# Patient Record
Sex: FEMALE | Race: WHITE | Employment: OTHER | URBAN - METROPOLITAN AREA
[De-identification: names, ages, dates, MRNs, and addresses within clinical notes are randomized per-mention and may not be internally consistent; named-entity substitution may affect disease eponyms.]

---

## 2017-01-17 ENCOUNTER — MED REC SCAN ONLY (OUTPATIENT)
Dept: FAMILY MEDICINE CLINIC | Facility: CLINIC | Age: 82
End: 2017-01-17

## 2017-02-02 RX ORDER — BETAMETHASONE DIPROPIONATE 0.05 %
OINTMENT (GRAM) TOPICAL
Qty: 60 G | Refills: 3 | Status: SHIPPED | OUTPATIENT
Start: 2017-02-02 | End: 2019-06-17

## 2017-02-02 NOTE — TELEPHONE ENCOUNTER
From: Kyle Hernandez  To:  Juaquin Berger MD  Sent: 1/31/2017 10:32 PM CST  Subject: Medication Renewal Request    Original authorizing provider: MD Kyle Palencia would like a refill of the following medications:  Betamethasone Dipro

## 2017-02-02 NOTE — TELEPHONE ENCOUNTER
Refill requested for Betamethasone cream via [facility]. LOV 08/23/16. Will you approve refill ? Routed to Dr Kenji Jones.

## 2017-03-21 ENCOUNTER — MED REC SCAN ONLY (OUTPATIENT)
Dept: FAMILY MEDICINE CLINIC | Facility: CLINIC | Age: 82
End: 2017-03-21

## 2017-03-30 ENCOUNTER — LAB ENCOUNTER (OUTPATIENT)
Dept: LAB | Age: 82
End: 2017-03-30
Attending: INTERNAL MEDICINE
Payer: MEDICARE

## 2017-03-30 ENCOUNTER — PRIOR ORIGINAL RECORDS (OUTPATIENT)
Dept: OTHER | Age: 82
End: 2017-03-30

## 2017-03-30 DIAGNOSIS — I25.10 CORONARY ATHEROSCLEROSIS OF NATIVE CORONARY ARTERY: ICD-10-CM

## 2017-03-30 DIAGNOSIS — E78.00 PURE HYPERCHOLESTEROLEMIA: ICD-10-CM

## 2017-03-30 DIAGNOSIS — I65.23 BILATERAL CAROTID ARTERY STENOSIS: Primary | ICD-10-CM

## 2017-03-30 PROCEDURE — 36415 COLL VENOUS BLD VENIPUNCTURE: CPT

## 2017-03-30 PROCEDURE — 84450 TRANSFERASE (AST) (SGOT): CPT

## 2017-03-30 PROCEDURE — 80061 LIPID PANEL: CPT

## 2017-03-30 PROCEDURE — 80048 BASIC METABOLIC PNL TOTAL CA: CPT

## 2017-03-30 PROCEDURE — 84460 ALANINE AMINO (ALT) (SGPT): CPT

## 2017-04-03 LAB
BUN: 18 MG/DL
CALCIUM: 8.8 MG/DL
CHLORIDE: 109 MEQ/L
CHOLESTEROL, TOTAL: 114 MG/DL
CREATININE, SERUM: 0.81 MG/DL
GLUCOSE: 108 MG/DL
HDL CHOLESTEROL: 49 MG/DL
LDL CHOLESTEROL: 49 MG/DL
POTASSIUM, SERUM: 3.7 MEQ/L
SGOT (AST): 15 IU/L
SGPT (ALT): 18 IU/L
SODIUM: 144 MEQ/L
TRIGLYCERIDES: 81 MG/DL

## 2017-04-10 ENCOUNTER — PRIOR ORIGINAL RECORDS (OUTPATIENT)
Dept: OTHER | Age: 82
End: 2017-04-10

## 2017-07-17 ENCOUNTER — APPOINTMENT (OUTPATIENT)
Dept: LAB | Age: 82
End: 2017-07-17
Attending: FAMILY MEDICINE
Payer: MEDICARE

## 2017-07-17 ENCOUNTER — OFFICE VISIT (OUTPATIENT)
Dept: FAMILY MEDICINE CLINIC | Facility: CLINIC | Age: 82
End: 2017-07-17

## 2017-07-17 VITALS
DIASTOLIC BLOOD PRESSURE: 60 MMHG | BODY MASS INDEX: 25 KG/M2 | SYSTOLIC BLOOD PRESSURE: 116 MMHG | RESPIRATION RATE: 15 BRPM | WEIGHT: 157 LBS | HEART RATE: 100 BPM

## 2017-07-17 DIAGNOSIS — M79.10 MYALGIA: Primary | ICD-10-CM

## 2017-07-17 DIAGNOSIS — E78.00 PURE HYPERCHOLESTEROLEMIA: ICD-10-CM

## 2017-07-17 DIAGNOSIS — H35.30 MACULAR DEGENERATION: ICD-10-CM

## 2017-07-17 DIAGNOSIS — M35.3 PMR (POLYMYALGIA RHEUMATICA) (HCC): ICD-10-CM

## 2017-07-17 DIAGNOSIS — M79.10 MYALGIA: ICD-10-CM

## 2017-07-17 DIAGNOSIS — D75.89 MACROCYTOSIS: ICD-10-CM

## 2017-07-17 DIAGNOSIS — I10 ESSENTIAL HYPERTENSION: ICD-10-CM

## 2017-07-17 DIAGNOSIS — E03.9 HYPOTHYROIDISM, UNSPECIFIED TYPE: ICD-10-CM

## 2017-07-17 LAB
CK: 49 IU/L (ref 26–192)
TSI SER-ACNC: 0.35 MIU/ML (ref 0.35–5.5)

## 2017-07-17 PROCEDURE — 99214 OFFICE O/P EST MOD 30 MIN: CPT | Performed by: FAMILY MEDICINE

## 2017-07-17 PROCEDURE — 82607 VITAMIN B-12: CPT

## 2017-07-17 PROCEDURE — 84443 ASSAY THYROID STIM HORMONE: CPT

## 2017-07-17 PROCEDURE — 85027 COMPLETE CBC AUTOMATED: CPT

## 2017-07-17 PROCEDURE — 80053 COMPREHEN METABOLIC PANEL: CPT

## 2017-07-17 PROCEDURE — 86140 C-REACTIVE PROTEIN: CPT

## 2017-07-17 PROCEDURE — 36415 COLL VENOUS BLD VENIPUNCTURE: CPT

## 2017-07-17 PROCEDURE — 85652 RBC SED RATE AUTOMATED: CPT

## 2017-07-17 PROCEDURE — 82550 ASSAY OF CK (CPK): CPT

## 2017-07-17 NOTE — PROGRESS NOTES
Chief Complaint:  Patient presents with:  Pain: c/o shoulder, arm, leg, and groin pain since May. Taking APAP for pain - takes the edge off.    Thyroid Problem: due for thyroid labs  Other: pt does not wish to have screening exams done (colonoscopy, mammogr History   Problem Relation Age of Onset   • Breast Cancer Sister    • Diabetes Sister    • Heart Attack Father      MI   • Hypertension Mother      Social history:  Smoking status: Former Smoker no obvious depression or anxiety    ASSESSMENT AND PLAN:  Discussed the following assessment   Problem List Items Addressed This Visit        Cardiovascular    Essential hypertension    Pure hypercholesterolemia       Endocrine    Hypothyroidism       Othe

## 2017-07-18 DIAGNOSIS — M35.3 PMR (POLYMYALGIA RHEUMATICA) (HCC): Primary | ICD-10-CM

## 2017-07-18 DIAGNOSIS — E83.51 HYPOCALCEMIA: ICD-10-CM

## 2017-07-18 DIAGNOSIS — M79.10 MYALGIA: ICD-10-CM

## 2017-07-18 DIAGNOSIS — D75.89 MACROCYTOSIS: ICD-10-CM

## 2017-07-18 LAB
ALBUMIN SERPL-MCNC: 3.4 G/DL (ref 3.5–4.8)
ALP LIVER SERPL-CCNC: 106 U/L (ref 55–142)
ALT SERPL-CCNC: 17 U/L (ref 14–54)
AST SERPL-CCNC: 13 U/L (ref 15–41)
BILIRUB SERPL-MCNC: 0.4 MG/DL (ref 0.1–2)
BUN BLD-MCNC: 13 MG/DL (ref 8–20)
C-REACTIVE PROTEIN: 3.78 MG/DL (ref ?–1)
CALCIUM BLD-MCNC: 9.6 MG/DL (ref 8.3–10.3)
CHLORIDE: 107 MMOL/L (ref 101–111)
CO2: 23 MMOL/L (ref 22–32)
CREAT BLD-MCNC: 0.68 MG/DL (ref 0.55–1.02)
ERYTHROCYTE [DISTWIDTH] IN BLOOD BY AUTOMATED COUNT: 13.5 % (ref 11.5–16)
GLUCOSE BLD-MCNC: 74 MG/DL (ref 70–99)
HAV AB SERPL IA-ACNC: 349 PG/ML (ref 193–986)
HCT VFR BLD AUTO: 40.9 % (ref 34–50)
HGB BLD-MCNC: 12.9 G/DL (ref 12–16)
M PROTEIN MFR SERPL ELPH: 8 G/DL (ref 6.1–8.3)
MCH RBC QN AUTO: 32.1 PG (ref 27–33.2)
MCHC RBC AUTO-ENTMCNC: 31.5 G/DL (ref 31–37)
MCV RBC AUTO: 101.7 FL (ref 81–100)
PLATELET # BLD AUTO: 265 10(3)UL (ref 150–450)
POTASSIUM SERPL-SCNC: 4.3 MMOL/L (ref 3.6–5.1)
RBC # BLD AUTO: 4.02 X10(6)UL (ref 3.8–5.1)
RED CELL DISTRIBUTION WIDTH-SD: 51 FL (ref 35.1–46.3)
SED RATE-ML: 53 MM/HR (ref 0–25)
SODIUM SERPL-SCNC: 140 MMOL/L (ref 136–144)
WBC # BLD AUTO: 10.6 X10(3) UL (ref 4–13)

## 2017-07-18 RX ORDER — PREDNISONE 20 MG/1
20 TABLET ORAL DAILY
Qty: 30 TABLET | Refills: 0 | Status: SHIPPED | OUTPATIENT
Start: 2017-07-18 | End: 2017-08-04

## 2017-07-20 PROBLEM — H35.3221 EXUDATIVE AGE-RELATED MACULAR DEGENERATION OF LEFT EYE WITH ACTIVE CHOROIDAL NEOVASCULARIZATION (HCC): Status: ACTIVE | Noted: 2017-07-20

## 2017-07-20 PROBLEM — H35.3112 NONEXUDATIVE AGE-RELATED MACULAR DEGENERATION, RIGHT EYE, INTERMEDIATE DRY STAGE: Status: ACTIVE | Noted: 2017-07-20

## 2017-07-20 PROBLEM — H35.341 MACULAR CYST, HOLE, OR PSEUDOHOLE, RIGHT EYE: Status: ACTIVE | Noted: 2017-07-20

## 2017-08-04 ENCOUNTER — LAB ENCOUNTER (OUTPATIENT)
Dept: LAB | Age: 82
End: 2017-08-04
Attending: FAMILY MEDICINE
Payer: MEDICARE

## 2017-08-04 ENCOUNTER — OFFICE VISIT (OUTPATIENT)
Dept: FAMILY MEDICINE CLINIC | Facility: CLINIC | Age: 82
End: 2017-08-04

## 2017-08-04 VITALS
BODY MASS INDEX: 25 KG/M2 | WEIGHT: 157 LBS | SYSTOLIC BLOOD PRESSURE: 118 MMHG | HEART RATE: 88 BPM | RESPIRATION RATE: 14 BRPM | DIASTOLIC BLOOD PRESSURE: 58 MMHG

## 2017-08-04 DIAGNOSIS — D75.89 MACROCYTOSIS: ICD-10-CM

## 2017-08-04 DIAGNOSIS — M35.3 PMR (POLYMYALGIA RHEUMATICA) (HCC): ICD-10-CM

## 2017-08-04 DIAGNOSIS — E03.9 HYPOTHYROIDISM, UNSPECIFIED TYPE: ICD-10-CM

## 2017-08-04 DIAGNOSIS — E83.51 HYPOCALCEMIA: ICD-10-CM

## 2017-08-04 DIAGNOSIS — M79.10 MYALGIA: ICD-10-CM

## 2017-08-04 DIAGNOSIS — M35.3 PMR (POLYMYALGIA RHEUMATICA) (HCC): Primary | ICD-10-CM

## 2017-08-04 LAB
25-HYDROXYVITAMIN D (TOTAL): 11.7 NG/ML (ref 30–100)
C-REACTIVE PROTEIN: 0.64 MG/DL (ref ?–1)
FOLATE (FOLIC ACID), SERUM: 23.5 NG/ML (ref 8.7–24)
RHEUMATOID FACT SERPL-ACNC: <10 IU/ML (ref ?–15)
SED RATE-ML: 30 MM/HR (ref 0–25)

## 2017-08-04 PROCEDURE — 84165 PROTEIN E-PHORESIS SERUM: CPT

## 2017-08-04 PROCEDURE — 86334 IMMUNOFIX E-PHORESIS SERUM: CPT

## 2017-08-04 PROCEDURE — 86431 RHEUMATOID FACTOR QUANT: CPT

## 2017-08-04 PROCEDURE — 99214 OFFICE O/P EST MOD 30 MIN: CPT | Performed by: FAMILY MEDICINE

## 2017-08-04 PROCEDURE — 36415 COLL VENOUS BLD VENIPUNCTURE: CPT

## 2017-08-04 PROCEDURE — 83883 ASSAY NEPHELOMETRY NOT SPEC: CPT

## 2017-08-04 PROCEDURE — 82306 VITAMIN D 25 HYDROXY: CPT

## 2017-08-04 PROCEDURE — 82746 ASSAY OF FOLIC ACID SERUM: CPT

## 2017-08-04 PROCEDURE — 86140 C-REACTIVE PROTEIN: CPT

## 2017-08-04 PROCEDURE — 85652 RBC SED RATE AUTOMATED: CPT

## 2017-08-04 RX ORDER — LEVOTHYROXINE SODIUM 112 UG/1
112 TABLET ORAL
Qty: 90 TABLET | Refills: 3 | Status: SHIPPED | OUTPATIENT
Start: 2017-08-04 | End: 2018-07-13

## 2017-08-04 RX ORDER — PREDNISONE 20 MG/1
20 TABLET ORAL DAILY
Qty: 30 TABLET | Refills: 0 | Status: SHIPPED | OUTPATIENT
Start: 2017-08-04 | End: 2017-10-26

## 2017-08-04 NOTE — PROGRESS NOTES
Chief Complaint:  Patient presents with:  Medication Follow-Up: dx with polymyalgia rheumatica at [address] [place] [address], put on Prednisone. Here for f/u. Pt reports she is feeling much better.      HPI:  This is a 80year old female patient presenting for Medication Follow-U Alcohol use: Yes           4.2 - 8.4 oz/week     Standard drinks or equivalent: 7 - 14 per week     Comment: 1-2 glass of wine daily        Current Outpatient Prescriptions:  Levothyroxine Sodium 112 MCG Oral Tab Take 1 tablet (112 mcg total) predniSONE 20 MG Oral Tab    Other Relevant Orders    SED RATE, WESTERGREN (AUTOMATED) (Completed)    C-REACTIVE PROTEIN (Completed)      Other Visit Diagnoses    None. Advised the following:  Doreen Sena was seen today for medication follow-up.     Radha

## 2017-08-07 DIAGNOSIS — E55.9 VITAMIN D DEFICIENCY: Primary | ICD-10-CM

## 2017-08-07 PROBLEM — M35.3 PMR (POLYMYALGIA RHEUMATICA) (HCC): Status: ACTIVE | Noted: 2017-08-07

## 2017-08-07 RX ORDER — ERGOCALCIFEROL 1.25 MG/1
50000 CAPSULE ORAL WEEKLY
Qty: 12 CAPSULE | Refills: 0 | Status: SHIPPED | OUTPATIENT
Start: 2017-08-07 | End: 2017-09-01

## 2017-08-07 RX ORDER — ERGOCALCIFEROL 1.25 MG/1
50000 CAPSULE ORAL WEEKLY
Qty: 12 CAPSULE | Refills: 0 | Status: SHIPPED | OUTPATIENT
Start: 2017-08-07 | End: 2017-10-26 | Stop reason: ALTCHOICE

## 2017-08-10 LAB
A/G RATIO: 1.52
ALBUMIN, SERUM: 4.22 G/DL (ref 3.5–4.8)
ALPHA-1 GLOBULIN: 0.22 G/DL (ref 0.1–0.3)
ALPHA-2 GLOBULIN: 1.02 G/DL (ref 0.6–1)
BETA GLOBULIN: 0.86 G/DL (ref 0.7–1.2)
GAMMA GLOBULIN: 0.69 G/DL (ref 0.6–1.6)
KAPPA FREE LIGHT CHAIN: 1.75 MG/DL (ref 0.33–1.94)
KAPPA/LAMBDA FLC RATIO: 0.94 (ref 0.26–1.65)
LAMBDA FREE LIGHT CHAIN: 1.87 MG/DL (ref 0.57–2.63)
TOTAL PROTEIN,SERUM: 7 G/DL (ref 6.1–8.3)

## 2017-08-30 ENCOUNTER — TELEPHONE (OUTPATIENT)
Dept: FAMILY MEDICINE CLINIC | Facility: CLINIC | Age: 82
End: 2017-08-30

## 2017-08-30 NOTE — TELEPHONE ENCOUNTER
LMOM for pt to call back and answer her Annual Assessment Form for appt w/Dr Hall on 9/1/17 (form by daily checklist)

## 2017-09-01 ENCOUNTER — OFFICE VISIT (OUTPATIENT)
Dept: FAMILY MEDICINE CLINIC | Facility: CLINIC | Age: 82
End: 2017-09-01

## 2017-09-01 ENCOUNTER — LAB ENCOUNTER (OUTPATIENT)
Dept: LAB | Age: 82
End: 2017-09-01
Attending: FAMILY MEDICINE
Payer: MEDICARE

## 2017-09-01 VITALS
BODY MASS INDEX: 25.3 KG/M2 | SYSTOLIC BLOOD PRESSURE: 140 MMHG | DIASTOLIC BLOOD PRESSURE: 62 MMHG | WEIGHT: 161.19 LBS | HEART RATE: 100 BPM | HEIGHT: 67 IN

## 2017-09-01 DIAGNOSIS — Z00.00 ENCOUNTER FOR ANNUAL HEALTH EXAMINATION: ICD-10-CM

## 2017-09-01 DIAGNOSIS — Z28.21 IMMUNIZATION NOT CARRIED OUT BECAUSE OF PATIENT REFUSAL: ICD-10-CM

## 2017-09-01 DIAGNOSIS — M35.3 PMR (POLYMYALGIA RHEUMATICA) (HCC): ICD-10-CM

## 2017-09-01 DIAGNOSIS — Z13.31 DEPRESSION SCREENING: ICD-10-CM

## 2017-09-01 DIAGNOSIS — D75.89 MACROCYTOSIS: ICD-10-CM

## 2017-09-01 DIAGNOSIS — M79.10 MYALGIA: ICD-10-CM

## 2017-09-01 DIAGNOSIS — E83.51 HYPOCALCEMIA: ICD-10-CM

## 2017-09-01 LAB
BASOPHILS # BLD AUTO: 0.05 X10(3) UL (ref 0–0.1)
BASOPHILS NFR BLD AUTO: 0.4 %
BILIRUB UR QL STRIP.AUTO: NEGATIVE
C-REACTIVE PROTEIN: 1.38 MG/DL (ref ?–1)
COLOR UR AUTO: YELLOW
EOSINOPHIL # BLD AUTO: 0 X10(3) UL (ref 0–0.3)
EOSINOPHIL NFR BLD AUTO: 0 %
ERYTHROCYTE [DISTWIDTH] IN BLOOD BY AUTOMATED COUNT: 13.8 % (ref 11.5–16)
GLUCOSE UR STRIP.AUTO-MCNC: NEGATIVE MG/DL
HCT VFR BLD AUTO: 38.9 % (ref 34–50)
HGB BLD-MCNC: 12.5 G/DL (ref 12–16)
IMMATURE GRANULOCYTE COUNT: 0.16 X10(3) UL (ref 0–1)
IMMATURE GRANULOCYTE RATIO %: 1.1 %
KETONES UR STRIP.AUTO-MCNC: NEGATIVE MG/DL
LYMPHOCYTES # BLD AUTO: 0.58 X10(3) UL (ref 0.9–4)
LYMPHOCYTES NFR BLD AUTO: 4.1 %
MCH RBC QN AUTO: 32.4 PG (ref 27–33.2)
MCHC RBC AUTO-ENTMCNC: 32.1 G/DL (ref 31–37)
MCV RBC AUTO: 100.8 FL (ref 81–100)
MONOCYTES # BLD AUTO: 0.41 X10(3) UL (ref 0.1–0.6)
MONOCYTES NFR BLD AUTO: 2.9 %
NEUTROPHIL ABS PRELIM: 13.07 X10 (3) UL (ref 1.3–6.7)
NEUTROPHILS # BLD AUTO: 13.07 X10(3) UL (ref 1.3–6.7)
NEUTROPHILS NFR BLD AUTO: 91.5 %
NITRITE UR QL STRIP.AUTO: NEGATIVE
PH UR STRIP.AUTO: 5 [PH] (ref 4.5–8)
PLATELET # BLD AUTO: 210 10(3)UL (ref 150–450)
PROT UR STRIP.AUTO-MCNC: NEGATIVE MG/DL
RBC # BLD AUTO: 3.86 X10(6)UL (ref 3.8–5.1)
RBC UR QL AUTO: NEGATIVE
RED CELL DISTRIBUTION WIDTH-SD: 51.8 FL (ref 35.1–46.3)
SED RATE-ML: 27 MM/HR (ref 0–25)
SP GR UR STRIP.AUTO: 1.01 (ref 1–1.03)
UROBILINOGEN UR STRIP.AUTO-MCNC: <2 MG/DL
WBC # BLD AUTO: 14.3 X10(3) UL (ref 4–13)

## 2017-09-01 PROCEDURE — 36415 COLL VENOUS BLD VENIPUNCTURE: CPT

## 2017-09-01 PROCEDURE — 85652 RBC SED RATE AUTOMATED: CPT

## 2017-09-01 PROCEDURE — 86140 C-REACTIVE PROTEIN: CPT

## 2017-09-01 PROCEDURE — G0439 PPPS, SUBSEQ VISIT: HCPCS | Performed by: FAMILY MEDICINE

## 2017-09-01 PROCEDURE — 81001 URINALYSIS AUTO W/SCOPE: CPT | Performed by: FAMILY MEDICINE

## 2017-09-01 PROCEDURE — G0444 DEPRESSION SCREEN ANNUAL: HCPCS | Performed by: FAMILY MEDICINE

## 2017-09-01 PROCEDURE — 85025 COMPLETE CBC W/AUTO DIFF WBC: CPT

## 2017-09-01 NOTE — PROGRESS NOTES
HPI:   Pastora Carroll is a 80year old female who presents for a Medicare Subsequent Annual Wellness visit (Pt already had Initial Annual Wellness). Patient following up on PMR. At this time, notes that she is feeling well.  Does have some AM stiffnes (50,000 Units total) by mouth once a week. Levothyroxine Sodium 112 MCG Oral Tab Take 1 tablet (112 mcg total) by mouth once daily. predniSONE 20 MG Oral Tab Take 1 tablet (20 mg total) by mouth daily.    Betamethasone Dipropionate 0.05 % External Ointm asthma    EXAM:   /62   Pulse 100   Ht 67\"   Wt 161 lb 3.2 oz   BMI 25.25 kg/m²  Estimated body mass index is 25.25 kg/m² as calculated from the following:    Height as of this encounter: 67\". Weight as of this encounter: 161 lb 3.2 oz.     Medic Braxton Moe is a 80year old female who presents for a Medicare Assessment. PLAN SUMMARY:   Diagnoses and all orders for this visit:    PMR (polymyalgia rheumatica) ([facility] [address]  Now on prednisone for 1 month. Notes improvement in symptoms.  Will repeat describe your current health state?: Fair    How do you maintain positive mental well-being?: Social Interaction; Visiting Friends; Visiting Family    If you are a male age 38-65 or a female age 47-67, do you take aspirin?: Yes    Have you had any immunizati input here.   Cognitive Assessment     What day of the week is this?: Correct    What month is it?: Correct    What year is it?: Correct    Recall \"Ball\": Correct    Recall \"Flag\": Correct    Recall \"Tree\": Correct       This section provided for quic Screening Mammogram      Mammogram Annually to 76, then as discussed Mammogram,1 Yr due on 10/26/2013 Update Health Maintenance if applicable     Immunizations (Update Immunization Activity if applicable)     Influenza  Covered Annually  Please get ever 05/03/2013 5.9 (H)    No flowsheet data found. Creat/alb ratio  Annually      LDL  Annually LDL CHOLESTROL (mg/dL)   Date Value   10/07/2013 72     LDL Cholesterol (mg/dL)   Date Value   03/30/2017 49    No flowsheet data found.      Dilated Eye exam

## 2017-09-01 NOTE — PATIENT INSTRUCTIONS
Courtney Montgomery's SCREENING SCHEDULE   Tests on this list are recommended by your physician but may not be covered, or covered at this frequency, by your insurer. Please check with your insurance carrier before scheduling to verify coverage.    PREVENTATI criteria:   • Men who are 73-68 years old and have smoked more than 100 cigarettes in their lifetime   • Anyone with a family history    Colorectal Cancer Screening  Covered up to Age 76     Colonoscopy Screen   Covered every 10 years- more often if abnorm Immunizations      Influenza  Covered Annually No orders found for this or any previous visit. Please get every year    Pneumococcal 13 (Prevnar)  Covered Once after 65 No orders found for this or any previous visit.  Please get once after your 65th birth

## 2017-09-06 DIAGNOSIS — M35.3 PMR (POLYMYALGIA RHEUMATICA) (HCC): Primary | ICD-10-CM

## 2017-09-06 RX ORDER — PREDNISONE 1 MG/1
TABLET ORAL
Qty: 150 TABLET | Refills: 0 | Status: SHIPPED | OUTPATIENT
Start: 2017-09-06 | End: 2019-06-17 | Stop reason: ALTCHOICE

## 2017-09-06 RX ORDER — PREDNISONE 10 MG/1
10 TABLET ORAL DAILY
Qty: 90 TABLET | Refills: 0 | Status: SHIPPED | OUTPATIENT
Start: 2017-09-06 | End: 2017-10-26

## 2017-09-29 ENCOUNTER — LAB ENCOUNTER (OUTPATIENT)
Dept: LAB | Age: 82
End: 2017-09-29
Attending: FAMILY MEDICINE
Payer: MEDICARE

## 2017-09-29 ENCOUNTER — OFFICE VISIT (OUTPATIENT)
Dept: FAMILY MEDICINE CLINIC | Facility: CLINIC | Age: 82
End: 2017-09-29

## 2017-09-29 VITALS
TEMPERATURE: 99 F | RESPIRATION RATE: 12 BRPM | SYSTOLIC BLOOD PRESSURE: 122 MMHG | WEIGHT: 159.63 LBS | DIASTOLIC BLOOD PRESSURE: 74 MMHG | HEART RATE: 72 BPM | HEIGHT: 67 IN | BODY MASS INDEX: 25.06 KG/M2

## 2017-09-29 DIAGNOSIS — M35.3 PMR (POLYMYALGIA RHEUMATICA) (HCC): Primary | ICD-10-CM

## 2017-09-29 DIAGNOSIS — M35.3 PMR (POLYMYALGIA RHEUMATICA) (HCC): ICD-10-CM

## 2017-09-29 PROCEDURE — 36415 COLL VENOUS BLD VENIPUNCTURE: CPT

## 2017-09-29 PROCEDURE — 85652 RBC SED RATE AUTOMATED: CPT

## 2017-09-29 PROCEDURE — 90686 IIV4 VACC NO PRSV 0.5 ML IM: CPT | Performed by: FAMILY MEDICINE

## 2017-09-29 PROCEDURE — 85025 COMPLETE CBC W/AUTO DIFF WBC: CPT

## 2017-09-29 PROCEDURE — 99214 OFFICE O/P EST MOD 30 MIN: CPT | Performed by: FAMILY MEDICINE

## 2017-09-29 PROCEDURE — 80053 COMPREHEN METABOLIC PANEL: CPT

## 2017-09-29 PROCEDURE — G0008 ADMIN INFLUENZA VIRUS VAC: HCPCS | Performed by: FAMILY MEDICINE

## 2017-09-29 PROCEDURE — 86140 C-REACTIVE PROTEIN: CPT

## 2017-09-29 NOTE — PROGRESS NOTES
Chief Complaint:  Patient presents with:  Medication Follow-Up    HPI:  This is a 80year old female patient presenting for Medication Follow-Up    Patient notes that she is feeling better.    Notes that in the morning she has a little stiffness but by the tablet daily. Disp: 150 tablet Rfl: 0   ergocalciferol 21885 units Oral Cap Take 1 capsule (50,000 Units total) by mouth once a week. Disp: 12 capsule Rfl: 0   Levothyroxine Sodium 112 MCG Oral Tab Take 1 tablet (112 mcg total) by mouth once daily.  Disp: 9 medication follow-up. Diagnoses and all orders for this visit:    PMR (polymyalgia rheumatica) ([facility]  Symptoms continue to improve without relapse. Will recheck labs today. Plan to wean to 10mg and maintain on this for 4 weeks.  IF tolerated, will plan t

## 2017-10-09 ENCOUNTER — PRIOR ORIGINAL RECORDS (OUTPATIENT)
Dept: OTHER | Age: 82
End: 2017-10-09

## 2017-10-16 ENCOUNTER — TELEPHONE (OUTPATIENT)
Dept: FAMILY MEDICINE CLINIC | Facility: CLINIC | Age: 82
End: 2017-10-16

## 2017-10-16 DIAGNOSIS — M35.3 PMR (POLYMYALGIA RHEUMATICA) (HCC): Primary | ICD-10-CM

## 2017-10-16 DIAGNOSIS — M35.3 PMR (POLYMYALGIA RHEUMATICA) (HCC): ICD-10-CM

## 2017-10-16 RX ORDER — PREDNISONE 1 MG/1
2 TABLET ORAL
Qty: 60 TABLET | Refills: 0 | Status: SHIPPED | OUTPATIENT
Start: 2017-10-16 | End: 2017-11-21

## 2017-10-16 RX ORDER — PREDNISONE 1 MG/1
TABLET ORAL
Qty: 150 TABLET | Refills: 0 | Status: CANCELLED
Start: 2017-10-16

## 2017-10-16 NOTE — TELEPHONE ENCOUNTER
I recommend patient go back to 12 mg prednisone daily. I will send in 1mg tablets. We may need to have her see rheumatology if no improvement. Let's get her in to see me this week if possible. Please let me know if you have any questions.   Mony Yanes

## 2017-10-16 NOTE — TELEPHONE ENCOUNTER
From: Lilian Hearing  Sent: 10/16/2017 1:58 PM CDT  Subject: Medication Renewal Request    Jeffery Montgomery would like a refill of the following medications:     predniSONE 1 MG Oral Tab [id number], DO]    Preferred pharmacy: [address] [place] [address],[address]

## 2017-10-16 NOTE — TELEPHONE ENCOUNTER
LOV 9/29/17. Future Appointments  Date Time Provider Jae Mcbride   10/26/2017 1:30 PM Mony Hall,  [facility] [place]     Pt was advised to decrease Prednisone to 10 mg daily at [address] [place] [address]. Pt notes that she is achy all over, has no appetite, and is jessica

## 2017-10-16 NOTE — TELEPHONE ENCOUNTER
sharon called back gave her instructions from Dr Monika Chauhan she will discuss it further at up coming appointment

## 2017-10-16 NOTE — TELEPHONE ENCOUNTER
Patient was being weened off of Prednisone and was put on 10mg and states that she is having a lot of pain and believes that she needs to be put back up on her dosage, patient states very painful

## 2017-10-26 ENCOUNTER — OFFICE VISIT (OUTPATIENT)
Dept: FAMILY MEDICINE CLINIC | Facility: CLINIC | Age: 82
End: 2017-10-26

## 2017-10-26 ENCOUNTER — LAB ENCOUNTER (OUTPATIENT)
Dept: LAB | Age: 82
End: 2017-10-26
Attending: FAMILY MEDICINE
Payer: MEDICARE

## 2017-10-26 VITALS
SYSTOLIC BLOOD PRESSURE: 130 MMHG | WEIGHT: 160.38 LBS | TEMPERATURE: 99 F | RESPIRATION RATE: 16 BRPM | BODY MASS INDEX: 25 KG/M2 | DIASTOLIC BLOOD PRESSURE: 60 MMHG | HEART RATE: 80 BPM

## 2017-10-26 DIAGNOSIS — E55.9 VITAMIN D DEFICIENCY: ICD-10-CM

## 2017-10-26 DIAGNOSIS — M35.3 PMR (POLYMYALGIA RHEUMATICA) (HCC): Primary | ICD-10-CM

## 2017-10-26 DIAGNOSIS — M35.3 PMR (POLYMYALGIA RHEUMATICA) (HCC): ICD-10-CM

## 2017-10-26 DIAGNOSIS — R73.01 ELEVATED FASTING GLUCOSE: ICD-10-CM

## 2017-10-26 PROCEDURE — 36415 COLL VENOUS BLD VENIPUNCTURE: CPT

## 2017-10-26 PROCEDURE — 82306 VITAMIN D 25 HYDROXY: CPT

## 2017-10-26 PROCEDURE — 83036 HEMOGLOBIN GLYCOSYLATED A1C: CPT

## 2017-10-26 PROCEDURE — 99213 OFFICE O/P EST LOW 20 MIN: CPT | Performed by: FAMILY MEDICINE

## 2017-10-26 PROCEDURE — 80053 COMPREHEN METABOLIC PANEL: CPT

## 2017-10-26 PROCEDURE — 86140 C-REACTIVE PROTEIN: CPT

## 2017-10-26 PROCEDURE — 85652 RBC SED RATE AUTOMATED: CPT

## 2017-10-26 PROCEDURE — 85025 COMPLETE CBC W/AUTO DIFF WBC: CPT

## 2017-10-26 RX ORDER — PREDNISONE 10 MG/1
10 TABLET ORAL DAILY
Qty: 90 TABLET | Refills: 0 | Status: SHIPPED | OUTPATIENT
Start: 2017-10-26 | End: 2017-11-21

## 2017-10-26 RX ORDER — PREDNISONE 1 MG/1
5 TABLET ORAL DAILY
Qty: 30 TABLET | Refills: 0 | Status: SHIPPED | OUTPATIENT
Start: 2017-10-26 | End: 2017-11-21

## 2017-10-26 NOTE — PATIENT INSTRUCTIONS
WE are increasing your prednisone to 15mg daily (one 10mg tablet and one 5 mg tablet daily). We will continue this until we see each other back.     We may change a few things based on labs today:    If vitamin D is normal, I would recommend you start 2000

## 2017-10-26 NOTE — PROGRESS NOTES
Chief Complaint:  Patient presents with:  PMR: taper down on Prednisone did not go well- currently on 13 mg/////// now has 40 of the 10mgs and 41 of the 1 mgs    HPI:  This is a 80year old female patient presenting for PMR (taper down on Prednisone did no Diabetes Sister    • Heart Attack Father      MI   • Hypertension Mother      Social history:  Smoking status: Former Smoker                                                              Packs/day: 0.00      Years: 0.00         Quit date: 1/1/1972  [place] and listed above, alert, oriented, appears well hydrated and in no acute distress  HEENT: atraumatic, conjunctiva clear, no obvious abnormalities on inspection   LUNGS: clear to auscultation bilaterally, no wheezes, rales or rhonchi, good air movement  CV: DO Rafael  10/26/2017 1:30 PM  Family Medicine

## 2017-11-07 ENCOUNTER — PRIOR ORIGINAL RECORDS (OUTPATIENT)
Dept: OTHER | Age: 82
End: 2017-11-07

## 2017-11-20 ENCOUNTER — APPOINTMENT (OUTPATIENT)
Dept: LAB | Age: 82
End: 2017-11-20
Attending: FAMILY MEDICINE
Payer: MEDICARE

## 2017-11-20 ENCOUNTER — OFFICE VISIT (OUTPATIENT)
Dept: FAMILY MEDICINE CLINIC | Facility: CLINIC | Age: 82
End: 2017-11-20

## 2017-11-20 VITALS
BODY MASS INDEX: 25.06 KG/M2 | SYSTOLIC BLOOD PRESSURE: 132 MMHG | RESPIRATION RATE: 16 BRPM | HEART RATE: 88 BPM | WEIGHT: 159.63 LBS | HEIGHT: 67 IN | DIASTOLIC BLOOD PRESSURE: 62 MMHG

## 2017-11-20 DIAGNOSIS — Z79.899 HIGH RISK MEDICATIONS (NOT ANTICOAGULANTS) LONG-TERM USE: ICD-10-CM

## 2017-11-20 DIAGNOSIS — M35.3 PMR (POLYMYALGIA RHEUMATICA) (HCC): Primary | ICD-10-CM

## 2017-11-20 DIAGNOSIS — R73.9 ELEVATED BLOOD SUGAR: ICD-10-CM

## 2017-11-20 DIAGNOSIS — M35.3 PMR (POLYMYALGIA RHEUMATICA) (HCC): ICD-10-CM

## 2017-11-20 PROCEDURE — 36415 COLL VENOUS BLD VENIPUNCTURE: CPT

## 2017-11-20 PROCEDURE — 99214 OFFICE O/P EST MOD 30 MIN: CPT | Performed by: FAMILY MEDICINE

## 2017-11-20 PROCEDURE — 86140 C-REACTIVE PROTEIN: CPT

## 2017-11-20 PROCEDURE — 85652 RBC SED RATE AUTOMATED: CPT

## 2017-11-20 NOTE — PROGRESS NOTES
Chief Complaint:  Patient presents with:  PMR: follow up. HPI:  This is a 80year old female patient presenting for PMR (follow up.)    Last visit noted to be in a relapse. Increased to 15mg prednisone daily. Today, patient notes she is feeling better. Disp: 30 tablet Rfl: 0   predniSONE 10 MG Oral Tab Take 1 tablet (10 mg total) by mouth daily. In addition to 5mg tablets. Disp: 90 tablet Rfl: 0   Levothyroxine Sodium 112 MCG Oral Tab Take 1 tablet (112 mcg total) by mouth once daily.  Disp: 90 tablet Rfl RATE, WESTDAGOREN (AUTOMATED)    C-REACTIVE PROTEIN      Other Visit Diagnoses     Elevated blood sugar        High risk medications (not anticoagulants) long-term use              Advised the following:  Summer Rand was seen today for pmr.     Diagnoses and all

## 2017-11-21 DIAGNOSIS — M35.3 PMR (POLYMYALGIA RHEUMATICA) (HCC): ICD-10-CM

## 2017-11-21 RX ORDER — PREDNISONE 1 MG/1
5 TABLET ORAL DAILY
Qty: 60 TABLET | Refills: 1 | Status: SHIPPED | OUTPATIENT
Start: 2017-11-21 | End: 2018-03-12

## 2017-11-21 RX ORDER — PREDNISONE 10 MG/1
10 TABLET ORAL DAILY
Qty: 90 TABLET | Refills: 0 | Status: SHIPPED | OUTPATIENT
Start: 2017-11-21 | End: 2018-01-15

## 2017-11-21 RX ORDER — PREDNISONE 1 MG/1
2 TABLET ORAL
Qty: 120 TABLET | Refills: 1 | Status: SHIPPED | OUTPATIENT
Start: 2017-11-21 | End: 2018-03-12

## 2017-12-18 ENCOUNTER — OFFICE VISIT (OUTPATIENT)
Dept: FAMILY MEDICINE CLINIC | Facility: CLINIC | Age: 82
End: 2017-12-18

## 2017-12-18 ENCOUNTER — APPOINTMENT (OUTPATIENT)
Dept: LAB | Age: 82
End: 2017-12-18
Attending: FAMILY MEDICINE
Payer: MEDICARE

## 2017-12-18 VITALS
SYSTOLIC BLOOD PRESSURE: 138 MMHG | HEIGHT: 67 IN | DIASTOLIC BLOOD PRESSURE: 68 MMHG | BODY MASS INDEX: 25.14 KG/M2 | TEMPERATURE: 98 F | WEIGHT: 160.19 LBS | RESPIRATION RATE: 16 BRPM | HEART RATE: 84 BPM

## 2017-12-18 DIAGNOSIS — M35.3 PMR (POLYMYALGIA RHEUMATICA) (HCC): Primary | ICD-10-CM

## 2017-12-18 DIAGNOSIS — M35.3 PMR (POLYMYALGIA RHEUMATICA) (HCC): ICD-10-CM

## 2017-12-18 DIAGNOSIS — R22.2 SUPRACLAVICULAR MASS: ICD-10-CM

## 2017-12-18 PROCEDURE — 86140 C-REACTIVE PROTEIN: CPT

## 2017-12-18 PROCEDURE — 99213 OFFICE O/P EST LOW 20 MIN: CPT | Performed by: FAMILY MEDICINE

## 2017-12-18 PROCEDURE — 85652 RBC SED RATE AUTOMATED: CPT

## 2017-12-18 PROCEDURE — 36415 COLL VENOUS BLD VENIPUNCTURE: CPT

## 2017-12-18 NOTE — PROGRESS NOTES
Chief Complaint:  Patient presents with:  PMR: 1 month f/u. Here to possibly adjust the prednisone. Currently taking prednisone 17mg  Blood Pressure: 148/70 and 138/68     HPI:  This is a 80year old female patient presenting for PMR (1 month f/u. Here to po and 1mg tablets. Disp: 90 tablet Rfl: 0   predniSONE 5 MG Oral Tab Take 1 tablet (5 mg total) by mouth daily. Add to 10 mg tablets and 1 mg tablets daily for total of 17 mg daily.  Disp: 60 tablet Rfl: 1   predniSONE 1 MG Oral Tab Take 2 tablets (2 mg total EXTREMITIES: warm and well perfused  PSYCH: pleasant and cooperative, no obvious depression or anxiety    ASSESSMENT AND PLAN:  Discussed the following assessment   Problem List Items Addressed This Visit        Immune    PMR (polymyalgia rheumatica) ([facility]

## 2017-12-26 ENCOUNTER — HOSPITAL ENCOUNTER (OUTPATIENT)
Dept: ULTRASOUND IMAGING | Age: 82
Discharge: HOME | End: 2017-12-26
Attending: FAMILY MEDICINE
Payer: MEDICARE

## 2017-12-26 DIAGNOSIS — R22.2 SUPRACLAVICULAR MASS: ICD-10-CM

## 2017-12-26 PROCEDURE — 76536 US EXAM OF HEAD AND NECK: CPT | Performed by: FAMILY MEDICINE

## 2018-01-05 ENCOUNTER — TELEPHONE (OUTPATIENT)
Dept: FAMILY MEDICINE CLINIC | Facility: CLINIC | Age: 83
End: 2018-01-05

## 2018-01-05 NOTE — TELEPHONE ENCOUNTER
Patient notified of below directives. Patient verbalized understanding and agrees with plan.       Future Appointments  Date Time Provider Jae Mcbride   1/15/2018 1:30 PM Mony Hall,  [facility] [place]

## 2018-01-05 NOTE — TELEPHONE ENCOUNTER
Pt was put on 16mg of prednisone x 2 wks. Pt reports she is feeling fine - everything went well on this dose. Pt is wondering if she should go down to 15mg now?     Routed to Dr. Lesly iDallo

## 2018-01-05 NOTE — TELEPHONE ENCOUNTER
She can decrease to 15mg daily until we follow up. Please let me know if you have any questions.   [id number], DO 1/5/2018 2:58 PM

## 2018-01-05 NOTE — TELEPHONE ENCOUNTER
Patient is calling with an update for Dr. Shraddha Santana stating her script for   PredniSONE (Tab) DELTASONE 10 MG Oral Take 1 tablet (10 mg total) by mouth daily. In addition to 5mg and 1mg tablets.       PredniSONE (Tab) DELTASONE 5 MG       PredniSONE (Tab) DELT

## 2018-01-15 ENCOUNTER — OFFICE VISIT (OUTPATIENT)
Dept: FAMILY MEDICINE CLINIC | Facility: CLINIC | Age: 83
End: 2018-01-15

## 2018-01-15 VITALS
HEIGHT: 67 IN | SYSTOLIC BLOOD PRESSURE: 126 MMHG | TEMPERATURE: 98 F | RESPIRATION RATE: 14 BRPM | BODY MASS INDEX: 24.96 KG/M2 | DIASTOLIC BLOOD PRESSURE: 64 MMHG | HEART RATE: 88 BPM | WEIGHT: 159 LBS

## 2018-01-15 DIAGNOSIS — R73.03 PREDIABETES: ICD-10-CM

## 2018-01-15 DIAGNOSIS — M35.3 PMR (POLYMYALGIA RHEUMATICA) (HCC): Primary | ICD-10-CM

## 2018-01-15 DIAGNOSIS — E78.00 PURE HYPERCHOLESTEROLEMIA: ICD-10-CM

## 2018-01-15 DIAGNOSIS — Z79.52 LONG TERM (CURRENT) USE OF SYSTEMIC STEROIDS: ICD-10-CM

## 2018-01-15 DIAGNOSIS — I10 ESSENTIAL HYPERTENSION: ICD-10-CM

## 2018-01-15 LAB
CARTRIDGE LOT#: 780 NUMERIC
HEMOGLOBIN A1C: 6 % (ref 4.3–5.6)

## 2018-01-15 PROCEDURE — 83036 HEMOGLOBIN GLYCOSYLATED A1C: CPT | Performed by: FAMILY MEDICINE

## 2018-01-15 PROCEDURE — 99214 OFFICE O/P EST MOD 30 MIN: CPT | Performed by: FAMILY MEDICINE

## 2018-01-15 RX ORDER — PREDNISONE 10 MG/1
10 TABLET ORAL DAILY
Qty: 90 TABLET | Refills: 0 | Status: SHIPPED | OUTPATIENT
Start: 2018-01-15 | End: 2018-05-23

## 2018-01-15 NOTE — PROGRESS NOTES
Chief Complaint:  Patient presents with:  PMR: 4 week f/u . Pt has been taking 15 mg of Prednisone. HPI:  This is a 80year old female patient presenting for PMR (4 week f/u .  Pt has been taking 15 mg of Prednisone. )    PMR:  Decreased from 16 to 15mg Comment: 1/2 to 1 glass of wine daily        Current Outpatient Prescriptions:  predniSONE 10 MG Oral Tab Take 1 tablet (10 mg total) by mouth daily. In addition to 1mg tablets.  Disp: 90 tablet Rfl: 0   predniSONE 5 MG Oral Tab Take 1 tablet (5 mg total) peripheral edema   EXTREMITIES: warm and well perfused  SKIN: No active bleeding or bruising  PSYCH: pleasant and cooperative, no obvious depression or anxiety    ASSESSMENT AND PLAN:  Discussed the following assessment   Problem List Items Addressed This use  -     HEMOGLOBIN A1C: 6.0%, improved from 6.1%. To cont diet changes. RTC 1 month.   Jennifer Click, DO  1/15/2018 1:30 PM  Family Medicine

## 2018-01-25 ENCOUNTER — PRIOR ORIGINAL RECORDS (OUTPATIENT)
Dept: OTHER | Age: 83
End: 2018-01-25

## 2018-01-25 ENCOUNTER — LAB ENCOUNTER (OUTPATIENT)
Dept: LAB | Age: 83
End: 2018-01-25
Attending: FAMILY MEDICINE
Payer: MEDICARE

## 2018-01-25 DIAGNOSIS — I10 ESSENTIAL HYPERTENSION: ICD-10-CM

## 2018-01-25 DIAGNOSIS — Z79.52 LONG TERM (CURRENT) USE OF SYSTEMIC STEROIDS: ICD-10-CM

## 2018-01-25 DIAGNOSIS — M35.3 PMR (POLYMYALGIA RHEUMATICA) (HCC): ICD-10-CM

## 2018-01-25 DIAGNOSIS — E78.00 PURE HYPERCHOLESTEROLEMIA: ICD-10-CM

## 2018-01-25 LAB
ALBUMIN SERPL-MCNC: 3.4 G/DL (ref 3.5–4.8)
ALP LIVER SERPL-CCNC: 61 U/L (ref 55–142)
ALT SERPL-CCNC: 24 U/L (ref 14–54)
AST SERPL-CCNC: 13 U/L (ref 15–41)
BASOPHILS # BLD AUTO: 0.05 X10(3) UL (ref 0–0.1)
BASOPHILS NFR BLD AUTO: 0.4 %
BILIRUB SERPL-MCNC: 0.6 MG/DL (ref 0.1–2)
BUN BLD-MCNC: 18 MG/DL (ref 8–20)
C-REACTIVE PROTEIN: 1.58 MG/DL (ref ?–1)
CALCIUM BLD-MCNC: 8.8 MG/DL (ref 8.3–10.3)
CHLORIDE: 107 MMOL/L (ref 101–111)
CHOLEST SMN-MCNC: 135 MG/DL (ref ?–200)
CO2: 28 MMOL/L (ref 22–32)
CREAT BLD-MCNC: 0.77 MG/DL (ref 0.55–1.02)
EOSINOPHIL # BLD AUTO: 0.17 X10(3) UL (ref 0–0.3)
EOSINOPHIL NFR BLD AUTO: 1.4 %
ERYTHROCYTE [DISTWIDTH] IN BLOOD BY AUTOMATED COUNT: 14.4 % (ref 11.5–16)
GLUCOSE BLD-MCNC: 96 MG/DL (ref 70–99)
HCT VFR BLD AUTO: 40.3 % (ref 34–50)
HDLC SERPL-MCNC: 56 MG/DL (ref 45–?)
HDLC SERPL: 2.41 {RATIO} (ref ?–4.44)
HGB BLD-MCNC: 12.8 G/DL (ref 12–16)
IMMATURE GRANULOCYTE COUNT: 0.07 X10(3) UL (ref 0–1)
IMMATURE GRANULOCYTE RATIO %: 0.6 %
LDLC SERPL CALC-MCNC: 56 MG/DL (ref ?–130)
LYMPHOCYTES # BLD AUTO: 2.36 X10(3) UL (ref 0.9–4)
LYMPHOCYTES NFR BLD AUTO: 19.7 %
M PROTEIN MFR SERPL ELPH: 7.3 G/DL (ref 6.1–8.3)
MCH RBC QN AUTO: 31.4 PG (ref 27–33.2)
MCHC RBC AUTO-ENTMCNC: 31.8 G/DL (ref 31–37)
MCV RBC AUTO: 99 FL (ref 81–100)
MONOCYTES # BLD AUTO: 1.28 X10(3) UL (ref 0.1–0.6)
MONOCYTES NFR BLD AUTO: 10.7 %
NEUTROPHIL ABS PRELIM: 8.05 X10 (3) UL (ref 1.3–6.7)
NEUTROPHILS # BLD AUTO: 8.05 X10(3) UL (ref 1.3–6.7)
NEUTROPHILS NFR BLD AUTO: 67.2 %
NONHDLC SERPL-MCNC: 79 MG/DL (ref ?–130)
PLATELET # BLD AUTO: 219 10(3)UL (ref 150–450)
POTASSIUM SERPL-SCNC: 3.7 MMOL/L (ref 3.6–5.1)
RBC # BLD AUTO: 4.07 X10(6)UL (ref 3.8–5.1)
RED CELL DISTRIBUTION WIDTH-SD: 53.4 FL (ref 35.1–46.3)
SED RATE-ML: 22 MM/HR (ref 0–25)
SODIUM SERPL-SCNC: 142 MMOL/L (ref 136–144)
TRIGL SERPL-MCNC: 115 MG/DL (ref ?–150)
VLDLC SERPL CALC-MCNC: 23 MG/DL (ref 5–40)
WBC # BLD AUTO: 12 X10(3) UL (ref 4–13)

## 2018-01-25 PROCEDURE — 36415 COLL VENOUS BLD VENIPUNCTURE: CPT

## 2018-01-25 PROCEDURE — 86140 C-REACTIVE PROTEIN: CPT

## 2018-01-25 PROCEDURE — 85025 COMPLETE CBC W/AUTO DIFF WBC: CPT

## 2018-01-25 PROCEDURE — 80061 LIPID PANEL: CPT

## 2018-01-25 PROCEDURE — 80053 COMPREHEN METABOLIC PANEL: CPT

## 2018-01-25 PROCEDURE — 85652 RBC SED RATE AUTOMATED: CPT

## 2018-02-12 ENCOUNTER — OFFICE VISIT (OUTPATIENT)
Dept: FAMILY MEDICINE CLINIC | Facility: CLINIC | Age: 83
End: 2018-02-12

## 2018-02-12 VITALS
DIASTOLIC BLOOD PRESSURE: 60 MMHG | HEIGHT: 67 IN | TEMPERATURE: 99 F | SYSTOLIC BLOOD PRESSURE: 130 MMHG | RESPIRATION RATE: 12 BRPM | WEIGHT: 158.38 LBS | HEART RATE: 72 BPM | BODY MASS INDEX: 24.86 KG/M2

## 2018-02-12 DIAGNOSIS — M35.3 PMR (POLYMYALGIA RHEUMATICA) (HCC): Primary | ICD-10-CM

## 2018-02-12 PROCEDURE — 99213 OFFICE O/P EST LOW 20 MIN: CPT | Performed by: FAMILY MEDICINE

## 2018-02-12 NOTE — PROGRESS NOTES
Chief Complaint:  Patient presents with:  Medication Follow-Up    HPI:  This is a 80year old female patient presenting for Medication Follow-Up    PMR:  Last visit, patient was doing well with transition back to 15mg prednisone.    Since this visit, catrina daily. In addition to 1mg tablets. Disp: 90 tablet Rfl: 0   predniSONE 5 MG Oral Tab Take 1 tablet (5 mg total) by mouth daily. Add to 10 mg tablets and 1 mg tablets daily for total of 17 mg daily.  Disp: 60 tablet Rfl: 1   Levothyroxine Sodium 112 MCG Oral Problem List Items Addressed This Visit        Immune    PMR (polymyalgia rheumatica) (HCC) - Primary    Relevant Orders    C-REACTIVE PROTEIN    SED RATE, MAVERICK (AUTOMATED)          Advised the following:  Stephenie Metcalf was seen today for medication foll

## 2018-02-20 ENCOUNTER — APPOINTMENT (OUTPATIENT)
Dept: LAB | Age: 83
End: 2018-02-20
Attending: FAMILY MEDICINE
Payer: MEDICARE

## 2018-02-20 DIAGNOSIS — M35.3 PMR (POLYMYALGIA RHEUMATICA) (HCC): ICD-10-CM

## 2018-02-20 LAB
C-REACTIVE PROTEIN: <0.29 MG/DL (ref ?–1)
SED RATE-ML: 26 MM/HR (ref 0–25)

## 2018-02-20 PROCEDURE — 85652 RBC SED RATE AUTOMATED: CPT

## 2018-02-20 PROCEDURE — 36415 COLL VENOUS BLD VENIPUNCTURE: CPT

## 2018-02-20 PROCEDURE — 86140 C-REACTIVE PROTEIN: CPT

## 2018-03-12 ENCOUNTER — OFFICE VISIT (OUTPATIENT)
Dept: FAMILY MEDICINE CLINIC | Facility: CLINIC | Age: 83
End: 2018-03-12

## 2018-03-12 VITALS
SYSTOLIC BLOOD PRESSURE: 134 MMHG | BODY MASS INDEX: 25.4 KG/M2 | DIASTOLIC BLOOD PRESSURE: 60 MMHG | WEIGHT: 161.81 LBS | TEMPERATURE: 98 F | HEART RATE: 84 BPM | RESPIRATION RATE: 16 BRPM | HEIGHT: 67 IN

## 2018-03-12 DIAGNOSIS — M35.3 PMR (POLYMYALGIA RHEUMATICA) (HCC): ICD-10-CM

## 2018-03-12 PROCEDURE — 99213 OFFICE O/P EST LOW 20 MIN: CPT | Performed by: FAMILY MEDICINE

## 2018-03-12 RX ORDER — PREDNISONE 1 MG/1
3 TABLET ORAL
Qty: 90 TABLET | Refills: 1 | Status: SHIPPED | OUTPATIENT
Start: 2018-03-12 | End: 2018-09-20

## 2018-03-22 ENCOUNTER — APPOINTMENT (OUTPATIENT)
Dept: LAB | Age: 83
End: 2018-03-22
Attending: FAMILY MEDICINE
Payer: MEDICARE

## 2018-03-22 DIAGNOSIS — M35.3 PMR (POLYMYALGIA RHEUMATICA) (HCC): ICD-10-CM

## 2018-03-22 LAB
C-REACTIVE PROTEIN: 0.39 MG/DL (ref ?–1)
SED RATE-ML: 20 MM/HR (ref 0–25)

## 2018-03-22 PROCEDURE — 85652 RBC SED RATE AUTOMATED: CPT

## 2018-03-22 PROCEDURE — 36415 COLL VENOUS BLD VENIPUNCTURE: CPT

## 2018-03-22 PROCEDURE — 86140 C-REACTIVE PROTEIN: CPT

## 2018-04-05 ENCOUNTER — PRIOR ORIGINAL RECORDS (OUTPATIENT)
Dept: OTHER | Age: 83
End: 2018-04-05

## 2018-04-05 ENCOUNTER — HOSPITAL ENCOUNTER (OUTPATIENT)
Dept: CARDIOLOGY CLINIC | Facility: HOSPITAL | Age: 83
Discharge: HOME | End: 2018-04-05
Attending: INTERNAL MEDICINE

## 2018-04-05 DIAGNOSIS — I10 BENIGN HYPERTENSION: ICD-10-CM

## 2018-04-05 DIAGNOSIS — I65.23 BILATERAL CAROTID ARTERY STENOSIS: ICD-10-CM

## 2018-04-10 ENCOUNTER — OFFICE VISIT (OUTPATIENT)
Dept: FAMILY MEDICINE CLINIC | Facility: CLINIC | Age: 83
End: 2018-04-10

## 2018-04-10 VITALS
RESPIRATION RATE: 12 BRPM | SYSTOLIC BLOOD PRESSURE: 134 MMHG | WEIGHT: 160 LBS | TEMPERATURE: 99 F | HEART RATE: 72 BPM | HEIGHT: 67.25 IN | BODY MASS INDEX: 24.82 KG/M2 | DIASTOLIC BLOOD PRESSURE: 62 MMHG

## 2018-04-10 DIAGNOSIS — M35.3 PMR (POLYMYALGIA RHEUMATICA) (HCC): Primary | ICD-10-CM

## 2018-04-10 PROCEDURE — 99213 OFFICE O/P EST LOW 20 MIN: CPT | Performed by: FAMILY MEDICINE

## 2018-04-10 NOTE — PROGRESS NOTES
Chief Complaint:  Patient presents with:  PMR: Pt is on 13 mg and ready to drop down 12mg     HPI:  This is a 80year old female patient presenting for PMR (Pt is on 13 mg and ready to drop down 12mg )    PMR:  Decreased from 14 to 13 mg.  Denies hip or arm predniSONE 10 MG Oral Tab Take 1 tablet (10 mg total) by mouth daily. In addition to 1mg tablets.  Disp: 90 tablet Rfl: 0   predniSONE 1 MG Oral Tab Take 8 tablets PO QD x 1 week, then 6 tablets PO QD x 1 week, then 4 tablets PO QD x 1 week, then 2 tablet and all orders for this visit:    PMR (polymyalgia rheumatica) (HCC)  Decrease from 13mg to 12mg. Recheck ESR and CRP. Goal to wean by 1 mg every 4 weeks until 10mg. -     SED RATE, WESTERGREN (AUTOMATED);  Future  -     C-REACTIVE PROTEIN; Future    Conc

## 2018-04-12 ENCOUNTER — PRIOR ORIGINAL RECORDS (OUTPATIENT)
Dept: OTHER | Age: 83
End: 2018-04-12

## 2018-04-12 LAB
ALBUMIN: 3.4 G/DL
ALKALINE PHOSPHATATE(ALK PHOS): 61 IU/L
BILIRUBIN TOTAL: 0.6 MG/DL
BUN: 18 MG/DL
CALCIUM: 8.8 MG/DL
CHLORIDE: 107 MEQ/L
CHOLESTEROL, TOTAL: 135 MG/DL
CREATININE, SERUM: 0.77 MG/DL
GLUCOSE: 96 MG/DL
HDL CHOLESTEROL: 56 MG/DL
HEMATOCRIT: 40.3 %
HEMOGLOBIN A1C: 6 %
HEMOGLOBIN: 12.8 G/DL
LDL CHOLESTEROL: 56 MG/DL
PLATELETS: 219 K/UL
POTASSIUM, SERUM: 3.7 MEQ/L
PROTEIN, TOTAL: 7.3 G/DL
RED BLOOD COUNT: 4.07 X 10-6/U
SGOT (AST): 13 IU/L
SGPT (ALT): 24 IU/L
SODIUM: 142 MEQ/L
TRIGLYCERIDES: 115 MG/DL
WHITE BLOOD COUNT: 12 X 10-3/U

## 2018-04-16 ENCOUNTER — APPOINTMENT (OUTPATIENT)
Dept: LAB | Age: 83
End: 2018-04-16
Attending: FAMILY MEDICINE
Payer: MEDICARE

## 2018-04-16 DIAGNOSIS — M35.3 PMR (POLYMYALGIA RHEUMATICA) (HCC): ICD-10-CM

## 2018-04-16 PROCEDURE — 85652 RBC SED RATE AUTOMATED: CPT

## 2018-04-16 PROCEDURE — 86140 C-REACTIVE PROTEIN: CPT

## 2018-04-16 PROCEDURE — 36415 COLL VENOUS BLD VENIPUNCTURE: CPT

## 2018-04-17 ENCOUNTER — PRIOR ORIGINAL RECORDS (OUTPATIENT)
Dept: OTHER | Age: 83
End: 2018-04-17

## 2018-04-23 ENCOUNTER — PRIOR ORIGINAL RECORDS (OUTPATIENT)
Dept: OTHER | Age: 83
End: 2018-04-23

## 2018-05-08 ENCOUNTER — OFFICE VISIT (OUTPATIENT)
Dept: FAMILY MEDICINE CLINIC | Facility: CLINIC | Age: 83
End: 2018-05-08

## 2018-05-08 VITALS
TEMPERATURE: 98 F | BODY MASS INDEX: 25.11 KG/M2 | RESPIRATION RATE: 12 BRPM | HEART RATE: 80 BPM | SYSTOLIC BLOOD PRESSURE: 120 MMHG | DIASTOLIC BLOOD PRESSURE: 60 MMHG | HEIGHT: 67 IN | WEIGHT: 160 LBS

## 2018-05-08 DIAGNOSIS — M35.3 PMR (POLYMYALGIA RHEUMATICA) (HCC): Primary | ICD-10-CM

## 2018-05-08 DIAGNOSIS — F43.9 STRESS AT HOME: ICD-10-CM

## 2018-05-08 PROCEDURE — 99213 OFFICE O/P EST LOW 20 MIN: CPT | Performed by: FAMILY MEDICINE

## 2018-05-08 NOTE — PROGRESS NOTES
Chief Complaint:  Patient presents with: Follow - Up: PMR    HPI:  This is a 80year old female patient presenting for Follow - Up (PMR)    PMR:  Notes she decreased to 11mg about 4 days ago and symptoms have been well controlled.  Denies muscle aches or j Alcohol use: Yes           4.2 oz/week     Glasses of wine: 7 per week     Comment: 1 glass         Current Outpatient Prescriptions:  predniSONE 1 MG Oral Tab Take 3 tablets (3 mg total) by mouth daily with breakfast. With the 10mg tab PLAN:  Discussed the following assessment   Problem List Items Addressed This Visit        Immune    PMR (polymyalgia rheumatica) (HCC) - Primary      Other Visit Diagnoses     Stress at home              Advised the following:  Nani Beasley was seen today for

## 2018-05-23 DIAGNOSIS — M35.3 PMR (POLYMYALGIA RHEUMATICA) (HCC): ICD-10-CM

## 2018-05-24 RX ORDER — PREDNISONE 10 MG/1
TABLET ORAL
Qty: 60 TABLET | Refills: 1 | Status: SHIPPED | OUTPATIENT
Start: 2018-05-24 | End: 2018-10-29

## 2018-05-25 PROBLEM — H35.433 PAVING STONE DEGENERATION OF RETINA, BILATERAL: Status: ACTIVE | Noted: 2018-05-25

## 2018-06-04 ENCOUNTER — TELEPHONE (OUTPATIENT)
Dept: FAMILY MEDICINE CLINIC | Facility: CLINIC | Age: 83
End: 2018-06-04

## 2018-06-04 ENCOUNTER — OFFICE VISIT (OUTPATIENT)
Dept: FAMILY MEDICINE CLINIC | Facility: CLINIC | Age: 83
End: 2018-06-04

## 2018-06-04 VITALS
SYSTOLIC BLOOD PRESSURE: 118 MMHG | DIASTOLIC BLOOD PRESSURE: 64 MMHG | HEIGHT: 67 IN | RESPIRATION RATE: 14 BRPM | HEART RATE: 76 BPM | BODY MASS INDEX: 25.11 KG/M2 | WEIGHT: 160 LBS | TEMPERATURE: 98 F

## 2018-06-04 DIAGNOSIS — M35.3 PMR (POLYMYALGIA RHEUMATICA) (HCC): Primary | ICD-10-CM

## 2018-06-04 PROCEDURE — 99214 OFFICE O/P EST MOD 30 MIN: CPT | Performed by: FAMILY MEDICINE

## 2018-06-04 NOTE — PATIENT INSTRUCTIONS
Finish out the 10mg for 4 weeks. Then decrease to 8mg for 3 weeks. We will try to see each other on July 9th and will do labs at that time.

## 2018-06-04 NOTE — PROGRESS NOTES
Chief Complaint:  Patient presents with: Follow - Up: PMR f/u 1 month Pt started 10 mg on Friday. Pt states no problems. HPI:  This is a 80year old female patient presenting for Follow - Up (PMR f/u 1 month Pt started 10 mg on Friday.  Pt states no pr Quit date: 1/1/1972  Smokeless tobacco: Never Used                      Alcohol use: Yes           4.2 oz/week     Glasses of wine: 7 per week     Comment: 1 glass         Current Outpatient Prescriptions:  PREDNISONE 10 MG Oral Tab TAKE ONE TABLET BY MOUT anxiety    ASSESSMENT AND PLAN:  Discussed the following assessment   Problem List Items Addressed This Visit        Immune    PMR (polymyalgia rheumatica) (HCC) - Primary          Advised the following:  Wilberto Quiñones was seen today for follow - up.     Diagnose

## 2018-07-09 ENCOUNTER — OFFICE VISIT (OUTPATIENT)
Dept: FAMILY MEDICINE CLINIC | Facility: CLINIC | Age: 83
End: 2018-07-09

## 2018-07-09 VITALS
SYSTOLIC BLOOD PRESSURE: 118 MMHG | RESPIRATION RATE: 16 BRPM | DIASTOLIC BLOOD PRESSURE: 58 MMHG | WEIGHT: 161 LBS | TEMPERATURE: 100 F | HEIGHT: 67 IN | BODY MASS INDEX: 25.27 KG/M2 | HEART RATE: 70 BPM

## 2018-07-09 DIAGNOSIS — M35.3 PMR (POLYMYALGIA RHEUMATICA) (HCC): Primary | ICD-10-CM

## 2018-07-09 DIAGNOSIS — E03.9 HYPOTHYROIDISM, UNSPECIFIED TYPE: ICD-10-CM

## 2018-07-09 DIAGNOSIS — Z79.899 HIGH RISK MEDICATION USE: ICD-10-CM

## 2018-07-09 DIAGNOSIS — I10 ESSENTIAL HYPERTENSION: ICD-10-CM

## 2018-07-09 DIAGNOSIS — R73.03 PREDIABETES: ICD-10-CM

## 2018-07-09 PROCEDURE — 80053 COMPREHEN METABOLIC PANEL: CPT | Performed by: FAMILY MEDICINE

## 2018-07-09 PROCEDURE — 84443 ASSAY THYROID STIM HORMONE: CPT | Performed by: FAMILY MEDICINE

## 2018-07-09 PROCEDURE — 99213 OFFICE O/P EST LOW 20 MIN: CPT | Performed by: FAMILY MEDICINE

## 2018-07-09 PROCEDURE — 85027 COMPLETE CBC AUTOMATED: CPT | Performed by: FAMILY MEDICINE

## 2018-07-09 PROCEDURE — 86140 C-REACTIVE PROTEIN: CPT | Performed by: FAMILY MEDICINE

## 2018-07-09 PROCEDURE — 83036 HEMOGLOBIN GLYCOSYLATED A1C: CPT | Performed by: FAMILY MEDICINE

## 2018-07-09 PROCEDURE — 85652 RBC SED RATE AUTOMATED: CPT | Performed by: FAMILY MEDICINE

## 2018-07-09 NOTE — PROGRESS NOTES
Chief Complaint:  Patient presents with:  PMR: Would like to be off of prednisone, pt is having no problems    HPI:  This is a 80year old female patient presenting for PMR (Would like to be off of prednisone, pt is having no problems)    HAs been on the 8 Disp: 60 tablet Rfl: 1   predniSONE 1 MG Oral Tab Take 3 tablets (3 mg total) by mouth daily with breakfast. With the 10mg tablet.  Disp: 90 tablet Rfl: 1   predniSONE 1 MG Oral Tab Take 8 tablets PO QD x 1 week, then 6 tablets PO QD x 1 week, then 4 tablet (polymyalgia rheumatica) (HCC) - Primary    Relevant Orders    CBC, PLATELET; NO DIFFERENTIAL (Completed)    COMP METABOLIC PANEL (14) (Completed)    SED RATE, WESTERGREN (AUTOMATED) (Completed)    C-REACTIVE PROTEIN (Completed)      Other Visit Diagnoses

## 2018-07-10 LAB
ALBUMIN SERPL-MCNC: 3.5 G/DL (ref 3.5–4.8)
ALP LIVER SERPL-CCNC: 76 U/L (ref 55–142)
ALT SERPL-CCNC: 23 U/L (ref 14–54)
AST SERPL-CCNC: 15 U/L (ref 15–41)
BILIRUB SERPL-MCNC: 0.5 MG/DL (ref 0.1–2)
BUN BLD-MCNC: 22 MG/DL (ref 8–20)
C-REACTIVE PROTEIN: <0.29 MG/DL (ref ?–1)
CALCIUM BLD-MCNC: 9 MG/DL (ref 8.3–10.3)
CHLORIDE: 109 MMOL/L (ref 101–111)
CO2: 26 MMOL/L (ref 22–32)
CREAT BLD-MCNC: 0.84 MG/DL (ref 0.55–1.02)
ERYTHROCYTE [DISTWIDTH] IN BLOOD BY AUTOMATED COUNT: 13.5 % (ref 11.5–16)
EST. AVERAGE GLUCOSE BLD GHB EST-MCNC: 126 MG/DL (ref 68–126)
GLUCOSE BLD-MCNC: 96 MG/DL (ref 70–99)
HBA1C MFR BLD HPLC: 6 % (ref ?–5.7)
HCT VFR BLD AUTO: 40.8 % (ref 34–50)
HGB BLD-MCNC: 13.3 G/DL (ref 12–16)
M PROTEIN MFR SERPL ELPH: 7.2 G/DL (ref 6.1–8.3)
MCH RBC QN AUTO: 32.8 PG (ref 27–33.2)
MCHC RBC AUTO-ENTMCNC: 32.6 G/DL (ref 31–37)
MCV RBC AUTO: 100.5 FL (ref 81–100)
PLATELET # BLD AUTO: 186 10(3)UL (ref 150–450)
POTASSIUM SERPL-SCNC: 4.4 MMOL/L (ref 3.6–5.1)
RBC # BLD AUTO: 4.06 X10(6)UL (ref 3.8–5.1)
RED CELL DISTRIBUTION WIDTH-SD: 50.2 FL (ref 35.1–46.3)
SED RATE-ML: 26 MM/HR (ref 0–25)
SODIUM SERPL-SCNC: 143 MMOL/L (ref 136–144)
TSI SER-ACNC: 0.18 MIU/ML (ref 0.35–5.5)
WBC # BLD AUTO: 13.2 X10(3) UL (ref 4–13)

## 2018-07-13 DIAGNOSIS — E03.9 HYPOTHYROIDISM, UNSPECIFIED TYPE: ICD-10-CM

## 2018-07-13 RX ORDER — LEVOTHYROXINE SODIUM 0.1 MG/1
100 TABLET ORAL
Qty: 90 TABLET | Refills: 0 | Status: SHIPPED | OUTPATIENT
Start: 2018-07-13 | End: 2018-09-20

## 2018-07-16 DIAGNOSIS — E03.9 HYPOTHYROIDISM, UNSPECIFIED TYPE: Primary | ICD-10-CM

## 2018-08-06 ENCOUNTER — OFFICE VISIT (OUTPATIENT)
Dept: FAMILY MEDICINE CLINIC | Facility: CLINIC | Age: 83
End: 2018-08-06
Payer: MEDICARE

## 2018-08-06 VITALS
BODY MASS INDEX: 25.27 KG/M2 | DIASTOLIC BLOOD PRESSURE: 56 MMHG | WEIGHT: 161 LBS | HEART RATE: 80 BPM | SYSTOLIC BLOOD PRESSURE: 110 MMHG | HEIGHT: 67 IN | TEMPERATURE: 99 F | RESPIRATION RATE: 16 BRPM

## 2018-08-06 DIAGNOSIS — M35.3 PMR (POLYMYALGIA RHEUMATICA) (HCC): Primary | ICD-10-CM

## 2018-08-06 DIAGNOSIS — M75.22 BICEPS TENDINITIS OF LEFT UPPER EXTREMITY: ICD-10-CM

## 2018-08-06 PROCEDURE — 99213 OFFICE O/P EST LOW 20 MIN: CPT | Performed by: FAMILY MEDICINE

## 2018-08-06 RX ORDER — PREDNISONE 2.5 MG
2.5 TABLET ORAL DAILY
Qty: 30 TABLET | Refills: 1 | Status: SHIPPED | OUTPATIENT
Start: 2018-08-06 | End: 2019-06-17 | Stop reason: ALTCHOICE

## 2018-08-06 RX ORDER — IRBESARTAN AND HYDROCHLOROTHIAZIDE 150; 12.5 MG/1; MG/1
TABLET, FILM COATED ORAL
Refills: 0 | COMMUNITY
Start: 2018-07-23 | End: 2019-02-04 | Stop reason: ALTCHOICE

## 2018-08-06 NOTE — PROGRESS NOTES
Chief Complaint:  Patient presents with:  PMR: Pain on left arm, painful when lifting it, radiates to shoulder  Blood Pressure: f/u    HPI:  This is a 80year old female patient presenting for PMR (Pain on left arm, painful when lifting it, radiates to wyatt glass         Current Outpatient Prescriptions:  Irbesartan-Hydrochlorothiazide 150-12.5 MG Oral Tab  Disp:  Rfl: 0   predniSONE 2.5 MG Oral Tab Take 1 tablet (2.5 mg total) by mouth daily.  Disp: 30 tablet Rfl: 1   Levothyroxine Sodium 100 MCG Oral Tab [facility] anxiety    ASSESSMENT AND PLAN:  Discussed the following assessment   Problem List Items Addressed This Visit        Immune    PMR (polymyalgia rheumatica) (HCC) - Primary    Relevant Medications    predniSONE 2.5 MG Oral Tab    Other Relevant Orders    C-

## 2018-08-06 NOTE — PATIENT INSTRUCTIONS
Finish out the course of 5mg (4 weeks) and then decrease to 2.5mg prednisone. After a week of starting 2.5mg, stop at the lab and get your blood work. We will see each other in 4 weeks. Also make sure that you are icing that shoulder.  If not getting be

## 2018-08-22 ENCOUNTER — APPOINTMENT (OUTPATIENT)
Dept: LAB | Age: 83
End: 2018-08-22
Attending: FAMILY MEDICINE
Payer: MEDICARE

## 2018-08-22 DIAGNOSIS — M35.3 PMR (POLYMYALGIA RHEUMATICA) (HCC): ICD-10-CM

## 2018-08-22 LAB
CRP SERPL-MCNC: 1.86 MG/DL (ref ?–1)
SED RATE-ML: 32 MM/HR (ref 0–25)

## 2018-08-22 PROCEDURE — 85652 RBC SED RATE AUTOMATED: CPT

## 2018-08-22 PROCEDURE — 36415 COLL VENOUS BLD VENIPUNCTURE: CPT

## 2018-08-22 PROCEDURE — 86140 C-REACTIVE PROTEIN: CPT

## 2018-08-23 ENCOUNTER — TELEPHONE (OUTPATIENT)
Dept: FAMILY MEDICINE CLINIC | Facility: CLINIC | Age: 83
End: 2018-08-23

## 2018-08-23 NOTE — TELEPHONE ENCOUNTER
If she dropped from 5mg to 2.5mg, I would go back to 5mg. I would have her continue this for 4 weeks before we again decrease dosing.   Thanks,  José Luis Yanes, DO

## 2018-08-23 NOTE — TELEPHONE ENCOUNTER
Called and talked to pateint informed her Dr Debby Hernandez wants her to go back to 5 mg daily she is OK with this

## 2018-08-23 NOTE — TELEPHONE ENCOUNTER
Spoke to pt who indicated she saw her recent labs via [address] Pt is not feeling well and would to speak to a nurse.

## 2018-08-23 NOTE — TELEPHONE ENCOUNTER
Patient reports she is not feeling well on new Prednisone dose. She is wondering if she should increase her Prednisone to 3.5mg to 2.5mg (was previously on 5mg).     Routed to Dr. Junaid Bains

## 2018-09-04 ENCOUNTER — OFFICE VISIT (OUTPATIENT)
Dept: FAMILY MEDICINE CLINIC | Facility: CLINIC | Age: 83
End: 2018-09-04
Payer: MEDICARE

## 2018-09-04 VITALS
RESPIRATION RATE: 16 BRPM | HEIGHT: 67 IN | HEART RATE: 80 BPM | TEMPERATURE: 99 F | DIASTOLIC BLOOD PRESSURE: 56 MMHG | BODY MASS INDEX: 25.11 KG/M2 | SYSTOLIC BLOOD PRESSURE: 120 MMHG | WEIGHT: 160 LBS

## 2018-09-04 DIAGNOSIS — E03.9 HYPOTHYROIDISM, UNSPECIFIED TYPE: ICD-10-CM

## 2018-09-04 DIAGNOSIS — L30.4 INTERTRIGO: ICD-10-CM

## 2018-09-04 DIAGNOSIS — M35.3 PMR (POLYMYALGIA RHEUMATICA) (HCC): Primary | ICD-10-CM

## 2018-09-04 PROCEDURE — 99214 OFFICE O/P EST MOD 30 MIN: CPT | Performed by: FAMILY MEDICINE

## 2018-09-04 RX ORDER — NYSTATIN 100000 U/G
CREAM TOPICAL
Qty: 15 G | Refills: 1 | Status: SHIPPED | OUTPATIENT
Start: 2018-09-04 | End: 2019-06-17 | Stop reason: ALTCHOICE

## 2018-09-04 NOTE — PROGRESS NOTES
Chief Complaint:  Patient presents with:  PMR: Is back on 5 mg on prednisone  Depression: Pt states she has been very stress  Rash: Painful rash on left side neck     HPI:  This is a 80year old female patient presenting for PMR (Is back on 5 mg on prednis status: Former Smoker                                                              Packs/day: 0.00      Years: 0.00         Quit date: 1/1/1972  Smokeless tobacco: Never Used                      Alcohol use: Yes           4.2 oz/week     Glasses of wine: oriented, appears well hydrated and in no acute distress  HEENT: atraumatic, conjunctiva clear, no obvious abnormalities on inspection   LUNGS: clear to auscultation bilaterally, no wheezes, rales or rhonchi, good air movement  CV: HRRR, no murmurs, no per

## 2018-09-17 ENCOUNTER — APPOINTMENT (OUTPATIENT)
Dept: LAB | Age: 83
End: 2018-09-17
Attending: FAMILY MEDICINE
Payer: MEDICARE

## 2018-09-17 DIAGNOSIS — M35.3 PMR (POLYMYALGIA RHEUMATICA) (HCC): ICD-10-CM

## 2018-09-17 DIAGNOSIS — E03.9 HYPOTHYROIDISM, UNSPECIFIED TYPE: ICD-10-CM

## 2018-09-17 LAB
CRP SERPL-MCNC: 0.92 MG/DL (ref ?–1)
SED RATE-ML: 27 MM/HR (ref 0–25)
TSI SER-ACNC: 0.59 MIU/ML (ref 0.35–5.5)

## 2018-09-17 PROCEDURE — 36415 COLL VENOUS BLD VENIPUNCTURE: CPT

## 2018-09-17 PROCEDURE — 86140 C-REACTIVE PROTEIN: CPT

## 2018-09-17 PROCEDURE — 85652 RBC SED RATE AUTOMATED: CPT

## 2018-09-17 PROCEDURE — 84443 ASSAY THYROID STIM HORMONE: CPT

## 2018-09-20 ENCOUNTER — OFFICE VISIT (OUTPATIENT)
Dept: FAMILY MEDICINE CLINIC | Facility: CLINIC | Age: 83
End: 2018-09-20
Payer: MEDICARE

## 2018-09-20 VITALS
TEMPERATURE: 99 F | BODY MASS INDEX: 25 KG/M2 | SYSTOLIC BLOOD PRESSURE: 114 MMHG | DIASTOLIC BLOOD PRESSURE: 58 MMHG | RESPIRATION RATE: 16 BRPM | HEART RATE: 80 BPM | WEIGHT: 162 LBS

## 2018-09-20 DIAGNOSIS — M35.3 PMR (POLYMYALGIA RHEUMATICA) (HCC): Primary | ICD-10-CM

## 2018-09-20 DIAGNOSIS — Z23 NEED FOR INFLUENZA VACCINATION: ICD-10-CM

## 2018-09-20 DIAGNOSIS — E03.9 HYPOTHYROIDISM, UNSPECIFIED TYPE: ICD-10-CM

## 2018-09-20 PROCEDURE — 90686 IIV4 VACC NO PRSV 0.5 ML IM: CPT | Performed by: FAMILY MEDICINE

## 2018-09-20 PROCEDURE — G0008 ADMIN INFLUENZA VIRUS VAC: HCPCS | Performed by: FAMILY MEDICINE

## 2018-09-20 PROCEDURE — 99213 OFFICE O/P EST LOW 20 MIN: CPT | Performed by: FAMILY MEDICINE

## 2018-09-20 RX ORDER — LEVOTHYROXINE SODIUM 0.1 MG/1
100 TABLET ORAL
Qty: 90 TABLET | Refills: 1 | Status: SHIPPED | OUTPATIENT
Start: 2018-09-20 | End: 2019-03-25

## 2018-09-20 RX ORDER — PREDNISONE 1 MG/1
3 TABLET ORAL
Qty: 90 TABLET | Refills: 1 | Status: SHIPPED | OUTPATIENT
Start: 2018-09-20 | End: 2019-01-21

## 2018-09-20 NOTE — PROGRESS NOTES
Chief Complaint:  Patient presents with:  Test Results  PMR: Has been on 5 MG on Prednione    HPI:  This is a 80year old female patient presenting for Test Results and PMR (Has been on 5 MG on Prednione)    PMR: Last visit, increased back to 5mg prednison Disp: 90 tablet Rfl: 1   Levothyroxine Sodium 100 MCG Oral Tab Take 1 tablet (100 mcg total) by mouth once daily. Disp: 90 tablet Rfl: 1   nystatin [id number] UNIT/GM External Cream Apply thin layer twice daily for rash.  Disp: 15 g Rfl: 1   Irbesartan-Hydrochl Addressed This Visit        Endocrine    Hypothyroidism    Relevant Medications    Levothyroxine Sodium 100 MCG Oral Tab       Immune    PMR (polymyalgia rheumatica) (HCC) - Primary    Relevant Medications    predniSONE 1 MG Oral Tab    Other Relevant Orde

## 2018-10-15 ENCOUNTER — APPOINTMENT (OUTPATIENT)
Dept: LAB | Age: 83
End: 2018-10-15
Attending: FAMILY MEDICINE
Payer: MEDICARE

## 2018-10-15 DIAGNOSIS — M35.3 PMR (POLYMYALGIA RHEUMATICA) (HCC): ICD-10-CM

## 2018-10-15 PROCEDURE — 85652 RBC SED RATE AUTOMATED: CPT

## 2018-10-15 PROCEDURE — 86140 C-REACTIVE PROTEIN: CPT

## 2018-10-15 PROCEDURE — 36415 COLL VENOUS BLD VENIPUNCTURE: CPT

## 2018-10-18 ENCOUNTER — TELEPHONE (OUTPATIENT)
Dept: FAMILY MEDICINE CLINIC | Facility: CLINIC | Age: 83
End: 2018-10-18

## 2018-10-18 NOTE — TELEPHONE ENCOUNTER
Called patient and informed her Dr Carrol Vidales wants her to increase to 4 mg latha so she will and then keep up coming appointment

## 2018-10-18 NOTE — TELEPHONE ENCOUNTER
Called patient and went over labs and recommendations She is taking 3.5 mg daily at this point, and has some ache in the shoulder but not as bad as before, she was asking to have Dr Ebenezer Callejas review this and recommend if she should wait until the appointment

## 2018-10-29 ENCOUNTER — OFFICE VISIT (OUTPATIENT)
Dept: FAMILY MEDICINE CLINIC | Facility: CLINIC | Age: 83
End: 2018-10-29
Payer: MEDICARE

## 2018-10-29 VITALS
HEART RATE: 84 BPM | DIASTOLIC BLOOD PRESSURE: 60 MMHG | RESPIRATION RATE: 16 BRPM | HEIGHT: 67 IN | TEMPERATURE: 98 F | WEIGHT: 159 LBS | BODY MASS INDEX: 24.96 KG/M2 | SYSTOLIC BLOOD PRESSURE: 126 MMHG

## 2018-10-29 DIAGNOSIS — M35.3 PMR (POLYMYALGIA RHEUMATICA) (HCC): Primary | ICD-10-CM

## 2018-10-29 DIAGNOSIS — B35.4 TINEA CORPORIS: ICD-10-CM

## 2018-10-29 PROCEDURE — 99214 OFFICE O/P EST MOD 30 MIN: CPT | Performed by: FAMILY MEDICINE

## 2018-10-29 RX ORDER — PREDNISONE 1 MG/1
5 TABLET ORAL DAILY
Qty: 90 TABLET | Refills: 0 | Status: SHIPPED | OUTPATIENT
Start: 2018-10-29 | End: 2018-12-07

## 2018-10-29 RX ORDER — KETOCONAZOLE 20 MG/G
CREAM TOPICAL
Qty: 30 G | Refills: 0 | Status: SHIPPED | OUTPATIENT
Start: 2018-10-29 | End: 2019-02-04

## 2018-10-29 NOTE — PROGRESS NOTES
Ophelia Calhoun is a 80year old female. HPI:   Patient presents for a medication follow up. Currently on prednisone for PMR. ESR was elevated, having more pain so went up from 3mg to 4mg.   She didin't notice much improvement so she increase last w murmur  EXT:  No pedal edema    ASSESSMENT AND PLAN:   1. PMR (polymyalgia rheumatica) (HCC)  Return to 5mg daily. Labs in 2 weeks. Recommend slower taper if improved in 2 weeks down to 4.5mg daily.   - SED RATE, WESTERGREN (AUTOMATED);  Future  - C-REACT

## 2018-11-05 ENCOUNTER — PRIOR ORIGINAL RECORDS (OUTPATIENT)
Dept: OTHER | Age: 83
End: 2018-11-05

## 2018-11-05 ENCOUNTER — MYAURORA ACCOUNT LINK (OUTPATIENT)
Dept: OTHER | Age: 83
End: 2018-11-05

## 2018-11-12 ENCOUNTER — APPOINTMENT (OUTPATIENT)
Dept: LAB | Age: 83
End: 2018-11-12
Attending: FAMILY MEDICINE
Payer: MEDICARE

## 2018-11-12 DIAGNOSIS — M35.3 PMR (POLYMYALGIA RHEUMATICA) (HCC): ICD-10-CM

## 2018-11-12 PROCEDURE — 36415 COLL VENOUS BLD VENIPUNCTURE: CPT

## 2018-11-12 PROCEDURE — 85652 RBC SED RATE AUTOMATED: CPT

## 2018-11-12 PROCEDURE — 86140 C-REACTIVE PROTEIN: CPT

## 2018-12-07 ENCOUNTER — OFFICE VISIT (OUTPATIENT)
Dept: FAMILY MEDICINE CLINIC | Facility: CLINIC | Age: 83
End: 2018-12-07
Payer: MEDICARE

## 2018-12-07 VITALS
HEIGHT: 67 IN | HEART RATE: 80 BPM | BODY MASS INDEX: 24.64 KG/M2 | RESPIRATION RATE: 14 BRPM | WEIGHT: 157 LBS | SYSTOLIC BLOOD PRESSURE: 132 MMHG | DIASTOLIC BLOOD PRESSURE: 52 MMHG

## 2018-12-07 DIAGNOSIS — M35.3 PMR (POLYMYALGIA RHEUMATICA) (HCC): Primary | ICD-10-CM

## 2018-12-07 PROCEDURE — 99213 OFFICE O/P EST LOW 20 MIN: CPT | Performed by: FAMILY MEDICINE

## 2018-12-07 RX ORDER — PREDNISONE 1 MG/1
5 TABLET ORAL DAILY
Qty: 90 TABLET | Refills: 0 | Status: SHIPPED | OUTPATIENT
Start: 2018-12-07 | End: 2019-06-17 | Stop reason: ALTCHOICE

## 2018-12-07 NOTE — PROGRESS NOTES
Kerri Oliver is a 80year old female. HPI:   Patient presents for a medication follow up. Currently on prednisone for PMR. Taking 4.5mg daily. ESR improved after last visit, so decreased from 5mg to 4.5mg.   Has not felt she could decrease anymo Visit:  Requested Prescriptions     Signed Prescriptions Disp Refills   • predniSONE 5 MG Oral Tab 90 tablet 0     Sig: Take 1 tablet (5 mg total) by mouth daily.      Imaging & Consults:  None

## 2018-12-14 ENCOUNTER — TELEPHONE (OUTPATIENT)
Dept: FAMILY MEDICINE CLINIC | Facility: CLINIC | Age: 83
End: 2018-12-14

## 2018-12-14 NOTE — TELEPHONE ENCOUNTER
Pt states she was advised to keep Dr. Jai Marte informed on Prednisone. Pt states the 8mg was not helping so she increased dose to 10mg. Pt states has been on Prednisone 10mg x 2 days and it seems to be helpful.   Pt wants to know how long she should stay on

## 2019-01-07 ENCOUNTER — OFFICE VISIT (OUTPATIENT)
Dept: FAMILY MEDICINE CLINIC | Facility: CLINIC | Age: 84
End: 2019-01-07
Payer: MEDICARE

## 2019-01-07 VITALS
TEMPERATURE: 98 F | SYSTOLIC BLOOD PRESSURE: 118 MMHG | DIASTOLIC BLOOD PRESSURE: 64 MMHG | BODY MASS INDEX: 24.64 KG/M2 | RESPIRATION RATE: 16 BRPM | HEART RATE: 80 BPM | HEIGHT: 67 IN | WEIGHT: 157 LBS

## 2019-01-07 DIAGNOSIS — M35.3 PMR (POLYMYALGIA RHEUMATICA) (HCC): Primary | ICD-10-CM

## 2019-01-07 PROCEDURE — 99213 OFFICE O/P EST LOW 20 MIN: CPT | Performed by: FAMILY MEDICINE

## 2019-01-07 NOTE — PROGRESS NOTES
Chief Complaint:  Patient presents with:  PMR: Pt is currently taking 9 MG of Prednisone   Derm Problem: Red Sore F/u, pt states it is getting smaller, pain is getting better    HPI:  This is a 80year old female patient presenting for PMR (Pt is currently Alcohol/week: 4.2 oz      Types: 7 Glasses of wine per week      Comment: 1/2 glass    Drug use: No         Current Outpatient Medications:  predniSONE 5 MG Oral Tab Take 1 tablet (5 mg total) by mouth daily.  Disp: 90 tablet Rfl: 0   ketoconazole 2 % Exter inspection   LUNGS: clear to auscultation bilaterally, no wheezes, rales or rhonchi, good air movement  CV: HRRR, no murmurs, no peripheral edema   EXTREMITIES: warm and well perfused  PSYCH: pleasant and cooperative, no obvious depression or anxiety    AS

## 2019-01-16 DIAGNOSIS — M35.3 PMR (POLYMYALGIA RHEUMATICA) (HCC): ICD-10-CM

## 2019-01-16 RX ORDER — PREDNISONE 1 MG/1
TABLET ORAL
Qty: 90 TABLET | Refills: 0 | Status: CANCELLED | OUTPATIENT
Start: 2019-01-16

## 2019-01-17 ENCOUNTER — PRIOR ORIGINAL RECORDS (OUTPATIENT)
Dept: OTHER | Age: 84
End: 2019-01-17

## 2019-01-21 DIAGNOSIS — M35.3 PMR (POLYMYALGIA RHEUMATICA) (HCC): ICD-10-CM

## 2019-01-21 NOTE — TELEPHONE ENCOUNTER
Requesting refill of prednisone 1 mg she is currently taking 9 mg total prednisone daily then is to taper to 8 mg daily for a month then 7 mg daily for a month.  So she will need one mg tablets to last for this time frame pended RX for Dr Tk Arnett to approve

## 2019-01-22 RX ORDER — PREDNISONE 1 MG/1
4 TABLET ORAL
Qty: 120 TABLET | Refills: 2 | Status: SHIPPED | OUTPATIENT
Start: 2019-01-22 | End: 2019-07-01

## 2019-02-04 ENCOUNTER — OFFICE VISIT (OUTPATIENT)
Dept: FAMILY MEDICINE CLINIC | Facility: CLINIC | Age: 84
End: 2019-02-04
Payer: MEDICARE

## 2019-02-04 ENCOUNTER — APPOINTMENT (OUTPATIENT)
Dept: LAB | Age: 84
End: 2019-02-04
Attending: FAMILY MEDICINE
Payer: MEDICARE

## 2019-02-04 VITALS
BODY MASS INDEX: 24.64 KG/M2 | HEART RATE: 80 BPM | HEIGHT: 67 IN | SYSTOLIC BLOOD PRESSURE: 118 MMHG | TEMPERATURE: 98 F | DIASTOLIC BLOOD PRESSURE: 64 MMHG | WEIGHT: 157 LBS | RESPIRATION RATE: 16 BRPM

## 2019-02-04 DIAGNOSIS — E78.00 PURE HYPERCHOLESTEROLEMIA: ICD-10-CM

## 2019-02-04 DIAGNOSIS — I10 ESSENTIAL HYPERTENSION: ICD-10-CM

## 2019-02-04 DIAGNOSIS — R21 RASH OF NECK: ICD-10-CM

## 2019-02-04 DIAGNOSIS — E03.9 HYPOTHYROIDISM, UNSPECIFIED TYPE: ICD-10-CM

## 2019-02-04 DIAGNOSIS — R73.03 PREDIABETES: ICD-10-CM

## 2019-02-04 DIAGNOSIS — M35.3 PMR (POLYMYALGIA RHEUMATICA) (HCC): ICD-10-CM

## 2019-02-04 DIAGNOSIS — I10 ESSENTIAL HYPERTENSION: Primary | ICD-10-CM

## 2019-02-04 LAB
ALBUMIN SERPL-MCNC: 3.6 G/DL (ref 3.1–4.5)
ALBUMIN/GLOB SERPL: 1 {RATIO} (ref 1–2)
ALP LIVER SERPL-CCNC: 70 U/L (ref 55–142)
ALT SERPL-CCNC: 24 U/L (ref 14–54)
ANION GAP SERPL CALC-SCNC: 9 MMOL/L (ref 0–18)
AST SERPL-CCNC: 19 U/L (ref 15–41)
BILIRUB SERPL-MCNC: 0.4 MG/DL (ref 0.1–2)
BUN BLD-MCNC: 19 MG/DL (ref 8–20)
BUN/CREAT SERPL: 25.3 (ref 10–20)
CALCIUM BLD-MCNC: 9 MG/DL (ref 8.3–10.3)
CHLORIDE SERPL-SCNC: 108 MMOL/L (ref 101–111)
CHOLEST SMN-MCNC: 141 MG/DL (ref ?–200)
CO2 SERPL-SCNC: 27 MMOL/L (ref 22–32)
CREAT BLD-MCNC: 0.75 MG/DL (ref 0.55–1.02)
DEPRECATED RDW RBC AUTO: 53.1 FL (ref 35.1–46.3)
ERYTHROCYTE [DISTWIDTH] IN BLOOD BY AUTOMATED COUNT: 13.7 % (ref 11–15)
EST. AVERAGE GLUCOSE BLD GHB EST-MCNC: 126 MG/DL (ref 68–126)
GLOBULIN PLAS-MCNC: 3.7 G/DL (ref 2.8–4.4)
GLUCOSE BLD-MCNC: 137 MG/DL (ref 70–99)
HBA1C MFR BLD HPLC: 6 % (ref ?–5.7)
HCT VFR BLD AUTO: 41.5 % (ref 35–48)
HDLC SERPL-MCNC: 56 MG/DL (ref 40–59)
HGB BLD-MCNC: 13.1 G/DL (ref 12–16)
HSCRP: 2.2 MG/L (ref ?–3)
LDLC SERPL CALC-MCNC: 57 MG/DL (ref ?–100)
M PROTEIN MFR SERPL ELPH: 7.3 G/DL (ref 6.4–8.2)
MCH RBC QN AUTO: 32.6 PG (ref 26–34)
MCHC RBC AUTO-ENTMCNC: 31.6 G/DL (ref 31–37)
MCV RBC AUTO: 103.2 FL (ref 80–100)
NONHDLC SERPL-MCNC: 85 MG/DL (ref ?–130)
OSMOLALITY SERPL CALC.SUM OF ELEC: 302 MOSM/KG (ref 275–295)
PLATELET # BLD AUTO: 187 10(3)UL (ref 150–450)
POTASSIUM SERPL-SCNC: 4.2 MMOL/L (ref 3.6–5.1)
RBC # BLD AUTO: 4.02 X10(6)UL (ref 3.8–5.3)
SED RATE-ML: 18 MM/HR (ref 0–25)
SODIUM SERPL-SCNC: 144 MMOL/L (ref 136–144)
TRIGL SERPL-MCNC: 140 MG/DL (ref 30–149)
TSI SER-ACNC: 0.46 MIU/ML (ref 0.35–5.5)
VLDLC SERPL CALC-MCNC: 28 MG/DL (ref 0–30)
WBC # BLD AUTO: 12.8 X10(3) UL (ref 4–11)

## 2019-02-04 PROCEDURE — 99214 OFFICE O/P EST MOD 30 MIN: CPT | Performed by: FAMILY MEDICINE

## 2019-02-04 PROCEDURE — 85652 RBC SED RATE AUTOMATED: CPT

## 2019-02-04 PROCEDURE — 84443 ASSAY THYROID STIM HORMONE: CPT

## 2019-02-04 PROCEDURE — 86141 C-REACTIVE PROTEIN HS: CPT

## 2019-02-04 PROCEDURE — 80053 COMPREHEN METABOLIC PANEL: CPT

## 2019-02-04 PROCEDURE — 80061 LIPID PANEL: CPT

## 2019-02-04 PROCEDURE — 86140 C-REACTIVE PROTEIN: CPT

## 2019-02-04 PROCEDURE — 85027 COMPLETE CBC AUTOMATED: CPT

## 2019-02-04 PROCEDURE — 83036 HEMOGLOBIN GLYCOSYLATED A1C: CPT

## 2019-02-04 RX ORDER — LISINOPRIL AND HYDROCHLOROTHIAZIDE 20; 12.5 MG/1; MG/1
TABLET ORAL
Refills: 0 | COMMUNITY
Start: 2019-01-17 | End: 2019-11-21 | Stop reason: ALTCHOICE

## 2019-02-04 RX ORDER — KETOCONAZOLE 20 MG/G
CREAM TOPICAL
Qty: 30 G | Refills: 0 | Status: SHIPPED | OUTPATIENT
Start: 2019-02-04 | End: 2019-06-17

## 2019-02-04 NOTE — PROGRESS NOTES
Chief Complaint:  Patient presents with:  Blood Pressure: F/u, started on Lisnopril/Hctz 20-12.5 MG    PMR: Taking 8 Mg of Prednisone    HPI:  This is a 80year old female patient presenting for Blood Pressure (F/u, started on Lisnopril/Hctz 20-12.5 MG  ) tobacco: Never Used    Alcohol use:  Yes      Alcohol/week: 4.2 oz      Types: 7 Glasses of wine per week      Comment: 1/2 glass    Drug use: No         Current Outpatient Medications:  Lisinopril-Hydrochlorothiazide 20-12.5 MG Oral Tab  Disp:  Rfl: 0   ke conjunctiva clear, no obvious abnormalities on inspection   LUNGS: clear to auscultation bilaterally, no wheezes, rales or rhonchi, good air movement  CV: HRRR, no murmurs, no peripheral edema   EXTREMITIES: warm and well perfused  SKIN: erythematous patch Future    Prediabetes  -    Diet controlled. Recheck HEMOGLOBIN A1C; Future    Rash of neck  -    Will cont ketoconazole 2 % External Cream; Use 1-2 x daily to affected area. Consider referral to dermatology given persistence. RTC 2 months.   Mony Coleman

## 2019-02-05 DIAGNOSIS — M35.3 PMR (POLYMYALGIA RHEUMATICA) (HCC): Primary | ICD-10-CM

## 2019-02-05 LAB — CRP SERPL-MCNC: <0.29 MG/DL (ref ?–1)

## 2019-02-28 VITALS
DIASTOLIC BLOOD PRESSURE: 68 MMHG | SYSTOLIC BLOOD PRESSURE: 120 MMHG | HEIGHT: 67 IN | BODY MASS INDEX: 24.96 KG/M2 | WEIGHT: 159 LBS | HEART RATE: 102 BPM

## 2019-02-28 VITALS
BODY MASS INDEX: 24.8 KG/M2 | HEART RATE: 80 BPM | HEIGHT: 67 IN | DIASTOLIC BLOOD PRESSURE: 62 MMHG | WEIGHT: 158 LBS | SYSTOLIC BLOOD PRESSURE: 120 MMHG

## 2019-02-28 VITALS
BODY MASS INDEX: 24.64 KG/M2 | HEART RATE: 78 BPM | WEIGHT: 157 LBS | DIASTOLIC BLOOD PRESSURE: 58 MMHG | SYSTOLIC BLOOD PRESSURE: 130 MMHG | HEIGHT: 67 IN

## 2019-03-01 VITALS
DIASTOLIC BLOOD PRESSURE: 68 MMHG | SYSTOLIC BLOOD PRESSURE: 112 MMHG | HEIGHT: 67 IN | HEART RATE: 72 BPM | WEIGHT: 161 LBS | BODY MASS INDEX: 25.27 KG/M2

## 2019-03-05 ENCOUNTER — PRIOR ORIGINAL RECORDS (OUTPATIENT)
Dept: HEALTH INFORMATION MANAGEMENT | Facility: OTHER | Age: 84
End: 2019-03-05

## 2019-03-13 ENCOUNTER — PATIENT OUTREACH (OUTPATIENT)
Dept: CASE MANAGEMENT | Age: 84
End: 2019-03-13

## 2019-03-13 NOTE — PROGRESS NOTES
Called pt regarding CCM program.   Pt would like more information mailed to her home    information mailed

## 2019-03-19 ENCOUNTER — TELEPHONE (OUTPATIENT)
Dept: CARDIOLOGY | Age: 84
End: 2019-03-19

## 2019-03-19 ENCOUNTER — TELEPHONE (OUTPATIENT)
Dept: FAMILY MEDICINE CLINIC | Facility: CLINIC | Age: 84
End: 2019-03-19

## 2019-03-19 RX ORDER — LISINOPRIL AND HYDROCHLOROTHIAZIDE 20; 12.5 MG/1; MG/1
1 TABLET ORAL DAILY
Qty: 90 TABLET | Refills: 1 | Status: SHIPPED | OUTPATIENT
Start: 2019-03-19 | End: 2019-09-11 | Stop reason: SDUPTHER

## 2019-03-25 ENCOUNTER — OFFICE VISIT (OUTPATIENT)
Dept: FAMILY MEDICINE CLINIC | Facility: CLINIC | Age: 84
End: 2019-03-25
Payer: MEDICARE

## 2019-03-25 VITALS
TEMPERATURE: 99 F | DIASTOLIC BLOOD PRESSURE: 64 MMHG | RESPIRATION RATE: 16 BRPM | BODY MASS INDEX: 24 KG/M2 | SYSTOLIC BLOOD PRESSURE: 124 MMHG | WEIGHT: 155 LBS | HEART RATE: 84 BPM

## 2019-03-25 DIAGNOSIS — E03.9 HYPOTHYROIDISM, UNSPECIFIED TYPE: ICD-10-CM

## 2019-03-25 DIAGNOSIS — Z23 NEED FOR VACCINATION: ICD-10-CM

## 2019-03-25 DIAGNOSIS — Z00.00 ENCOUNTER FOR ANNUAL HEALTH EXAMINATION: Primary | ICD-10-CM

## 2019-03-25 DIAGNOSIS — M35.3 PMR (POLYMYALGIA RHEUMATICA) (HCC): ICD-10-CM

## 2019-03-25 PROCEDURE — 90670 PCV13 VACCINE IM: CPT | Performed by: FAMILY MEDICINE

## 2019-03-25 PROCEDURE — G0009 ADMIN PNEUMOCOCCAL VACCINE: HCPCS | Performed by: FAMILY MEDICINE

## 2019-03-25 PROCEDURE — G0439 PPPS, SUBSEQ VISIT: HCPCS | Performed by: FAMILY MEDICINE

## 2019-03-25 RX ORDER — LEVOTHYROXINE SODIUM 0.1 MG/1
100 TABLET ORAL
Qty: 90 TABLET | Refills: 1 | Status: SHIPPED | OUTPATIENT
Start: 2019-03-25 | End: 2019-09-16

## 2019-03-25 NOTE — PATIENT INSTRUCTIONS
Stephenie Montgomery's SCREENING SCHEDULE   Tests on this list are recommended by your physician but may not be covered, or covered at this frequency, by your insurer. Please check with your insurance carrier before scheduling to verify coverage.    PREVENTATI to patients who meet one of the following criteria:   • Men who are 73-68 years old and have smoked more than 100 cigarettes in their lifetime   • Anyone with a family history    Colorectal Cancer Screening  Covered up to Age 76     Colonoscopy Screen   Co reminders to display for this patient. Please get this Mammogram regularly   Immunizations      Influenza  Covered Annually No orders found for this or any previous visit.  Please get every year    Pneumococcal 13 (Prevnar)  Covered Once after 65 Orders [facility] regarding Advance Directives.

## 2019-03-25 NOTE — PROGRESS NOTES
HPI:   Maricel Genao is a 80year old female who presents for a Medicare Subsequent Annual Wellness visit (Pt already had Initial Annual Wellness). PMR: Now on 6mg prednisone for past 3 days. Notes she is not having any symptoms of PMR.      CAD, HTN low risk: Fall/Risk Scorin    Cognitive Assessment   She had a completely normal cognitive assessment- see flowsheet entries    Functional Ability/Status   Maricel Genao has some abnormal functions as listed below:  She has no issues with dressing an (OPHTHALMOLOGY)    Patient Active Problem List:     Hypothyroidism     Unspecified cataract     Coronary atherosclerosis of unspecified type of vessel, native or graft     Herpes zoster without mention of complication     Retinopathy of both eyes     Macul 2.5 MG Oral Tab Take 1 tablet (2.5 mg total) by mouth daily.  (Patient taking differently: Take 5 mg by mouth daily.  )   predniSONE 1 MG Oral Tab Take 8 tablets PO QD x 1 week, then 6 tablets PO QD x 1 week, then 4 tablets PO QD x 1 week, then 2 tablets PO asthma    EXAM:   /64   Pulse 84   Temp 98.6 °F (37 °C) (Oral)   Resp 16   Wt 155 lb   BMI 24.28 kg/m²  Estimated body mass index is 24.28 kg/m² as calculated from the following:    Height as of 2/4/19: 67\". Weight as of this encounter: 155 lb. FLULAVAL 6 months & older 0.5 ml Prefilled syringe (65711) 09/29/2017, 09/20/2018   • Pneumococcal (Prevnar 13) 03/25/2019   Deferred Date(s) Deferred   • >=3 YRS TRI  MULTIDOSE VIAL (16738) FLU CLINIC 09/01/2017   • Pneumococcal (Prevnar 13) 09/01/2017 Sugar (FSB)Annually Glucose (mg/dL)   Date Value   02/04/2019 137 (H)     GLUCOSE (mg/dL)   Date Value   10/07/2013 89          Cardiovascular Disease Screening     LDL Annually LDL Cholesterol (mg/dL)   Date Value   02/04/2019 57     LDL CHOLESTROL (mg/dL for Moderate/High Risk No vaccine history found Medium/high risk factors:   End-stage renal disease   Hemophiliacs who received Factor VIII or IX concentrates   Clients of institutions for the mentally retarded   Persons who live in the same house as a Hep

## 2019-04-10 RX ORDER — PREDNISONE 10 MG/1
TABLET ORAL
COMMUNITY

## 2019-04-10 RX ORDER — METOPROLOL TARTRATE 50 MG/1
1 TABLET, FILM COATED ORAL 2 TIMES DAILY
COMMUNITY
Start: 2018-04-23 | End: 2019-07-12 | Stop reason: SDUPTHER

## 2019-04-10 RX ORDER — ATORVASTATIN CALCIUM 40 MG/1
1 TABLET, FILM COATED ORAL AT BEDTIME
COMMUNITY
Start: 2018-04-23 | End: 2019-06-20 | Stop reason: SDUPTHER

## 2019-04-22 ENCOUNTER — TELEPHONE (OUTPATIENT)
Dept: FAMILY MEDICINE CLINIC | Facility: CLINIC | Age: 84
End: 2019-04-22

## 2019-04-22 DIAGNOSIS — L98.9 SKIN LESION OF NECK: ICD-10-CM

## 2019-04-22 DIAGNOSIS — L98.9 FACIAL SKIN LESION: Primary | ICD-10-CM

## 2019-04-22 RX ORDER — MUPIROCIN CALCIUM 20 MG/G
CREAM TOPICAL
Qty: 15 G | Refills: 0 | Status: SHIPPED | OUTPATIENT
Start: 2019-04-22 | End: 2019-09-16

## 2019-04-22 NOTE — TELEPHONE ENCOUNTER
Patient is calling to find out if antibiotic should be the next step to treat a yeast infection. Patient also wants to know the name of dermatologist she should see. Patient was recommended to see derm but was not given a name.  Please contact patient

## 2019-04-22 NOTE — TELEPHONE ENCOUNTER
Will write for bactroban. Referral to derm placed. Please provide her with the information. Please let me know if you have any questions.   [address], DO 4/22/2019 4:58 PM

## 2019-04-22 NOTE — TELEPHONE ENCOUNTER
LOV 3/25/19. Future Appointments   Date Time Provider Jae Nietoi   6/17/2019  1:00 PM Mony Hall,  [facility] [place]     Pt states that she has an ongoing fungal infection on neck. It has been getting worse.  It is spreading, red, possible b

## 2019-06-17 ENCOUNTER — OFFICE VISIT (OUTPATIENT)
Dept: FAMILY MEDICINE CLINIC | Facility: CLINIC | Age: 84
End: 2019-06-17
Payer: MEDICARE

## 2019-06-17 VITALS
RESPIRATION RATE: 16 BRPM | DIASTOLIC BLOOD PRESSURE: 64 MMHG | HEART RATE: 80 BPM | BODY MASS INDEX: 24 KG/M2 | SYSTOLIC BLOOD PRESSURE: 120 MMHG | WEIGHT: 155 LBS | TEMPERATURE: 98 F

## 2019-06-17 DIAGNOSIS — I10 ESSENTIAL HYPERTENSION: ICD-10-CM

## 2019-06-17 DIAGNOSIS — E78.00 PURE HYPERCHOLESTEROLEMIA: ICD-10-CM

## 2019-06-17 DIAGNOSIS — M35.3 PMR (POLYMYALGIA RHEUMATICA) (HCC): Primary | ICD-10-CM

## 2019-06-17 DIAGNOSIS — H35.3221 EXUDATIVE AGE-RELATED MACULAR DEGENERATION OF LEFT EYE WITH ACTIVE CHOROIDAL NEOVASCULARIZATION (HCC): ICD-10-CM

## 2019-06-17 DIAGNOSIS — L98.9 SKIN LESION OF NECK: ICD-10-CM

## 2019-06-17 DIAGNOSIS — E03.9 HYPOTHYROIDISM, UNSPECIFIED TYPE: ICD-10-CM

## 2019-06-17 DIAGNOSIS — C44.320 SQUAMOUS CELL CARCINOMA, FACE: ICD-10-CM

## 2019-06-17 DIAGNOSIS — I25.10 ATHEROSCLEROSIS OF NATIVE CORONARY ARTERY OF NATIVE HEART WITHOUT ANGINA PECTORIS: ICD-10-CM

## 2019-06-17 PROCEDURE — 99214 OFFICE O/P EST MOD 30 MIN: CPT | Performed by: FAMILY MEDICINE

## 2019-06-17 RX ORDER — BETAMETHASONE DIPROPIONATE 0.05 %
OINTMENT (GRAM) TOPICAL
Qty: 45 G | Refills: 3 | Status: SHIPPED | OUTPATIENT
Start: 2019-06-17

## 2019-06-17 NOTE — PROGRESS NOTES
Chief Complaint:  Patient presents with:  Medication Follow-Up    HPI:  This is a 80year old female patient presenting for Medication Follow-Up    Squamous cell CA- diagnosed under the R eye. Recently removed. Having sutures removed next week.      PMR: No per week      Comment: 1/2 glass    Drug use: No         Current Outpatient Medications:  Betamethasone Dipropionate 0.05 % External Ointment Apply BID as needed Disp: 45 g Rfl: 3   Mupirocin Calcium 2 % External Cream Apply thin layer topically 1-2 times Other    Exudative age-related macular degeneration of left eye with active choroidal neovascularization ([address]      Other Visit Diagnoses     Skin lesion of neck        Relevant Medications    Betamethasone Dipropionate 0.05 % External Ointment    Sq

## 2019-06-20 RX ORDER — ATORVASTATIN CALCIUM 40 MG/1
40 TABLET, FILM COATED ORAL AT BEDTIME
Qty: 90 TABLET | Refills: 0 | Status: SHIPPED | OUTPATIENT
Start: 2019-06-20 | End: 2019-09-20 | Stop reason: SDUPTHER

## 2019-07-01 DIAGNOSIS — M35.3 PMR (POLYMYALGIA RHEUMATICA) (HCC): ICD-10-CM

## 2019-07-02 RX ORDER — PREDNISONE 1 MG/1
TABLET ORAL
Qty: 120 TABLET | Refills: 1 | Status: SHIPPED | OUTPATIENT
Start: 2019-07-02 | End: 2019-11-21 | Stop reason: ALTCHOICE

## 2019-07-12 RX ORDER — METOPROLOL TARTRATE 50 MG/1
TABLET, FILM COATED ORAL
Qty: 180 TABLET | Refills: 0 | Status: SHIPPED | OUTPATIENT
Start: 2019-07-12 | End: 2019-10-11 | Stop reason: SDUPTHER

## 2019-07-29 ENCOUNTER — TELEPHONE (OUTPATIENT)
Dept: CARDIOLOGY | Age: 84
End: 2019-07-29

## 2019-08-12 ENCOUNTER — OFFICE VISIT (OUTPATIENT)
Dept: CARDIOLOGY | Age: 84
End: 2019-08-12

## 2019-08-12 VITALS
BODY MASS INDEX: 24.33 KG/M2 | WEIGHT: 155 LBS | HEIGHT: 67 IN | HEART RATE: 64 BPM | DIASTOLIC BLOOD PRESSURE: 54 MMHG | SYSTOLIC BLOOD PRESSURE: 122 MMHG

## 2019-08-12 DIAGNOSIS — I10 HYPERTENSION, BENIGN: ICD-10-CM

## 2019-08-12 DIAGNOSIS — I25.10 CORONARY ARTERY DISEASE INVOLVING NATIVE CORONARY ARTERY OF NATIVE HEART WITHOUT ANGINA PECTORIS: Primary | ICD-10-CM

## 2019-08-12 DIAGNOSIS — E78.00 HYPERCHOLESTEREMIA: ICD-10-CM

## 2019-08-12 DIAGNOSIS — I65.23 ASYMPTOMATIC CAROTID ARTERY STENOSIS, BILATERAL: ICD-10-CM

## 2019-08-12 PROBLEM — M35.3 PMR (POLYMYALGIA RHEUMATICA) (CMD): Status: ACTIVE | Noted: 2017-08-07

## 2019-08-12 PROCEDURE — 99214 OFFICE O/P EST MOD 30 MIN: CPT | Performed by: INTERNAL MEDICINE

## 2019-09-11 RX ORDER — LISINOPRIL AND HYDROCHLOROTHIAZIDE 20; 12.5 MG/1; MG/1
TABLET ORAL
Qty: 90 TABLET | Refills: 2 | Status: SHIPPED | OUTPATIENT
Start: 2019-09-11 | End: 2020-05-18 | Stop reason: ALTCHOICE

## 2019-09-16 ENCOUNTER — OFFICE VISIT (OUTPATIENT)
Dept: FAMILY MEDICINE CLINIC | Facility: CLINIC | Age: 84
End: 2019-09-16
Payer: MEDICARE

## 2019-09-16 VITALS
WEIGHT: 154 LBS | HEIGHT: 67 IN | SYSTOLIC BLOOD PRESSURE: 118 MMHG | HEART RATE: 70 BPM | BODY MASS INDEX: 24.17 KG/M2 | RESPIRATION RATE: 16 BRPM | DIASTOLIC BLOOD PRESSURE: 58 MMHG | TEMPERATURE: 98 F

## 2019-09-16 DIAGNOSIS — E03.9 HYPOTHYROIDISM, UNSPECIFIED TYPE: ICD-10-CM

## 2019-09-16 DIAGNOSIS — M35.3 PMR (POLYMYALGIA RHEUMATICA) (HCC): Primary | ICD-10-CM

## 2019-09-16 PROCEDURE — G0008 ADMIN INFLUENZA VIRUS VAC: HCPCS | Performed by: FAMILY MEDICINE

## 2019-09-16 PROCEDURE — 90662 IIV NO PRSV INCREASED AG IM: CPT | Performed by: FAMILY MEDICINE

## 2019-09-16 PROCEDURE — 99214 OFFICE O/P EST MOD 30 MIN: CPT | Performed by: FAMILY MEDICINE

## 2019-09-16 RX ORDER — LEVOTHYROXINE SODIUM 0.1 MG/1
100 TABLET ORAL
Qty: 90 TABLET | Refills: 1 | Status: SHIPPED | OUTPATIENT
Start: 2019-09-16 | End: 2020-03-16

## 2019-09-16 NOTE — PROGRESS NOTES
Chief Complaint:  Patient presents with:  Blood Pressure: 3 month Follow Up  Medication Follow-Up: Stopped Taking Prednisone this morning   Imm/Inj: Flu Shot    HPI:  This is a 80year old female patient presenting for Blood Pressure (3 month Follow Up); M HYSTERECTOMY  1/1/1972   • OOPHORECTOMY  1/1/1965   • SKIN SURGERY  06/11/2019    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       Family History   Problem Relation Age of Onset   • Hypertension Mother    • Heart Attack Father         MI   • Breast Cancer Sis pleasant and cooperative, no obvious depression or anxiety    ASSESSMENT AND PLAN:  Discussed the following assessment   Problem List Items Addressed This Visit        Endocrine    Hypothyroidism    Relevant Medications    Levothyroxine Sodium 100 MCG Oral

## 2019-09-20 RX ORDER — ATORVASTATIN CALCIUM 40 MG/1
40 TABLET, FILM COATED ORAL AT BEDTIME
Qty: 90 TABLET | Refills: 1 | Status: SHIPPED | OUTPATIENT
Start: 2019-09-20 | End: 2019-12-19

## 2019-10-13 ENCOUNTER — HOSPITAL (OUTPATIENT)
Dept: OTHER | Age: 84
End: 2019-10-13

## 2019-10-13 ENCOUNTER — DIAGNOSTIC TRANS (OUTPATIENT)
Dept: OTHER | Age: 84
End: 2019-10-13

## 2019-10-13 PROCEDURE — 99222 1ST HOSP IP/OBS MODERATE 55: CPT | Performed by: INTERNAL MEDICINE

## 2019-10-14 ENCOUNTER — DIAGNOSTIC TRANS (OUTPATIENT)
Dept: OTHER | Age: 84
End: 2019-10-14

## 2019-10-14 PROCEDURE — 93306 TTE W/DOPPLER COMPLETE: CPT | Performed by: INTERNAL MEDICINE

## 2019-10-14 PROCEDURE — 99232 SBSQ HOSP IP/OBS MODERATE 35: CPT | Performed by: INTERNAL MEDICINE

## 2019-10-14 RX ORDER — METOPROLOL TARTRATE 50 MG/1
TABLET, FILM COATED ORAL
Qty: 180 TABLET | Refills: 2 | Status: SHIPPED | OUTPATIENT
Start: 2019-10-14 | End: 2020-05-18 | Stop reason: SDUPTHER

## 2019-10-15 PROCEDURE — 99232 SBSQ HOSP IP/OBS MODERATE 35: CPT | Performed by: INTERNAL MEDICINE

## 2019-10-17 ENCOUNTER — LAB ENCOUNTER (OUTPATIENT)
Dept: LAB | Age: 84
End: 2019-10-17
Attending: INTERNAL MEDICINE
Payer: MEDICARE

## 2019-10-17 DIAGNOSIS — I25.10 CAD (CORONARY ARTERY DISEASE): ICD-10-CM

## 2019-10-17 DIAGNOSIS — I10 HTN (HYPERTENSION): Primary | ICD-10-CM

## 2019-10-17 PROCEDURE — 85025 COMPLETE CBC W/AUTO DIFF WBC: CPT

## 2019-10-17 PROCEDURE — 80053 COMPREHEN METABOLIC PANEL: CPT

## 2019-10-17 PROCEDURE — 36415 COLL VENOUS BLD VENIPUNCTURE: CPT

## 2019-10-23 ENCOUNTER — NURSE ONLY (OUTPATIENT)
Dept: LAB | Age: 84
End: 2019-10-23
Attending: INTERNAL MEDICINE
Payer: MEDICARE

## 2019-10-23 DIAGNOSIS — I10 HTN (HYPERTENSION): ICD-10-CM

## 2019-10-23 DIAGNOSIS — I10 HTN (HYPERTENSION): Primary | ICD-10-CM

## 2019-10-23 PROCEDURE — 85025 COMPLETE CBC W/AUTO DIFF WBC: CPT

## 2019-10-23 PROCEDURE — 80053 COMPREHEN METABOLIC PANEL: CPT

## 2019-10-23 PROCEDURE — 36415 COLL VENOUS BLD VENIPUNCTURE: CPT

## 2019-10-30 ENCOUNTER — NURSE ONLY (OUTPATIENT)
Dept: LAB | Age: 84
End: 2019-10-30
Attending: INTERNAL MEDICINE
Payer: MEDICARE

## 2019-10-30 DIAGNOSIS — I10 HTN (HYPERTENSION): Primary | ICD-10-CM

## 2019-10-30 PROCEDURE — 85025 COMPLETE CBC W/AUTO DIFF WBC: CPT

## 2019-10-30 PROCEDURE — 80053 COMPREHEN METABOLIC PANEL: CPT

## 2019-10-30 PROCEDURE — 36415 COLL VENOUS BLD VENIPUNCTURE: CPT

## 2019-11-06 ENCOUNTER — NURSE ONLY (OUTPATIENT)
Dept: LAB | Age: 84
End: 2019-11-06
Attending: INTERNAL MEDICINE
Payer: MEDICARE

## 2019-11-06 DIAGNOSIS — I10 HTN (HYPERTENSION): Primary | ICD-10-CM

## 2019-11-06 PROCEDURE — 80053 COMPREHEN METABOLIC PANEL: CPT

## 2019-11-06 PROCEDURE — 36415 COLL VENOUS BLD VENIPUNCTURE: CPT

## 2019-11-06 PROCEDURE — 85025 COMPLETE CBC W/AUTO DIFF WBC: CPT

## 2019-11-21 ENCOUNTER — OFFICE VISIT (OUTPATIENT)
Dept: FAMILY MEDICINE CLINIC | Facility: CLINIC | Age: 84
End: 2019-11-21
Payer: MEDICARE

## 2019-11-21 VITALS
RESPIRATION RATE: 14 BRPM | DIASTOLIC BLOOD PRESSURE: 64 MMHG | HEART RATE: 80 BPM | WEIGHT: 152 LBS | SYSTOLIC BLOOD PRESSURE: 122 MMHG | OXYGEN SATURATION: 98 % | TEMPERATURE: 98 F | BODY MASS INDEX: 24 KG/M2

## 2019-11-21 DIAGNOSIS — E03.9 HYPOTHYROIDISM, UNSPECIFIED TYPE: ICD-10-CM

## 2019-11-21 DIAGNOSIS — T79.6XXD TRAUMATIC RHABDOMYOLYSIS, SUBSEQUENT ENCOUNTER: ICD-10-CM

## 2019-11-21 DIAGNOSIS — M35.3 PMR (POLYMYALGIA RHEUMATICA) (HCC): ICD-10-CM

## 2019-11-21 DIAGNOSIS — M79.89 LEG SWELLING: Primary | ICD-10-CM

## 2019-11-21 DIAGNOSIS — I10 HYPERTENSION, BENIGN: ICD-10-CM

## 2019-11-21 PROCEDURE — 99214 OFFICE O/P EST MOD 30 MIN: CPT | Performed by: FAMILY MEDICINE

## 2019-11-21 NOTE — PROGRESS NOTES
Chief Complaint:  Patient presents with:  Leg Pain: Pain on Right Leg due to Fall, Pt went to [facility] on 10-- currently living in a Assistant Living home     HPI:  This is a 80year old female patient presenting for Leg Pain (Pain on Right CATARACT SURGERY, COMPLEX     • D & C  1/1/1968   • HYSTERECTOMY  1/1/1972   • OOPHORECTOMY  1/1/1965   • SKIN SURGERY  06/11/2019    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       Family History   Problem Relation Age of Onset   • Hypertension Mother    • depression or anxiety    ASSESSMENT AND PLAN:  Discussed the following assessment   Problem List Items Addressed This Visit        Cardiovascular    Hypertension, benign       Endocrine    Hypothyroidism       Immune    PMR (polymyalgia rheumatica) ([facility])

## 2020-01-26 ENCOUNTER — HOSPITAL ENCOUNTER (OUTPATIENT)
Facility: HOSPITAL | Age: 85
Setting detail: OBSERVATION
Discharge: HOME HEALTH | End: 2020-01-28
Attending: EMERGENCY MEDICINE | Admitting: HOSPITALIST
Payer: MEDICARE

## 2020-01-26 DIAGNOSIS — M35.3 PMR (POLYMYALGIA RHEUMATICA) (HCC): Primary | ICD-10-CM

## 2020-01-26 DIAGNOSIS — R53.1 WEAKNESS: ICD-10-CM

## 2020-01-26 LAB
ALBUMIN SERPL-MCNC: 2.7 G/DL (ref 3.4–5)
ALBUMIN/GLOB SERPL: 0.7 {RATIO} (ref 1–2)
ALP LIVER SERPL-CCNC: 88 U/L (ref 55–142)
ALT SERPL-CCNC: 14 U/L (ref 13–56)
ANION GAP SERPL CALC-SCNC: 4 MMOL/L (ref 0–18)
AST SERPL-CCNC: 10 U/L (ref 15–37)
BASOPHILS # BLD AUTO: 0.03 X10(3) UL (ref 0–0.2)
BASOPHILS NFR BLD AUTO: 0.3 %
BILIRUB SERPL-MCNC: 0.4 MG/DL (ref 0.1–2)
BILIRUB UR QL STRIP.AUTO: NEGATIVE
BUN BLD-MCNC: 14 MG/DL (ref 7–18)
BUN/CREAT SERPL: 26.9 (ref 10–20)
CALCIUM BLD-MCNC: 8.8 MG/DL (ref 8.5–10.1)
CHLORIDE SERPL-SCNC: 111 MMOL/L (ref 98–112)
CK SERPL-CCNC: 29 U/L (ref 26–192)
CLARITY UR REFRACT.AUTO: CLEAR
CO2 SERPL-SCNC: 28 MMOL/L (ref 21–32)
COLOR UR AUTO: YELLOW
CREAT BLD-MCNC: 0.52 MG/DL (ref 0.55–1.02)
DEPRECATED RDW RBC AUTO: 51.8 FL (ref 35.1–46.3)
EOSINOPHIL # BLD AUTO: 0.05 X10(3) UL (ref 0–0.7)
EOSINOPHIL NFR BLD AUTO: 0.5 %
ERYTHROCYTE [DISTWIDTH] IN BLOOD BY AUTOMATED COUNT: 14.4 % (ref 11–15)
GLOBULIN PLAS-MCNC: 4.1 G/DL (ref 2.8–4.4)
GLUCOSE BLD-MCNC: 114 MG/DL (ref 70–99)
GLUCOSE UR STRIP.AUTO-MCNC: NEGATIVE MG/DL
HCT VFR BLD AUTO: 34.5 % (ref 35–48)
HGB BLD-MCNC: 11 G/DL (ref 12–16)
IMM GRANULOCYTES # BLD AUTO: 0.06 X10(3) UL (ref 0–1)
IMM GRANULOCYTES NFR BLD: 0.6 %
KETONES UR STRIP.AUTO-MCNC: NEGATIVE MG/DL
LEUKOCYTE ESTERASE UR QL STRIP.AUTO: NEGATIVE
LYMPHOCYTES # BLD AUTO: 1.29 X10(3) UL (ref 1–4)
LYMPHOCYTES NFR BLD AUTO: 14 %
M PROTEIN MFR SERPL ELPH: 6.8 G/DL (ref 6.4–8.2)
MCH RBC QN AUTO: 31.3 PG (ref 26–34)
MCHC RBC AUTO-ENTMCNC: 31.9 G/DL (ref 31–37)
MCV RBC AUTO: 98.3 FL (ref 80–100)
MONOCYTES # BLD AUTO: 1.03 X10(3) UL (ref 0.1–1)
MONOCYTES NFR BLD AUTO: 11.1 %
NEUTROPHILS # BLD AUTO: 6.78 X10 (3) UL (ref 1.5–7.7)
NEUTROPHILS # BLD AUTO: 6.78 X10(3) UL (ref 1.5–7.7)
NEUTROPHILS NFR BLD AUTO: 73.5 %
NITRITE UR QL STRIP.AUTO: NEGATIVE
OSMOLALITY SERPL CALC.SUM OF ELEC: 297 MOSM/KG (ref 275–295)
PH UR STRIP.AUTO: 7 [PH] (ref 4.5–8)
PLATELET # BLD AUTO: 167 10(3)UL (ref 150–450)
POTASSIUM SERPL-SCNC: 3.7 MMOL/L (ref 3.5–5.1)
PROT UR STRIP.AUTO-MCNC: NEGATIVE MG/DL
RBC # BLD AUTO: 3.51 X10(6)UL (ref 3.8–5.3)
RBC UR QL AUTO: NEGATIVE
SED RATE-ML: 67 MM/HR (ref 0–25)
SODIUM SERPL-SCNC: 143 MMOL/L (ref 136–145)
SP GR UR STRIP.AUTO: 1.01 (ref 1–1.03)
UROBILINOGEN UR STRIP.AUTO-MCNC: 2 MG/DL
WBC # BLD AUTO: 9.2 X10(3) UL (ref 4–11)

## 2020-01-26 PROCEDURE — 99220 INITIAL OBSERVATION CARE,LEVL III: CPT | Performed by: HOSPITALIST

## 2020-01-26 RX ORDER — ASPIRIN 81 MG/1
81 TABLET ORAL DAILY
Status: DISCONTINUED | OUTPATIENT
Start: 2020-01-27 | End: 2020-01-28

## 2020-01-26 RX ORDER — OFLOXACIN 3 MG/ML
SOLUTION/ DROPS OPHTHALMIC AS NEEDED
COMMUNITY

## 2020-01-26 RX ORDER — ATORVASTATIN CALCIUM 40 MG/1
40 TABLET, FILM COATED ORAL NIGHTLY
Status: DISCONTINUED | OUTPATIENT
Start: 2020-01-26 | End: 2020-01-28

## 2020-01-26 RX ORDER — ENOXAPARIN SODIUM 100 MG/ML
40 INJECTION SUBCUTANEOUS EVERY 24 HOURS
Status: DISCONTINUED | OUTPATIENT
Start: 2020-01-26 | End: 2020-01-28

## 2020-01-26 RX ORDER — ACETAMINOPHEN 500 MG
1000 TABLET ORAL EVERY 12 HOURS PRN
COMMUNITY

## 2020-01-26 RX ORDER — ONDANSETRON 2 MG/ML
4 INJECTION INTRAMUSCULAR; INTRAVENOUS EVERY 6 HOURS PRN
Status: DISCONTINUED | OUTPATIENT
Start: 2020-01-26 | End: 2020-01-28

## 2020-01-26 RX ORDER — HYDROMORPHONE HYDROCHLORIDE 1 MG/ML
INJECTION, SOLUTION INTRAMUSCULAR; INTRAVENOUS; SUBCUTANEOUS
Status: COMPLETED
Start: 2020-01-26 | End: 2020-01-26

## 2020-01-26 RX ORDER — LEVOTHYROXINE SODIUM 0.1 MG/1
100 TABLET ORAL
Status: DISCONTINUED | OUTPATIENT
Start: 2020-01-27 | End: 2020-01-28

## 2020-01-26 RX ORDER — METOPROLOL TARTRATE 50 MG/1
50 TABLET, FILM COATED ORAL
Status: DISCONTINUED | OUTPATIENT
Start: 2020-01-26 | End: 2020-01-28

## 2020-01-26 RX ORDER — TRAZODONE HYDROCHLORIDE 50 MG/1
50 TABLET ORAL NIGHTLY PRN
Status: DISCONTINUED | OUTPATIENT
Start: 2020-01-26 | End: 2020-01-28

## 2020-01-26 RX ORDER — PREDNISONE 20 MG/1
40 TABLET ORAL
Status: DISCONTINUED | OUTPATIENT
Start: 2020-01-27 | End: 2020-01-28

## 2020-01-26 RX ORDER — HYDROMORPHONE HYDROCHLORIDE 1 MG/ML
0.5 INJECTION, SOLUTION INTRAMUSCULAR; INTRAVENOUS; SUBCUTANEOUS EVERY 30 MIN PRN
Status: DISCONTINUED | OUTPATIENT
Start: 2020-01-26 | End: 2020-01-26

## 2020-01-26 RX ORDER — METHYLPREDNISOLONE SODIUM SUCCINATE 125 MG/2ML
125 INJECTION, POWDER, LYOPHILIZED, FOR SOLUTION INTRAMUSCULAR; INTRAVENOUS ONCE
Status: COMPLETED | OUTPATIENT
Start: 2020-01-26 | End: 2020-01-26

## 2020-01-26 RX ORDER — ACETAMINOPHEN 325 MG/1
650 TABLET ORAL EVERY 6 HOURS PRN
Status: DISCONTINUED | OUTPATIENT
Start: 2020-01-26 | End: 2020-01-28

## 2020-01-26 RX ORDER — METOCLOPRAMIDE HYDROCHLORIDE 5 MG/ML
10 INJECTION INTRAMUSCULAR; INTRAVENOUS EVERY 8 HOURS PRN
Status: DISCONTINUED | OUTPATIENT
Start: 2020-01-26 | End: 2020-01-28

## 2020-01-26 NOTE — ED INITIAL ASSESSMENT (HPI)
Pt fell in October and has had severe pain since. Pt takes tylenol. Pt seen at multiple places and prescribed tylenol.

## 2020-01-26 NOTE — PLAN OF CARE
ED admit for PMR flare up. Worse pain in shoulders and hips. Able to bend knees, move legs and plantar/dorsiflex. States she has numbness to feet per baseline. Left arm ROM worse than right. Skin intact, 2 person skin check done. RA, VSS.  Buck placed per

## 2020-01-26 NOTE — H&P
[facility]  History and Physical     Breanna Binning Patient Status:  Emergency    1932 MRN [id number]   Location [facility] Attending Lee Paulino MD   Hosp Day # 0 PCP Ashley Gutierrez DO     Chief Complaint: 0.05 % External Ointment, Apply BID as needed, Disp: 45 g, Rfl: 3  ofloxacin 0.3 % Ophthalmic Solution,  , Disp: , Rfl: 1  Multiple Vitamins-Minerals (ICAPS AREDS 2 OR), Take by mouth daily.   , Disp: , Rfl:   aspirin (ASPIRIN LOW DOSE) 81 MG Oral Tab, Take last 168 hours. Recent Labs   Lab 01/26/20  1331   CK 29       Imaging: Imaging data reviewed in Epic. ASSESSMENT / PLAN:     1. Acute flare of PMR: iv steroids ordered per Dr. Flako Jarrett in ER.   Will redose tomorrow; rheum and PT consults  2. hypothyr

## 2020-01-26 NOTE — ED PROVIDER NOTES
Patient Seen in: [facility] Emergency Department      History   Patient presents with:  Pain    Stated Complaint: Pain - fall in October, admitted at that time.  Only taking tylenol for pain and*    HPI    80-year-old female comes in the hospital com Review of Systems    Positive for stated complaint: Pain - fall in October, admitted at that time. Only taking tylenol for pain and*  Other systems are as noted in HPI. Constitutional and vital signs reviewed.       All other systems reviewed and neg Narrative: The following orders were created for panel order CBC WITH DIFFERENTIAL WITH PLATELET.   Procedure                               Abnormality         Status                     ---------                               -----------         ------

## 2020-01-26 NOTE — ED NOTES
Patient states she has been having chronic pain all over her body since she fell back in October. She was seen for all this pain several times and told to take tylenol. She states the tylenol just isn't working.

## 2020-01-27 LAB
ANION GAP SERPL CALC-SCNC: 6 MMOL/L (ref 0–18)
BASOPHILS # BLD AUTO: 0.01 X10(3) UL (ref 0–0.2)
BASOPHILS NFR BLD AUTO: 0.1 %
BUN BLD-MCNC: 14 MG/DL (ref 7–18)
BUN/CREAT SERPL: 26.4 (ref 10–20)
CALCIUM BLD-MCNC: 8.8 MG/DL (ref 8.5–10.1)
CHLORIDE SERPL-SCNC: 111 MMOL/L (ref 98–112)
CK SERPL-CCNC: 29 U/L (ref 26–192)
CO2 SERPL-SCNC: 26 MMOL/L (ref 21–32)
CREAT BLD-MCNC: 0.53 MG/DL (ref 0.55–1.02)
DEPRECATED RDW RBC AUTO: 51.5 FL (ref 35.1–46.3)
EOSINOPHIL # BLD AUTO: 0 X10(3) UL (ref 0–0.7)
EOSINOPHIL NFR BLD AUTO: 0 %
ERYTHROCYTE [DISTWIDTH] IN BLOOD BY AUTOMATED COUNT: 14.2 % (ref 11–15)
GLUCOSE BLD-MCNC: 161 MG/DL (ref 70–99)
HCT VFR BLD AUTO: 33.3 % (ref 35–48)
HGB BLD-MCNC: 10.4 G/DL (ref 12–16)
IMM GRANULOCYTES # BLD AUTO: 0.05 X10(3) UL (ref 0–1)
IMM GRANULOCYTES NFR BLD: 0.6 %
LYMPHOCYTES # BLD AUTO: 0.66 X10(3) UL (ref 1–4)
LYMPHOCYTES NFR BLD AUTO: 8.5 %
MCH RBC QN AUTO: 30.7 PG (ref 26–34)
MCHC RBC AUTO-ENTMCNC: 31.2 G/DL (ref 31–37)
MCV RBC AUTO: 98.2 FL (ref 80–100)
MONOCYTES # BLD AUTO: 0.2 X10(3) UL (ref 0.1–1)
MONOCYTES NFR BLD AUTO: 2.6 %
NEUTROPHILS # BLD AUTO: 6.81 X10 (3) UL (ref 1.5–7.7)
NEUTROPHILS # BLD AUTO: 6.81 X10(3) UL (ref 1.5–7.7)
NEUTROPHILS NFR BLD AUTO: 88.2 %
OSMOLALITY SERPL CALC.SUM OF ELEC: 300 MOSM/KG (ref 275–295)
PLATELET # BLD AUTO: 183 10(3)UL (ref 150–450)
POTASSIUM SERPL-SCNC: 4 MMOL/L (ref 3.5–5.1)
RBC # BLD AUTO: 3.39 X10(6)UL (ref 3.8–5.3)
RHEUMATOID FACT SERPL-ACNC: <10 IU/ML (ref ?–15)
SODIUM SERPL-SCNC: 143 MMOL/L (ref 136–145)
URATE SERPL-MCNC: 4 MG/DL (ref 2.6–6)
VIT D+METAB SERPL-MCNC: 47.5 NG/ML (ref 30–100)
WBC # BLD AUTO: 7.7 X10(3) UL (ref 4–11)

## 2020-01-27 PROCEDURE — 99225 SUBSEQUENT OBSERVATION CARE: CPT | Performed by: HOSPITALIST

## 2020-01-27 RX ORDER — POLYETHYLENE GLYCOL 3350 17 G/17G
17 POWDER, FOR SOLUTION ORAL DAILY
Status: DISCONTINUED | OUTPATIENT
Start: 2020-01-27 | End: 2020-01-28

## 2020-01-27 RX ORDER — SENNOSIDES 8.6 MG
8.6 TABLET ORAL 2 TIMES DAILY
Status: DISCONTINUED | OUTPATIENT
Start: 2020-01-27 | End: 2020-01-28

## 2020-01-27 NOTE — DIETARY NOTE
[facility]     Admitting diagnosis:  PMR (polymyalgia rheumatica) ([facility]) [M35.3]  Weakness [R53.1]    Ht: 170.2 cm (5' 7\")  Wt: 54.4 kg (119 lb 14.9 oz). This is 88% of IBW  Body mass index is 18.78 kg/m².   IBW:

## 2020-01-27 NOTE — PROGRESS NOTES
Went to remove meyer, pt very anxious and is refusing to have it removed. States she wants to keep it in until discharge. Informed pt that she is at risk for UTI with meyer in. Pt states, \"please don't take it out and cause me more stress. \"  Paged Dr. Torey Corral

## 2020-01-27 NOTE — PROGRESS NOTES
[facility]  Progress note     [facility] ER Patient Status:  Emergency    1932 MRN [id number]   Location [facility] Attending Jaguar Riojas MD   Hosp Day # 0 PCP DO Heather     Chief Complaint: joint p steroids; rheum to evaluate  2. hypothyroidsm and htn; resume home meds  D/c meyer; start bowel regimen; hopefully home tomorrow  Quality:  · DVT Prophylaxis: lovenox  · CODE status: full  · Meyer: no    Plan of care discussed with patient and rn  Patrice Liang

## 2020-01-27 NOTE — PHYSICAL THERAPY NOTE
PHYSICAL THERAPY EVALUATION - INPATIENT     Room Number: 799/071-N  Evaluation Date: 1/27/2020  Type of Evaluation: Initial  Physician Order: PT Eval and Treat    Presenting Problem: Joint pain - PMR  Reason for Therapy: Mobility Dysfunction and Disc living. OBJECTIVE  Precautions: Bed/chair alarm; Low vision  Fall Risk: High fall risk    WEIGHT BEARING RESTRICTION  Weight Bearing Restriction: None                PAIN ASSESSMENT  Rating: 3  Location: Bilateral shoulders, left more than right  Managem STATUS  Gait Assessment   Gait Assistance: Contact guard assist  Distance (ft): 150 ft  Assistive Device: Rolling walker  Pattern: (Decreased pace, decreased step length)  Stoop/Curb Assistance: Not tested  Comment : Above score is based on FIM definations returning to prior to level of function. DISCHARGE RECOMMENDATIONS  PT Discharge Recommendations: Home with home health PT; Intermittent Supervision(initial supervision for safety )The AM-PAC '6-Clicks' Inpatient Basic Mobility Short Form was completed and

## 2020-01-27 NOTE — OCCUPATIONAL THERAPY NOTE
OCCUPATIONAL THERAPY EVALUATION - INPATIENT     Room Number: 258/109-Q  Evaluation Date: 1/27/2020  Type of Evaluation: Initial  Presenting Problem: joint pain, PMR    Physician Order: IP Consult to Occupational Therapy  Reason for Therapy: ADL/IADL Dysfun supervision for shower transfers and showering twice a week. She walks to dining zarate for all her meals via RW. SUBJECTIVE   \"I think it's my PMR.  My pain was a 15 yesterday, everywhere, this is so much better\"    Patient self-stated goal is to feel Provided: Patient received in supine; sitting EOB to L side via CGA with encouragement and increased time; standing via RW and CGA; functional mobility to zarate and back into bathroom via RW and CGA; toilet transfer via CGA and safety cues; functional mobil Recommendations: Home with home health PT/OT(initial family assist)  OT Device Recommendations: TBD    PLAN  OT Treatment Plan: Balance activities; ADL training;Functional transfer training; Endurance training;Patient/Family education;Patient/Family training

## 2020-01-27 NOTE — PLAN OF CARE
Pt A & O x4, forgetful at times, often gets distracted off topic when asking questions. /IS, on RA. SCDs. Regular diet. Meyer placed yesterday per pt request as she was not getting up. PT saw pt this AM, will remove meyer.  Last BM about 4-7 days ago p

## 2020-01-27 NOTE — CM/SW NOTE
Emerita Paulino, 01/27/20, 3:32 PM  Patient failed inpatient criteria. Second level of review completed and supports observation. UR committee in agreement. Discussed with Dr. Gonzalo Vital who approves observation status.  Observation letter given to the patient

## 2020-01-27 NOTE — PROGRESS NOTES
01/27/20 0740   Clinical Encounter Type   Referral To Nurse   Spiritism Encounters   Spiritual Requests During Visit / Hospitalization [facility]  (Marissa Fabian made referral to [address] [place] [address] as requested by patient.)

## 2020-01-27 NOTE — PLAN OF CARE
Pt A&O. On 2L via nasal cannula while asleep. Pt seems anxious and forgetful. Reorientation effective. SCDs to BLE. Pt tolerating general diet, but is unable to remember when her last BM was. She states it is normal for her to only go once a week.  Cielo in

## 2020-01-27 NOTE — CM/SW NOTE
01/27/20 1200   CM/SW Referral Data   Referral Source Social Work (self-referral)   Reason for Referral Discharge planning   Informant Patient; Children   Patient Info   Patient's Mental Status Alert;Oriented   Discharge Needs   Anticipated D/C needs Yazmin

## 2020-01-28 ENCOUNTER — TELEPHONE (OUTPATIENT)
Dept: FAMILY MEDICINE CLINIC | Facility: CLINIC | Age: 85
End: 2020-01-28

## 2020-01-28 VITALS
SYSTOLIC BLOOD PRESSURE: 117 MMHG | OXYGEN SATURATION: 98 % | HEIGHT: 67 IN | DIASTOLIC BLOOD PRESSURE: 54 MMHG | BODY MASS INDEX: 18.82 KG/M2 | TEMPERATURE: 98 F | WEIGHT: 119.94 LBS | HEART RATE: 83 BPM | RESPIRATION RATE: 18 BRPM

## 2020-01-28 LAB
CRP SERPL-MCNC: 0.83 MG/DL (ref ?–0.3)
SED RATE-ML: 52 MM/HR (ref 0–25)

## 2020-01-28 PROCEDURE — 99217 OBSERVATION CARE DISCHARGE: CPT | Performed by: HOSPITALIST

## 2020-01-28 RX ORDER — PREDNISONE 20 MG/1
20 TABLET ORAL
Qty: 60 TABLET | Refills: 0 | Status: SHIPPED | OUTPATIENT
Start: 2020-01-28 | End: 2020-03-23 | Stop reason: DRUGHIGH

## 2020-01-28 RX ORDER — PREDNISONE 20 MG/1
20 TABLET ORAL
Qty: 60 TABLET | Refills: 0 | Status: SHIPPED | OUTPATIENT
Start: 2020-01-28 | End: 2020-01-28

## 2020-01-28 RX ORDER — PREDNISONE 20 MG/1
20 TABLET ORAL
Status: DISCONTINUED | OUTPATIENT
Start: 2020-01-28 | End: 2020-01-28

## 2020-01-28 NOTE — CONSULTS
[facility] Rheumatology  [address] [place] [address] [address] [facility],[address]  [place], [address]    Referring Physician:   Dr. Ashley Lentz ref. provider found       . As you know Ms. Raymond Pryor is a 80year old female with complaints of possible flare of her past diagnosis of PMR. around her hip girdle she again has morning stiffness which is her worst time of the day. Pain improves slightly throughout the day.   There is no swelling of her fingers or toes there is been some short-lived knee effusion she has no history of gout or CP HISTORY:     Social History    Tobacco Use      Smoking status: Former Smoker        Quit date: 1972        Years since quittin.1      Smokeless tobacco: Never Used    Alcohol use:  Yes      Alcohol/week: 7.0 standard drinks      Types: 7 Glasses o Hemoglobin      12.0 - 16.0 g/dL 10.4 (L) 11.0 (L)   Hematocrit      35.0 - 48.0 % 33.3 (L) 34.5 (L)   Platelet Count      424.3 - 450.0 10(3)uL 183.0 167.0   MCV      80.0 - 100.0 fL 98.2 98.3   MCH      26.0 - 34.0 pg 30.7 31.3   MCHC      31.0 - 37.0 Spec Gravity      1.001 - 1.030  1.015   Glucose Urine      Negative mg/dl  Negative   Bilirubin      Negative  Negative   Ketones, UA      Negative mg/dL  Negative   Blood Urine      Negative  Negative   PH Urine      4.5 - 8.0  7.0   Protein Urine Aller.  There is been no signs of GCA in past or now. No jaw claudication, no scalp tenderness, no headaches  Add CRP and few other serologies.   She agrees pending her labs to drop her prednisone down to 20 mg a day in am; and she will likely need this dos

## 2020-01-28 NOTE — PLAN OF CARE
Pt. Admitted for PMR flare up on 01-26-20. Pain level is well controlled on oral daily Prednisone and prn Tylenol. Ambulates with walker and min assist. VS remain stable.  Voiding via meyer catheter, per pt's request. Last BM on 01-22-20, refusing bowel m

## 2020-01-28 NOTE — CM/SW NOTE
Contacted [facility] ([phone]) and spoke with [place] who confirmed they have previously provided [facility] services to patient. Discussed new referral and anticipated discharge today. Referral sent via [address]. QD done.   / to rem

## 2020-01-28 NOTE — PHYSICAL THERAPY NOTE
PHYSICAL THERAPY TREATMENT NOTE - INPATIENT    Room Number: 201/010-J     Session: 1   Number of Visits to Meet Established Goals: 3    Presenting Problem: Joint pain - PMR    Problem List  Principal Problem:    PMR (polymyalgia rheumatica) (HCC)  Active None   How much help from another person does the patient currently need. ..   -   Moving to and from a bed to a chair (including a wheelchair)?: None   -   Need to walk in hospital room?: None   -   Climbing 3-5 steps with a railing?: A Lot(does no have st rehab aide will perform treatment activities prescribed by this physical therapist. The rehab aide will communicate with overseeing PT regarding any change in functional mobility. RN aware.          DISCHARGE RECOMMENDATIONS  PT Discharge Recommendations: H

## 2020-01-28 NOTE — TELEPHONE ENCOUNTER
Ana from [facility] called to find out if Dr. Dorian Jimenez will be signing orders for home health and physical therapy.  Patient will be discharged from [facility] today

## 2020-01-28 NOTE — OCCUPATIONAL THERAPY NOTE
OCCUPATIONAL THERAPY TREATMENT NOTE - INPATIENT     Room Number: 201/827-A  Session: 1   Number of Visits to Meet Established Goals: 3    Presenting Problem: joint pain, PMR    History related to current admission: Patient is 80year old female admitted on regular lower body clothing?: A Little(supervision)  -   Bathing (including washing, rinsing, drying)?: A Little(supervision)  -   Toileting, which includes using toilet, bedpan or urinal? : A Little(supervision)  -   Putting on and taking off regular uppe no further questions or concerns about safe return home to I/ADL tasks. No further OT services needed at this time.     OT Discharge Recommendations: Home with home health PT/OT(initial family assist)  OT Device Recommendations: TBD    PLAN  OT Treatment Pl

## 2020-01-28 NOTE — PROGRESS NOTES
Patient seen and examined. Medically cleared for discharge, if all involved specialists' consent for discharge.     Edelmira Graves MD  [facility]  Internal Medicine Hospitalist  Cell [phone]

## 2020-01-29 ENCOUNTER — PATIENT OUTREACH (OUTPATIENT)
Dept: CASE MANAGEMENT | Age: 85
End: 2020-01-29

## 2020-01-29 DIAGNOSIS — Z02.9 ENCOUNTERS FOR ADMINISTRATIVE PURPOSE: ICD-10-CM

## 2020-01-29 DIAGNOSIS — M35.3 PMR (POLYMYALGIA RHEUMATICA) (HCC): ICD-10-CM

## 2020-01-29 LAB
ANTI-NUCLEAR ANTIBODY (ANA), HEP-2 IGG: DETECTED
CCP IGG SERPL-ACNC: 0.5 U/ML (ref 0–6.9)
KAPPA FREE LIGHT CHAIN: 1.47 MG/DL (ref 0.33–1.94)
KAPPA/LAMBDA FLC RATIO: 0.88 (ref 0.26–1.65)
LAMBDA FREE LIGHT CHAIN: 1.66 MG/DL (ref 0.57–2.63)
MYELOPEROX ANTIBODIES, IGG: 0 AU/ML
SERINE PROTEASE3, IGG: 0 AU/ML

## 2020-01-29 PROCEDURE — 1111F DSCHRG MED/CURRENT MED MERGE: CPT

## 2020-01-29 NOTE — CM/SW NOTE
01/29/20 0800   Discharge disposition   Expected discharge disposition Home-Health   Name of Facillity/Home Care/Hospice   ([address])   Home services after discharge Skilled home care   Discharge transportation Private car   AVS sent to amanda

## 2020-01-29 NOTE — PLAN OF CARE
Pt cleared for discharge home today with home health care. Buck catheter was removed at noon and pt voided. Pt had bm today. Pain well controlled with tylenol and prednisone.  Written and verbal discharge instructions provided for pt and daughter, all ques

## 2020-01-29 NOTE — DISCHARGE SUMMARY
[facility] [place] [facility]PITAL[facility]  DISCHARGE SUMMARY     Sally Liu Patient Status:  Observation    1932 MRN [id number]   [facility] 3SW-A Attending No att. providers found   Hosp Day # 0 PCP José Luis Yanes DO     Date of Admission: 2020  D STRENGTH      Take 1,000 mg by mouth every 12 (twelve) hours as needed for Pain. Refills:  0     Aspirin Low Dose 81 MG Tabs  Generic drug:  aspirin      Take 81 mg by mouth daily.    Refills:  0     atorvastatin 40 MG Tabs  Commonly known as:  LIPITOR Clear to auscultation bilaterally. No wheezes. No rhonchi. Cardiovascular: S1, S2. Regular rate and rhythm. No murmurs, rubs or gallops. Abdomen: Soft, nontender, nondistended. No rebound, guarding or organomegaly.   Neurologic: No focal neurological d

## 2020-01-29 NOTE — PROGRESS NOTES
Initial Post Discharge Follow Up   Discharge Date: 1/28/20  Contact Date: 1/29/2020    Consent Verification:  Assessment Completed With: Patient  HIPAA Verified?   Yes    Discharge Dx:   PMR (polymyalgia rheumatica)    Was TCC ordered: no    General:   • • Levothyroxine Sodium 100 MCG Oral Tab Take 1 tablet (100 mcg total) by mouth once daily.  90 tablet 1   • Betamethasone Dipropionate 0.05 % External Ointment Apply BID as needed 45 g 3   • aspirin (ASPIRIN LOW DOSE) 81 MG Oral Tab Take 81 mg by mouth da appointment: scheduled at D/C within 7-14 days  no     NCM Reviewed/scheduled/rescheduled PCP TCM/HFU appointment with pt:  Yes      Have you made all of your follow up appointments? no, pt states that she will call today to schedule with Rheumatologist.

## 2020-01-31 LAB
ALBUMIN SERPL ELPH-MCNC: 3.05 G/DL (ref 3.75–5.21)
ALBUMIN/GLOB SERPL: 0.97 {RATIO} (ref 1–2)
ALPHA1 GLOB SERPL ELPH-MCNC: 0.46 G/DL (ref 0.19–0.46)
ALPHA2 GLOB SERPL ELPH-MCNC: 1.04 G/DL (ref 0.48–1.05)
B-GLOBULIN SERPL ELPH-MCNC: 0.86 G/DL (ref 0.68–1.23)
GAMMA GLOB SERPL ELPH-MCNC: 0.8 G/DL (ref 0.62–1.7)
M PROTEIN MFR SERPL ELPH: 6.2 G/DL (ref 6.4–8.2)

## 2020-02-04 ENCOUNTER — OFFICE VISIT (OUTPATIENT)
Dept: FAMILY MEDICINE CLINIC | Facility: CLINIC | Age: 85
End: 2020-02-04
Payer: MEDICARE

## 2020-02-04 VITALS
RESPIRATION RATE: 16 BRPM | HEART RATE: 64 BPM | OXYGEN SATURATION: 97 % | BODY MASS INDEX: 22 KG/M2 | DIASTOLIC BLOOD PRESSURE: 60 MMHG | WEIGHT: 143 LBS | TEMPERATURE: 99 F | SYSTOLIC BLOOD PRESSURE: 122 MMHG

## 2020-02-04 DIAGNOSIS — H35.3221 EXUDATIVE AGE-RELATED MACULAR DEGENERATION OF LEFT EYE WITH ACTIVE CHOROIDAL NEOVASCULARIZATION (HCC): ICD-10-CM

## 2020-02-04 DIAGNOSIS — M35.3 PMR (POLYMYALGIA RHEUMATICA) (HCC): Primary | ICD-10-CM

## 2020-02-04 PROCEDURE — 1111F DSCHRG MED/CURRENT MED MERGE: CPT | Performed by: FAMILY MEDICINE

## 2020-02-04 PROCEDURE — 99495 TRANSJ CARE MGMT MOD F2F 14D: CPT | Performed by: FAMILY MEDICINE

## 2020-02-04 NOTE — PROGRESS NOTES
HPI:    Inessa Fox is a 80year old female here today for hospital follow up.    She was discharged from Inpatient hospital, [facility] to Assisted living   Admission Date: 1/26/20   Discharge Date: 1/28/20  Hospital Discharge Diagnoses (since 1 tablet (20 mg total) by mouth daily with breakfast. Further weaning to be done by your rheumatologist or your primary doctor  EYE DROPS RELIEF OP, Apply 1 drop to eye daily as needed.   acetaminophen 500 MG Oral Tab, Take 1,000 mg by mouth every 12 (twelve) denies abdominal pain, denies heartburn, denies diarrhea  MUSCULOSKELETAL: denies pain, normal range of motion of extremities  NEURO: denies headaches, + dizziness, + weakness  PSYCHE: denies depression or anxiety  HEMATOLOGIC: denies hx of anemia, or brui Transitional Care Management Certification:  I certify that the following are true:  Communication with the patient was made within 2 business days of discharge on date above   Medical Decision Making- Based on service period of discharge to 30 days:

## 2020-02-06 ENCOUNTER — APPOINTMENT (OUTPATIENT)
Dept: LAB | Age: 85
End: 2020-02-06
Attending: FAMILY MEDICINE
Payer: MEDICARE

## 2020-02-06 DIAGNOSIS — M35.3 PMR (POLYMYALGIA RHEUMATICA) (HCC): ICD-10-CM

## 2020-02-06 LAB
CRP SERPL-MCNC: <0.29 MG/DL (ref ?–0.3)
SED RATE-ML: 20 MM/HR (ref 0–25)

## 2020-02-06 PROCEDURE — 85652 RBC SED RATE AUTOMATED: CPT

## 2020-02-06 PROCEDURE — 36415 COLL VENOUS BLD VENIPUNCTURE: CPT

## 2020-02-06 PROCEDURE — 86140 C-REACTIVE PROTEIN: CPT

## 2020-02-14 ENCOUNTER — PATIENT OUTREACH (OUTPATIENT)
Dept: CASE MANAGEMENT | Age: 85
End: 2020-02-14

## 2020-02-14 NOTE — PROGRESS NOTES
Attempted to contact pt to introduce CCM program, no answer left detailed message for pt to call back.

## 2020-03-02 ENCOUNTER — PATIENT OUTREACH (OUTPATIENT)
Dept: CASE MANAGEMENT | Age: 85
End: 2020-03-02

## 2020-03-02 ENCOUNTER — TELEPHONE (OUTPATIENT)
Dept: FAMILY MEDICINE CLINIC | Facility: CLINIC | Age: 85
End: 2020-03-02

## 2020-03-02 NOTE — TELEPHONE ENCOUNTER
Didier Deluca at [facility] called requesting to speak with nurse, states patient is requesting an extension of occupational physical therapy, requesting order

## 2020-03-12 DIAGNOSIS — E03.9 HYPOTHYROIDISM, UNSPECIFIED TYPE: ICD-10-CM

## 2020-03-16 RX ORDER — LEVOTHYROXINE SODIUM 0.1 MG/1
TABLET ORAL
Qty: 90 TABLET | Refills: 1 | Status: SHIPPED | OUTPATIENT
Start: 2020-03-16 | End: 2020-10-01

## 2020-03-16 NOTE — TELEPHONE ENCOUNTER
Requested Prescriptions     Signed Prescriptions Disp Refills   • LEVOTHYROXINE SODIUM 100 MCG Oral Tab 90 tablet 1     Sig: TAKE 1 TABLET BY MOUTH DAILY     Authorizing Provider: Shameka Zapata     Ordering User: Queta Pan 2/4/2020     Patient

## 2020-03-23 ENCOUNTER — PATIENT OUTREACH (OUTPATIENT)
Dept: CASE MANAGEMENT | Age: 85
End: 2020-03-23

## 2020-03-23 DIAGNOSIS — E78.00 HYPERCHOLESTEREMIA: ICD-10-CM

## 2020-03-23 DIAGNOSIS — I10 HYPERTENSION, BENIGN: ICD-10-CM

## 2020-03-23 NOTE — PROGRESS NOTES
Spoke to Alonso Jha at length about CCM, current care plan and performed CCM assessment with Alonso Jha reviewed meds and compliance. Interventions for following categories based on assessment  I want to know a little about you. • What do you do for fun?  [place] _yes__________ (dm, HTN, etc)  • Would you like more info on anything? No     Goals:  • What would you say is your biggest concerns about your health? PMR and being on Prednisone.   Doesn't like having to use a walker but she is using it for balance and sta

## 2020-03-31 PROCEDURE — 99490 CHRNC CARE MGMT STAFF 1ST 20: CPT

## 2020-04-06 ENCOUNTER — PATIENT OUTREACH (OUTPATIENT)
Dept: CASE MANAGEMENT | Age: 85
End: 2020-04-06

## 2020-04-06 DIAGNOSIS — I10 HYPERTENSION, BENIGN: ICD-10-CM

## 2020-04-06 DIAGNOSIS — I65.23 ASYMPTOMATIC CAROTID ARTERY STENOSIS, BILATERAL: ICD-10-CM

## 2020-04-06 DIAGNOSIS — E78.00 HYPERCHOLESTEREMIA: ICD-10-CM

## 2020-04-06 NOTE — PROGRESS NOTES
4/6/2020  Spoke to Gabriela for CCM. Updates to patient care team/comments: yes,   Stanley Castillo MD CARDIOLOGY [address] [facility] [phone]   Patient reported changes in medications: no changes.    Med Adherence  Commen patient's concerns.    Monthly Minute Total including today: 23    Physical assessment, complete health history, and need for CCM established by Ronnell Perrin DO.

## 2020-04-28 ENCOUNTER — TELEPHONE (OUTPATIENT)
Dept: CARDIOLOGY | Age: 85
End: 2020-04-28

## 2020-04-28 RX ORDER — ATORVASTATIN CALCIUM 40 MG/1
40 TABLET, FILM COATED ORAL AT BEDTIME
Qty: 90 TABLET | Refills: 0 | Status: SHIPPED | OUTPATIENT
Start: 2020-04-28 | End: 2020-07-27

## 2020-04-30 PROCEDURE — 99490 CHRNC CARE MGMT STAFF 1ST 20: CPT

## 2020-05-11 ENCOUNTER — TELEPHONE (OUTPATIENT)
Dept: CARDIOLOGY | Age: 85
End: 2020-05-11

## 2020-05-12 ENCOUNTER — PATIENT OUTREACH (OUTPATIENT)
Dept: CASE MANAGEMENT | Age: 85
End: 2020-05-12

## 2020-05-12 DIAGNOSIS — I65.23 ASYMPTOMATIC CAROTID ARTERY STENOSIS, BILATERAL: ICD-10-CM

## 2020-05-12 DIAGNOSIS — E78.00 HYPERCHOLESTEREMIA: ICD-10-CM

## 2020-05-12 DIAGNOSIS — I10 HYPERTENSION, BENIGN: ICD-10-CM

## 2020-05-12 NOTE — PROGRESS NOTES
5/12/2020  Spoke to [place] for CCM. Updates to patient care team/comments: n/a. Patient reported changes in medications: no changes. Med Adherence  Comment: pt reports taking all medications as prescribed. Health Maintenance:    Your Appoint

## 2020-05-18 ENCOUNTER — OFFICE VISIT (OUTPATIENT)
Dept: CARDIOLOGY | Age: 85
End: 2020-05-18

## 2020-05-18 VITALS — SYSTOLIC BLOOD PRESSURE: 120 MMHG | DIASTOLIC BLOOD PRESSURE: 70 MMHG

## 2020-05-18 DIAGNOSIS — E78.00 HYPERCHOLESTEREMIA: ICD-10-CM

## 2020-05-18 DIAGNOSIS — I65.23 ASYMPTOMATIC CAROTID ARTERY STENOSIS, BILATERAL: ICD-10-CM

## 2020-05-18 DIAGNOSIS — I25.10 CORONARY ARTERY DISEASE INVOLVING NATIVE CORONARY ARTERY OF NATIVE HEART WITHOUT ANGINA PECTORIS: Primary | ICD-10-CM

## 2020-05-18 DIAGNOSIS — I10 HYPERTENSION, BENIGN: ICD-10-CM

## 2020-05-18 PROCEDURE — 99442 TELEPHONE E&M BY PHYSICIAN EST PT NOT ORIG PREV 7 DAYS 11-20 MIN: CPT | Performed by: INTERNAL MEDICINE

## 2020-05-18 RX ORDER — METOPROLOL TARTRATE 50 MG/1
50 TABLET, FILM COATED ORAL 2 TIMES DAILY
Qty: 180 TABLET | Refills: 3 | Status: SHIPPED | OUTPATIENT
Start: 2020-05-18

## 2020-05-31 PROCEDURE — 99490 CHRNC CARE MGMT STAFF 1ST 20: CPT

## 2020-06-09 ENCOUNTER — NURSE ONLY (OUTPATIENT)
Dept: LAB | Age: 85
End: 2020-06-09
Attending: INTERNAL MEDICINE
Payer: MEDICARE

## 2020-06-09 DIAGNOSIS — M35.3 PMR (POLYMYALGIA RHEUMATICA) (HCC): ICD-10-CM

## 2020-06-09 DIAGNOSIS — R76.8 POSITIVE ANA (ANTINUCLEAR ANTIBODY): ICD-10-CM

## 2020-06-09 DIAGNOSIS — R70.0 ELEVATED SED RATE: ICD-10-CM

## 2020-06-09 PROCEDURE — 82565 ASSAY OF CREATININE: CPT

## 2020-06-09 PROCEDURE — 86160 COMPLEMENT ANTIGEN: CPT

## 2020-06-09 PROCEDURE — 86256 FLUORESCENT ANTIBODY TITER: CPT

## 2020-06-09 PROCEDURE — 36415 COLL VENOUS BLD VENIPUNCTURE: CPT

## 2020-06-09 PROCEDURE — 86140 C-REACTIVE PROTEIN: CPT

## 2020-06-09 PROCEDURE — 80076 HEPATIC FUNCTION PANEL: CPT

## 2020-06-09 PROCEDURE — 85025 COMPLETE CBC W/AUTO DIFF WBC: CPT

## 2020-06-09 PROCEDURE — 84520 ASSAY OF UREA NITROGEN: CPT

## 2020-06-09 PROCEDURE — 85652 RBC SED RATE AUTOMATED: CPT

## 2020-06-12 ENCOUNTER — PATIENT OUTREACH (OUTPATIENT)
Dept: CASE MANAGEMENT | Age: 85
End: 2020-06-12

## 2020-06-12 DIAGNOSIS — M35.3 PMR (POLYMYALGIA RHEUMATICA) (HCC): ICD-10-CM

## 2020-06-12 DIAGNOSIS — E78.00 HYPERCHOLESTEREMIA: ICD-10-CM

## 2020-06-12 DIAGNOSIS — I65.23 ASYMPTOMATIC CAROTID ARTERY STENOSIS, BILATERAL: ICD-10-CM

## 2020-06-12 DIAGNOSIS — I10 HYPERTENSION, BENIGN: ICD-10-CM

## 2020-06-12 NOTE — PROGRESS NOTES
6/12/2020  Spoke to Gabriela for CCM. Updates to patient care team/comments: n/a. Patient reported changes in medications: yes, and upon review together medication list was updated.     Med Adherence  Comment: pt reports taking all medications as pres down but does try to stretch. · Active goal from previous outreach: start stretching exercises for strength/flexabilty and balance.      Continue independent living, healthy diet and exercise routine.  Continue to provide encouragement, support, and ed

## 2020-06-30 PROCEDURE — 99490 CHRNC CARE MGMT STAFF 1ST 20: CPT

## 2020-07-14 ENCOUNTER — NURSE ONLY (OUTPATIENT)
Dept: LAB | Age: 85
End: 2020-07-14
Attending: INTERNAL MEDICINE
Payer: MEDICARE

## 2020-07-14 ENCOUNTER — PATIENT OUTREACH (OUTPATIENT)
Dept: CASE MANAGEMENT | Age: 85
End: 2020-07-14

## 2020-07-14 DIAGNOSIS — Z79.52 LONG TERM CURRENT USE OF SYSTEMIC STEROIDS: ICD-10-CM

## 2020-07-14 DIAGNOSIS — R76.8 POSITIVE ANA (ANTINUCLEAR ANTIBODY): ICD-10-CM

## 2020-07-14 DIAGNOSIS — M35.3 PMR (POLYMYALGIA RHEUMATICA) (HCC): ICD-10-CM

## 2020-07-14 DIAGNOSIS — R70.0 ELEVATED SED RATE: ICD-10-CM

## 2020-07-14 LAB
ALBUMIN SERPL-MCNC: 3.3 G/DL (ref 3.4–5)
ALP LIVER SERPL-CCNC: 93 U/L (ref 55–142)
ALT SERPL-CCNC: 19 U/L (ref 13–56)
AST SERPL-CCNC: 15 U/L (ref 15–37)
BASOPHILS # BLD AUTO: 0.03 X10(3) UL (ref 0–0.2)
BASOPHILS NFR BLD AUTO: 0.2 %
BILIRUB DIRECT SERPL-MCNC: 0.2 MG/DL (ref 0–0.2)
BILIRUB SERPL-MCNC: 0.7 MG/DL (ref 0.1–2)
BUN BLD-MCNC: 14 MG/DL (ref 7–18)
C3 SERPL-MCNC: 131 MG/DL (ref 90–180)
C4 SERPL-MCNC: 37.9 MG/DL (ref 10–40)
CREAT BLD-MCNC: 0.72 MG/DL (ref 0.55–1.02)
CRP SERPL-MCNC: <0.29 MG/DL (ref ?–0.3)
DEPRECATED RDW RBC AUTO: 52 FL (ref 35.1–46.3)
EOSINOPHIL # BLD AUTO: 0.03 X10(3) UL (ref 0–0.7)
EOSINOPHIL NFR BLD AUTO: 0.2 %
ERYTHROCYTE [DISTWIDTH] IN BLOOD BY AUTOMATED COUNT: 13.5 % (ref 11–15)
HCT VFR BLD AUTO: 41.8 % (ref 35–48)
HGB BLD-MCNC: 13.4 G/DL (ref 12–16)
IMM GRANULOCYTES # BLD AUTO: 0.12 X10(3) UL (ref 0–1)
IMM GRANULOCYTES NFR BLD: 1 %
LYMPHOCYTES # BLD AUTO: 1.12 X10(3) UL (ref 1–4)
LYMPHOCYTES NFR BLD AUTO: 9.1 %
M PROTEIN MFR SERPL ELPH: 6.8 G/DL (ref 6.4–8.2)
MCH RBC QN AUTO: 33.2 PG (ref 26–34)
MCHC RBC AUTO-ENTMCNC: 32.1 G/DL (ref 31–37)
MCV RBC AUTO: 103.5 FL (ref 80–100)
MONOCYTES # BLD AUTO: 0.93 X10(3) UL (ref 0.1–1)
MONOCYTES NFR BLD AUTO: 7.5 %
NEUTROPHILS # BLD AUTO: 10.1 X10 (3) UL (ref 1.5–7.7)
NEUTROPHILS # BLD AUTO: 10.1 X10(3) UL (ref 1.5–7.7)
NEUTROPHILS NFR BLD AUTO: 82 %
PLATELET # BLD AUTO: 150 10(3)UL (ref 150–450)
RBC # BLD AUTO: 4.04 X10(6)UL (ref 3.8–5.3)
SED RATE-ML: 17 MM/HR (ref 0–25)
WBC # BLD AUTO: 12.3 X10(3) UL (ref 4–11)

## 2020-07-14 PROCEDURE — 86256 FLUORESCENT ANTIBODY TITER: CPT

## 2020-07-14 PROCEDURE — 85652 RBC SED RATE AUTOMATED: CPT

## 2020-07-14 PROCEDURE — 82565 ASSAY OF CREATININE: CPT

## 2020-07-14 PROCEDURE — 36415 COLL VENOUS BLD VENIPUNCTURE: CPT

## 2020-07-14 PROCEDURE — 84520 ASSAY OF UREA NITROGEN: CPT

## 2020-07-14 PROCEDURE — 80076 HEPATIC FUNCTION PANEL: CPT

## 2020-07-14 PROCEDURE — 82652 VIT D 1 25-DIHYDROXY: CPT

## 2020-07-14 PROCEDURE — 86140 C-REACTIVE PROTEIN: CPT

## 2020-07-14 PROCEDURE — 86160 COMPLEMENT ANTIGEN: CPT

## 2020-07-14 PROCEDURE — 85025 COMPLETE CBC W/AUTO DIFF WBC: CPT

## 2020-07-16 LAB — 1,25-DIHYDROXYVITAMIN D: 67.1 PG/ML

## 2020-07-27 RX ORDER — ATORVASTATIN CALCIUM 40 MG/1
40 TABLET, FILM COATED ORAL AT BEDTIME
Qty: 90 TABLET | Refills: 0 | Status: SHIPPED | OUTPATIENT
Start: 2020-07-27

## 2020-08-17 ENCOUNTER — TELEPHONE (OUTPATIENT)
Dept: FAMILY MEDICINE CLINIC | Facility: CLINIC | Age: 85
End: 2020-08-17

## 2020-08-17 ENCOUNTER — PATIENT OUTREACH (OUTPATIENT)
Dept: CASE MANAGEMENT | Age: 85
End: 2020-08-17

## 2020-08-17 DIAGNOSIS — E78.00 HYPERCHOLESTEREMIA: ICD-10-CM

## 2020-08-17 DIAGNOSIS — E03.9 HYPOTHYROIDISM, UNSPECIFIED TYPE: ICD-10-CM

## 2020-08-17 DIAGNOSIS — I10 HYPERTENSION, BENIGN: ICD-10-CM

## 2020-08-17 NOTE — TELEPHONE ENCOUNTER
Spoke with patient about her concerns from being prevented to attend her AWV with Dr. Debby Hernandez in September patient states where she resides they are not being allowed to attend doctor's appointment if they can not be done virtually.  Patient states the facil

## 2020-08-17 NOTE — PROGRESS NOTES
8/17/2020  Spoke to Gabriela for CCM.       Updates to patient care team/ comments: No change per patient  Patient reported changes in medications: No changes per patient  Med Adherence  Comment: Taking as prescribed    Health Maintenance: UTD  LDL Control d agrees to goal action plan.  Yes per patient  Self-Management Abilities (patient reported)             1= least confident in achieving goal, 5= very confident               - confidence: 4    Care Manager Follow Up: 1 month follow up  Reason For Follow Up:

## 2020-08-26 ENCOUNTER — TELEPHONE (OUTPATIENT)
Dept: FAMILY MEDICINE CLINIC | Facility: CLINIC | Age: 85
End: 2020-08-26

## 2020-08-26 NOTE — TELEPHONE ENCOUNTER
TRAM for pt's, daughter, Savage Silva. Please call the office and confirm pt's apptment for [id number] DOS 9/3/20 with Dr. Dorian Jimenez @ 1:40 or does this apptment need to be rescheduled?   Apparently there's an issue at the facility the pt is located at -- facility of [facility]

## 2020-08-28 ENCOUNTER — TELEPHONE (OUTPATIENT)
Dept: FAMILY MEDICINE CLINIC | Facility: CLINIC | Age: 85
End: 2020-08-28

## 2020-08-28 NOTE — TELEPHONE ENCOUNTER
Did not complete AHA due to appointment change. Pt unable to come in for Wellness. Pt requested to switch appointment to a regular ov.

## 2020-08-31 PROCEDURE — 99490 CHRNC CARE MGMT STAFF 1ST 20: CPT

## 2020-09-03 ENCOUNTER — OFFICE VISIT (OUTPATIENT)
Dept: FAMILY MEDICINE CLINIC | Facility: CLINIC | Age: 85
End: 2020-09-03
Payer: MEDICARE

## 2020-09-03 VITALS
TEMPERATURE: 98 F | SYSTOLIC BLOOD PRESSURE: 124 MMHG | HEIGHT: 67 IN | BODY MASS INDEX: 21.5 KG/M2 | HEART RATE: 70 BPM | WEIGHT: 137 LBS | RESPIRATION RATE: 16 BRPM | DIASTOLIC BLOOD PRESSURE: 70 MMHG

## 2020-09-03 DIAGNOSIS — E03.9 HYPOTHYROIDISM, UNSPECIFIED TYPE: Primary | ICD-10-CM

## 2020-09-03 DIAGNOSIS — I10 HYPERTENSION, BENIGN: ICD-10-CM

## 2020-09-03 DIAGNOSIS — B37.0 THRUSH: ICD-10-CM

## 2020-09-03 DIAGNOSIS — E78.00 HYPERCHOLESTEREMIA: ICD-10-CM

## 2020-09-03 DIAGNOSIS — M35.3 PMR (POLYMYALGIA RHEUMATICA) (HCC): ICD-10-CM

## 2020-09-03 DIAGNOSIS — I25.10 ATHEROSCLEROSIS OF NATIVE CORONARY ARTERY OF NATIVE HEART WITHOUT ANGINA PECTORIS: ICD-10-CM

## 2020-09-03 DIAGNOSIS — Z23 NEED FOR INFLUENZA VACCINATION: ICD-10-CM

## 2020-09-03 PROCEDURE — 90662 IIV NO PRSV INCREASED AG IM: CPT | Performed by: FAMILY MEDICINE

## 2020-09-03 PROCEDURE — 99214 OFFICE O/P EST MOD 30 MIN: CPT | Performed by: FAMILY MEDICINE

## 2020-09-03 PROCEDURE — G0008 ADMIN INFLUENZA VIRUS VAC: HCPCS | Performed by: FAMILY MEDICINE

## 2020-09-03 NOTE — PROGRESS NOTES
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

## 2020-09-08 ENCOUNTER — NURSE ONLY (OUTPATIENT)
Dept: LAB | Age: 85
End: 2020-09-08
Attending: FAMILY MEDICINE
Payer: MEDICARE

## 2020-09-08 DIAGNOSIS — E03.9 HYPOTHYROIDISM, UNSPECIFIED TYPE: ICD-10-CM

## 2020-09-08 DIAGNOSIS — E78.00 HYPERCHOLESTEREMIA: ICD-10-CM

## 2020-09-08 LAB
CHOLEST SMN-MCNC: 123 MG/DL (ref ?–200)
HDLC SERPL-MCNC: 61 MG/DL (ref 40–59)
LDLC SERPL CALC-MCNC: 47 MG/DL (ref ?–100)
NONHDLC SERPL-MCNC: 62 MG/DL (ref ?–130)
PATIENT FASTING Y/N/NP: YES
TRIGL SERPL-MCNC: 75 MG/DL (ref 30–149)
TSI SER-ACNC: 0.7 MIU/ML (ref 0.36–3.74)
VLDLC SERPL CALC-MCNC: 15 MG/DL (ref 0–30)

## 2020-09-08 PROCEDURE — 80061 LIPID PANEL: CPT

## 2020-09-08 PROCEDURE — 84443 ASSAY THYROID STIM HORMONE: CPT

## 2020-09-08 PROCEDURE — 36415 COLL VENOUS BLD VENIPUNCTURE: CPT

## 2020-09-14 ENCOUNTER — PATIENT OUTREACH (OUTPATIENT)
Dept: CASE MANAGEMENT | Age: 85
End: 2020-09-14

## 2020-09-14 ENCOUNTER — TELEPHONE (OUTPATIENT)
Dept: FAMILY MEDICINE CLINIC | Facility: CLINIC | Age: 85
End: 2020-09-14

## 2020-09-14 DIAGNOSIS — I10 HYPERTENSION, BENIGN: ICD-10-CM

## 2020-09-14 DIAGNOSIS — I65.23 ASYMPTOMATIC CAROTID ARTERY STENOSIS, BILATERAL: ICD-10-CM

## 2020-09-14 DIAGNOSIS — E78.00 HYPERCHOLESTEREMIA: ICD-10-CM

## 2020-09-14 NOTE — TELEPHONE ENCOUNTER
Patient called to update Dr. Ollie Minor regarding the Nystatin medication. Patient stated after 4 days of taking it she started to have nausea, upset stomach and dizziness. Patient requesting another medication if possible.  Patient stated she will only be avai

## 2020-09-14 NOTE — PROGRESS NOTES
Called patient and left a message for patient to call back when they can. Reviewed patient chart.  Left contact my contact number [phone]        Time Spent This Encounter Total: 5  min medical record review  Monthly Minute Total including today: 5 smitha

## 2020-09-14 NOTE — PROGRESS NOTES
9/14/2020  Spoke to Gabriela for CCM.       Updates to patient care team/ comments: No changes per patient  Patient reported changes in medications: No changes per patient  Med Adherence  Comment: Taking as prescribed    Health Maintenance: Pt aware  Annual Patient agrees to goal action plan.  Yes per patient  Self-Management Abilities (patient reported)             1= least confident in achieving goal, 5= very confident               - confidence: 4    Care Manager Follow Up: 1 month follow up  Re

## 2020-09-14 NOTE — TELEPHONE ENCOUNTER
Has she had any improvement in her symptoms? If so, let's stop medication and we can reevaluate in a few weeks. If not, then stop medication and let me know. Please let me know if you have any questions.   [id number] [facility] [address], DO 9/14/2020 3:31 PM

## 2020-09-15 NOTE — TELEPHONE ENCOUNTER
Called and talked to patient she stopped medication oral symptoms not completely gone she will monitor and call back if symptoms return

## 2020-09-30 DIAGNOSIS — E03.9 HYPOTHYROIDISM, UNSPECIFIED TYPE: ICD-10-CM

## 2020-09-30 PROCEDURE — 99490 CHRNC CARE MGMT STAFF 1ST 20: CPT

## 2020-10-01 RX ORDER — LEVOTHYROXINE SODIUM 0.1 MG/1
TABLET ORAL
Qty: 90 TABLET | Refills: 1 | Status: SHIPPED | OUTPATIENT
Start: 2020-10-01 | End: 2021-03-26

## 2020-10-01 NOTE — TELEPHONE ENCOUNTER
Requested Prescriptions     Pending Prescriptions Disp Refills   • LEVOTHYROXINE SODIUM 100 MCG Oral Tab [Pharmacy Med Name: LEVOTHYROXINE 0.100MG (100MCG) TAB] 90 tablet 1     Sig: TAKE 1 TABLET BY MOUTH DAILY     LOV 9/3/2020     Patient was asked to fol

## 2020-10-09 ENCOUNTER — TELEPHONE (OUTPATIENT)
Dept: FAMILY MEDICINE CLINIC | Facility: CLINIC | Age: 85
End: 2020-10-09

## 2020-10-09 ENCOUNTER — PATIENT OUTREACH (OUTPATIENT)
Dept: CASE MANAGEMENT | Age: 85
End: 2020-10-09

## 2020-10-09 DIAGNOSIS — B37.0 ORAL THRUSH: Primary | ICD-10-CM

## 2020-10-09 NOTE — TELEPHONE ENCOUNTER
Spoke with patient regarding her possible thrush. Patient states she was prescribed nystatin for possible thrush, however after 4 days on medication she started to have side effects and was told by Dr. Ollie Minor to discontinue medication.  Patient states it ha

## 2020-10-09 NOTE — PROGRESS NOTES
10/9/2020  Spoke to Gabriela for CCM.       Updates to patient care team/ comments: No changes per patient  Patient reported changes in medications: No changes per patient  Med Adherence  Comment: Taking as prescribed    Health Maintenance:   Annual Physical For Follow Up: review progress and or barriers towards patients goals. Care managers interventions: A message was sent to Dr. Monika Chauhan office on patient behalf. Patient aware they will be following up with her.  Patient aware she may contact me if further

## 2020-10-12 RX ORDER — FLUCONAZOLE 150 MG/1
150 TABLET ORAL ONCE
Qty: 1 TABLET | Refills: 0 | Status: SHIPPED | OUTPATIENT
Start: 2020-10-12 | End: 2020-10-12

## 2020-10-23 RX ORDER — ATORVASTATIN CALCIUM 40 MG/1
40 TABLET, FILM COATED ORAL AT BEDTIME
Qty: 90 TABLET | Refills: 0 | OUTPATIENT
Start: 2020-10-23

## 2020-10-27 ENCOUNTER — PATIENT OUTREACH (OUTPATIENT)
Dept: CASE MANAGEMENT | Age: 85
End: 2020-10-27

## 2020-10-27 NOTE — PROGRESS NOTES
Called patient and left a message for patient to call back when they can. Reviewed patient chart.  Left contact my contact number [phone]        Time Spent This Encounter Total: 5  min medical record review  Monthly Minute Total including today: 5 smitha

## 2020-11-04 RX ORDER — ATORVASTATIN CALCIUM 40 MG/1
TABLET, FILM COATED ORAL
Qty: 90 TABLET | Refills: 0 | Status: SHIPPED | OUTPATIENT
Start: 2020-11-04 | End: 2021-02-09

## 2020-11-04 NOTE — TELEPHONE ENCOUNTER
Requested Prescriptions     Pending Prescriptions Disp Refills   • ATORVASTATIN 40 MG Oral Tab [Pharmacy Med Name: ATORVASTATIN 40MG TABLETS] 90 tablet 0     Sig: TAKE 1 TABLET BY MOUTH AT BEDTIME     LOV 9/3/2020     Patient was asked to follow-up in: 6 m

## 2020-11-19 ENCOUNTER — MED REC SCAN ONLY (OUTPATIENT)
Dept: FAMILY MEDICINE CLINIC | Facility: CLINIC | Age: 85
End: 2020-11-19

## 2020-11-23 ENCOUNTER — PATIENT OUTREACH (OUTPATIENT)
Dept: CASE MANAGEMENT | Age: 85
End: 2020-11-23

## 2020-11-23 DIAGNOSIS — I10 HYPERTENSION, BENIGN: ICD-10-CM

## 2020-11-23 DIAGNOSIS — E03.9 HYPOTHYROIDISM, UNSPECIFIED TYPE: ICD-10-CM

## 2020-11-23 DIAGNOSIS — H35.3221 EXUDATIVE AGE-RELATED MACULAR DEGENERATION OF LEFT EYE WITH ACTIVE CHOROIDAL NEOVASCULARIZATION (HCC): ICD-10-CM

## 2020-11-23 DIAGNOSIS — E78.00 HYPERCHOLESTEREMIA: ICD-10-CM

## 2020-11-23 NOTE — PROGRESS NOTES
11/23/2020  Spoke to Gabriela for CCM. Updates to patient care team/ comments: No changes per patient  Patient reported changes in medications: Started on Prednisone per patient/No other changes per patient.    Med Adherence  Comment: Taking as prescrib - confidence: 4    Care Manager Follow Up: 1 month follow up  Reason For Follow Up: review progress and or barriers towards patients goals. Care managers interventions: None needed at this time per patient.    Time Spent This Encounter Total:

## 2020-11-30 PROCEDURE — 99490 CHRNC CARE MGMT STAFF 1ST 20: CPT

## 2020-12-01 ENCOUNTER — NURSE ONLY (OUTPATIENT)
Dept: LAB | Age: 85
End: 2020-12-01
Attending: INTERNAL MEDICINE
Payer: MEDICARE

## 2020-12-01 DIAGNOSIS — R70.0 ELEVATED SED RATE: ICD-10-CM

## 2020-12-01 DIAGNOSIS — M35.3 PMR (POLYMYALGIA RHEUMATICA) (HCC): ICD-10-CM

## 2020-12-01 PROCEDURE — 85652 RBC SED RATE AUTOMATED: CPT

## 2020-12-01 PROCEDURE — 85025 COMPLETE CBC W/AUTO DIFF WBC: CPT

## 2020-12-01 PROCEDURE — 36415 COLL VENOUS BLD VENIPUNCTURE: CPT

## 2020-12-01 PROCEDURE — 86140 C-REACTIVE PROTEIN: CPT

## 2020-12-22 ENCOUNTER — PATIENT OUTREACH (OUTPATIENT)
Dept: CASE MANAGEMENT | Age: 85
End: 2020-12-22

## 2020-12-22 DIAGNOSIS — I25.10 ATHEROSCLEROSIS OF NATIVE CORONARY ARTERY OF NATIVE HEART WITHOUT ANGINA PECTORIS: ICD-10-CM

## 2020-12-22 DIAGNOSIS — E03.9 HYPOTHYROIDISM, UNSPECIFIED TYPE: ICD-10-CM

## 2020-12-22 DIAGNOSIS — I10 HYPERTENSION, BENIGN: ICD-10-CM

## 2020-12-22 DIAGNOSIS — E78.00 HYPERCHOLESTEREMIA: ICD-10-CM

## 2020-12-22 NOTE — PROGRESS NOTES
12/22/2020  Spoke to Gabriela for CCM.       Updates to patient care team/ comments: No change per patient  Patient reported changes in medications: No changes per patient  Med Adherence  Comment: Taking as prescribed    Health Maintenance: Pt aware  [place] towards patients goals. Care managers interventions: None needed at this time. Patient advised to contact me if further assistance is needed.   Time Spent This Encounter Total: 25  min medical record review, telephone communication, care plan updates wh

## 2020-12-31 PROCEDURE — 99490 CHRNC CARE MGMT STAFF 1ST 20: CPT

## 2021-01-21 ENCOUNTER — PATIENT OUTREACH (OUTPATIENT)
Dept: CASE MANAGEMENT | Age: 86
End: 2021-01-21

## 2021-01-21 NOTE — PROGRESS NOTES
Called patient and left a message for patient to call back when they can. Reviewed patient chart.  Left contact my contact number [phone]        Time Spent This Encounter Total: 5  min medical record review  Monthly Minute Total including today: 5 smitha

## 2021-02-09 ENCOUNTER — TELEPHONE (OUTPATIENT)
Dept: FAMILY MEDICINE CLINIC | Facility: CLINIC | Age: 86
End: 2021-02-09

## 2021-02-09 DIAGNOSIS — E78.00 HYPERCHOLESTEREMIA: Primary | ICD-10-CM

## 2021-02-09 RX ORDER — ATORVASTATIN CALCIUM 40 MG/1
40 TABLET, FILM COATED ORAL NIGHTLY
Qty: 90 TABLET | Refills: 2 | Status: SHIPPED | OUTPATIENT
Start: 2021-02-09 | End: 2021-11-26

## 2021-02-09 NOTE — TELEPHONE ENCOUNTER
Pharmacy calling in states they sent over a refill request a week ago regarding a refill for pt's ATORVASTATIN pt called pharmacy today 02/09/21 stating she is out of medication.

## 2021-03-02 ENCOUNTER — NURSE ONLY (OUTPATIENT)
Dept: LAB | Age: 86
End: 2021-03-02
Attending: INTERNAL MEDICINE
Payer: MEDICARE

## 2021-03-02 DIAGNOSIS — M35.3 PMR (POLYMYALGIA RHEUMATICA) (HCC): ICD-10-CM

## 2021-03-02 LAB
CRP SERPL-MCNC: 0.33 MG/DL (ref ?–0.3)
SED RATE-ML: 23 MM/HR

## 2021-03-02 PROCEDURE — 86140 C-REACTIVE PROTEIN: CPT

## 2021-03-02 PROCEDURE — 36415 COLL VENOUS BLD VENIPUNCTURE: CPT

## 2021-03-02 PROCEDURE — 85652 RBC SED RATE AUTOMATED: CPT

## 2021-03-04 ENCOUNTER — PATIENT OUTREACH (OUTPATIENT)
Dept: CASE MANAGEMENT | Age: 86
End: 2021-03-04

## 2021-03-04 DIAGNOSIS — E78.00 HYPERCHOLESTEREMIA: ICD-10-CM

## 2021-03-04 DIAGNOSIS — I10 HYPERTENSION, BENIGN: ICD-10-CM

## 2021-03-04 DIAGNOSIS — Z23 NEED FOR VACCINATION: ICD-10-CM

## 2021-03-04 DIAGNOSIS — E03.9 HYPOTHYROIDISM, UNSPECIFIED TYPE: ICD-10-CM

## 2021-03-04 NOTE — PROGRESS NOTES
3/4/2021  Spoke to Gabriela for CCM. Updates to patient care team/ comments: No changes per patient  Patient reported changes in medications: Per Dr. Otis Garrido patient has stopped taking the prednisone.   Med Adherence  Comment: Taking as prescribed    Heal month follow up  Reason For Follow Up: review progress and or barriers towards patients goals. Care managers interventions: None needed at this time. Patient aware she may contact me if further assistance is needed.   Time Spent This Encounter Total: 26

## 2021-03-26 ENCOUNTER — TELEPHONE (OUTPATIENT)
Dept: FAMILY MEDICINE CLINIC | Facility: CLINIC | Age: 86
End: 2021-03-26

## 2021-03-26 DIAGNOSIS — E03.9 HYPOTHYROIDISM, UNSPECIFIED TYPE: ICD-10-CM

## 2021-03-26 RX ORDER — LEVOTHYROXINE SODIUM 0.1 MG/1
100 TABLET ORAL DAILY
Qty: 90 TABLET | Refills: 1 | Status: SHIPPED | OUTPATIENT
Start: 2021-03-26 | End: 2021-09-27

## 2021-03-26 NOTE — TELEPHONE ENCOUNTER
Medication refilled per protocol. Requested Prescriptions     Signed Prescriptions Disp Refills   • Levothyroxine Sodium 100 MCG Oral Tab 90 tablet 1     Sig: Take 1 tablet (100 mcg total) by mouth daily.      Authorizing Provider: Sean Cruzchjeane

## 2021-03-31 PROCEDURE — 99490 CHRNC CARE MGMT STAFF 1ST 20: CPT

## 2021-04-05 ENCOUNTER — PATIENT OUTREACH (OUTPATIENT)
Dept: CASE MANAGEMENT | Age: 86
End: 2021-04-05

## 2021-04-05 NOTE — PROGRESS NOTES
Called patient and left a message for patient to call back when they can. Reviewed patient chart.  Left contact my contact number [phone]        Time Spent This Encounter Total: 5  min medical record review  Monthly Minute Total including today: 5 smitha

## 2021-04-19 ENCOUNTER — OFFICE VISIT (OUTPATIENT)
Dept: FAMILY MEDICINE CLINIC | Facility: CLINIC | Age: 86
End: 2021-04-19
Payer: MEDICARE

## 2021-04-19 VITALS
BODY MASS INDEX: 25.74 KG/M2 | WEIGHT: 164 LBS | HEART RATE: 74 BPM | SYSTOLIC BLOOD PRESSURE: 124 MMHG | RESPIRATION RATE: 14 BRPM | TEMPERATURE: 98 F | HEIGHT: 67 IN | DIASTOLIC BLOOD PRESSURE: 70 MMHG

## 2021-04-19 DIAGNOSIS — M35.3 PMR (POLYMYALGIA RHEUMATICA) (HCC): ICD-10-CM

## 2021-04-19 DIAGNOSIS — E03.9 HYPOTHYROIDISM, UNSPECIFIED TYPE: ICD-10-CM

## 2021-04-19 DIAGNOSIS — H81.10 BENIGN PAROXYSMAL POSITIONAL VERTIGO, UNSPECIFIED LATERALITY: Primary | ICD-10-CM

## 2021-04-19 DIAGNOSIS — H35.3221 EXUDATIVE AGE-RELATED MACULAR DEGENERATION, LEFT EYE, WITH ACTIVE CHOROIDAL NEOVASCULARIZATION (HCC): ICD-10-CM

## 2021-04-19 DIAGNOSIS — I10 HYPERTENSION, BENIGN: ICD-10-CM

## 2021-04-19 PROCEDURE — 99214 OFFICE O/P EST MOD 30 MIN: CPT | Performed by: FAMILY MEDICINE

## 2021-04-19 RX ORDER — MECLIZINE HCL 12.5 MG/1
TABLET ORAL 3 TIMES DAILY PRN
Qty: 30 TABLET | Refills: 1 | Status: SHIPPED | OUTPATIENT
Start: 2021-04-19

## 2021-04-19 NOTE — PATIENT INSTRUCTIONS
For Vertigo:  -Take meclizine 1-2 tablets up to 3x a day for dizziness. Goal is minimal use.    -Start vestibular physical therapy.   -Start using flonase nasal spray (over the counter) 1 spray each nostril twice daily  -Use ear wax softening drops in the R

## 2021-04-19 NOTE — PROGRESS NOTES
Chief Complaint:  Patient presents with:  Dizziness: Feeling Lightheaded,and states eye sight is worsening-concern it could be bc of the prednisone or it being due to reciving the Covid Vaccine     HPI:  This is a 80year old female patient presenting for office. Overall notes she feels well.  Currently on aspirin, metoprolol, atorvastatin. Previously on lisinopril/HCTZ- stopped due to low BPs.  Denies Headaches, SOB, vision changes, chest pain and LE swelling.     Hypothyroidism: On levothyroxine 100 mcg. use: No       Current Outpatient Medications   Medication Sig Dispense Refill   • Meclizine HCl 12.5 MG Oral Tab Take 1-2 tablets (12.5-25 mg total) by mouth 3 (three) times daily as needed for Dizziness.  30 tablet 1   • Levothyroxine Sodium 100 MCG Oral T Endocrine    Hypothyroidism       Immune    PMR (polymyalgia rheumatica) (HCC)      Other Visit Diagnoses     Benign paroxysmal positional vertigo, unspecified laterality    -  Primary    Relevant Medications    Meclizine HCl 12.5 MG Oral Tab    Other Rele

## 2021-04-22 ENCOUNTER — PATIENT OUTREACH (OUTPATIENT)
Dept: CASE MANAGEMENT | Age: 86
End: 2021-04-22

## 2021-04-22 NOTE — PROGRESS NOTES
Called patient and left a message for patient to call back when they can. Reviewed patient chart.  Left contact my contact number [phone]        Time Spent This Encounter Total: 5  min medical record review  Monthly Minute Total including today: 10 min

## 2021-05-04 ENCOUNTER — NURSE ONLY (OUTPATIENT)
Dept: LAB | Age: 86
End: 2021-05-04
Attending: INTERNAL MEDICINE
Payer: MEDICARE

## 2021-05-04 DIAGNOSIS — M79.10 MYALGIA: ICD-10-CM

## 2021-05-04 DIAGNOSIS — R79.82 CRP ELEVATED: ICD-10-CM

## 2021-05-04 DIAGNOSIS — M35.3 PMR (POLYMYALGIA RHEUMATICA) (HCC): ICD-10-CM

## 2021-05-04 DIAGNOSIS — Z79.52 LONG TERM CURRENT USE OF SYSTEMIC STEROIDS: ICD-10-CM

## 2021-05-04 PROCEDURE — 86140 C-REACTIVE PROTEIN: CPT

## 2021-05-04 PROCEDURE — 36415 COLL VENOUS BLD VENIPUNCTURE: CPT

## 2021-05-04 PROCEDURE — 85652 RBC SED RATE AUTOMATED: CPT

## 2021-05-06 ENCOUNTER — TELEPHONE (OUTPATIENT)
Dept: FAMILY MEDICINE CLINIC | Facility: CLINIC | Age: 86
End: 2021-05-06

## 2021-05-06 NOTE — TELEPHONE ENCOUNTER
Pt is calling she is out of her Atorvastatin on the weekend and is calling to have it refilled to [facility]. She said the pharmacy has reached out to us 3 times but we do not show any request. They told her to call us.

## 2021-05-06 NOTE — TELEPHONE ENCOUNTER
Ayla Santacruz was given #90 with 2 additional refills 2/9/2021. [facility] does have refills available and will have the medication ready after 1p.m. today. Notified Ayla Santacruz and she verbalized understanding.

## 2021-05-19 ENCOUNTER — PATIENT OUTREACH (OUTPATIENT)
Dept: CASE MANAGEMENT | Age: 86
End: 2021-05-19

## 2021-05-19 DIAGNOSIS — I10 HYPERTENSION, BENIGN: ICD-10-CM

## 2021-05-19 DIAGNOSIS — M35.3 PMR (POLYMYALGIA RHEUMATICA) (HCC): ICD-10-CM

## 2021-05-19 DIAGNOSIS — E78.00 HYPERCHOLESTEREMIA: ICD-10-CM

## 2021-05-19 DIAGNOSIS — I25.10 ATHEROSCLEROSIS OF NATIVE CORONARY ARTERY OF NATIVE HEART WITHOUT ANGINA PECTORIS: ICD-10-CM

## 2021-05-19 DIAGNOSIS — R53.1 WEAKNESS: ICD-10-CM

## 2021-05-19 DIAGNOSIS — E03.9 HYPOTHYROIDISM, UNSPECIFIED TYPE: ICD-10-CM

## 2021-05-19 NOTE — PROGRESS NOTES
5/19/2021  Spoke to Wilberto Quiñones for CCM.       Updates to patient care team/ comments: No changes per patient  Patient reported changes in medications: No changes per patient  Med Adherence  Comment: Taking as prescribed    Health Maintenance: Pt aware  [facility] Provider Jae Mcbride   6/16/2021  2:00 PM Kimbreley Carey, DO [facility]             Patient agrees to goal action plan.   Self-Management Abilities (patient reported)             1= least confident in achieving goal, 5= very confident

## 2021-05-31 PROCEDURE — 99490 CHRNC CARE MGMT STAFF 1ST 20: CPT

## 2021-06-04 ENCOUNTER — TELEPHONE (OUTPATIENT)
Dept: FAMILY MEDICINE CLINIC | Facility: CLINIC | Age: 86
End: 2021-06-04

## 2021-06-04 DIAGNOSIS — I10 HYPERTENSION, BENIGN: Primary | ICD-10-CM

## 2021-06-04 RX ORDER — METOPROLOL TARTRATE 50 MG/1
50 TABLET, FILM COATED ORAL 2 TIMES DAILY
Qty: 180 TABLET | Refills: 0 | Status: SHIPPED | OUTPATIENT
Start: 2021-06-04 | End: 2021-08-31

## 2021-06-04 NOTE — TELEPHONE ENCOUNTER
Metoprolol Tartrate 50 MG Oral Tab   10/16/2012     Sig - Route:  Take 1 Tab by mouth 2 (two) times daily. - Oral    Class: Historical      Patient is requesting this be refilled ASAP she was just seen and forgot to mention this to the Sharad Campuzano please send

## 2021-06-04 NOTE — TELEPHONE ENCOUNTER
Angioedema  Angioedema is a sudden swelling of tissues, often of the skin. It can occur on the face or genitals or in the abdomen or other body parts. The swelling usually develops over a short period and gets better in 24 to 48 hours. It often begins during the night and is found when the person wakes up. The person may also get red, itchy patches of skin (hives). Angioedema can be dangerous if it involves swelling of the air passages.   Depending on the cause, episodes of angioedema may only happen once, come back in unpredictable patterns, or repeat for several years and then gradually fade away.   CAUSES   Angioedema can be caused by an allergic reaction to various triggers. It can also result from nonallergic causes, including reactions to drugs, immune system disorders, viral infections, or an abnormal gene that is passed to you from your parents (hereditary). For some people with angioedema, the cause is unknown.   Some things that can trigger angioedema include:   · Foods.    · Medicines, such as ACE inhibitors, ARBs, nonsteroidal anti-inflammatory agents, or estrogen.    · Latex.    · Animal saliva.    · Insect stings.    · Dyes used in X-rays.    · Mild injury.    · Dental work.  · Surgery.  · Stress.    · Sudden changes in temperature.    · Exercise.  SIGNS AND SYMPTOMS   · Swelling of the skin.  · Hives. If these are present, there is also intense itching.  · Redness in the affected area.    · Pain in the affected area.  · Swollen lips or tongue.  · Breathing problems. This may happen if the air passages swell.  · Wheezing.  If internal organs are involved, there may be:   · Nausea.    · Abdominal pain.    · Vomiting.    · Difficulty swallowing.    · Difficulty passing urine.  DIAGNOSIS   · Your health care provider will examine the affected area and take a medical and family history.  · Various tests may be done to help determine the cause. Tests may include:  ¨ Allergy skin tests to see if the problem  Medication refilled per protocol. Requested Prescriptions     Signed Prescriptions Disp Refills   • Metoprolol Tartrate 50 MG Oral Tab 180 tablet 0     Sig: Take 1 tablet (50 mg total) by mouth 2 (two) times daily.      Authorizing Provider: Hilario Wilcox is an allergic reaction.    ¨ Blood tests to check for hereditary angioedema.    ¨ Tests to check for underlying diseases that could cause the condition.    · A review of your medicines, including over-the-counter medicines, may be done.  TREATMENT   Treatment will depend on the cause of the angioedema. Possible treatments include:   · Removal of anything that triggered the condition (such as stopping certain medicines).    · Medicines to treat symptoms or prevent attacks. Medicines given may include:    ¨ Antihistamines.    ¨ Epinephrine injection.    ¨ Steroids.    · Hospitalization may be required for severe attacks. If the air passages are affected, it can be an emergency. Tubes may need to be placed to keep the airway open.  HOME CARE INSTRUCTIONS   · Take all medicines as directed by your health care provider.  · If you were given medicines for emergency allergy treatment, always carry them with you.  · Wear a medical bracelet as directed by your health care provider.    · Avoid known triggers.  SEEK MEDICAL CARE IF:   · You have repeat attacks of angioedema.    · Your attacks are more frequent or more severe despite preventive measures.    · You have hereditary angioedema and are considering having children. It is important to discuss with your health care provider the risks of passing the condition on to your children.  SEEK IMMEDIATE MEDICAL CARE IF:   · You have severe swelling of the mouth, tongue, or lips.  · You have difficulty breathing.    · You have difficulty swallowing.    · You faint.  MAKE SURE YOU:  · Understand these instructions.  · Will watch your condition.  · Will get help right away if you are not doing well or get worse.     This information is not intended to replace advice given to you by your health care provider. Make sure you discuss any questions you have with your health care provider.     Document Released: 02/26/2003 Document Revised: 01/08/2016 Document Reviewed:  08/11/2014  Elsevier Interactive Patient Education ©2016 [employer]

## 2021-06-14 ENCOUNTER — PATIENT OUTREACH (OUTPATIENT)
Dept: CASE MANAGEMENT | Age: 86
End: 2021-06-14

## 2021-06-14 NOTE — PROGRESS NOTES
Called patient and left a message for patient to call back when they can. Reviewed patient chart.  Left contact my contact number [phone]        Time Spent This Encounter Total: 5  min medical record review  Monthly Minute Total including today: 5 smitha

## 2021-06-22 ENCOUNTER — PATIENT OUTREACH (OUTPATIENT)
Dept: CASE MANAGEMENT | Age: 86
End: 2021-06-22

## 2021-06-22 NOTE — PROGRESS NOTES
Called patient and left a message for patient to call back when they can. Reviewed patient chart.  Left contact my contact number [phone]        Time Spent This Encounter Total: 5  min medical record review  Monthly Minute Total including today: 10 min

## 2021-07-12 ENCOUNTER — OFFICE VISIT (OUTPATIENT)
Dept: FAMILY MEDICINE CLINIC | Facility: CLINIC | Age: 86
End: 2021-07-12
Payer: MEDICARE

## 2021-07-12 VITALS
HEIGHT: 67 IN | DIASTOLIC BLOOD PRESSURE: 72 MMHG | HEART RATE: 78 BPM | SYSTOLIC BLOOD PRESSURE: 128 MMHG | RESPIRATION RATE: 16 BRPM | WEIGHT: 162 LBS | BODY MASS INDEX: 25.43 KG/M2 | TEMPERATURE: 98 F

## 2021-07-12 DIAGNOSIS — M35.3 PMR (POLYMYALGIA RHEUMATICA) (HCC): ICD-10-CM

## 2021-07-12 DIAGNOSIS — E03.9 HYPOTHYROIDISM, UNSPECIFIED TYPE: ICD-10-CM

## 2021-07-12 DIAGNOSIS — E78.00 HYPERCHOLESTEREMIA: ICD-10-CM

## 2021-07-12 DIAGNOSIS — I10 HYPERTENSION, BENIGN: ICD-10-CM

## 2021-07-12 DIAGNOSIS — Z00.00 ROUTINE HEALTH MAINTENANCE: ICD-10-CM

## 2021-07-12 DIAGNOSIS — H91.93 DECREASED HEARING OF BOTH EARS: Primary | ICD-10-CM

## 2021-07-12 DIAGNOSIS — I25.10 ATHEROSCLEROSIS OF NATIVE CORONARY ARTERY OF NATIVE HEART WITHOUT ANGINA PECTORIS: ICD-10-CM

## 2021-07-12 PROCEDURE — 99214 OFFICE O/P EST MOD 30 MIN: CPT | Performed by: FAMILY MEDICINE

## 2021-07-12 NOTE — PROGRESS NOTES
Chief Complaint:  Patient presents with:  Lightheadedness: Pt did not got PT, Meclizine solved the dizziness   Forms Completion: Parking Placard      HPI:  This is a 80year old female patient presenting for Lightheadedness (Pt did not got PT, Meclizine so [facility])     1994   • Hyperlipidemia    • Hypothyroidism 6/29/2012   • Retinopathy of both eyes 11/11/2014    Diagnosis documented by Dr Wilber Caceres in notes from visit on 11/3/14      Past Surgical History:   Procedure Laterality Date   • CATARACT SURGERY, COMPLEX Tab Take 1,000 mg by mouth every 12 (twelve) hours as needed for Pain. • ofloxacin 0.3 % Ophthalmic Solution as needed.      • Betamethasone Dipropionate 0.05 % External Ointment Apply BID as needed 45 g 3   • aspirin (ASPIRIN LOW DOSE) 81 MG Oral Tab T PLATELET; Future  -     COMP METABOLIC PANEL (14); Future    Hypertension, benign  Normotensive today. Will cont current therapy    Atherosclerosis of native coronary artery of native heart without angina pectoris  No chest pain or concerning symptoms.   We

## 2021-07-13 ENCOUNTER — NURSE ONLY (OUTPATIENT)
Dept: LAB | Age: 86
End: 2021-07-13
Attending: FAMILY MEDICINE
Payer: MEDICARE

## 2021-07-13 DIAGNOSIS — E78.00 HYPERCHOLESTEREMIA: ICD-10-CM

## 2021-07-13 DIAGNOSIS — E03.9 HYPOTHYROIDISM, UNSPECIFIED TYPE: ICD-10-CM

## 2021-07-13 DIAGNOSIS — Z00.00 ROUTINE HEALTH MAINTENANCE: ICD-10-CM

## 2021-07-13 DIAGNOSIS — Z79.52 LONG TERM CURRENT USE OF SYSTEMIC STEROIDS: ICD-10-CM

## 2021-07-13 DIAGNOSIS — R70.0 ELEVATED SED RATE: ICD-10-CM

## 2021-07-13 DIAGNOSIS — R76.8 POSITIVE ANA (ANTINUCLEAR ANTIBODY): ICD-10-CM

## 2021-07-13 DIAGNOSIS — M35.3 PMR (POLYMYALGIA RHEUMATICA) (HCC): ICD-10-CM

## 2021-07-13 LAB
ALBUMIN SERPL-MCNC: 3.5 G/DL (ref 3.4–5)
ALBUMIN/GLOB SERPL: 1 {RATIO} (ref 1–2)
ALP LIVER SERPL-CCNC: 110 U/L
ALT SERPL-CCNC: 17 U/L
ANION GAP SERPL CALC-SCNC: 6 MMOL/L (ref 0–18)
AST SERPL-CCNC: 12 U/L (ref 15–37)
BASOPHILS # BLD AUTO: 0.02 X10(3) UL (ref 0–0.2)
BASOPHILS NFR BLD AUTO: 0.2 %
BILIRUB SERPL-MCNC: 0.7 MG/DL (ref 0.1–2)
BUN BLD-MCNC: 9 MG/DL (ref 7–18)
BUN/CREAT SERPL: 18.4 (ref 10–20)
CALCIUM BLD-MCNC: 8.8 MG/DL (ref 8.5–10.1)
CHLORIDE SERPL-SCNC: 111 MMOL/L (ref 98–112)
CHOLEST SMN-MCNC: 123 MG/DL (ref ?–200)
CO2 SERPL-SCNC: 26 MMOL/L (ref 21–32)
CREAT BLD-MCNC: 0.49 MG/DL
CRP SERPL-MCNC: 0.38 MG/DL (ref ?–0.3)
DEPRECATED RDW RBC AUTO: 49.8 FL (ref 35.1–46.3)
EOSINOPHIL # BLD AUTO: 0.04 X10(3) UL (ref 0–0.7)
EOSINOPHIL NFR BLD AUTO: 0.4 %
ERYTHROCYTE [DISTWIDTH] IN BLOOD BY AUTOMATED COUNT: 13.8 % (ref 11–15)
GLOBULIN PLAS-MCNC: 3.5 G/DL (ref 2.8–4.4)
GLUCOSE BLD-MCNC: 97 MG/DL (ref 70–99)
HCT VFR BLD AUTO: 41.6 %
HDLC SERPL-MCNC: 54 MG/DL (ref 40–59)
HGB BLD-MCNC: 13.7 G/DL
IMM GRANULOCYTES # BLD AUTO: 0.05 X10(3) UL (ref 0–1)
IMM GRANULOCYTES NFR BLD: 0.5 %
LDLC SERPL CALC-MCNC: 52 MG/DL (ref ?–100)
LYMPHOCYTES # BLD AUTO: 1.67 X10(3) UL (ref 1–4)
LYMPHOCYTES NFR BLD AUTO: 17.5 %
M PROTEIN MFR SERPL ELPH: 7 G/DL (ref 6.4–8.2)
MCH RBC QN AUTO: 32.1 PG (ref 26–34)
MCHC RBC AUTO-ENTMCNC: 32.9 G/DL (ref 31–37)
MCV RBC AUTO: 97.4 FL
MONOCYTES # BLD AUTO: 0.76 X10(3) UL (ref 0.1–1)
MONOCYTES NFR BLD AUTO: 7.9 %
NEUTROPHILS # BLD AUTO: 7.02 X10 (3) UL (ref 1.5–7.7)
NEUTROPHILS # BLD AUTO: 7.02 X10(3) UL (ref 1.5–7.7)
NEUTROPHILS NFR BLD AUTO: 73.5 %
NONHDLC SERPL-MCNC: 69 MG/DL (ref ?–130)
OSMOLALITY SERPL CALC.SUM OF ELEC: 295 MOSM/KG (ref 275–295)
PATIENT FASTING Y/N/NP: YES
PATIENT FASTING Y/N/NP: YES
PLATELET # BLD AUTO: 141 10(3)UL (ref 150–450)
POTASSIUM SERPL-SCNC: 3.9 MMOL/L (ref 3.5–5.1)
RBC # BLD AUTO: 4.27 X10(6)UL
SED RATE-ML: 22 MM/HR
SODIUM SERPL-SCNC: 143 MMOL/L (ref 136–145)
TRIGL SERPL-MCNC: 91 MG/DL (ref 30–149)
TSI SER-ACNC: 0.55 MIU/ML (ref 0.36–3.74)
VLDLC SERPL CALC-MCNC: 13 MG/DL (ref 0–30)
WBC # BLD AUTO: 9.6 X10(3) UL (ref 4–11)

## 2021-07-13 PROCEDURE — 80061 LIPID PANEL: CPT

## 2021-07-13 PROCEDURE — 84443 ASSAY THYROID STIM HORMONE: CPT

## 2021-07-13 PROCEDURE — 80053 COMPREHEN METABOLIC PANEL: CPT

## 2021-07-13 PROCEDURE — 36415 COLL VENOUS BLD VENIPUNCTURE: CPT

## 2021-07-13 PROCEDURE — 86140 C-REACTIVE PROTEIN: CPT

## 2021-07-13 PROCEDURE — 85025 COMPLETE CBC W/AUTO DIFF WBC: CPT

## 2021-07-13 PROCEDURE — 85652 RBC SED RATE AUTOMATED: CPT

## 2021-07-20 ENCOUNTER — PATIENT OUTREACH (OUTPATIENT)
Dept: CASE MANAGEMENT | Age: 86
End: 2021-07-20

## 2021-07-20 DIAGNOSIS — I25.10 ATHEROSCLEROSIS OF NATIVE CORONARY ARTERY OF NATIVE HEART WITHOUT ANGINA PECTORIS: ICD-10-CM

## 2021-07-20 DIAGNOSIS — M35.3 PMR (POLYMYALGIA RHEUMATICA) (HCC): ICD-10-CM

## 2021-07-20 DIAGNOSIS — E78.00 HYPERCHOLESTEREMIA: ICD-10-CM

## 2021-07-20 DIAGNOSIS — I10 HYPERTENSION, BENIGN: ICD-10-CM

## 2021-07-20 NOTE — PROGRESS NOTES
7/20/2021  Spoke to Gabriela for CCM.       Updates to patient care team/ comments: No changes per patient  Patient reported changes in medications: No changes per patient  Med Adherence  Comment: taking as prescribed    Health Maintenance: Pt aware  Annual assistance is needed. Time Spent This Encounter Total: 25 min medical record review, telephone communication, care plan updates where needed, education, goals and action plan recreation/update. Provided acknowledgment and validation to patient's concerns.

## 2021-07-31 PROCEDURE — 99490 CHRNC CARE MGMT STAFF 1ST 20: CPT

## 2021-08-20 ENCOUNTER — PATIENT OUTREACH (OUTPATIENT)
Dept: CASE MANAGEMENT | Age: 86
End: 2021-08-20

## 2021-08-20 DIAGNOSIS — M35.3 PMR (POLYMYALGIA RHEUMATICA) (HCC): ICD-10-CM

## 2021-08-20 DIAGNOSIS — E03.9 HYPOTHYROIDISM, UNSPECIFIED TYPE: ICD-10-CM

## 2021-08-20 DIAGNOSIS — E78.00 HYPERCHOLESTEREMIA: ICD-10-CM

## 2021-08-20 DIAGNOSIS — I25.10 ATHEROSCLEROSIS OF NATIVE CORONARY ARTERY OF NATIVE HEART WITHOUT ANGINA PECTORIS: ICD-10-CM

## 2021-08-20 DIAGNOSIS — I10 HYPERTENSION, BENIGN: ICD-10-CM

## 2021-08-20 NOTE — PROGRESS NOTES
8/20/2021  Spoke to Gabriela for CCM.       Updates to patient care team/ comments: No changes per patient  Patient reported changes in medications: No changes per patient  Med Adherence  Comment: taking as prescribed    Health Maintenance: Pt aware  [facility] recreation/update. Provided acknowledgment and validation to patient's concerns.    Monthly Minute Total including today: 23    Physical assessment, complete health history, and care plan established by Linda Ignacio DO, need for CCM determined from these

## 2021-08-31 ENCOUNTER — TELEPHONE (OUTPATIENT)
Dept: FAMILY MEDICINE CLINIC | Facility: CLINIC | Age: 86
End: 2021-08-31

## 2021-08-31 DIAGNOSIS — I10 HYPERTENSION, BENIGN: ICD-10-CM

## 2021-08-31 PROCEDURE — 99490 CHRNC CARE MGMT STAFF 1ST 20: CPT

## 2021-08-31 RX ORDER — METOPROLOL TARTRATE 50 MG/1
50 TABLET, FILM COATED ORAL 2 TIMES DAILY
Qty: 180 TABLET | Refills: 1 | Status: SHIPPED | OUTPATIENT
Start: 2021-08-31

## 2021-08-31 NOTE — TELEPHONE ENCOUNTER
Pt is almost out of her Metoprolol Tartrate 50 MG Oral Tab and she would like this sent into her [facility] on [address]

## 2021-08-31 NOTE — TELEPHONE ENCOUNTER
LOV 7/12/2021  Pt asked to f/u in 6 months    Medication eprescribed to pharmacy as requested.     Requested Prescriptions     Signed Prescriptions Disp Refills   • metoprolol tartrate 50 MG Oral Tab 180 tablet 1     Sig: Take 1 tablet (50 mg total) by wally

## 2021-09-16 ENCOUNTER — PATIENT OUTREACH (OUTPATIENT)
Dept: CASE MANAGEMENT | Age: 86
End: 2021-09-16

## 2021-09-16 NOTE — PROGRESS NOTES
Called patient and left a message for patient to call back when they can. Reviewed patient chart.  Left contact my contact number [phone]        Time Spent This Encounter Total: 5  min medical record review  Monthly Minute Total including today: 5 smitha

## 2021-09-27 ENCOUNTER — TELEPHONE (OUTPATIENT)
Dept: FAMILY MEDICINE CLINIC | Facility: CLINIC | Age: 86
End: 2021-09-27

## 2021-09-27 DIAGNOSIS — E03.9 HYPOTHYROIDISM, UNSPECIFIED TYPE: ICD-10-CM

## 2021-09-27 RX ORDER — LEVOTHYROXINE SODIUM 0.1 MG/1
100 TABLET ORAL DAILY
Qty: 90 TABLET | Refills: 2 | Status: SHIPPED | OUTPATIENT
Start: 2021-09-27

## 2021-09-27 NOTE — TELEPHONE ENCOUNTER
Lab Results   Component Value Date    TSH 0.554 07/13/2021    T4F 1.4 08/23/2016       Medication eprescribed to pharmacy as requested.     Requested Prescriptions     Pending Prescriptions Disp Refills   • levothyroxine 100 MCG Oral Tab 90 tablet 1     S

## 2021-09-27 NOTE — TELEPHONE ENCOUNTER
Pt states she missed counted her medication Levothyroxine and pt states she only has one tablet left.

## 2021-10-13 ENCOUNTER — PATIENT OUTREACH (OUTPATIENT)
Dept: CASE MANAGEMENT | Age: 86
End: 2021-10-13

## 2021-10-13 NOTE — PROGRESS NOTES
Called patient and left a message for patient to call back when they can. Reviewed patient chart.  Left contact my contact number [phone]        Time Spent This Encounter Total: 5  min medical record review  Monthly Minute Total including today: 5 smitha

## 2021-11-08 ENCOUNTER — PATIENT OUTREACH (OUTPATIENT)
Dept: CASE MANAGEMENT | Age: 86
End: 2021-11-08

## 2021-11-08 NOTE — PROGRESS NOTES
Called patient and left a message for patient to call back when they can. Reviewed patient chart.  Left contact my contact number [phone]        Time Spent This Encounter Total: 5  min medical record review  Monthly Minute Total including today: 5 smitha

## 2021-11-23 ENCOUNTER — NURSE ONLY (OUTPATIENT)
Dept: LAB | Age: 86
End: 2021-11-23
Attending: INTERNAL MEDICINE
Payer: MEDICARE

## 2021-11-23 DIAGNOSIS — R79.82 CRP ELEVATED: ICD-10-CM

## 2021-11-23 DIAGNOSIS — M35.3 PMR (POLYMYALGIA RHEUMATICA) (HCC): ICD-10-CM

## 2021-11-23 DIAGNOSIS — R76.8 POSITIVE ANA (ANTINUCLEAR ANTIBODY): ICD-10-CM

## 2021-11-23 DIAGNOSIS — R70.0 ELEVATED SED RATE: ICD-10-CM

## 2021-11-23 PROCEDURE — 86140 C-REACTIVE PROTEIN: CPT

## 2021-11-23 PROCEDURE — 36415 COLL VENOUS BLD VENIPUNCTURE: CPT

## 2021-11-23 PROCEDURE — 85652 RBC SED RATE AUTOMATED: CPT

## 2021-11-26 DIAGNOSIS — E78.00 HYPERCHOLESTEREMIA: ICD-10-CM

## 2021-11-26 RX ORDER — ATORVASTATIN CALCIUM 40 MG/1
TABLET, FILM COATED ORAL
Qty: 90 TABLET | Refills: 0 | Status: SHIPPED | OUTPATIENT
Start: 2021-11-26

## 2021-11-26 NOTE — TELEPHONE ENCOUNTER
Medication refilled per protocol.      Requested Prescriptions     Signed Prescriptions Disp Refills   • ATORVASTATIN 40 MG Oral Tab 90 tablet 0     Sig: TAKE 1 TABLET(40 MG) BY MOUTH EVERY NIGHT AT BEDTIME     Authorizing Provider: Jefferson Carroll

## 2021-12-07 DIAGNOSIS — E78.00 HYPERCHOLESTEREMIA: ICD-10-CM

## 2021-12-07 DIAGNOSIS — I10 HYPERTENSION, BENIGN: ICD-10-CM

## 2021-12-08 ENCOUNTER — PATIENT OUTREACH (OUTPATIENT)
Dept: CASE MANAGEMENT | Age: 86
End: 2021-12-08

## 2021-12-08 RX ORDER — ATORVASTATIN CALCIUM 40 MG/1
TABLET, FILM COATED ORAL
Qty: 90 TABLET | Refills: 0 | OUTPATIENT
Start: 2021-12-08

## 2021-12-08 RX ORDER — METOPROLOL TARTRATE 50 MG/1
TABLET, FILM COATED ORAL
Qty: 180 TABLET | Refills: 1 | OUTPATIENT
Start: 2021-12-08

## 2021-12-08 NOTE — PROGRESS NOTES
Patient requested to be un enrolled at this time. Patient identified with a potential need for Chronic Care Management services. Called patient to introduce self and availability of Chronic Care Management services.   Patient informed about the following

## 2021-12-08 NOTE — TELEPHONE ENCOUNTER
Requested Prescriptions     Pending Prescriptions Disp Refills   • METOPROLOL TARTRATE 50 MG Oral Tab [Pharmacy Med Name: METOPROLOL TARTRATE 50MG TABLETS] 180 tablet 1     Sig: TAKE 1 TABLET(50 MG) BY MOUTH TWICE DAILY   • ATORVASTATIN 40 MG Oral Tab [Pha

## 2021-12-16 DIAGNOSIS — I10 HYPERTENSION, BENIGN: ICD-10-CM

## 2021-12-16 NOTE — TELEPHONE ENCOUNTER
Pt calling to check on status on refill. Pt is asking to please have this refilled today as she will only have a  until tomorrow.

## 2021-12-16 NOTE — TELEPHONE ENCOUNTER
Patient called and only has enough until Sunday but won't have anyway to get it after Friday as she doesn't drive and her  is having surgery Saturday. Can we please refill today.  Please call patient and let her know

## 2021-12-16 NOTE — TELEPHONE ENCOUNTER
This medication was issued on 08/31/2021 for 90 days with one additional refill, patient should have enough refills until Feb 2022  Have her contact her pharmacy

## 2021-12-16 NOTE — TELEPHONE ENCOUNTER
METOPROLOL TARTRATE 50MG TABLETS     Sig: TAKE 1 TABLET(50 MG) BY MOUTH TWICE DAILY    Disp:  180 tablet    Refills:  1 (Pharmacy requested: Not specified)    Start: 12/16/2021    Class: Normal    Non-formulary For: Hypertension, benign    Last ordered: 3

## 2021-12-17 RX ORDER — METOPROLOL TARTRATE 50 MG/1
TABLET, FILM COATED ORAL
Qty: 180 TABLET | Refills: 1 | OUTPATIENT
Start: 2021-12-17

## 2021-12-17 NOTE — TELEPHONE ENCOUNTER
Patient reports the pharmacy screwed up her medications in November. She was given 24 pills with one refill. She call the pharmacy the other day and requested 180 and a refill. She will be out of metoprolol on Sunday.  He daughter Altagracia Webb) who usually help

## 2021-12-18 NOTE — TELEPHONE ENCOUNTER
Pt notified has been notified of refill approval. Pt states she has picked up medication from pharmacy.

## 2022-02-08 ENCOUNTER — OFFICE VISIT (OUTPATIENT)
Dept: FAMILY MEDICINE CLINIC | Facility: CLINIC | Age: 87
End: 2022-02-08
Payer: MEDICARE

## 2022-02-08 ENCOUNTER — NURSE ONLY (OUTPATIENT)
Dept: LAB | Age: 87
End: 2022-02-08
Attending: FAMILY MEDICINE
Payer: MEDICARE

## 2022-02-08 VITALS
WEIGHT: 160.63 LBS | HEART RATE: 72 BPM | DIASTOLIC BLOOD PRESSURE: 70 MMHG | BODY MASS INDEX: 25.21 KG/M2 | HEIGHT: 67 IN | SYSTOLIC BLOOD PRESSURE: 130 MMHG | TEMPERATURE: 97 F

## 2022-02-08 DIAGNOSIS — H91.93 BILATERAL HEARING LOSS, UNSPECIFIED HEARING LOSS TYPE: Primary | ICD-10-CM

## 2022-02-08 DIAGNOSIS — D69.6 THROMBOCYTOPENIA (HCC): ICD-10-CM

## 2022-02-08 DIAGNOSIS — E03.9 HYPOTHYROIDISM, UNSPECIFIED TYPE: ICD-10-CM

## 2022-02-08 DIAGNOSIS — E78.00 HYPERCHOLESTEREMIA: ICD-10-CM

## 2022-02-08 DIAGNOSIS — M35.3 PMR (POLYMYALGIA RHEUMATICA) (HCC): ICD-10-CM

## 2022-02-08 DIAGNOSIS — H35.3221 EXUDATIVE AGE-RELATED MACULAR DEGENERATION, LEFT EYE, WITH ACTIVE CHOROIDAL NEOVASCULARIZATION (HCC): ICD-10-CM

## 2022-02-08 DIAGNOSIS — I10 HYPERTENSION, BENIGN: ICD-10-CM

## 2022-02-08 LAB
BASOPHILS # BLD AUTO: 0.01 X10(3) UL (ref 0–0.2)
BASOPHILS NFR BLD AUTO: 0.1 %
EOSINOPHIL # BLD AUTO: 0.03 X10(3) UL (ref 0–0.7)
EOSINOPHIL NFR BLD AUTO: 0.3 %
ERYTHROCYTE [DISTWIDTH] IN BLOOD BY AUTOMATED COUNT: 13.9 %
HCT VFR BLD AUTO: 40.7 %
HGB BLD-MCNC: 13.1 G/DL
IMM GRANULOCYTES # BLD AUTO: 0.05 X10(3) UL (ref 0–1)
IMM GRANULOCYTES NFR BLD: 0.5 %
LYMPHOCYTES # BLD AUTO: 1.56 X10(3) UL (ref 1–4)
LYMPHOCYTES NFR BLD AUTO: 16.8 %
MCH RBC QN AUTO: 31.9 PG (ref 26–34)
MCHC RBC AUTO-ENTMCNC: 32.2 G/DL (ref 31–37)
MCV RBC AUTO: 99 FL
MONOCYTES # BLD AUTO: 0.73 X10(3) UL (ref 0.1–1)
MONOCYTES NFR BLD AUTO: 7.9 %
NEUTROPHILS # BLD AUTO: 6.88 X10 (3) UL (ref 1.5–7.7)
NEUTROPHILS # BLD AUTO: 6.88 X10(3) UL (ref 1.5–7.7)
NEUTROPHILS NFR BLD AUTO: 74.4 %
PLATELET # BLD AUTO: 142 10(3)UL (ref 150–450)
RBC # BLD AUTO: 4.11 X10(6)UL
WBC # BLD AUTO: 9.3 X10(3) UL (ref 4–11)

## 2022-02-08 PROCEDURE — 36415 COLL VENOUS BLD VENIPUNCTURE: CPT

## 2022-02-08 PROCEDURE — 85025 COMPLETE CBC W/AUTO DIFF WBC: CPT

## 2022-02-08 PROCEDURE — 99214 OFFICE O/P EST MOD 30 MIN: CPT | Performed by: FAMILY MEDICINE

## 2022-02-08 RX ORDER — LEVOTHYROXINE SODIUM 0.1 MG/1
100 TABLET ORAL DAILY
Qty: 90 TABLET | Refills: 1 | Status: SHIPPED | OUTPATIENT
Start: 2022-02-08

## 2022-02-08 RX ORDER — METOPROLOL TARTRATE 50 MG/1
50 TABLET, FILM COATED ORAL 2 TIMES DAILY
Qty: 180 TABLET | Refills: 1 | Status: SHIPPED | OUTPATIENT
Start: 2022-02-08

## 2022-02-08 RX ORDER — ATORVASTATIN CALCIUM 40 MG/1
40 TABLET, FILM COATED ORAL NIGHTLY
Qty: 90 TABLET | Refills: 1 | Status: SHIPPED | OUTPATIENT
Start: 2022-02-08

## 2022-03-29 ENCOUNTER — NURSE ONLY (OUTPATIENT)
Dept: LAB | Age: 87
End: 2022-03-29
Attending: INTERNAL MEDICINE
Payer: MEDICARE

## 2022-03-29 DIAGNOSIS — R70.0 ELEVATED SED RATE: ICD-10-CM

## 2022-03-29 DIAGNOSIS — Z79.899 LONG-TERM USE OF HIGH-RISK MEDICATION: ICD-10-CM

## 2022-03-29 DIAGNOSIS — M35.3 PMR (POLYMYALGIA RHEUMATICA) (HCC): ICD-10-CM

## 2022-03-29 DIAGNOSIS — M79.10 MYALGIA: ICD-10-CM

## 2022-03-29 LAB
CRP SERPL-MCNC: <0.29 MG/DL (ref ?–0.3)
ERYTHROCYTE [SEDIMENTATION RATE] IN BLOOD: 22 MM/HR

## 2022-03-29 PROCEDURE — 85652 RBC SED RATE AUTOMATED: CPT

## 2022-03-29 PROCEDURE — 36415 COLL VENOUS BLD VENIPUNCTURE: CPT

## 2022-03-29 PROCEDURE — 86140 C-REACTIVE PROTEIN: CPT

## 2022-05-14 NOTE — TELEPHONE ENCOUNTER
Left message for patient to call office.    
Prednisone refilled. Please let me know if you have any questions.   Loi Veras,  5/24/2018 3:29 PM
Pt advised. Flu shot done on 10/05/2017 with [facility]  
Recommend flu shot if not already done (if done at dialysis - please update chart)  
Refill request sent from pharmacy for one of the doses of maintenance Prednisone. LOV 05/08/18. Will you approve ? Routed to Dr Shivani Buckley.
[id number]

## 2022-08-08 ENCOUNTER — OFFICE VISIT (OUTPATIENT)
Dept: FAMILY MEDICINE CLINIC | Facility: CLINIC | Age: 87
End: 2022-08-08
Payer: MEDICARE

## 2022-08-08 VITALS
OXYGEN SATURATION: 96 % | RESPIRATION RATE: 16 BRPM | SYSTOLIC BLOOD PRESSURE: 140 MMHG | HEART RATE: 80 BPM | BODY MASS INDEX: 24.96 KG/M2 | HEIGHT: 67 IN | DIASTOLIC BLOOD PRESSURE: 70 MMHG | TEMPERATURE: 97 F | WEIGHT: 159 LBS

## 2022-08-08 DIAGNOSIS — Z91.81 AT RISK FOR FALLING: ICD-10-CM

## 2022-08-08 DIAGNOSIS — S41.111A LACERATION OF RIGHT UPPER EXTREMITY, INITIAL ENCOUNTER: ICD-10-CM

## 2022-08-08 DIAGNOSIS — Z00.00 ROUTINE HEALTH MAINTENANCE: ICD-10-CM

## 2022-08-08 DIAGNOSIS — E03.9 HYPOTHYROIDISM, UNSPECIFIED TYPE: ICD-10-CM

## 2022-08-08 DIAGNOSIS — I10 HYPERTENSION, BENIGN: Primary | ICD-10-CM

## 2022-08-08 DIAGNOSIS — E78.00 HYPERCHOLESTEREMIA: ICD-10-CM

## 2022-08-08 DIAGNOSIS — Z23 NEED FOR PNEUMOCOCCAL VACCINATION: ICD-10-CM

## 2022-08-08 PROCEDURE — 90732 PPSV23 VACC 2 YRS+ SUBQ/IM: CPT | Performed by: FAMILY MEDICINE

## 2022-08-08 PROCEDURE — G0009 ADMIN PNEUMOCOCCAL VACCINE: HCPCS | Performed by: FAMILY MEDICINE

## 2022-08-08 PROCEDURE — 99214 OFFICE O/P EST MOD 30 MIN: CPT | Performed by: FAMILY MEDICINE

## 2022-08-16 ENCOUNTER — LAB REQUISITION (OUTPATIENT)
Dept: LAB | Facility: HOSPITAL | Age: 87
End: 2022-08-16
Payer: MEDICARE

## 2022-08-16 DIAGNOSIS — Z00.00 ENCOUNTER FOR GENERAL ADULT MEDICAL EXAMINATION WITHOUT ABNORMAL FINDINGS: ICD-10-CM

## 2022-08-16 DIAGNOSIS — E78.00 PURE HYPERCHOLESTEROLEMIA, UNSPECIFIED: ICD-10-CM

## 2022-08-16 DIAGNOSIS — I10 ESSENTIAL (PRIMARY) HYPERTENSION: ICD-10-CM

## 2022-08-16 DIAGNOSIS — M35.3 POLYMYALGIA RHEUMATICA (HCC): ICD-10-CM

## 2022-08-16 DIAGNOSIS — E03.9 HYPOTHYROIDISM, UNSPECIFIED: ICD-10-CM

## 2022-08-16 DIAGNOSIS — Z79.899 OTHER LONG TERM (CURRENT) DRUG THERAPY: ICD-10-CM

## 2022-08-16 LAB
ALBUMIN SERPL-MCNC: 3.5 G/DL (ref 3.4–5)
ALBUMIN/GLOB SERPL: 1 {RATIO} (ref 1–2)
ALP LIVER SERPL-CCNC: 100 U/L
ALT SERPL-CCNC: 17 U/L
ANION GAP SERPL CALC-SCNC: 4 MMOL/L (ref 0–18)
AST SERPL-CCNC: 13 U/L (ref 15–37)
BASOPHILS # BLD AUTO: 0.02 X10(3) UL (ref 0–0.2)
BASOPHILS NFR BLD AUTO: 0.2 %
BILIRUB SERPL-MCNC: 0.8 MG/DL (ref 0.1–2)
BUN BLD-MCNC: 13 MG/DL (ref 7–18)
CALCIUM BLD-MCNC: 9.1 MG/DL (ref 8.5–10.1)
CHLORIDE SERPL-SCNC: 111 MMOL/L (ref 98–112)
CHOLEST SERPL-MCNC: 127 MG/DL (ref ?–200)
CO2 SERPL-SCNC: 27 MMOL/L (ref 21–32)
CREAT BLD-MCNC: 0.63 MG/DL
CRP SERPL-MCNC: <0.29 MG/DL (ref ?–0.3)
EOSINOPHIL # BLD AUTO: 0.03 X10(3) UL (ref 0–0.7)
EOSINOPHIL NFR BLD AUTO: 0.3 %
ERYTHROCYTE [DISTWIDTH] IN BLOOD BY AUTOMATED COUNT: 13.9 %
ERYTHROCYTE [SEDIMENTATION RATE] IN BLOOD: 20 MM/HR
GFR SERPLBLD BASED ON 1.73 SQ M-ARVRAT: 84 ML/MIN/1.73M2 (ref 60–?)
GLOBULIN PLAS-MCNC: 3.4 G/DL (ref 2.8–4.4)
GLUCOSE BLD-MCNC: 96 MG/DL (ref 70–99)
HCT VFR BLD AUTO: 43.8 %
HDLC SERPL-MCNC: 58 MG/DL (ref 40–59)
HGB BLD-MCNC: 14.2 G/DL
IMM GRANULOCYTES # BLD AUTO: 0.04 X10(3) UL (ref 0–1)
IMM GRANULOCYTES NFR BLD: 0.4 %
LDLC SERPL CALC-MCNC: 52 MG/DL (ref ?–100)
LYMPHOCYTES # BLD AUTO: 1.87 X10(3) UL (ref 1–4)
LYMPHOCYTES NFR BLD AUTO: 19.9 %
MCH RBC QN AUTO: 32.5 PG (ref 26–34)
MCHC RBC AUTO-ENTMCNC: 32.4 G/DL (ref 31–37)
MCV RBC AUTO: 100.2 FL
MONOCYTES # BLD AUTO: 0.74 X10(3) UL (ref 0.1–1)
MONOCYTES NFR BLD AUTO: 7.9 %
NEUTROPHILS # BLD AUTO: 6.69 X10 (3) UL (ref 1.5–7.7)
NEUTROPHILS # BLD AUTO: 6.69 X10(3) UL (ref 1.5–7.7)
NEUTROPHILS NFR BLD AUTO: 71.3 %
NONHDLC SERPL-MCNC: 69 MG/DL (ref ?–130)
OSMOLALITY SERPL CALC.SUM OF ELEC: 294 MOSM/KG (ref 275–295)
PLATELET # BLD AUTO: 148 10(3)UL (ref 150–450)
POTASSIUM SERPL-SCNC: 3.8 MMOL/L (ref 3.5–5.1)
PROT SERPL-MCNC: 6.9 G/DL (ref 6.4–8.2)
RBC # BLD AUTO: 4.37 X10(6)UL
SODIUM SERPL-SCNC: 142 MMOL/L (ref 136–145)
TRIGL SERPL-MCNC: 86 MG/DL (ref 30–149)
TSI SER-ACNC: 1.14 MIU/ML (ref 0.36–3.74)
VIT D+METAB SERPL-MCNC: 41.2 NG/ML (ref 30–100)
VLDLC SERPL CALC-MCNC: 12 MG/DL (ref 0–30)
WBC # BLD AUTO: 9.4 X10(3) UL (ref 4–11)

## 2022-08-16 PROCEDURE — 85652 RBC SED RATE AUTOMATED: CPT | Performed by: FAMILY MEDICINE

## 2022-08-16 PROCEDURE — 80061 LIPID PANEL: CPT | Performed by: FAMILY MEDICINE

## 2022-08-16 PROCEDURE — 85025 COMPLETE CBC W/AUTO DIFF WBC: CPT | Performed by: FAMILY MEDICINE

## 2022-08-16 PROCEDURE — 82306 VITAMIN D 25 HYDROXY: CPT | Performed by: FAMILY MEDICINE

## 2022-08-16 PROCEDURE — 84443 ASSAY THYROID STIM HORMONE: CPT | Performed by: FAMILY MEDICINE

## 2022-08-16 PROCEDURE — 80053 COMPREHEN METABOLIC PANEL: CPT | Performed by: FAMILY MEDICINE

## 2022-08-16 PROCEDURE — 86140 C-REACTIVE PROTEIN: CPT | Performed by: FAMILY MEDICINE

## 2022-09-01 DIAGNOSIS — E78.00 HYPERCHOLESTEREMIA: ICD-10-CM

## 2022-09-01 RX ORDER — ATORVASTATIN CALCIUM 40 MG/1
TABLET, FILM COATED ORAL
Qty: 90 TABLET | Refills: 0 | Status: SHIPPED | OUTPATIENT
Start: 2022-09-01

## 2022-09-18 DIAGNOSIS — I10 HYPERTENSION, BENIGN: ICD-10-CM

## 2022-09-19 RX ORDER — METOPROLOL TARTRATE 50 MG/1
TABLET, FILM COATED ORAL
Qty: 180 TABLET | Refills: 0 | Status: SHIPPED | OUTPATIENT
Start: 2022-09-19

## 2022-10-27 DIAGNOSIS — E78.00 HYPERCHOLESTEREMIA: ICD-10-CM

## 2022-10-27 DIAGNOSIS — E03.9 HYPOTHYROIDISM, UNSPECIFIED TYPE: ICD-10-CM

## 2022-10-27 DIAGNOSIS — I10 HYPERTENSION, BENIGN: ICD-10-CM

## 2022-10-27 RX ORDER — ATORVASTATIN CALCIUM 40 MG/1
TABLET, FILM COATED ORAL
Qty: 90 TABLET | Refills: 0 | Status: SHIPPED | OUTPATIENT
Start: 2022-10-27

## 2022-10-27 RX ORDER — METOPROLOL TARTRATE 50 MG/1
TABLET, FILM COATED ORAL
Qty: 180 TABLET | Refills: 0 | Status: SHIPPED | OUTPATIENT
Start: 2022-10-27

## 2022-10-27 RX ORDER — LEVOTHYROXINE SODIUM 0.1 MG/1
TABLET ORAL
Qty: 90 TABLET | Refills: 0 | Status: SHIPPED | OUTPATIENT
Start: 2022-10-27

## 2023-02-02 ENCOUNTER — LAB REQUISITION (OUTPATIENT)
Dept: LAB | Facility: HOSPITAL | Age: 88
End: 2023-02-02
Payer: MEDICARE

## 2023-02-02 DIAGNOSIS — M79.10 MYALGIA, UNSPECIFIED SITE: ICD-10-CM

## 2023-02-02 DIAGNOSIS — Z79.899 OTHER LONG TERM (CURRENT) DRUG THERAPY: ICD-10-CM

## 2023-02-02 DIAGNOSIS — E55.9 VITAMIN D DEFICIENCY, UNSPECIFIED: ICD-10-CM

## 2023-02-02 DIAGNOSIS — Z79.52 LONG TERM (CURRENT) USE OF SYSTEMIC STEROIDS: ICD-10-CM

## 2023-02-02 DIAGNOSIS — M35.3 POLYMYALGIA RHEUMATICA (HCC): ICD-10-CM

## 2023-02-02 LAB
CRP SERPL-MCNC: 1.72 MG/DL (ref ?–0.3)
ERYTHROCYTE [SEDIMENTATION RATE] IN BLOOD: 39 MM/HR
VIT D+METAB SERPL-MCNC: 42.6 NG/ML (ref 30–100)

## 2023-02-02 PROCEDURE — 82306 VITAMIN D 25 HYDROXY: CPT | Performed by: INTERNAL MEDICINE

## 2023-02-02 PROCEDURE — 86140 C-REACTIVE PROTEIN: CPT | Performed by: INTERNAL MEDICINE

## 2023-02-02 PROCEDURE — 85652 RBC SED RATE AUTOMATED: CPT | Performed by: INTERNAL MEDICINE

## 2023-02-08 ENCOUNTER — TELEPHONE (OUTPATIENT)
Dept: FAMILY MEDICINE CLINIC | Facility: CLINIC | Age: 88
End: 2023-02-08

## 2023-02-13 ENCOUNTER — OFFICE VISIT (OUTPATIENT)
Dept: FAMILY MEDICINE CLINIC | Facility: CLINIC | Age: 88
End: 2023-02-13
Payer: MEDICARE

## 2023-02-13 VITALS
SYSTOLIC BLOOD PRESSURE: 122 MMHG | WEIGHT: 161 LBS | BODY MASS INDEX: 25.27 KG/M2 | DIASTOLIC BLOOD PRESSURE: 70 MMHG | OXYGEN SATURATION: 97 % | TEMPERATURE: 97 F | RESPIRATION RATE: 16 BRPM | HEIGHT: 67 IN | HEART RATE: 70 BPM

## 2023-02-13 DIAGNOSIS — M35.3 PMR (POLYMYALGIA RHEUMATICA) (HCC): ICD-10-CM

## 2023-02-13 DIAGNOSIS — E78.00 HYPERCHOLESTEREMIA: ICD-10-CM

## 2023-02-13 DIAGNOSIS — Z91.81 AT RISK FOR FALLING: ICD-10-CM

## 2023-02-13 DIAGNOSIS — E03.9 HYPOTHYROIDISM, UNSPECIFIED TYPE: ICD-10-CM

## 2023-02-13 DIAGNOSIS — H61.92 SKIN LESION OF LEFT EXTERNAL EAR: ICD-10-CM

## 2023-02-13 DIAGNOSIS — H35.3221 EXUDATIVE AGE-RELATED MACULAR DEGENERATION, LEFT EYE, WITH ACTIVE CHOROIDAL NEOVASCULARIZATION (HCC): ICD-10-CM

## 2023-02-13 DIAGNOSIS — D69.6 THROMBOCYTOPENIA (HCC): ICD-10-CM

## 2023-02-13 DIAGNOSIS — I10 HYPERTENSION, BENIGN: ICD-10-CM

## 2023-02-13 DIAGNOSIS — Z00.00 ENCOUNTER FOR ANNUAL HEALTH EXAMINATION: Primary | ICD-10-CM

## 2023-02-13 RX ORDER — ATORVASTATIN CALCIUM 40 MG/1
40 TABLET, FILM COATED ORAL NIGHTLY
Qty: 90 TABLET | Refills: 1 | Status: SHIPPED | OUTPATIENT
Start: 2023-02-13

## 2023-02-13 RX ORDER — METOPROLOL TARTRATE 50 MG/1
50 TABLET, FILM COATED ORAL 2 TIMES DAILY
Qty: 180 TABLET | Refills: 1 | Status: SHIPPED | OUTPATIENT
Start: 2023-02-13

## 2023-02-13 RX ORDER — LEVOTHYROXINE SODIUM 0.1 MG/1
100 TABLET ORAL DAILY
Qty: 90 TABLET | Refills: 1 | Status: SHIPPED | OUTPATIENT
Start: 2023-02-13

## 2023-03-25 ENCOUNTER — HOSPITAL ENCOUNTER (EMERGENCY)
Facility: HOSPITAL | Age: 88
Discharge: HOME | End: 2023-03-25
Attending: EMERGENCY MEDICINE
Payer: MEDICARE

## 2023-03-25 VITALS
HEIGHT: 67 IN | BODY MASS INDEX: 25.11 KG/M2 | WEIGHT: 160 LBS | TEMPERATURE: 98 F | RESPIRATION RATE: 16 BRPM | DIASTOLIC BLOOD PRESSURE: 70 MMHG | OXYGEN SATURATION: 95 % | SYSTOLIC BLOOD PRESSURE: 167 MMHG | HEART RATE: 75 BPM

## 2023-03-25 DIAGNOSIS — R03.0 ELEVATED BLOOD PRESSURE READING: Primary | ICD-10-CM

## 2023-03-25 LAB
ALBUMIN SERPL-MCNC: 3.5 G/DL (ref 3.4–5)
ALBUMIN/GLOB SERPL: 0.9 {RATIO} (ref 1–2)
ALP LIVER SERPL-CCNC: 94 U/L
ALT SERPL-CCNC: 20 U/L
ANION GAP SERPL CALC-SCNC: 7 MMOL/L (ref 0–18)
AST SERPL-CCNC: 18 U/L (ref 15–37)
BASOPHILS # BLD AUTO: 0.02 X10(3) UL (ref 0–0.2)
BASOPHILS NFR BLD AUTO: 0.2 %
BILIRUB SERPL-MCNC: 0.7 MG/DL (ref 0.1–2)
BUN BLD-MCNC: 13 MG/DL (ref 7–18)
CALCIUM BLD-MCNC: 8.9 MG/DL (ref 8.5–10.1)
CHLORIDE SERPL-SCNC: 110 MMOL/L (ref 98–112)
CK SERPL-CCNC: 64 U/L
CO2 SERPL-SCNC: 27 MMOL/L (ref 21–32)
CREAT BLD-MCNC: 0.56 MG/DL
EOSINOPHIL # BLD AUTO: 0.02 X10(3) UL (ref 0–0.7)
EOSINOPHIL NFR BLD AUTO: 0.2 %
ERYTHROCYTE [DISTWIDTH] IN BLOOD BY AUTOMATED COUNT: 13.8 %
GFR SERPLBLD BASED ON 1.73 SQ M-ARVRAT: 87 ML/MIN/1.73M2 (ref 60–?)
GLOBULIN PLAS-MCNC: 3.8 G/DL (ref 2.8–4.4)
GLUCOSE BLD-MCNC: 94 MG/DL (ref 70–99)
HCT VFR BLD AUTO: 42.1 %
HGB BLD-MCNC: 14.1 G/DL
IMM GRANULOCYTES # BLD AUTO: 0.04 X10(3) UL (ref 0–1)
IMM GRANULOCYTES NFR BLD: 0.4 %
LYMPHOCYTES # BLD AUTO: 1.71 X10(3) UL (ref 1–4)
LYMPHOCYTES NFR BLD AUTO: 15.3 %
MCH RBC QN AUTO: 31.8 PG (ref 26–34)
MCHC RBC AUTO-ENTMCNC: 33.5 G/DL (ref 31–37)
MCV RBC AUTO: 94.8 FL
MONOCYTES # BLD AUTO: 0.72 X10(3) UL (ref 0.1–1)
MONOCYTES NFR BLD AUTO: 6.4 %
NEUTROPHILS # BLD AUTO: 8.66 X10 (3) UL (ref 1.5–7.7)
NEUTROPHILS # BLD AUTO: 8.66 X10(3) UL (ref 1.5–7.7)
NEUTROPHILS NFR BLD AUTO: 77.5 %
OSMOLALITY SERPL CALC.SUM OF ELEC: 298 MOSM/KG (ref 275–295)
PLATELET # BLD AUTO: 135 10(3)UL (ref 150–450)
POTASSIUM SERPL-SCNC: 3.8 MMOL/L (ref 3.5–5.1)
PROT SERPL-MCNC: 7.3 G/DL (ref 6.4–8.2)
RBC # BLD AUTO: 4.44 X10(6)UL
SODIUM SERPL-SCNC: 144 MMOL/L (ref 136–145)
TROPONIN I HIGH SENSITIVITY: 7 NG/L
WBC # BLD AUTO: 11.2 X10(3) UL (ref 4–11)

## 2023-03-25 PROCEDURE — 80053 COMPREHEN METABOLIC PANEL: CPT | Performed by: EMERGENCY MEDICINE

## 2023-03-25 PROCEDURE — 84484 ASSAY OF TROPONIN QUANT: CPT | Performed by: EMERGENCY MEDICINE

## 2023-03-25 PROCEDURE — 82550 ASSAY OF CK (CPK): CPT | Performed by: EMERGENCY MEDICINE

## 2023-03-25 PROCEDURE — 99284 EMERGENCY DEPT VISIT MOD MDM: CPT

## 2023-03-25 PROCEDURE — 36415 COLL VENOUS BLD VENIPUNCTURE: CPT

## 2023-03-25 PROCEDURE — 85025 COMPLETE CBC W/AUTO DIFF WBC: CPT | Performed by: EMERGENCY MEDICINE

## 2023-03-25 PROCEDURE — 99283 EMERGENCY DEPT VISIT LOW MDM: CPT

## 2023-03-25 NOTE — ED INITIAL ASSESSMENT (HPI)
Pt here via ems for htn systolic over 730G and weakness. This was a one time BP that was before PT. Lopressor was taken this morning. Pt is usually 140s blood pressure.

## 2023-03-25 NOTE — DISCHARGE INSTRUCTIONS
Continue to monitor your blood pressure if you develop symptoms with an elevated blood pressure you may require further evaluation at that time at this time would not change any of your medications be certain that you take your baseline blood pressure medications. Should you have an elevated blood pressure reading, sit and relax for 15 minutes and repeat your blood pressure. If it continues to be elevated you may require evaluation at that time.

## 2023-04-21 ENCOUNTER — TELEPHONE (OUTPATIENT)
Dept: FAMILY MEDICINE CLINIC | Facility: CLINIC | Age: 88
End: 2023-04-21

## 2023-04-21 NOTE — TELEPHONE ENCOUNTER
Received progress note and report from Dr Roxann Tate of [facility] office placed in Dr Morris Pierce office for review

## 2023-05-31 ENCOUNTER — TELEPHONE (OUTPATIENT)
Dept: FAMILY MEDICINE CLINIC | Facility: CLINIC | Age: 88
End: 2023-05-31

## 2023-06-03 ENCOUNTER — OFFICE VISIT (OUTPATIENT)
Dept: FAMILY MEDICINE CLINIC | Facility: CLINIC | Age: 88
End: 2023-06-03
Payer: MEDICARE

## 2023-06-03 VITALS
OXYGEN SATURATION: 97 % | DIASTOLIC BLOOD PRESSURE: 58 MMHG | RESPIRATION RATE: 16 BRPM | SYSTOLIC BLOOD PRESSURE: 128 MMHG | HEART RATE: 80 BPM | BODY MASS INDEX: 25 KG/M2 | HEIGHT: 67 IN
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
## 2023-06-03 DIAGNOSIS — H81.10 BENIGN PAROXYSMAL POSITIONAL VERTIGO, UNSPECIFIED LATERALITY: ICD-10-CM

## 2023-06-03 DIAGNOSIS — I10 HYPERTENSION, BENIGN: ICD-10-CM

## 2023-06-03 DIAGNOSIS — R42 DIZZINESS: ICD-10-CM

## 2023-06-03 DIAGNOSIS — G44.319 ACUTE POST-TRAUMATIC HEADACHE, NOT INTRACTABLE: ICD-10-CM

## 2023-06-03 PROCEDURE — 99214 OFFICE O/P EST MOD 30 MIN: CPT | Performed by: FAMILY MEDICINE

## 2023-06-03 RX ORDER — MECLIZINE HCL 12.5 MG/1
TABLET ORAL 3 TIMES DAILY PRN
Qty: 30 TABLET | Refills: 1 | Status: SHIPPED | OUTPATIENT
Start: 2023-06-03

## 2023-06-05 ENCOUNTER — TELEPHONE (OUTPATIENT)
Dept: FAMILY MEDICINE CLINIC | Facility: CLINIC | Age: 88
End: 2023-06-05

## 2023-06-05 ENCOUNTER — LAB ENCOUNTER (OUTPATIENT)
Dept: LAB | Facility: HOSPITAL | Age: 88
End: 2023-06-05
Attending: FAMILY MEDICINE
Payer: MEDICARE

## 2023-06-05 ENCOUNTER — HOSPITAL ENCOUNTER (OUTPATIENT)
Dept: CT IMAGING | Facility: HOSPITAL | Age: 88
Discharge: HOME | End: 2023-06-05
Attending: FAMILY MEDICINE
Payer: MEDICARE

## 2023-06-05 DIAGNOSIS — E78.00 HYPERCHOLESTEREMIA: ICD-10-CM
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
## 2023-06-05 DIAGNOSIS — Z00.00 ROUTINE HEALTH MAINTENANCE: ICD-10-CM
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
## 2023-06-05 DIAGNOSIS — E03.9 HYPOTHYROIDISM, UNSPECIFIED TYPE: ICD-10-CM

## 2023-06-05 DIAGNOSIS — R42 DIZZINESS: ICD-10-CM

## 2023-06-05 DIAGNOSIS — I10 HYPERTENSION, BENIGN: ICD-10-CM

## 2023-06-05 DIAGNOSIS — G44.319 ACUTE POST-TRAUMATIC HEADACHE, NOT INTRACTABLE: ICD-10-CM

## 2023-06-05 LAB
ALBUMIN SERPL-MCNC: 3.6 G/DL (ref 3.4–5)
ALBUMIN/GLOB SERPL: 1 {RATIO} (ref 1–2)
ALP LIVER SERPL-CCNC: 110 U/L
ALT SERPL-CCNC: 18 U/L
ANION GAP SERPL CALC-SCNC: 3 MMOL/L (ref 0–18)
AST SERPL-CCNC: 18 U/L (ref 15–37)
BASOPHILS # BLD AUTO: 0.02 X10(3) UL (ref 0–0.2)
BASOPHILS NFR BLD AUTO: 0.2 %
BILIRUB SERPL-MCNC: 0.8 MG/DL (ref 0.1–2)
BUN BLD-MCNC: 16 MG/DL (ref 7–18)
CALCIUM BLD-MCNC: 8.7 MG/DL (ref 8.5–10.1)
CHLORIDE SERPL-SCNC: 114 MMOL/L (ref 98–112)
CHOLEST SERPL-MCNC: 133 MG/DL (ref ?–200)
CO2 SERPL-SCNC: 25 MMOL/L (ref 21–32)
CREAT BLD-MCNC: 0.64 MG/DL
EOSINOPHIL # BLD AUTO: 0.01 X10(3) UL (ref 0–0.7)
EOSINOPHIL NFR BLD AUTO: 0.1 %
ERYTHROCYTE [DISTWIDTH] IN BLOOD BY AUTOMATED COUNT: 13.9 %
FASTING PATIENT LIPID ANSWER: NO
FASTING STATUS PATIENT QL REPORTED: NO
GFR SERPLBLD BASED ON 1.73 SQ M-ARVRAT: 84 ML/MIN/1.73M2 (ref 60–?)
GLOBULIN PLAS-MCNC: 3.6 G/DL (ref 2.8–4.4)
GLUCOSE BLD-MCNC: 90 MG/DL (ref 70–99)
HCT VFR BLD AUTO: 42.1 %
HDLC SERPL-MCNC: 62 MG/DL (ref 40–59)
HGB BLD-MCNC: 14 G/DL
IMM GRANULOCYTES # BLD AUTO: 0.04 X10(3) UL (ref 0–1)
IMM GRANULOCYTES NFR BLD: 0.3 %
LDLC SERPL CALC-MCNC: 52 MG/DL (ref ?–100)
LYMPHOCYTES # BLD AUTO: 1.18 X10(3) UL (ref 1–4)
LYMPHOCYTES NFR BLD AUTO: 9.4 %
MCH RBC QN AUTO: 31.7 PG (ref 26–34)
MCHC RBC AUTO-ENTMCNC: 33.3 G/DL (ref 31–37)
MCV RBC AUTO: 95.2 FL
MONOCYTES # BLD AUTO: 0.94 X10(3) UL (ref 0.1–1)
MONOCYTES NFR BLD AUTO: 7.5 %
NEUTROPHILS # BLD AUTO: 10.31 X10 (3) UL (ref 1.5–7.7)
NEUTROPHILS # BLD AUTO: 10.31 X10(3) UL (ref 1.5–7.7)
NEUTROPHILS NFR BLD AUTO: 82.5 %
NONHDLC SERPL-MCNC: 71 MG/DL (ref ?–130)
OSMOLALITY SERPL CALC.SUM OF ELEC: 295 MOSM/KG (ref 275–295)
PLATELET # BLD AUTO: 147 10(3)UL (ref 150–450)
POTASSIUM SERPL-SCNC: 4 MMOL/L (ref 3.5–5.1)
PROT SERPL-MCNC: 7.2 G/DL (ref 6.4–8.2)
RBC # BLD AUTO: 4.42 X10(6)UL
SODIUM SERPL-SCNC: 142 MMOL/L (ref 136–145)
TRIGL SERPL-MCNC: 105 MG/DL (ref 30–149)
TSI SER-ACNC: 1.28 MIU/ML (ref 0.36–3.74)
VLDLC SERPL CALC-MCNC: 15 MG/DL (ref 0–30)
WBC # BLD AUTO: 12.5 X10(3) UL (ref 4–11)

## 2023-06-05 PROCEDURE — 84443 ASSAY THYROID STIM HORMONE: CPT

## 2023-06-05 PROCEDURE — 70450 CT HEAD/BRAIN W/O DYE: CPT | Performed by: FAMILY MEDICINE

## 2023-06-05 PROCEDURE — 36415 COLL VENOUS BLD VENIPUNCTURE: CPT

## 2023-06-05 PROCEDURE — 80053 COMPREHEN METABOLIC PANEL: CPT

## 2023-06-05 PROCEDURE — 80061 LIPID PANEL: CPT

## 2023-06-05 PROCEDURE — 85025 COMPLETE CBC W/AUTO DIFF WBC: CPT

## 2023-06-05 NOTE — TELEPHONE ENCOUNTER
Can we please call patient and let her know it is negative and I will put more comments on mychart at a later time.

## 2023-06-05 NOTE — TELEPHONE ENCOUNTER
Impression   CONCLUSION:       1. Negative for acute intracranial process.      FYI to Dr. Wili Dudley

## 2023-06-09 DIAGNOSIS — D72.825 BANDEMIA: Primary | ICD-10-CM

## 2023-06-21 ENCOUNTER — TELEPHONE (OUTPATIENT)
Dept: FAMILY MEDICINE CLINIC | Facility: CLINIC | Age: 88
End: 2023-06-21

## 2023-06-21 NOTE — TELEPHONE ENCOUNTER
Pt is calling because cutting down on the metoprolol tartrate 25 MG Oral Tab has really been helping and she is able to walk better. She wants to wait to do the blood work because she feels her white blood cells will still be high.  Pt is starting with PT

## 2023-08-22 ENCOUNTER — LAB REQUISITION (OUTPATIENT)
Dept: LAB | Facility: HOSPITAL | Age: 88
End: 2023-08-22
Payer: MEDICARE

## 2023-08-22 DIAGNOSIS — R70.0 ELEVATED ERYTHROCYTE SEDIMENTATION RATE: ICD-10-CM

## 2023-08-22 DIAGNOSIS — Z79.899 OTHER LONG TERM (CURRENT) DRUG THERAPY: ICD-10-CM

## 2023-08-22 DIAGNOSIS — M35.3 POLYMYALGIA RHEUMATICA (HCC): ICD-10-CM

## 2023-08-22 LAB
CRP SERPL-MCNC: <0.29 MG/DL (ref ?–0.3)
ERYTHROCYTE [SEDIMENTATION RATE] IN BLOOD: 42 MM/HR

## 2023-08-22 PROCEDURE — 85652 RBC SED RATE AUTOMATED: CPT | Performed by: INTERNAL MEDICINE

## 2023-08-22 PROCEDURE — 86140 C-REACTIVE PROTEIN: CPT | Performed by: INTERNAL MEDICINE

## 2023-08-29 ENCOUNTER — APPOINTMENT (OUTPATIENT)
Dept: GENERAL RADIOLOGY | Facility: HOSPITAL | Age: 88
End: 2023-08-29
Attending: EMERGENCY MEDICINE
Payer: MEDICARE

## 2023-08-29 ENCOUNTER — APPOINTMENT (OUTPATIENT)
Dept: CT IMAGING | Facility: HOSPITAL | Age: 88
End: 2023-08-29
Attending: EMERGENCY MEDICINE
Payer: MEDICARE

## 2023-08-29 ENCOUNTER — HOSPITAL ENCOUNTER (OUTPATIENT)
Facility: HOSPITAL | Age: 88
Setting detail: OBSERVATION
LOS: 1 days | Discharge: NURSING, REHAB OR LONG-TERM CARE | End: 2023-08-31
Attending: EMERGENCY MEDICINE | Admitting: HOSPITALIST
Payer: MEDICARE

## 2023-08-29 DIAGNOSIS — R55 SYNCOPE AND COLLAPSE: Primary | ICD-10-CM

## 2023-08-29 LAB
ALBUMIN SERPL-MCNC: 3.3 G/DL (ref 3.4–5)
ALBUMIN/GLOB SERPL: 0.8 {RATIO} (ref 1–2)
ALP LIVER SERPL-CCNC: 107 U/L
ALT SERPL-CCNC: 25 U/L
ANION GAP SERPL CALC-SCNC: 4 MMOL/L (ref 0–18)
AST SERPL-CCNC: 28 U/L (ref 15–37)
BASOPHILS # BLD AUTO: 0.03 X10(3) UL (ref 0–0.2)
BASOPHILS NFR BLD AUTO: 0.2 %
BILIRUB SERPL-MCNC: 0.6 MG/DL (ref 0.1–2)
BUN BLD-MCNC: 14 MG/DL (ref 7–18)
CALCIUM BLD-MCNC: 8.8 MG/DL (ref 8.5–10.1)
CHLORIDE SERPL-SCNC: 108 MMOL/L (ref 98–112)
CK SERPL-CCNC: 80 U/L
CO2 SERPL-SCNC: 26 MMOL/L (ref 21–32)
CREAT BLD-MCNC: 0.64 MG/DL
EGFRCR SERPLBLD CKD-EPI 2021: 83 ML/MIN/1.73M2 (ref 60–?)
EOSINOPHIL # BLD AUTO: 0.04 X10(3) UL (ref 0–0.7)
EOSINOPHIL NFR BLD AUTO: 0.3 %
ERYTHROCYTE [DISTWIDTH] IN BLOOD BY AUTOMATED COUNT: 13.3 %
GLOBULIN PLAS-MCNC: 4 G/DL (ref 2.8–4.4)
GLUCOSE BLD-MCNC: 109 MG/DL (ref 70–99)
HCT VFR BLD AUTO: 41.5 %
HGB BLD-MCNC: 13.9 G/DL
IMM GRANULOCYTES # BLD AUTO: 0.14 X10(3) UL (ref 0–1)
IMM GRANULOCYTES NFR BLD: 1.1 %
LYMPHOCYTES # BLD AUTO: 2.37 X10(3) UL (ref 1–4)
LYMPHOCYTES NFR BLD AUTO: 18.5 %
MCH RBC QN AUTO: 31.8 PG (ref 26–34)
MCHC RBC AUTO-ENTMCNC: 33.5 G/DL (ref 31–37)
MCV RBC AUTO: 95 FL
MONOCYTES # BLD AUTO: 0.92 X10(3) UL (ref 0.1–1)
MONOCYTES NFR BLD AUTO: 7.2 %
NEUTROPHILS # BLD AUTO: 9.29 X10 (3) UL (ref 1.5–7.7)
NEUTROPHILS # BLD AUTO: 9.29 X10(3) UL (ref 1.5–7.7)
NEUTROPHILS NFR BLD AUTO: 72.7 %
OSMOLALITY SERPL CALC.SUM OF ELEC: 287 MOSM/KG (ref 275–295)
PLATELET # BLD AUTO: 142 10(3)UL (ref 150–450)
POTASSIUM SERPL-SCNC: 3.9 MMOL/L (ref 3.5–5.1)
PROT SERPL-MCNC: 7.3 G/DL (ref 6.4–8.2)
RBC # BLD AUTO: 4.37 X10(6)UL
SODIUM SERPL-SCNC: 138 MMOL/L (ref 136–145)
WBC # BLD AUTO: 12.8 X10(3) UL (ref 4–11)

## 2023-08-29 PROCEDURE — 71045 X-RAY EXAM CHEST 1 VIEW: CPT | Performed by: EMERGENCY MEDICINE

## 2023-08-29 PROCEDURE — 72125 CT NECK SPINE W/O DYE: CPT | Performed by: EMERGENCY MEDICINE

## 2023-08-29 PROCEDURE — 99223 1ST HOSP IP/OBS HIGH 75: CPT | Performed by: HOSPITALIST

## 2023-08-29 PROCEDURE — 70450 CT HEAD/BRAIN W/O DYE: CPT | Performed by: EMERGENCY MEDICINE

## 2023-08-29 RX ORDER — ACETAMINOPHEN 500 MG
1000 TABLET ORAL ONCE
Status: COMPLETED | OUTPATIENT
Start: 2023-08-29 | End: 2023-08-29

## 2023-08-30 ENCOUNTER — NURSE ONLY (OUTPATIENT)
Dept: ELECTROPHYSIOLOGY | Facility: HOSPITAL | Age: 88
End: 2023-08-30
Attending: HOSPITALIST
Payer: MEDICARE

## 2023-08-30 ENCOUNTER — APPOINTMENT (OUTPATIENT)
Dept: CV DIAGNOSTICS | Facility: HOSPITAL | Age: 88
End: 2023-08-30
Attending: HOSPITALIST
Payer: MEDICARE

## 2023-08-30 PROBLEM — S06.0X9A CONCUSSION WITH LOSS OF CONSCIOUSNESS: Status: ACTIVE | Noted: 2023-08-30

## 2023-08-30 LAB
ANION GAP SERPL CALC-SCNC: 5 MMOL/L (ref 0–18)
ATRIAL RATE: 84 BPM
BASOPHILS # BLD AUTO: 0.02 X10(3) UL (ref 0–0.2)
BASOPHILS NFR BLD AUTO: 0.1 %
BUN BLD-MCNC: 10 MG/DL (ref 7–18)
CALCIUM BLD-MCNC: 8.3 MG/DL (ref 8.5–10.1)
CHLORIDE SERPL-SCNC: 109 MMOL/L (ref 98–112)
CO2 SERPL-SCNC: 25 MMOL/L (ref 21–32)
CREAT BLD-MCNC: 0.54 MG/DL
EGFRCR SERPLBLD CKD-EPI 2021: 87 ML/MIN/1.73M2 (ref 60–?)
EOSINOPHIL # BLD AUTO: 0 X10(3) UL (ref 0–0.7)
EOSINOPHIL NFR BLD AUTO: 0 %
ERYTHROCYTE [DISTWIDTH] IN BLOOD BY AUTOMATED COUNT: 13.5 %
GLUCOSE BLD-MCNC: 103 MG/DL (ref 70–99)
HCT VFR BLD AUTO: 36.1 %
HGB BLD-MCNC: 12.1 G/DL
IMM GRANULOCYTES # BLD AUTO: 0.08 X10(3) UL (ref 0–1)
IMM GRANULOCYTES NFR BLD: 0.6 %
LYMPHOCYTES # BLD AUTO: 0.97 X10(3) UL (ref 1–4)
LYMPHOCYTES NFR BLD AUTO: 7 %
MCH RBC QN AUTO: 31.9 PG (ref 26–34)
MCHC RBC AUTO-ENTMCNC: 33.5 G/DL (ref 31–37)
MCV RBC AUTO: 95.3 FL
MONOCYTES # BLD AUTO: 0.69 X10(3) UL (ref 0.1–1)
MONOCYTES NFR BLD AUTO: 5 %
NEUTROPHILS # BLD AUTO: 12.16 X10 (3) UL (ref 1.5–7.7)
NEUTROPHILS # BLD AUTO: 12.16 X10(3) UL (ref 1.5–7.7)
NEUTROPHILS NFR BLD AUTO: 87.3 %
OSMOLALITY SERPL CALC.SUM OF ELEC: 287 MOSM/KG (ref 275–295)
P AXIS: 43 DEGREES
P-R INTERVAL: 154 MS
PLATELET # BLD AUTO: 116 10(3)UL (ref 150–450)
POTASSIUM SERPL-SCNC: 4.2 MMOL/L (ref 3.5–5.1)
Q-T INTERVAL: 384 MS
QRS DURATION: 78 MS
QTC CALCULATION (BEZET): 453 MS
R AXIS: -20 DEGREES
RBC # BLD AUTO: 3.79 X10(6)UL
SODIUM SERPL-SCNC: 139 MMOL/L (ref 136–145)
T AXIS: 55 DEGREES
VENTRICULAR RATE: 84 BPM
WBC # BLD AUTO: 13.9 X10(3) UL (ref 4–11)

## 2023-08-30 PROCEDURE — 99232 SBSQ HOSP IP/OBS MODERATE 35: CPT | Performed by: INTERNAL MEDICINE

## 2023-08-30 PROCEDURE — 93306 TTE W/DOPPLER COMPLETE: CPT | Performed by: HOSPITALIST

## 2023-08-30 RX ORDER — SODIUM CHLORIDE 9 MG/ML
INJECTION, SOLUTION INTRAVENOUS CONTINUOUS
Status: DISCONTINUED | OUTPATIENT
Start: 2023-08-30 | End: 2023-08-31

## 2023-08-30 RX ORDER — LEVOTHYROXINE SODIUM 0.1 MG/1
100 TABLET ORAL
Status: DISCONTINUED | OUTPATIENT
Start: 2023-08-30 | End: 2023-08-31

## 2023-08-30 RX ORDER — MELATONIN
3 NIGHTLY PRN
Status: DISCONTINUED | OUTPATIENT
Start: 2023-08-30 | End: 2023-08-31

## 2023-08-30 RX ORDER — QUETIAPINE FUMARATE 25 MG/1
25 TABLET, FILM COATED ORAL NIGHTLY
Status: DISCONTINUED | OUTPATIENT
Start: 2023-08-30 | End: 2023-08-31

## 2023-08-30 RX ORDER — METOCLOPRAMIDE HYDROCHLORIDE 5 MG/ML
10 INJECTION INTRAMUSCULAR; INTRAVENOUS EVERY 8 HOURS PRN
Status: DISCONTINUED | OUTPATIENT
Start: 2023-08-30 | End: 2023-08-30

## 2023-08-30 RX ORDER — QUETIAPINE FUMARATE 25 MG/1
25 TABLET, FILM COATED ORAL ONCE
Status: COMPLETED | OUTPATIENT
Start: 2023-08-30 | End: 2023-08-30

## 2023-08-30 RX ORDER — ACETAMINOPHEN 500 MG
500 TABLET ORAL EVERY 4 HOURS PRN
Status: DISCONTINUED | OUTPATIENT
Start: 2023-08-30 | End: 2023-08-31

## 2023-08-30 RX ORDER — ASPIRIN 81 MG/1
81 TABLET, CHEWABLE ORAL DAILY
Status: DISCONTINUED | OUTPATIENT
Start: 2023-08-30 | End: 2023-08-31

## 2023-08-30 RX ORDER — LORAZEPAM 2 MG/ML
0.5 INJECTION INTRAMUSCULAR ONCE
Status: COMPLETED | OUTPATIENT
Start: 2023-08-30 | End: 2023-08-30

## 2023-08-30 RX ORDER — ENOXAPARIN SODIUM 100 MG/ML
40 INJECTION SUBCUTANEOUS DAILY
Status: DISCONTINUED | OUTPATIENT
Start: 2023-08-30 | End: 2023-08-31

## 2023-08-30 RX ORDER — MORPHINE SULFATE 4 MG/ML
4 INJECTION, SOLUTION INTRAMUSCULAR; INTRAVENOUS ONCE
Status: DISCONTINUED | OUTPATIENT
Start: 2023-08-30 | End: 2023-08-31

## 2023-08-30 RX ORDER — ATORVASTATIN CALCIUM 40 MG/1
40 TABLET, FILM COATED ORAL NIGHTLY
Status: DISCONTINUED | OUTPATIENT
Start: 2023-08-30 | End: 2023-08-31

## 2023-08-30 RX ORDER — HALOPERIDOL 5 MG/ML
0.5 INJECTION INTRAMUSCULAR ONCE
Status: DISCONTINUED | OUTPATIENT
Start: 2023-08-30 | End: 2023-08-30

## 2023-08-30 RX ORDER — ACETAMINOPHEN 500 MG
1000 TABLET ORAL ONCE
Status: COMPLETED | OUTPATIENT
Start: 2023-08-30 | End: 2023-08-30

## 2023-08-30 RX ORDER — ONDANSETRON 2 MG/ML
4 INJECTION INTRAMUSCULAR; INTRAVENOUS EVERY 6 HOURS PRN
Status: DISCONTINUED | OUTPATIENT
Start: 2023-08-30 | End: 2023-08-31

## 2023-08-30 RX ORDER — PREDNISONE 1 MG/1
1 TABLET ORAL
Status: DISCONTINUED | OUTPATIENT
Start: 2023-08-30 | End: 2023-08-31

## 2023-08-30 NOTE — PLAN OF CARE
Patient is Alert and oriented x4. Tele rhythm NSR O2 saturation 94% on room air Breath sounds clear/diminished. Bed is locked and in low position. Call light and personal items within reach. No C/O chest pain or shortness of breath. Pt voiding with no issue. Skin dry/Intact. Reviewed plan of care and patient verbalizes understanding. POC: EEG,Echo, IVF @100    Pt refused all medications and medical procedures this morning. Pt states she's not doing anything until Nicol Bound (Her daughter) comes. Pt is scheduled for an EEG and echo. Problem: SAFETY ADULT - FALL  Goal: Free from fall injury  Description: INTERVENTIONS:  - Assess pt frequently for physical needs  - Identify cognitive and physical deficits and behaviors that affect risk of falls.   - [place] fall precautions as indicated by assessment.  - Educate pt/family on patient safety including physical limitations  - Instruct pt to call for assistance with activity based on assessment  - Modify environment to reduce risk of injury  - Provide assistive devices as appropriate  - Consider OT/PT consult to assist with strengthening/mobility  - Encourage toileting schedule  Outcome: Progressing

## 2023-08-30 NOTE — ED QUICK NOTES
Assumed care for this pt, hx of fall with mild amnesia. Claimed to have severe \"dizziness\". Then medics showed up. + hematoma to posterior head. A good ROM of all extrenites. No neurologic issues noted other than mild amnesia.

## 2023-08-30 NOTE — CM/SW NOTE
08/30/23 1100   CM/SW Referral Data   Referral Source Social Work (self-referral)   Reason for Referral Discharge planning   Informant EMR;Daughter   Medical Hx   Does patient have an established PCP? Yes   Patient [facility] [facility]   Patient lives with Alone   Patient Status Prior to Admission   Independent with ADLs and Mobility Yes   Discharge Needs   Anticipated D/C needs Subacute rehab;Transportation services     HOME SITUATION  Type of Home: Independent living facility [facility])   Home Layout: One level     Lives With: Alone (Staff on grounds 24hrs)  Drives: No  Patient Owned Equipment: Rolling walker  Patient Regularly Uses: Glasses     Prior Level of [place]: Pt reports that she ambulates with rolling walker. Has gotten assistance from \"ALS\" before who helped her down to the dining zarate after she has had a fall before. Per chart review gets assist with transfers in/out of the shower and has adjustable bed  (Per PT evaluation)      Patient is a 81 y/o female who admitted with Syncope and collapse. PT/OT recommending JACOB. Attempted to meet with patient however she was not feeling well. Met with patient's daughter Kacey Rosado) to discuss discharge planning. She has lived at [facility] for about 4 years. She is normally able to shower, get dressed, and get to meals with her RW. She has hired assistance in the past after falls. Discussed PT/OT rec of JACOB, they are agreeable. She went to the [facility] about 4 years ago but is open to looking at other options. Reviewed Medicare coverage for JACOB including need for medically necessary 3 midnight inpatient hospital stay. Patient was made inpatient status on 8/30 at 1:50am (TODAY) and would need to have a medical need to remain in the hospital until 9/2 in order to have Medicare coverage for JACOB. If patient does not meet this criteria, pt could elect to pay privately for NH. Sent referral in aidin. Will need to complete PASRR. SW/CM to remain available for support and/or discharge planning.     ELICIA Melton  Discharge Planner  [phone]

## 2023-08-30 NOTE — ED QUICK NOTES
Pt complained of tendrness to hematoma site, but refused narcotic pain meds. Placed on comfortable position, remained alert per norm.

## 2023-08-30 NOTE — PHYSICAL THERAPY NOTE
PHYSICAL THERAPY EVALUATION - INPATIENT     Room Number: 0424/0364-Y  Evaluation Date: 8/30/2023  Type of Evaluation: Initial  Physician Order: PT Eval and Treat    Presenting Problem: Fall  Co-Morbidities : HTN, hypothyroidism, hyperlipidemia  Reason for Therapy: Mobility Dysfunction and Discharge Planning    History related to current admission: Patient is a 80year old female admitted on 8/29/2023 from home for fall. Negative CT imaging for acute processes of neck and head. ASSESSMENT   In this PT evaluation, the patient presents with the following impairments decreased strength, functional mobility, balance, endurance, awareness of deficits. These impairments and comorbidities manifest themselves as functional limitations in independent bed mobility, transfers, and gait. The patient is below baseline and would benefit from skilled inpatient PT to address the above deficits to assist patient in returning to prior to level of function. Functional outcome measures completed include [facility]. The AM-PAC '6-Clicks' Inpatient Basic Mobility Short Form was completed and this patient is demonstrating a Approx Degree of Impairment: 57.7%  degree of impairment in mobility. Research supports that patients with this level of impairment may benefit from JACOB. DISCHARGE RECOMMENDATIONS  PT Discharge Recommendations: Sub-acute rehabilitation    PLAN  PT Treatment Plan: Body mechanics; Bed mobility; Endurance; Energy conservation;Patient education; Family education;Gait training;Balance training;Transfer training;Strengthening  Rehab Potential : Fair  Frequency (Obs):  (2-3x/week)  Number of Visits to Meet Established Goals: 6      CURRENT GOALS    Goal #1 Patient is able to demonstrate supine - sit EOB @ level: modified independent     Goal #2 Patient is able to demonstrate transfers EOB to/from Chair/Wheelchair at assistance level: modified independent     Goal #3 Patient is able to ambulate 150 feet with assist device: walker - rolling at assistance level: supervision     Goal #4    Goal #5    Goal #6    Goal Comments: Goals established on 8/30/2023    HOME SITUATION  Type of Home: Independent living facility [facility])   Home Layout: One level                Lives With: Alone (Staff on grounds 24hrs)  Drives: No  Patient Owned Equipment: Rolling walker  Patient Regularly Uses: Glasses    Prior Level of [place]: Pt reports that she ambulates with rolling walker. Has gotten assistance from \"ALS\" before who helped her down to the dining zarate after she has had a fall before.  Per chart review gets assist with transfers in/out of the shower and has adjustable bed    SUBJECTIVE  \"I need so much help right now I have not gotten out of bed and I won't be able to get to the chair\"  \"I'll be fine when I'm back home, I get up all the time by myself and have no issues\"      OBJECTIVE  Precautions: Bed/chair alarm  Fall Risk: High fall risk    WEIGHT BEARING RESTRICTION  Weight Bearing Restriction: None                PAIN ASSESSMENT  Rating: Unable to rate  Location: headache  Management Techniques: Relaxation    COGNITION  Overall Cognitive Status:  Impaired  Safety Judgement:  decreased awareness of need for assistance and decreased awareness of need for safety  Awareness of Errors:  decreased awareness of errors   Awareness of Deficits:  decreased awareness of deficits  Problem Solving:  assistance required to identify errors made, assistance required to generate solutions, and assistance required to implement solutions    RANGE OF MOTION AND STRENGTH ASSESSMENT  See OT note for UE assessment    Lower extremity ROM is within functional limits     Lower extremity strength is grossly 3+/5      BALANCE  Static Sitting: Fair -  Dynamic Sitting: Poor +  Static Standing: Poor +  Dynamic Standing: Poor +    ADDITIONAL TESTS                                    ACTIVITY TOLERANCE  Pulse: 87  Heart Rate Source: Monitor O2 WALK       NEUROLOGICAL FINDINGS                        AM-PAC '6-Clicks' INPATIENT SHORT FORM - BASIC MOBILITY  How much difficulty does the patient currently have. .. Patient Difficulty: Turning over in bed (including adjusting bedclothes, sheets and blankets)?: A Lot   Patient Difficulty: Sitting down on and standing up from a chair with arms (e.g., wheelchair, bedside commode, etc.): A Little   Patient Difficulty: Moving from lying on back to sitting on the side of the bed?: A Lot   How much help from another person does the patient currently need. .. Help from Another: Moving to and from a bed to a chair (including a wheelchair)?: A Little   Help from Another: Need to walk in hospital room?: A Little   Help from Another: Climbing 3-5 steps with a railing?: A Lot       AM-PAC Score:  Raw Score: 15   Approx Degree of Impairment: 57.7%   Standardized Score (AM-PAC Scale): 39.45   CMS Modifier (G-Code): CK    FUNCTIONAL ABILITY STATUS  Gait Assessment   Functional Mobility/Gait Assessment  Gait Assistance: Contact guard assist  Distance (ft): 2  Assistive Device: Rolling walker  Pattern:  (short step/stride length)    Skilled Therapy Provided     Gait Training:   Pt cued on upright standing posture to improve alignment with UEs  Pt cued on gait sequencing with RW  Pt cued on proper UE placement on RW handles    Therapeutic Activity:   Pt cued on placement of UE and LEs for optimal force generation and safe STS transfer. Pt cued on usage of arm rests for controlled descent into sitting  Cued on proper placement in front of chair and positioning of RW for optimal safety and safe descent into sitting - pt with uncontrolled descent         Bed Mobility:  Rolling: NT  Supine to sit: modA   Sit to supine: NT     Transfer Mobility:  Sit to stand: Aaron   Stand to sit: Aaron  Gait = CGA-Aaron    Therapist's Comments: RN cleared for session. Pt agreeable for therapy, received supine. Dtr present for session. Educated pt and dtr regarding discharge recommendation. Instructed to call for nursing staff for any needs and OOB mobility. Exercise/Education Provided:  Bed mobility  Body mechanics  Energy conservation  Functional activity tolerated  Gait training  Posture  Strengthening  Transfer training    Patient End of Session: Up in chair;Call light within reach;RN aware of session/findings; With [facility] staff;Needs met; All patient questions and concerns addressed; Alarm set; Discussed recommendations with /; Family present      Patient Evaluation Complexity Level:  History Moderate - 1 or 2 personal factors and/or co-morbidities   Examination of body systems Low - addressing 1-2 elements   Clinical Presentation Low - Stable   Clinical Decision Making Low - Stable       PT Session Time: 25 minutes  Gait Trainin minutes  Therapeutic Activity: 10 minutes

## 2023-08-30 NOTE — ED QUICK NOTES
Orders for admission, patient is aware of plan and ready to go upstairs. Any questions, please call ED RN Megan Valencias at extension [phone].      Patient Covid vaccination status: Fully vaccinated     COVID Test Ordered in ED: None    COVID Suspicion at Admission: N/A    Running Infusions:  None    Mental Status/LOC at time of transport: ALERT X 3    Other pertinent information: LIVES [place] + HEMATOMA TO POSTERIOR HEAD  CIWA score: N/A   NIH score:  N/A

## 2023-08-31 VITALS
WEIGHT: 164 LBS | TEMPERATURE: 98 F | OXYGEN SATURATION: 95 % | DIASTOLIC BLOOD PRESSURE: 79 MMHG | SYSTOLIC BLOOD PRESSURE: 119 MMHG | HEIGHT: 66 IN | BODY MASS INDEX: 26.36 KG/M2 | HEART RATE: 100 BPM | RESPIRATION RATE: 20 BRPM

## 2023-08-31 LAB
BASOPHILS # BLD AUTO: 0.01 X10(3) UL (ref 0–0.2)
BASOPHILS NFR BLD AUTO: 0.1 %
EOSINOPHIL # BLD AUTO: 0.03 X10(3) UL (ref 0–0.7)
EOSINOPHIL NFR BLD AUTO: 0.2 %
ERYTHROCYTE [DISTWIDTH] IN BLOOD BY AUTOMATED COUNT: 13.5 %
HCT VFR BLD AUTO: 37.1 %
HGB BLD-MCNC: 12.4 G/DL
IMM GRANULOCYTES # BLD AUTO: 0.06 X10(3) UL (ref 0–1)
IMM GRANULOCYTES NFR BLD: 0.5 %
LYMPHOCYTES # BLD AUTO: 1.33 X10(3) UL (ref 1–4)
LYMPHOCYTES NFR BLD AUTO: 10.7 %
MCH RBC QN AUTO: 31.6 PG (ref 26–34)
MCHC RBC AUTO-ENTMCNC: 33.4 G/DL (ref 31–37)
MCV RBC AUTO: 94.6 FL
MONOCYTES # BLD AUTO: 0.77 X10(3) UL (ref 0.1–1)
MONOCYTES NFR BLD AUTO: 6.2 %
NEUTROPHILS # BLD AUTO: 10.26 X10 (3) UL (ref 1.5–7.7)
NEUTROPHILS # BLD AUTO: 10.26 X10(3) UL (ref 1.5–7.7)
NEUTROPHILS NFR BLD AUTO: 82.3 %
PLATELET # BLD AUTO: 119 10(3)UL (ref 150–450)
RBC # BLD AUTO: 3.92 X10(6)UL
WBC # BLD AUTO: 12.5 X10(3) UL (ref 4–11)

## 2023-08-31 PROCEDURE — 99239 HOSP IP/OBS DSCHRG MGMT >30: CPT | Performed by: INTERNAL MEDICINE

## 2023-08-31 NOTE — PLAN OF CARE
Problem: SAFETY ADULT - FALL  Goal: Free from fall injury  Description: INTERVENTIONS:  - Assess pt frequently for physical needs  - Identify cognitive and physical deficits and behaviors that affect risk of falls. - [place] fall precautions as indicated by assessment.  - Educate pt/family on patient safety including physical limitations  - Instruct pt to call for assistance with activity based on assessment  - Modify environment to reduce risk of injury  - Provide assistive devices as appropriate  - Consider OT/PT consult to assist with strengthening/mobility  - Encourage toileting schedule  Outcome: Progressing     Problem: CARDIOVASCULAR - ADULT  Goal: Maintains optimal cardiac output and hemodynamic stability  Description: INTERVENTIONS:  - Monitor vital signs, rhythm, and trends  - Monitor for bleeding, hypotension and signs of decreased cardiac output  - Evaluate effectiveness of vasoactive medications to optimize hemodynamic stability  - Monitor arterial and/or venous puncture sites for bleeding and/or hematoma  - Assess quality of pulses, skin color and temperature  - Assess for signs of decreased coronary artery perfusion - ex.  Angina  - Evaluate fluid balance, assess for edema, trend weights  Outcome: Progressing  Goal: Absence of cardiac arrhythmias or at baseline  Description: INTERVENTIONS:  - Continuous cardiac monitoring, monitor vital signs, obtain 12 lead EKG if indicated  - Evaluate effectiveness of antiarrhythmic and heart rate control medications as ordered  - Initiate emergency measures for life threatening arrhythmias  - Monitor electrolytes and administer replacement therapy as ordered  Outcome: Progressing     Problem: RESPIRATORY - ADULT  Goal: Achieves optimal ventilation and oxygenation  Description: INTERVENTIONS:  - Assess for changes in respiratory status  - Assess for changes in mentation and behavior  - Position to facilitate oxygenation and minimize respiratory effort  - Oxygen supplementation based on oxygen saturation or ABGs  - Provide Smoking Cessation handout, if applicable  - Encourage broncho-pulmonary hygiene including cough, deep breathe, Incentive Spirometry  - Assess the need for suctioning and perform as needed  - Assess and instruct to report SOB or any respiratory difficulty  - Respiratory Therapy support as indicated  - Manage/alleviate anxiety  - Monitor for signs/symptoms of CO2 retention  Outcome: Progressing

## 2023-08-31 NOTE — PROGRESS NOTES
NURSING DISCHARGE NOTE    Discharged Nursing home via Ambulance. Accompanied by Support staff  Belongings Taken by patient/family. Discharge paperwork send with.

## 2023-08-31 NOTE — CM/SW NOTE
Mita Chauhan RN, 08/31/23, 2:05 PM  Patient failed inpatient criteria. Second level of review completed and supports observation. UR committee in agreement. Discussed with Dr. Huey Means who approves observation status. Observation letter given to the patient and order written.    Mark Cordova RN, [address] [facility]  Extension [phone]

## 2023-08-31 NOTE — PROCEDURES
GABRIELE - ELECTROENCEPHALOGRAM (EEG) REPORT  Patient Name:  Billy Hancock   MRN / CSN:  [id number] / [id number]   Date of Birth / Age:  6/18/1932 /  80year old   Encounter Date:  8/30/2023         METHODS:  Twenty-two electrodes were applied according to the 10-20-electrode placement system on this routine audio-video EEG. EKG monitoring, monopolar and bipolar montages are routinely utilized. The record was obtained on a digital system. OBJECT:  This is a 80year old year-old female with altered mental status     The EEG was requested to assess for epileptiform activity and change in mental status. State(s) of consciousness: awake and asleep    Relevant medications:    aspirin  81 mg Oral Daily    atorvastatin  40 mg Oral Nightly    levothyroxine  100 mcg Oral Before breakfast    metoprolol tartrate  25 mg Oral 2x Daily(Beta Blocker)    predniSONE  1 mg Oral Daily with breakfast    enoxaparin  40 mg Subcutaneous Daily    morphINE  4 mg Intravenous Once    QUEtiapine  25 mg Oral Nightly       FINDINGS:  1) Background: A bilateral and reactive  posterior-dominant background rhythm of 6-7 Hz with an amplitude of 20-40 microvolts is seen. Slight background asymmetry with higher amplitude activity seen from the right side, compared to the left. 2) Sleep: background slowing. 3) Abnormalities: no clear ictal or interictal discharges appreciated. 4) Activation:                    HV: Not performed. IPS: Not performed. IMPRESSION:     This was a mildly abnormal routine EEG. Findings suggest a diffuse and nonspecific encephalopathy characterized by mild background slowing. No clear seizures captured. Of note, a routine EEG cannot exclude the possibility for epilepsy.        SIGNATURES:  Merissa Pereira MD   [facility]

## 2023-08-31 NOTE — CM/SW NOTE
Spoke with daughter Jose A Florez) and informed her we will leave accepting JACOB list in patient's room for her to review. SW/CM to remain available for support and/or discharge planning. Addendum 11:55am:      08/31/23 1152   Discharge disposition   Expected discharge disposition subacute   Post Acute Care Provider [facility]   Discharge transportation [address]     Informed by RN that patient is medically cleared for discharge. Spoke with patient's daughter Jose A Florez) again, she is aware patient is cleared for discharge and will not meet Medicare coverage for JACOB. She is agreeable with respite/private pay SNF stay. She was agreeable with [facility] which is $400/day and they will bill insurance for therapy. Discussed transport by [employer] and unfortunately not covered by insurance, she was agreeable with private pay cost of medicar. Spoke with Allyssa Victor from [facility] who confirmed respite/private pay bed available today. Spoke with [address] and scheduled  for 2:45pm today. PCS form completed. Updated \[facility] [place] [facility]\"" who was agreeable with discharge time. Updated RN. SW will remain available. Addendum: Family requested change Medicar to 430pm. Updated [facility] and RN.        [facility]  [phone]    [facility]  [phone] or [facility]  Discharge Planner  [phone]

## 2023-09-02 ENCOUNTER — EXTERNAL FACILITY (OUTPATIENT)
Dept: FAMILY MEDICINE CLINIC | Facility: CLINIC | Age: 88
End: 2023-09-02

## 2023-09-02 DIAGNOSIS — E03.9 HYPOTHYROIDISM, UNSPECIFIED TYPE: ICD-10-CM

## 2023-09-02 DIAGNOSIS — R53.1 WEAKNESS: Primary | ICD-10-CM

## 2023-09-02 DIAGNOSIS — I10 HYPERTENSION, BENIGN: ICD-10-CM

## 2023-09-02 DIAGNOSIS — D69.6 THROMBOCYTOPENIA (HCC): ICD-10-CM

## 2023-09-02 DIAGNOSIS — S06.0X9A CONCUSSION WITH LOSS OF CONSCIOUSNESS, INITIAL ENCOUNTER: ICD-10-CM

## 2023-09-02 DIAGNOSIS — M35.3 PMR (POLYMYALGIA RHEUMATICA) (HCC): ICD-10-CM

## 2023-09-05 ENCOUNTER — INITIAL APN SNF VISIT (OUTPATIENT)
Dept: INTERNAL MEDICINE CLINIC | Age: 88
End: 2023-09-05

## 2023-09-05 VITALS
SYSTOLIC BLOOD PRESSURE: 137 MMHG | RESPIRATION RATE: 18 BRPM | HEART RATE: 96 BPM | DIASTOLIC BLOOD PRESSURE: 78 MMHG | BODY MASS INDEX: 27 KG/M2 | TEMPERATURE: 98 F | WEIGHT: 164.38 LBS | OXYGEN SATURATION: 95 %

## 2023-09-05 DIAGNOSIS — D69.6 THROMBOCYTOPENIA (HCC): ICD-10-CM

## 2023-09-05 DIAGNOSIS — I10 HYPERTENSION, UNSPECIFIED TYPE: ICD-10-CM

## 2023-09-05 DIAGNOSIS — E03.9 HYPOTHYROIDISM, UNSPECIFIED TYPE: ICD-10-CM

## 2023-09-05 DIAGNOSIS — M35.3 POLYMYALGIA RHEUMATICA (HCC): ICD-10-CM

## 2023-09-05 DIAGNOSIS — R55 SYNCOPE, UNSPECIFIED SYNCOPE TYPE: Primary | ICD-10-CM

## 2023-09-05 DIAGNOSIS — S06.0X1A CONCUSSION WITH BRIEF LOC: ICD-10-CM

## 2023-09-05 DIAGNOSIS — R41.0 ACUTE DELIRIUM: ICD-10-CM

## 2023-09-05 DIAGNOSIS — D72.829 LEUKOCYTOSIS, UNSPECIFIED TYPE: ICD-10-CM

## 2023-09-05 DIAGNOSIS — E78.5 HYPERLIPIDEMIA, UNSPECIFIED HYPERLIPIDEMIA TYPE: ICD-10-CM

## 2023-09-05 DIAGNOSIS — R42 DIZZINESS: ICD-10-CM

## 2023-09-05 DIAGNOSIS — E55.9 VITAMIN D DEFICIENCY: ICD-10-CM

## 2023-09-05 PROCEDURE — 1126F AMNT PAIN NOTED NONE PRSNT: CPT | Performed by: NURSE PRACTITIONER

## 2023-09-05 PROCEDURE — 99310 SBSQ NF CARE HIGH MDM 45: CPT | Performed by: NURSE PRACTITIONER

## 2023-09-05 PROCEDURE — 1111F DSCHRG MED/CURRENT MED MERGE: CPT | Performed by: NURSE PRACTITIONER

## 2023-09-05 RX ORDER — DOCUSATE SODIUM 100 MG/1
100 CAPSULE, LIQUID FILLED ORAL 2 TIMES DAILY PRN
COMMUNITY

## 2023-09-05 RX ORDER — ERGOCALCIFEROL 1.25 MG/1
50000 CAPSULE ORAL WEEKLY
COMMUNITY

## 2023-09-05 RX ORDER — POLYETHYLENE GLYCOL 3350 17 G/17G
17 POWDER, FOR SOLUTION ORAL DAILY PRN
COMMUNITY

## 2023-09-08 ENCOUNTER — SNF VISIT (OUTPATIENT)
Dept: INTERNAL MEDICINE CLINIC | Age: 88
End: 2023-09-08

## 2023-09-08 VITALS
SYSTOLIC BLOOD PRESSURE: 129 MMHG | DIASTOLIC BLOOD PRESSURE: 68 MMHG | BODY MASS INDEX: 26 KG/M2 | TEMPERATURE: 97 F | WEIGHT: 162.81 LBS | HEART RATE: 96 BPM | OXYGEN SATURATION: 99 % | RESPIRATION RATE: 19 BRPM

## 2023-09-08 DIAGNOSIS — R55 SYNCOPE, UNSPECIFIED SYNCOPE TYPE: Primary | ICD-10-CM

## 2023-09-08 DIAGNOSIS — Z91.89 AT RISK FOR IMPAIRED MOBILITY: ICD-10-CM

## 2023-09-08 DIAGNOSIS — R53.1 WEAKNESS: ICD-10-CM

## 2023-09-08 DIAGNOSIS — S06.0X1A CONCUSSION WITH BRIEF LOC: ICD-10-CM

## 2023-09-08 PROCEDURE — 99307 SBSQ NF CARE SF MDM 10: CPT | Performed by: NURSE PRACTITIONER

## 2023-09-08 PROCEDURE — 1125F AMNT PAIN NOTED PAIN PRSNT: CPT | Performed by: NURSE PRACTITIONER

## 2023-09-08 PROCEDURE — 1111F DSCHRG MED/CURRENT MED MERGE: CPT | Performed by: NURSE PRACTITIONER

## 2023-09-13 ENCOUNTER — SNF VISIT (OUTPATIENT)
Dept: INTERNAL MEDICINE CLINIC | Age: 88
End: 2023-09-13

## 2023-09-13 DIAGNOSIS — S06.0X1A CONCUSSION WITH BRIEF LOC: ICD-10-CM

## 2023-09-13 DIAGNOSIS — R91.8 PULMONARY INFILTRATES ON CXR: Primary | ICD-10-CM

## 2023-09-13 DIAGNOSIS — R53.1 WEAKNESS: ICD-10-CM

## 2023-09-13 DIAGNOSIS — R55 SYNCOPE, UNSPECIFIED SYNCOPE TYPE: ICD-10-CM

## 2023-09-13 DIAGNOSIS — D72.829 LEUKOCYTOSIS, UNSPECIFIED TYPE: ICD-10-CM

## 2023-09-13 PROCEDURE — 1125F AMNT PAIN NOTED PAIN PRSNT: CPT | Performed by: NURSE PRACTITIONER

## 2023-09-13 PROCEDURE — 99309 SBSQ NF CARE MODERATE MDM 30: CPT | Performed by: NURSE PRACTITIONER

## 2023-09-13 PROCEDURE — 1111F DSCHRG MED/CURRENT MED MERGE: CPT | Performed by: NURSE PRACTITIONER

## 2023-09-14 VITALS
TEMPERATURE: 97 F | SYSTOLIC BLOOD PRESSURE: 120 MMHG | OXYGEN SATURATION: 97 % | WEIGHT: 164.81 LBS | DIASTOLIC BLOOD PRESSURE: 80 MMHG | RESPIRATION RATE: 18 BRPM | HEART RATE: 87 BPM | BODY MASS INDEX: 27 KG/M2

## 2023-09-14 RX ORDER — CEFDINIR 300 MG/1
300 CAPSULE ORAL 2 TIMES DAILY
COMMUNITY
Start: 2023-09-13 | End: 2023-09-20

## 2023-09-14 RX ORDER — GARLIC EXTRACT 500 MG
1 CAPSULE ORAL DAILY
COMMUNITY
Start: 2023-09-14 | End: 2023-09-28

## 2023-10-03 ENCOUNTER — TELEPHONE (OUTPATIENT)
Dept: FAMILY MEDICINE CLINIC | Facility: CLINIC | Age: 88
End: 2023-10-03

## 2023-10-03 NOTE — TELEPHONE ENCOUNTER
Patient's daughter Maximo (on HIPAA) called. Maximo is asking to speak with Dr. Hernandez Sheikh, states patient is not doing well since being back at [facility]. Patient is terrified of falling again, she's complaining of not feeling well, reluctant to do anything, crying all the time, states she's ready to die.  Offered to schedule an appointment but Maximo would first like to speak with Dr. Hernandez Sheikh

## 2023-10-07 NOTE — TELEPHONE ENCOUNTER
Can we help the patient get scheduled for a virtual visit. Daughter should be able to help schedule .   Valerie Haq, DO

## 2023-10-19 ENCOUNTER — OFFICE VISIT (OUTPATIENT)
Dept: FAMILY MEDICINE CLINIC | Facility: CLINIC | Age: 88
End: 2023-10-19
Payer: MEDICARE

## 2023-10-19 VITALS
HEART RATE: 80 BPM | BODY MASS INDEX: 26.52 KG/M2 | WEIGHT: 165 LBS | OXYGEN SATURATION: 97 % | DIASTOLIC BLOOD PRESSURE: 66 MMHG | RESPIRATION RATE: 16 BRPM | HEIGHT: 66 IN | SYSTOLIC BLOOD PRESSURE: 132 MMHG | TEMPERATURE: 97 F

## 2023-10-19 DIAGNOSIS — E03.9 HYPOTHYROIDISM, UNSPECIFIED TYPE: ICD-10-CM

## 2023-10-19 DIAGNOSIS — I10 HYPERTENSION, BENIGN: ICD-10-CM

## 2023-10-19 DIAGNOSIS — D69.6 THROMBOCYTOPENIA (HCC): ICD-10-CM

## 2023-10-19 DIAGNOSIS — R55 SYNCOPE, UNSPECIFIED SYNCOPE TYPE: Primary | ICD-10-CM

## 2023-10-19 PROCEDURE — 99214 OFFICE O/P EST MOD 30 MIN: CPT | Performed by: FAMILY MEDICINE

## 2023-10-19 RX ORDER — NYSTATIN 100000 U/G
CREAM TOPICAL
COMMUNITY
Start: 2023-09-21

## 2023-10-27 ENCOUNTER — TELEPHONE (OUTPATIENT)
Dept: FAMILY MEDICINE CLINIC | Facility: CLINIC | Age: 88
End: 2023-10-27

## 2023-10-27 RX ORDER — SERTRALINE HYDROCHLORIDE 25 MG/1
25 TABLET, FILM COATED ORAL DAILY
Qty: 30 TABLET | Refills: 2 | Status: SHIPPED | OUTPATIENT
Start: 2023-10-27

## 2023-10-27 NOTE — TELEPHONE ENCOUNTER
Wants to start SSRI. Discussed at last visit. Will send to pharmacy. Please let me know if they have any questions.

## 2023-10-27 NOTE — TELEPHONE ENCOUNTER
Pt daughter call requesting speak to Dr Kajal Barrientos or  a nurse for a antidepressants medication was suggested before.

## 2023-11-07 ENCOUNTER — LAB ENCOUNTER (OUTPATIENT)
Dept: LAB | Age: 88
End: 2023-11-07
Attending: FAMILY MEDICINE
Payer: MEDICARE

## 2023-11-14 ENCOUNTER — APPOINTMENT (OUTPATIENT)
Dept: GENERAL RADIOLOGY | Facility: HOSPITAL | Age: 88
End: 2023-11-14
Attending: EMERGENCY MEDICINE
Payer: MEDICARE

## 2023-11-14 ENCOUNTER — APPOINTMENT (OUTPATIENT)
Dept: CT IMAGING | Facility: HOSPITAL | Age: 88
End: 2023-11-14
Attending: EMERGENCY MEDICINE
Payer: MEDICARE

## 2023-11-14 ENCOUNTER — HOSPITAL ENCOUNTER (OUTPATIENT)
Facility: HOSPITAL | Age: 88
Setting detail: OBSERVATION
Discharge: NURSING, REHAB OR LONG-TERM CARE | End: 2023-11-17
Attending: EMERGENCY MEDICINE | Admitting: HOSPITALIST
Payer: MEDICARE

## 2023-11-14 DIAGNOSIS — R53.1 WEAKNESS: ICD-10-CM
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
## 2023-11-14 DIAGNOSIS — S61.216A LACERATION OF RIGHT LITTLE FINGER WITHOUT FOREIGN BODY WITHOUT DAMAGE TO NAIL, INITIAL ENCOUNTER: ICD-10-CM

## 2023-11-14 PROBLEM — G62.9 NEUROPATHY: Status: ACTIVE | Noted: 2023-11-14

## 2023-11-14 LAB
ALBUMIN SERPL-MCNC: 3.2 G/DL (ref 3.4–5)
ALBUMIN/GLOB SERPL: 0.8 {RATIO} (ref 1–2)
ALP LIVER SERPL-CCNC: 100 U/L
ALT SERPL-CCNC: 14 U/L
ANION GAP SERPL CALC-SCNC: 7 MMOL/L (ref 0–18)
AST SERPL-CCNC: 9 U/L (ref 15–37)
ATRIAL RATE: 70 BPM
BASOPHILS # BLD AUTO: 0.02 X10(3) UL (ref 0–0.2)
BASOPHILS NFR BLD AUTO: 0.2 %
BILIRUB SERPL-MCNC: 0.6 MG/DL (ref 0.1–2)
BILIRUB UR QL STRIP.AUTO: NEGATIVE
BUN BLD-MCNC: 10 MG/DL (ref 9–23)
CALCIUM BLD-MCNC: 8.7 MG/DL (ref 8.5–10.1)
CHLORIDE SERPL-SCNC: 107 MMOL/L (ref 98–112)
CLARITY UR REFRACT.AUTO: CLEAR
CO2 SERPL-SCNC: 26 MMOL/L (ref 21–32)
CREAT BLD-MCNC: 0.8 MG/DL
EGFRCR SERPLBLD CKD-EPI 2021: 70 ML/MIN/1.73M2 (ref 60–?)
EOSINOPHIL # BLD AUTO: 0.03 X10(3) UL (ref 0–0.7)
EOSINOPHIL NFR BLD AUTO: 0.3 %
ERYTHROCYTE [DISTWIDTH] IN BLOOD BY AUTOMATED COUNT: 13.8 %
GLOBULIN PLAS-MCNC: 3.8 G/DL (ref 2.8–4.4)
GLUCOSE BLD-MCNC: 178 MG/DL (ref 70–99)
GLUCOSE UR STRIP.AUTO-MCNC: NORMAL MG/DL
HCT VFR BLD AUTO: 42.3 %
HGB BLD-MCNC: 13.8 G/DL
IMM GRANULOCYTES # BLD AUTO: 0.05 X10(3) UL (ref 0–1)
IMM GRANULOCYTES NFR BLD: 0.5 %
KETONES UR STRIP.AUTO-MCNC: NEGATIVE MG/DL
LEUKOCYTE ESTERASE UR QL STRIP.AUTO: 25
LYMPHOCYTES # BLD AUTO: 1.57 X10(3) UL (ref 1–4)
LYMPHOCYTES NFR BLD AUTO: 14.9 %
MCH RBC QN AUTO: 31.4 PG (ref 26–34)
MCHC RBC AUTO-ENTMCNC: 32.6 G/DL (ref 31–37)
MCV RBC AUTO: 96.1 FL
MONOCYTES # BLD AUTO: 0.7 X10(3) UL (ref 0.1–1)
MONOCYTES NFR BLD AUTO: 6.7 %
NEUTROPHILS # BLD AUTO: 8.14 X10 (3) UL (ref 1.5–7.7)
NEUTROPHILS # BLD AUTO: 8.14 X10(3) UL (ref 1.5–7.7)
NEUTROPHILS NFR BLD AUTO: 77.4 %
NITRITE UR QL STRIP.AUTO: NEGATIVE
OSMOLALITY SERPL CALC.SUM OF ELEC: 293 MOSM/KG (ref 275–295)
P AXIS: 32 DEGREES
P-R INTERVAL: 154 MS
PH UR STRIP.AUTO: 6.5 [PH] (ref 5–8)
PLATELET # BLD AUTO: 139 10(3)UL (ref 150–450)
POTASSIUM SERPL-SCNC: 3.7 MMOL/L (ref 3.5–5.1)
PROT SERPL-MCNC: 7 G/DL (ref 6.4–8.2)
PROT UR STRIP.AUTO-MCNC: NEGATIVE MG/DL
Q-T INTERVAL: 404 MS
QRS DURATION: 80 MS
QTC CALCULATION (BEZET): 436 MS
R AXIS: -26 DEGREES
RBC # BLD AUTO: 4.4 X10(6)UL
RBC UR QL AUTO: NEGATIVE
SODIUM SERPL-SCNC: 140 MMOL/L (ref 136–145)
SP GR UR STRIP.AUTO: 1.01 (ref 1–1.03)
T AXIS: 14 DEGREES
TROPONIN I SERPL HS-MCNC: 6 NG/L
UROBILINOGEN UR STRIP.AUTO-MCNC: NORMAL MG/DL
VENTRICULAR RATE: 70 BPM
WBC # BLD AUTO: 10.5 X10(3) UL (ref 4–11)

## 2023-11-14 PROCEDURE — 99223 1ST HOSP IP/OBS HIGH 75: CPT | Performed by: INTERNAL MEDICINE

## 2023-11-14 PROCEDURE — 73130 X-RAY EXAM OF HAND: CPT | Performed by: EMERGENCY MEDICINE

## 2023-11-14 PROCEDURE — 70450 CT HEAD/BRAIN W/O DYE: CPT | Performed by: EMERGENCY MEDICINE

## 2023-11-14 PROCEDURE — 71045 X-RAY EXAM CHEST 1 VIEW: CPT | Performed by: EMERGENCY MEDICINE

## 2023-11-14 RX ORDER — DOCUSATE SODIUM 100 MG/1
100 CAPSULE, LIQUID FILLED ORAL 2 TIMES DAILY PRN
Status: DISCONTINUED | OUTPATIENT
Start: 2023-11-14 | End: 2023-11-17

## 2023-11-14 RX ORDER — SODIUM CHLORIDE 9 MG/ML
INJECTION, SOLUTION INTRAVENOUS CONTINUOUS
Status: ACTIVE | OUTPATIENT
Start: 2023-11-14 | End: 2023-11-14

## 2023-11-14 RX ORDER — MECLIZINE HCL 12.5 MG/1
TABLET ORAL 3 TIMES DAILY PRN
Status: DISCONTINUED | OUTPATIENT
Start: 2023-11-14 | End: 2023-11-14

## 2023-11-14 RX ORDER — ACETAMINOPHEN 500 MG
500 TABLET ORAL EVERY 4 HOURS PRN
Status: DISCONTINUED | OUTPATIENT
Start: 2023-11-14 | End: 2023-11-17

## 2023-11-14 RX ORDER — METOCLOPRAMIDE HYDROCHLORIDE 5 MG/ML
10 INJECTION INTRAMUSCULAR; INTRAVENOUS EVERY 8 HOURS PRN
Status: DISCONTINUED | OUTPATIENT
Start: 2023-11-14 | End: 2023-11-17

## 2023-11-14 RX ORDER — PREDNISONE 1 MG/1
1 TABLET ORAL
Status: DISCONTINUED | OUTPATIENT
Start: 2023-11-15 | End: 2023-11-17

## 2023-11-14 RX ORDER — ONDANSETRON 2 MG/ML
4 INJECTION INTRAMUSCULAR; INTRAVENOUS EVERY 6 HOURS PRN
Status: DISCONTINUED | OUTPATIENT
Start: 2023-11-14 | End: 2023-11-17

## 2023-11-14 RX ORDER — ATORVASTATIN CALCIUM 40 MG/1
40 TABLET, FILM COATED ORAL NIGHTLY
Status: DISCONTINUED | OUTPATIENT
Start: 2023-11-14 | End: 2023-11-17

## 2023-11-14 RX ORDER — LEVOTHYROXINE SODIUM 0.05 MG/1
100 TABLET ORAL
Status: DISCONTINUED | OUTPATIENT
Start: 2023-11-15 | End: 2023-11-17

## 2023-11-14 RX ORDER — HEPARIN SODIUM 5000 [USP'U]/ML
5000 INJECTION, SOLUTION INTRAVENOUS; SUBCUTANEOUS EVERY 8 HOURS SCHEDULED
Status: DISCONTINUED | OUTPATIENT
Start: 2023-11-14 | End: 2023-11-17

## 2023-11-14 RX ORDER — ACETAMINOPHEN 500 MG
1000 TABLET ORAL ONCE
Status: COMPLETED | OUTPATIENT
Start: 2023-11-14 | End: 2023-11-14

## 2023-11-14 RX ORDER — SERTRALINE HYDROCHLORIDE 25 MG/1
25 TABLET, FILM COATED ORAL DAILY
Status: DISCONTINUED | OUTPATIENT
Start: 2023-11-14 | End: 2023-11-14

## 2023-11-14 NOTE — CM/SW NOTE
[facility] Patient lives at [place] [address]. She has been cleared for discharge. Patient is asking if she can wait for her daughter, Loulou Rod, so she can drive her home; Loulou Rod is returning from [address] [place] [address]. Julisa Pitt who is enroute to the ED. MD updated.

## 2023-11-14 NOTE — ED INITIAL ASSESSMENT (HPI)
Patient was using walker and was turning, couldn't feel feet went to sit back on her bed and fell to the side. Injured right pinky and ring finger. Had a concussion a month ago from a fall with memory loss.   Hx hypertension and has macular degeneration

## 2023-11-14 NOTE — ED QUICK NOTES
Pt back from ct department. Daughter at bedside. Awake and alert. Comfortable. Pure wick in place and reattached to wall suction.

## 2023-11-14 NOTE — ED QUICK NOTES
Called for check in on lunch order. Food staff states they do not have an order on file, but can have it 'rushed up'. Family and PT aware and boxed lunch given to PT in meantime.

## 2023-11-14 NOTE — DISCHARGE INSTRUCTIONS
Keep wound clean and apply antibiotic ointment twice a day. Have sutures out in 7 days. Return if any problems.

## 2023-11-15 ENCOUNTER — APPOINTMENT (OUTPATIENT)
Dept: MRI IMAGING | Facility: HOSPITAL | Age: 88
End: 2023-11-15
Attending: Other
Payer: MEDICARE

## 2023-11-15 PROCEDURE — 70551 MRI BRAIN STEM W/O DYE: CPT | Performed by: OTHER

## 2023-11-15 PROCEDURE — 99232 SBSQ HOSP IP/OBS MODERATE 35: CPT | Performed by: INTERNAL MEDICINE

## 2023-11-15 RX ORDER — GABAPENTIN 100 MG/1
100 CAPSULE ORAL 3 TIMES DAILY
Status: DISCONTINUED | OUTPATIENT
Start: 2023-11-15 | End: 2023-11-17

## 2023-11-15 RX ORDER — AMLODIPINE BESYLATE 5 MG/1
5 TABLET ORAL DAILY
Status: DISCONTINUED | OUTPATIENT
Start: 2023-11-15 | End: 2023-11-17

## 2023-11-15 NOTE — PLAN OF CARE
Assumed care at 1930  A&O X4, RA,   B/p runs high in the 160's,   Scheduled metoprolol given. Medicated with tylenol for headache. Fall precaution maintained. Frequent staff rounding in place for patient's needs. Fall precaution in place. PT/OT to see.        Problem: MUSCULOSKELETAL - ADULT  Goal: Return mobility to safest level of function  Description: INTERVENTIONS:  - Assess patient stability and activity tolerance for standing, transferring and ambulating w/ or w/o assistive devices  - Assist with transfers and ambulation using safe patient handling equipment as needed  - Ensure adequate protection for wounds/incisions during mobilization  - Obtain PT/OT consults as needed  - Advance activity as appropriate  - Communicate ordered activity level and limitations with patient/family  Outcome: Progressing  Goal: Maintain proper alignment of affected body part  Description: INTERVENTIONS:  - Support and protect limb and body alignment per provider's orders  - Instruct and reinforce with patient and family use of appropriate assistive device and precautions (e.g. spinal or hip dislocation precautions)  Outcome: Progressing     Problem: Impaired Functional Mobility  Goal: Achieve highest/safest level of mobility/gait  Description: Interventions:  - Assess patient's functional ability and stability  - Promote increasing activity/tolerance for mobility and gait  - Educate and engage patient/family in tolerated activity level and precautions  - Recommend patient transfer to bedside chair toward strongest side  Outcome: Progressing     Problem: Impaired Activities of Daily Living  Goal: Achieve highest/safest level of independence in self care  Description: Interventions:  - Assess ability and encourage patient to participate in ADLs to maximize function  - Promote sitting position while performing ADLs such as feeding, grooming, and bathing  - Educate and encourage patient/family in tolerated functional activity level and precautions during self-care  - Encourage patient to incorporate impaired side during daily activities to promote function  Outcome: Progressing

## 2023-11-15 NOTE — PROGRESS NOTES
NURSING ADMISSION NOTE      Patient admitted via Cart  Oriented to room. Safety precautions initiated. Bed in low position. Call light in reach. The patient is A/OX4, denies having pain, no SOB. Admission navigator completed. Daughter at the bedside. The patient reports that she left her glasses and hearing aids at home. Dysphagia screening pass. Diet orders in place. PT/OT evals pending.

## 2023-11-15 NOTE — CM/SW NOTE
11/15/23 1100   CM/SW Referral Data   Referral Source Social Work (self-referral)   Reason for Referral Discharge planning   Informant Patient;EMR;Clinical Staff Member   Patient [facility]   Discharge Needs   Anticipated D/C needs To be determined     HOME SITUATION  Type of Home: Independent living facility [facility])   Home Layout: One level     Lives With: Alone (Staff on site 24hrs)  Drives: No  Patient Owned Equipment: Rolling walker (adjustable bed)     Prior Level of [place] per PT eval: Pt lives at [address] [facility]. Reports using RW for ambulation. Was at [place] [address] for about 1 month after last admission. Reports able to get some assist from \"ALS\" at [facility] for wheeling down to dining zarate in  or with transfers in/out of the shower. Pt is a 79 y/o female admitted after a fall. Notified by therapy team of recommendation for [facility]. Chart reviewed, pt is in observation status and does not meet criteria/qualify for [facility] under Medicare at this time. Pt is from [place] [address]. SW attempted to meet with pt at bedside to discuss discharge planning, pt declined. Pt stated 'I do not need more therapy, I am trying to figure out why I cannot walk.' Pt requested SW come back in the afternoon when her dtr Venice Jimenez is present. SW will f/u with pt and pt's dtr regarding discharge plan.      ELICIA Romero  Discharge Planner

## 2023-11-15 NOTE — PHYSICAL THERAPY NOTE
PHYSICAL THERAPY EVALUATION - INPATIENT     Room Number: 1378/6322-K  Evaluation Date: 11/15/2023  Type of Evaluation: Initial  Physician Order: PT Eval and Treat    Presenting Problem: Fall  Co-Morbidities : HTN, DL, CAD  Reason for Therapy: Mobility Dysfunction and Discharge Planning    History related to current admission: Patient is a 80year old female admitted on 11/14/2023 from home for fall. Previous Admissions:   8/29-8/31/2023: Syncope and collapse: rec [facility]; ins denied, went to [facility] under private pay. Pt reports for about 1 month. ASSESSMENT   In this PT evaluation, the patient presents with the following impairments decreased strength, functional mobility, trunk control, endurance, balance. These impairments and comorbidities manifest themselves as functional limitations in independent bed mobility, transfers, and gait. The patient is below baseline and would benefit from skilled inpatient PT to address the above deficits to assist patient in returning to prior to level of function. Functional outcome measures completed include AMPAC. The AM-PAC '6-Clicks' Inpatient Basic Mobility Short Form was completed and this patient is demonstrating a Approx Degree of Impairment: 50.57%  degree of impairment in mobility. Research supports that patients with this level of impairment may benefit from [facility]. DISCHARGE RECOMMENDATIONS  PT Discharge Recommendations: Sub-acute rehabilitation    PLAN  PT Treatment Plan: Body mechanics; Bed mobility; Endurance; Energy conservation;Patient education; Family education;Gait training;Balance training;Transfer training;Strengthening  Rehab Potential : Fair  Frequency (Obs): 3-5x/week  Number of Visits to Meet Established Goals: 6      CURRENT GOALS    Goal #1 Patient is able to demonstrate supine - sit EOB @ level: modified independent     Goal #2 Patient is able to demonstrate transfers EOB to/from Chair/Wheelchair at assistance level: modified independent     Goal #3 Patient is able to ambulate 150 feet with assist device: walker - rolling at assistance level: modified independent     Goal #4    Goal #5    Goal #6    Goal Comments: Goals established on 11/15/2023    HOME SITUATION  Type of Home: Independent living facility [facility])   Home Layout: One level                Lives With: Alone (Staff on site 24hrs)  Drives: No  Patient Owned Equipment: Rolling walker (adjustable bed)       Prior Level of [place]: Pt lives at [facility]. Reports using RW for ambulation. Was at [place] [address] for about 1 month after last admission. Reports able to get some assist from \"ALS\" at [facility] for wheeling down to dining zarate in  or with transfers in/out of the shower.      SUBJECTIVE  \"Well I'll need to stay here for a few days until I can get stronger to go back home because I can't pay for [facility] anymore\"      OBJECTIVE  Precautions: Bed/chair alarm  Fall Risk: High fall risk    WEIGHT BEARING RESTRICTION  Weight Bearing Restriction: None                PAIN ASSESSMENT  Ratin          COGNITION  Safety Judgement:  decreased awareness of need for assistance and decreased awareness of need for safety  Awareness of Errors:  decreased awareness of errors   Awareness of Deficits:  decreased awareness of deficits  Problem Solving:  assistance required to identify errors made, assistance required to generate solutions, and assistance required to implement solutions    RANGE OF MOTION AND STRENGTH ASSESSMENT  See OT note for UE assessment    Lower extremity ROM is within functional limits     Lower extremity strength is grossly 3+ to 4/5      BALANCE  Static Sitting: Fair  Dynamic Sitting: Fair -  Static Standing: Poor +  Dynamic Standing: Poor +    ADDITIONAL TESTS                                    ACTIVITY TOLERANCE                         O2 WALK       NEUROLOGICAL FINDINGS                        AM-PAC '6-Clicks' INPATIENT SHORT FORM - BASIC MOBILITY  How much difficulty does the patient currently have. .. Patient Difficulty: Turning over in bed (including adjusting bedclothes, sheets and blankets)?: A Little   Patient Difficulty: Sitting down on and standing up from a chair with arms (e.g., wheelchair, bedside commode, etc.): A Little   Patient Difficulty: Moving from lying on back to sitting on the side of the bed?: A Little   How much help from another person does the patient currently need. .. Help from Another: Moving to and from a bed to a chair (including a wheelchair)?: A Little   Help from Another: Need to walk in hospital room?: A Little   Help from Another: Climbing 3-5 steps with a railing?: A Lot       AM-PAC Score:  Raw Score: 17   Approx Degree of Impairment: 50.57%   Standardized Score (AM-PAC Scale): 42.13   CMS Modifier (G-Code): CK    FUNCTIONAL ABILITY STATUS  Gait Assessment   Functional Mobility/Gait Assessment  Gait Assistance: Minimum assistance  Distance (ft): 3  Assistive Device: Rolling walker  Pattern:  (short step/stride length; decreased hip flexion)    Skilled Therapy Provided     Gait Training:   Pt cued on upright standing posture to improve alignment with UEs  Pt cued on gait sequencing with RW - cued for increased step width and length for side steps to chair  Pt cued on proper UE placement on RW handles  Pt req significant encouragement to participate in therapy and challenge her abilities. Pt with high fear of falling contributing to apprehension and refusal to attempt formal ambulation into the hallway    Therapeutic Activity:   Pt cued on placement of UE and LEs for optimal force generation and safe STS transfer. Pt cued on usage of arm rests for controlled descent into sitting      Bed Mobility:  Rolling: NT  Supine to sit: CGA from [facility] elevated   Sit to supine: NT     Transfer Mobility:  Sit to stand: Aaron   Stand to sit: CGA  Gait = CGA-Aaron    Therapist's Comments: RN cleared for session.  Pt agreeable for therapy, received supine. Pt req significant encouragement and education throughout session. Instructed to call for nursing staff for any needs and OOB mobility. Exercise/Education Provided:  Bed mobility  Body mechanics  Energy conservation  Functional activity tolerated  Gait training  Posture  Strengthening  Transfer training    Patient End of Session: Up in chair;Needs met;Call light within reach;RN aware of session/findings; All patient questions and concerns addressed; Alarm set; Discussed recommendations with /      Patient Evaluation Complexity Level:  History Moderate - 1 or 2 personal factors and/or co-morbidities   Examination of body systems Low - addressing 1-2 elements   Clinical Presentation Low - Stable   Clinical Decision Making Low - Stable       PT Session Time: 35 minutes  Gait Trainin minutes  Therapeutic Activity: 4 minutes

## 2023-11-15 NOTE — PLAN OF CARE
Assumed patient care at 0730. Room Air. Normal sinus rhythm. No tele. Electrolyte replacement protocol (cardiac). Cardiac diet. PT/OT evaluated. Ambulates two assist with a walker. Purewick In place. Hearing aids and glasses left at home. Neuro Consulted. All safety precautions are in place and will continue with plan of care.             Problem: MUSCULOSKELETAL - ADULT  Goal: Return mobility to safest level of function  Description: INTERVENTIONS:  - Assess patient stability and activity tolerance for standing, transferring and ambulating w/ or w/o assistive devices  - Assist with transfers and ambulation using safe patient handling equipment as needed  - Ensure adequate protection for wounds/incisions during mobilization  - Obtain PT/OT consults as needed  - Advance activity as appropriate  - Communicate ordered activity level and limitations with patient/family  Outcome: Progressing  Goal: Maintain proper alignment of affected body part  Description: INTERVENTIONS:  - Support and protect limb and body alignment per provider's orders  - Instruct and reinforce with patient and family use of appropriate assistive device and precautions (e.g. spinal or hip dislocation precautions)  Outcome: Progressing     Problem: Impaired Functional Mobility  Goal: Achieve highest/safest level of mobility/gait  Description: Interventions:  - Assess patient's functional ability and stability  - Promote increasing activity/tolerance for mobility and gait  - Educate and engage patient/family in tolerated activity level and precautions  - Recommend use of chair position in bed 3 times per day  Outcome: Progressing     Problem: Impaired Activities of Daily Living  Goal: Achieve highest/safest level of independence in self care  Description: Interventions:  - Assess ability and encourage patient to participate in ADLs to maximize function  - Promote sitting position while performing ADLs such as feeding, grooming, and bathing  - Educate and encourage patient/family in tolerated functional activity level and precautions during self-care  - Encourage patient to incorporate impaired side during daily activities to promote function  Outcome: Progressing

## 2023-11-16 PROCEDURE — 99232 SBSQ HOSP IP/OBS MODERATE 35: CPT | Performed by: INTERNAL MEDICINE

## 2023-11-16 RX ORDER — AMLODIPINE BESYLATE 5 MG/1
5 TABLET ORAL DAILY
Qty: 30 TABLET | Refills: 0 | Status: SHIPPED | OUTPATIENT
Start: 2023-11-17

## 2023-11-16 RX ORDER — GABAPENTIN 100 MG/1
100 CAPSULE ORAL 3 TIMES DAILY
Qty: 90 CAPSULE | Refills: 0 | Status: SHIPPED | OUTPATIENT
Start: 2023-11-16 | End: 2023-11-17

## 2023-11-16 NOTE — PLAN OF CARE
Assumed care at 1930  A&O X4, RA, VSS  Scheduled medications given   No new concerns. Fall precaution maintained. Frequent staff rounding in place for patient's needs. PT/OT following. Plan for [facility].            Problem: MUSCULOSKELETAL - ADULT  Goal: Return mobility to safest level of function  Description: INTERVENTIONS:  - Assess patient stability and activity tolerance for standing, transferring and ambulating w/ or w/o assistive devices  - Assist with transfers and ambulation using safe patient handling equipment as needed  - Ensure adequate protection for wounds/incisions during mobilization  - Obtain PT/OT consults as needed  - Advance activity as appropriate  - Communicate ordered activity level and limitations with patient/family  Outcome: Progressing  Goal: Maintain proper alignment of affected body part  Description: INTERVENTIONS:  - Support and protect limb and body alignment per provider's orders  - Instruct and reinforce with patient and family use of appropriate assistive device and precautions (e.g. spinal or hip dislocation precautions)  Outcome: Progressing     Problem: Impaired Functional Mobility  Goal: Achieve highest/safest level of mobility/gait  Description: Interventions:  - Assess patient's functional ability and stability  - Promote increasing activity/tolerance for mobility and gait  - Educate and engage patient/family in tolerated activity level and precautions  Outcome: Progressing     Problem: Impaired Activities of Daily Living  Goal: Achieve highest/safest level of independence in self care  Description: Interventions:  - Assess ability and encourage patient to participate in ADLs to maximize function  - Promote sitting position while performing ADLs such as feeding, grooming, and bathing  - Educate and encourage patient/family in tolerated functional activity level and precautions during self-care  Outcome: Progressing

## 2023-11-16 NOTE — CM/SW NOTE
Met with patient and daughter @ bedside to discuss discharge recommendation of [facility]. Explained to patient that even though she has been in the hospital since 11/14/2023--her stay has been under observation and this status is not recognized as qualifying for [facility] stay. Patient still in ' significant pain' stating ' this new medication not working.' Page out to hospitalist.  Provided daughter with local respite rates in the area as well as [facility] information    Spoke with daughter Radha--informed her of 'special' rate for [facility] residents @ [facility] for $225.oo per noc for 14 days--therapies 3-5x per week--she requested additional info on cost of [facility] $385.oo and $395.oo for 14 days as well as [facility] $400.oo for 14 days with therapies up to 5 times per week. Daughter appreciative of follow up--requested referrals be sent to [facility] and [facility] assessing which facility pay provide 5 times a week therapy-PASSR completed--AIDIN referrals sent to those facilities--pending. Nurse as well as  attempted to reach out to Dr Deena Quan regarding no pain relief--await call back.   Pending acceptance @ [facility] for respite stay

## 2023-11-16 NOTE — PHYSICAL THERAPY NOTE
PHYSICAL THERAPY TREATMENT NOTE - INPATIENT    Room Number: 0773/1334-Q     Session: 1    Number of Visits to Meet Established Goals: 6    Presenting Problem: Fall  Co-Morbidities : HTN, DL, CAD    History related to current admission: Patient is a 80year old female admitted on 2023 from home for fall. ASSESSMENT     Pt presents to therapy with decreased strength, functional mobility, endurance, balance, activity tolerance, trunk control. These impairments contribute to limitations in walking, transfers, bed mobility. Pt continues to require frequent encouragement during session to participate in therapy and challenge functional mobility. Pt very doubtful of her strength and balance with high fear of falling. Pt will continue to benefit from skilled IP PT to address impairments and functional limitations. DISCHARGE RECOMMENDATIONS  PT Discharge Recommendations: Sub-acute rehabilitation     PLAN  PT Treatment Plan: Body mechanics; Bed mobility; Endurance; Energy conservation;Patient education; Family education;Gait training;Balance training;Transfer training;Strengthening  Rehab Potential : Fair  Frequency (Obs): 3-5x/week    CURRENT GOALS   Goal #1 Patient is able to demonstrate supine - sit EOB @ level: modified independent     Goal #2 Patient is able to demonstrate transfers EOB to/from Chair/Wheelchair at assistance level: modified independent     Goal #3 Patient is able to ambulate 150 feet with assist device: walker - rolling at assistance level: modified independent     Goal #4    Goal #5    Goal #6    Goal Comments: Goals established on 11/15/2023    2023 all goals ongoing       SUBJECTIVE  \"No, I don't want a rollator, it's just hard for me to stand long so I will go find a chair, I'm not changing my walker now\"     OBJECTIVE  Precautions: Bed/chair alarm    WEIGHT BEARING RESTRICTION  Weight Bearing Restriction: None                PAIN ASSESSMENT   Ratin          BALANCE Static Sitting: Fair  Dynamic Sitting: Fair -           Static Standing: Poor +  Dynamic Standing: Poor +    ACTIVITY TOLERANCE                         O2 WALK         AM-PAC '6-Clicks' INPATIENT SHORT FORM - BASIC MOBILITY  How much difficulty does the patient currently have. .. Patient Difficulty: Turning over in bed (including adjusting bedclothes, sheets and blankets)?: A Little   Patient Difficulty: Sitting down on and standing up from a chair with arms (e.g., wheelchair, bedside commode, etc.): A Little   Patient Difficulty: Moving from lying on back to sitting on the side of the bed?: A Little   How much help from another person does the patient currently need. .. Help from Another: Moving to and from a bed to a chair (including a wheelchair)?: A Little   Help from Another: Need to walk in hospital room?: A Little   Help from Another: Climbing 3-5 steps with a railing?: A Lot       AM-PAC Score:  Raw Score: 17   Approx Degree of Impairment: 50.57%   Standardized Score (AM-PAC Scale): 42.13   CMS Modifier (G-Code): CK    FUNCTIONAL ABILITY STATUS  Gait Assessment   Functional Mobility/Gait Assessment  Gait Assistance: Contact guard assist  Distance (ft): 2  Assistive Device: Rolling walker  Pattern:  (short step/stride length)    Skilled Therapy Provided    Gait Training:   Pt cued on upright standing posture to improve alignment with UEs  Pt cued on gait sequencing with RW - pt with retropulsion in standing, req cuing for anterior lean; cued to increase step length  Pt educated on benefit of rollator walker to participate in sitting rest breaks as pt reports inability to statically stand for long periods of time. Pt not open to suggestion as she does not want to change her habits and she reports she would rather go find a chair to sit down on.      Therapeutic Activity:   Pt cued on placement of UE and LEs for optimal force generation and safe STS transfer. Pt with improved sit>stand mechanics and level of assist from chair with arm rest  Pt cued on usage of arm rests for controlled descent into sitting - pt with uncontrolled descent  Pt able to maintain static standing with SBA during brief change; req cuing for upright posture  Sit to stand x5    Bed Mobility:  Rolling: NT   Supine<>Sit: Aaron   Sit<>Supine: NT     Transfer Mobility:  Sit<>Stand: Aaron progressing to close SBA   Stand<>Sit: CGA   Gait: CGA-Aaron    Therapist's Comments: RN cleared for session. Pt agreeable for therapy, received supine. Dtr present for session. Pt requires frequent encouragement during session. Instructed to call for nursing staff for any needs and OOB mobility. THERAPEUTIC EXERCISES  Lower Extremity Marching   Heel raise  LAQ     Upper Extremity Elbow flexion  Sh flexion   Sh rolls      Position Sitting     Repetitions   10   Sets   1     Patient End of Session: Up in chair;Needs met;Call light within reach;RN aware of session/findings; All patient questions and concerns addressed; Alarm set; Family present    PT Session Time: 30 minutes  Gait Trainin minutes  Therapeutic Activity: 10 minutes  Therapeutic Exercise: 10 minutes

## 2023-11-16 NOTE — PLAN OF CARE
Patient AAOx4. Room air. No tele. Sub q heparin. Purewick. Tylenol given for leg pain, patient complaining of leg twitching from taking gabapentinMD hankins. Max assist x2. Cardiac diet, pills whole. Patient rounded on routinely. Patient updated on plan of care.        Problem: MUSCULOSKELETAL - ADULT  Goal: Return mobility to safest level of function  Description: INTERVENTIONS:  - Assess patient stability and activity tolerance for standing, transferring and ambulating w/ or w/o assistive devices  - Assist with transfers and ambulation using safe patient handling equipment as needed  - Ensure adequate protection for wounds/incisions during mobilization  - Obtain PT/OT consults as needed  - Advance activity as appropriate  - Communicate ordered activity level and limitations with patient/family  Outcome: Progressing  Goal: Maintain proper alignment of affected body part  Description: INTERVENTIONS:  - Support and protect limb and body alignment per provider's orders  - Instruct and reinforce with patient and family use of appropriate assistive device and precautions (e.g. spinal or hip dislocation precautions)  Outcome: Progressing     Problem: Impaired Functional Mobility  Goal: Achieve highest/safest level of mobility/gait  Description: Interventions:  - Assess patient's functional ability and stability  - Promote increasing activity/tolerance for mobility and gait  - Educate and engage patient/family in tolerated activity level and precautions  - Recommend use of sit-stand lift for transfers  Outcome: Progressing     Problem: Impaired Activities of Daily Living  Goal: Achieve highest/safest level of independence in self care  Description: Interventions:  - Assess ability and encourage patient to participate in ADLs to maximize function  - Promote sitting position while performing ADLs such as feeding, grooming, and bathing  - Educate and encourage patient/family in tolerated functional activity level and precautions during self-care  - Provide support under elbow of weak side to prevent shoulder subluxation  Outcome: Progressing

## 2023-11-17 VITALS
OXYGEN SATURATION: 96 % | DIASTOLIC BLOOD PRESSURE: 63 MMHG | WEIGHT: 166.38 LBS | BODY MASS INDEX: 27 KG/M2 | RESPIRATION RATE: 18 BRPM | HEART RATE: 89 BPM | TEMPERATURE: 98 F | SYSTOLIC BLOOD PRESSURE: 134 MMHG

## 2023-11-17 PROCEDURE — 99239 HOSP IP/OBS DSCHRG MGMT >30: CPT | Performed by: INTERNAL MEDICINE

## 2023-11-17 RX ORDER — PREGABALIN 25 MG/1
25 CAPSULE ORAL 2 TIMES DAILY
Qty: 5 CAPSULE | Refills: 0 | Status: SHIPPED | OUTPATIENT
Start: 2023-11-17

## 2023-11-17 RX ORDER — PREGABALIN 25 MG/1
25 CAPSULE ORAL 2 TIMES DAILY
Status: SHIPPED | COMMUNITY
Start: 2023-11-17 | End: 2023-11-17

## 2023-11-17 RX ORDER — PREGABALIN 25 MG/1
25 CAPSULE ORAL 2 TIMES DAILY
Status: DISCONTINUED | OUTPATIENT
Start: 2023-11-17 | End: 2023-11-17

## 2023-11-17 NOTE — PROGRESS NOTES
Assumed patient care at 1930  Patient oriented x 4   Not on tele. Vitals stable, room air   C/o leg pain and restless leg   Refuses arabella gabapentin   Request prn tyl and given   Safety precautions in place   SL IV , call light is within reach.    Will continue to monitor pt

## 2023-11-17 NOTE — CM/SW NOTE
Received call from patient's daughter Klarissabebeto Davies  regarding feedback from [facility] and [address] for respite stay. Daughter shared her mother's preference with [facility]--spoke with Kelvin Pathak in admissions @ the facility who will reach out to daughter. Discussed transportation options--daughter chose [facility]--aware of cost post service.   Expressed appreciation for assistance with information/arrangement of respite stay--new [facility]

## 2023-11-17 NOTE — PHYSICAL THERAPY NOTE
PHYSICAL THERAPY TREATMENT NOTE - INPATIENT    Room Number: 8127/7791-B     Session: 2     Number of Visits to Meet Established Goals: 6    Presenting Problem: Fall  Co-Morbidities : HTN, DL, CAD    History related to current admission: Patient is a 80year old female admitted on 2023 from home for fall. ASSESSMENT     Pt presents to therapy with decreased strength, functional mobility, endurance, balance, activity tolerance, trunk control. These impairments contribute to limitations in walking, transfers, bed mobility. Pt continues to require encouragement throughout session, increased fear of falling, requires reassurance. Progressed to formal ambulation today. Pt will continue to benefit from skilled IP PT to address impairments and functional limitations. DISCHARGE RECOMMENDATIONS  PT Discharge Recommendations: Sub-acute rehabilitation     PLAN  PT Treatment Plan: Body mechanics; Bed mobility; Endurance; Energy conservation;Patient education; Family education;Gait training;Balance training;Transfer training;Strengthening  Rehab Potential : Fair  Frequency (Obs): 3-5x/week    CURRENT GOALS   Goal #1 Patient is able to demonstrate supine - sit EOB @ level: modified independent     Goal #2 Patient is able to demonstrate transfers EOB to/from Chair/Wheelchair at assistance level: modified independent     Goal #3 Patient is able to ambulate 150 feet with assist device: walker - rolling at assistance level: modified independent     Goal #4    Goal #5    Goal #6    Goal Comments: Goals established on 11/15/2023    2023 all goals ongoing      SUBJECTIVE  \"Let's just take it one step at a time\"     OBJECTIVE  Precautions: Bed/chair alarm    WEIGHT BEARING RESTRICTION  Weight Bearing Restriction: None                PAIN ASSESSMENT   Ratin          BALANCE                                                                                                                       Static Sitting: Fair  Dynamic Sitting: Poor +           Static Standing: Poor +  Dynamic Standing: Poor +    ACTIVITY TOLERANCE                         O2 WALK         AM-PAC '6-Clicks' INPATIENT SHORT FORM - BASIC MOBILITY  How much difficulty does the patient currently have. .. Patient Difficulty: Turning over in bed (including adjusting bedclothes, sheets and blankets)?: A Little   Patient Difficulty: Sitting down on and standing up from a chair with arms (e.g., wheelchair, bedside commode, etc.): A Little   Patient Difficulty: Moving from lying on back to sitting on the side of the bed?: A Little   How much help from another person does the patient currently need. .. Help from Another: Moving to and from a bed to a chair (including a wheelchair)?: A Little   Help from Another: Need to walk in hospital room?: A Little   Help from Another: Climbing 3-5 steps with a railing?: A Little       AM-PAC Score:  Raw Score: 18   Approx Degree of Impairment: 46.58%   Standardized Score (AM-PAC Scale): 43.63   CMS Modifier (G-Code): CK    FUNCTIONAL ABILITY STATUS  Gait Assessment   Functional Mobility/Gait Assessment  Gait Assistance: Contact guard assist;Minimum assistance  Distance (ft): 75  Assistive Device: Rolling walker  Pattern:  (short step stride length, dec silverio)    Skilled Therapy Provided    Gait Training:   Pt cued on upright standing posture to improve alignment with UEs  Pt cued on gait sequencing with RW - cued to increased step/stride length, initially not receptive    Pt cued on proper UE placement on RW handles  Pt resistive to education and cuing during ambulation as she states \"I just need to take things one at a time, I can only focus on walking and nothing else\"  Pt continues to not be receptive to education on rollator walkers for increased convenience of seat  Amb x75 with CGA, intermittent Aaron    Therapeutic Activity:   Pt cued on placement of UE and LEs for optimal force generation and safe STS transfer. Pt cued on usage of arm rests for controlled descent into sitting - req cuing to reach back opposed to holding RW handles      Bed Mobility:  Rolling: NT  Supine<>Sit: supervision HOB elevated  Sit<>Supine: NT     Transfer Mobility:  Sit<>Stand: Aaron   Stand<>Sit: CGA   Gait: CGA-Aaron    Therapist's Comments: RN cleared for session. Pt agreeable for therapy, received supine. Dtr present for session. Instructed to call for nursing staff for any needs and OOB mobility. Patient End of Session: Up in chair;Needs met;Call light within reach;RN aware of session/findings; All patient questions and concerns addressed; Alarm set; Family present; Discussed recommendations with /    PT Session Time: 30 minutes  Gait Trainin minutes  Therapeutic Activity: 3 minutes

## 2023-11-17 NOTE — CM/SW NOTE
11/17/23 1137   Discharge disposition   Expected discharge disposition subacute   Post Acute Care Provider [facility]   Discharge transportation [facility]     RYANN spoke with LARRY at [facility] who stated they are able to accept pt today for respite. RYANN spoke with RN who stated pt is medically cleared for discharge. [facility] arranged for 4pm. Updated RN and Collene at [facility]. RN to update pt. SW left VM for pt's dtr, Yolette Omer, notifying her of transport time. PCS form completed and available for RN to print. [facility] [phone] , [facility] for report.      [facility]  [phone]    ELICIA Chambers  Discharge Planner

## 2023-11-17 NOTE — PROGRESS NOTES
Assumed care around 0700. A/Ox4, R/A, No tele. VSS. Pt worked with today and patient able to walk with 1 assist and walker. Lyrica added for LE neuropathy. IVSL. Discharge pending to [facility] at [address] [place] [address]. Staff will continue to monitor.

## 2023-11-17 NOTE — PROGRESS NOTES
NURSING DISCHARGE NOTE    Discharged Rehab facility via Ambulance. Accompanied by Support staff  Belongings Taken by patient/family. Discharge and transfer paperwork provided and explained. Report called and given to receiving facility. Sent with script for Lyrica. All questions and concerns addressed. Patient left in care of [facility].

## 2023-11-20 ENCOUNTER — PATIENT OUTREACH (OUTPATIENT)
Dept: CASE MANAGEMENT | Age: 88
End: 2023-11-20

## 2023-11-30 ENCOUNTER — TELEPHONE (OUTPATIENT)
Dept: FAMILY MEDICINE CLINIC | Facility: CLINIC | Age: 88
End: 2023-11-30
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
## 2023-11-30 DIAGNOSIS — R42 DIZZINESS: ICD-10-CM

## 2023-11-30 DIAGNOSIS — R29.898 MUSCULAR DECONDITIONING: ICD-10-CM

## 2023-11-30 DIAGNOSIS — E03.9 HYPOTHYROIDISM, UNSPECIFIED TYPE: ICD-10-CM

## 2023-11-30 DIAGNOSIS — G62.9 NEUROPATHY: ICD-10-CM
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
## 2023-11-30 DIAGNOSIS — I10 HYPERTENSION, BENIGN: ICD-10-CM

## 2023-11-30 NOTE — TELEPHONE ENCOUNTER
Called and talked to daughter she fell and injured her self she was in the hospital the [address] [place] [address] residence but is getting ready to go back to [facility]. While in the hospital her BP was elevated so the put her on 5 mg of amlodipine in addition to the metoprolol, also placed her on lyrica  she is asking if Dr Syl White will be refilling these pended the order if she wants to do this.  Also needs new order for PT sent to [address] [place], [place] [address]ab

## 2023-11-30 NOTE — TELEPHONE ENCOUNTER
Pt daughter call requesting speak to a nurse for mother medication and also a new physical therapies order.

## 2023-12-01 RX ORDER — AMLODIPINE BESYLATE 5 MG/1
5 TABLET ORAL DAILY
Qty: 90 TABLET | Refills: 0 | Status: SHIPPED | OUTPATIENT
Start: 2023-12-01

## 2023-12-01 RX ORDER — PREGABALIN 25 MG/1
25 CAPSULE ORAL 2 TIMES DAILY
Qty: 180 CAPSULE | Refills: 0 | Status: SHIPPED | OUTPATIENT
Start: 2023-12-01

## 2024-01-01 ENCOUNTER — APPOINTMENT (OUTPATIENT)
Dept: CT IMAGING | Facility: HOSPITAL | Age: 89
End: 2024-01-01
Attending: INTERNAL MEDICINE
Payer: MEDICARE

## 2024-01-01 ENCOUNTER — TELEPHONE (OUTPATIENT)
Dept: FAMILY MEDICINE CLINIC | Facility: CLINIC | Age: 89
End: 2024-01-01

## 2024-01-01 ENCOUNTER — HOSPITAL ENCOUNTER (INPATIENT)
Facility: HOSPITAL | Age: 89
LOS: 5 days | Discharge: HOSPICE | End: 2024-01-01
Attending: EMERGENCY MEDICINE | Admitting: STUDENT IN AN ORGANIZED HEALTH CARE EDUCATION/TRAINING PROGRAM
Payer: MEDICARE

## 2024-01-01 ENCOUNTER — APPOINTMENT (OUTPATIENT)
Dept: GENERAL RADIOLOGY | Facility: HOSPITAL | Age: 89
End: 2024-01-01
Attending: EMERGENCY MEDICINE
Payer: MEDICARE

## 2024-01-01 ENCOUNTER — APPOINTMENT (OUTPATIENT)
Dept: GENERAL RADIOLOGY | Facility: HOSPITAL | Age: 89
End: 2024-01-01
Attending: NURSE PRACTITIONER
Payer: MEDICARE

## 2024-01-01 ENCOUNTER — APPOINTMENT (OUTPATIENT)
Dept: CV DIAGNOSTICS | Facility: HOSPITAL | Age: 89
End: 2024-01-01
Payer: MEDICARE

## 2024-01-01 ENCOUNTER — APPOINTMENT (OUTPATIENT)
Dept: GENERAL RADIOLOGY | Facility: HOSPITAL | Age: 89
End: 2024-01-01
Attending: INTERNAL MEDICINE
Payer: MEDICARE

## 2024-01-01 ENCOUNTER — APPOINTMENT (OUTPATIENT)
Dept: CT IMAGING | Facility: HOSPITAL | Age: 89
End: 2024-01-01
Attending: EMERGENCY MEDICINE
Payer: MEDICARE

## 2024-01-01 ENCOUNTER — HOSPITAL ENCOUNTER (INPATIENT)
Facility: HOSPITAL | Age: 89
LOS: 2 days | End: 2024-01-01
Attending: INTERNAL MEDICINE | Admitting: INTERNAL MEDICINE
Payer: OTHER MISCELLANEOUS

## 2024-01-01 VITALS
RESPIRATION RATE: 14 BRPM | OXYGEN SATURATION: 84 % | HEART RATE: 99 BPM | TEMPERATURE: 98 F | DIASTOLIC BLOOD PRESSURE: 60 MMHG | SYSTOLIC BLOOD PRESSURE: 137 MMHG

## 2024-01-01 VITALS
OXYGEN SATURATION: 100 % | BODY MASS INDEX: 25.53 KG/M2 | TEMPERATURE: 97 F | SYSTOLIC BLOOD PRESSURE: 165 MMHG | HEIGHT: 67 IN | RESPIRATION RATE: 18 BRPM | HEART RATE: 77 BPM | WEIGHT: 162.69 LBS | DIASTOLIC BLOOD PRESSURE: 65 MMHG

## 2024-01-01 DIAGNOSIS — E78.00 HYPERCHOLESTEREMIA: ICD-10-CM

## 2024-01-01 DIAGNOSIS — K56.2 SIGMOID VOLVULUS (HCC): Primary | ICD-10-CM

## 2024-01-01 DIAGNOSIS — E03.9 HYPOTHYROIDISM, UNSPECIFIED TYPE: ICD-10-CM

## 2024-01-01 LAB
ALBUMIN SERPL-MCNC: 2.2 G/DL (ref 3.2–4.8)
ALBUMIN SERPL-MCNC: 2.3 G/DL (ref 3.2–4.8)
ALBUMIN SERPL-MCNC: 2.5 G/DL (ref 3.2–4.8)
ALBUMIN SERPL-MCNC: 2.7 G/DL (ref 3.2–4.8)
ALBUMIN SERPL-MCNC: 2.9 G/DL (ref 3.2–4.8)
ALBUMIN SERPL-MCNC: 3 G/DL (ref 3.2–4.8)
ALBUMIN/GLOB SERPL: 0.8 {RATIO} (ref 1–2)
ALBUMIN/GLOB SERPL: 0.8 {RATIO} (ref 1–2)
ALBUMIN/GLOB SERPL: 0.9 {RATIO} (ref 1–2)
ALBUMIN/GLOB SERPL: 0.9 {RATIO} (ref 1–2)
ALBUMIN/GLOB SERPL: 1 {RATIO} (ref 1–2)
ALBUMIN/GLOB SERPL: 1 {RATIO} (ref 1–2)
ALP LIVER SERPL-CCNC: 69 U/L
ALP LIVER SERPL-CCNC: 70 U/L
ALP LIVER SERPL-CCNC: 72 U/L
ALP LIVER SERPL-CCNC: 77 U/L
ALP LIVER SERPL-CCNC: 88 U/L
ALP LIVER SERPL-CCNC: 92 U/L
ALT SERPL-CCNC: 10 U/L
ALT SERPL-CCNC: 10 U/L
ALT SERPL-CCNC: 11 U/L
ALT SERPL-CCNC: 12 U/L
ALT SERPL-CCNC: 13 U/L
ALT SERPL-CCNC: 13 U/L
AMMONIA PLAS-MCNC: 43 UMOL/L (ref 11–32)
ANION GAP SERPL CALC-SCNC: 1 MMOL/L (ref 0–18)
ANION GAP SERPL CALC-SCNC: 2 MMOL/L (ref 0–18)
ANION GAP SERPL CALC-SCNC: 3 MMOL/L (ref 0–18)
ANION GAP SERPL CALC-SCNC: 4 MMOL/L (ref 0–18)
ANION GAP SERPL CALC-SCNC: 8 MMOL/L (ref 0–18)
ANION GAP SERPL CALC-SCNC: 8 MMOL/L (ref 0–18)
ANION GAP SERPL CALC-SCNC: <0 MMOL/L (ref 0–18)
ARTERIAL PATENCY WRIST A: POSITIVE
AST SERPL-CCNC: 12 U/L (ref ?–34)
AST SERPL-CCNC: 14 U/L (ref ?–34)
AST SERPL-CCNC: 14 U/L (ref ?–34)
AST SERPL-CCNC: 15 U/L (ref ?–34)
AST SERPL-CCNC: 16 U/L (ref ?–34)
AST SERPL-CCNC: 19 U/L (ref ?–34)
ATRIAL RATE: 136 BPM
ATRIAL RATE: 88 BPM
BASE EXCESS BLDA CALC-SCNC: 0.5 MMOL/L (ref ?–2)
BASOPHILS # BLD AUTO: 0.02 X10(3) UL (ref 0–0.2)
BASOPHILS # BLD AUTO: 0.03 X10(3) UL (ref 0–0.2)
BASOPHILS # BLD AUTO: 0.04 X10(3) UL (ref 0–0.2)
BASOPHILS # BLD AUTO: 0.05 X10(3) UL (ref 0–0.2)
BASOPHILS # BLD AUTO: 0.07 X10(3) UL (ref 0–0.2)
BASOPHILS NFR BLD AUTO: 0.2 %
BASOPHILS NFR BLD AUTO: 0.3 %
BILIRUB SERPL-MCNC: 0.4 MG/DL (ref 0.2–0.9)
BILIRUB SERPL-MCNC: 0.5 MG/DL (ref 0.2–0.9)
BILIRUB SERPL-MCNC: 0.6 MG/DL (ref 0.2–0.9)
BODY TEMPERATURE: 98.6 F
BUN BLD-MCNC: 14 MG/DL (ref 9–23)
BUN BLD-MCNC: 16 MG/DL (ref 9–23)
BUN BLD-MCNC: 17 MG/DL (ref 9–23)
BUN BLD-MCNC: 18 MG/DL (ref 9–23)
BUN BLD-MCNC: 20 MG/DL (ref 9–23)
BUN BLD-MCNC: 22 MG/DL (ref 9–23)
BUN BLD-MCNC: 23 MG/DL (ref 9–23)
CALCIUM BLD-MCNC: 7.4 MG/DL (ref 8.7–10.4)
CALCIUM BLD-MCNC: 7.7 MG/DL (ref 8.7–10.4)
CALCIUM BLD-MCNC: 7.8 MG/DL (ref 8.7–10.4)
CALCIUM BLD-MCNC: 7.9 MG/DL (ref 8.7–10.4)
CALCIUM BLD-MCNC: 7.9 MG/DL (ref 8.7–10.4)
CALCIUM BLD-MCNC: 8 MG/DL (ref 8.7–10.4)
CALCIUM BLD-MCNC: 8.4 MG/DL (ref 8.7–10.4)
CALCIUM BLD-MCNC: 8.5 MG/DL (ref 8.7–10.4)
CALCIUM BLD-MCNC: 8.5 MG/DL (ref 8.7–10.4)
CHLORIDE SERPL-SCNC: 114 MMOL/L (ref 98–112)
CHLORIDE SERPL-SCNC: 114 MMOL/L (ref 98–112)
CHLORIDE SERPL-SCNC: 115 MMOL/L (ref 98–112)
CHLORIDE SERPL-SCNC: 116 MMOL/L (ref 98–112)
CHLORIDE SERPL-SCNC: 116 MMOL/L (ref 98–112)
CHLORIDE SERPL-SCNC: 119 MMOL/L (ref 98–112)
CO2 SERPL-SCNC: 24 MMOL/L (ref 21–32)
CO2 SERPL-SCNC: 24 MMOL/L (ref 21–32)
CO2 SERPL-SCNC: 26 MMOL/L (ref 21–32)
CO2 SERPL-SCNC: 26 MMOL/L (ref 21–32)
CO2 SERPL-SCNC: 27 MMOL/L (ref 21–32)
CO2 SERPL-SCNC: 27 MMOL/L (ref 21–32)
CO2 SERPL-SCNC: 30 MMOL/L (ref 21–32)
CO2 SERPL-SCNC: 30 MMOL/L (ref 21–32)
CO2 SERPL-SCNC: 33 MMOL/L (ref 21–32)
COHGB MFR BLD: 2.3 % SAT (ref 0–3)
CREAT BLD-MCNC: 0.52 MG/DL
CREAT BLD-MCNC: 0.59 MG/DL
CREAT BLD-MCNC: 0.63 MG/DL
CREAT BLD-MCNC: 0.63 MG/DL
CREAT BLD-MCNC: 0.66 MG/DL
CREAT BLD-MCNC: 0.74 MG/DL
CREAT BLD-MCNC: 0.77 MG/DL
CREAT BLD-MCNC: 0.78 MG/DL
CREAT BLD-MCNC: 0.8 MG/DL
EGFRCR SERPLBLD CKD-EPI 2021: 69 ML/MIN/1.73M2 (ref 60–?)
EGFRCR SERPLBLD CKD-EPI 2021: 71 ML/MIN/1.73M2 (ref 60–?)
EGFRCR SERPLBLD CKD-EPI 2021: 72 ML/MIN/1.73M2 (ref 60–?)
EGFRCR SERPLBLD CKD-EPI 2021: 76 ML/MIN/1.73M2 (ref 60–?)
EGFRCR SERPLBLD CKD-EPI 2021: 82 ML/MIN/1.73M2 (ref 60–?)
EGFRCR SERPLBLD CKD-EPI 2021: 83 ML/MIN/1.73M2 (ref 60–?)
EGFRCR SERPLBLD CKD-EPI 2021: 83 ML/MIN/1.73M2 (ref 60–?)
EGFRCR SERPLBLD CKD-EPI 2021: 85 ML/MIN/1.73M2 (ref 60–?)
EGFRCR SERPLBLD CKD-EPI 2021: 87 ML/MIN/1.73M2 (ref 60–?)
EOSINOPHIL # BLD AUTO: 0.01 X10(3) UL (ref 0–0.7)
EOSINOPHIL # BLD AUTO: 0.04 X10(3) UL (ref 0–0.7)
EOSINOPHIL # BLD AUTO: 0.08 X10(3) UL (ref 0–0.7)
EOSINOPHIL # BLD AUTO: 0.13 X10(3) UL (ref 0–0.7)
EOSINOPHIL # BLD AUTO: 0.18 X10(3) UL (ref 0–0.7)
EOSINOPHIL # BLD AUTO: 0.25 X10(3) UL (ref 0–0.7)
EOSINOPHIL # BLD AUTO: 0.29 X10(3) UL (ref 0–0.7)
EOSINOPHIL NFR BLD AUTO: 0.1 %
EOSINOPHIL NFR BLD AUTO: 0.2 %
EOSINOPHIL NFR BLD AUTO: 0.5 %
EOSINOPHIL NFR BLD AUTO: 0.5 %
EOSINOPHIL NFR BLD AUTO: 1 %
EOSINOPHIL NFR BLD AUTO: 1.7 %
EOSINOPHIL NFR BLD AUTO: 1.8 %
ERYTHROCYTE [DISTWIDTH] IN BLOOD BY AUTOMATED COUNT: 17.9 %
ERYTHROCYTE [DISTWIDTH] IN BLOOD BY AUTOMATED COUNT: 18.1 %
ERYTHROCYTE [DISTWIDTH] IN BLOOD BY AUTOMATED COUNT: 18.2 %
ERYTHROCYTE [DISTWIDTH] IN BLOOD BY AUTOMATED COUNT: 18.4 %
ERYTHROCYTE [DISTWIDTH] IN BLOOD BY AUTOMATED COUNT: 18.5 %
GLOBULIN PLAS-MCNC: 2.5 G/DL (ref 2–3.5)
GLOBULIN PLAS-MCNC: 2.7 G/DL (ref 2–3.5)
GLOBULIN PLAS-MCNC: 2.8 G/DL (ref 2–3.5)
GLOBULIN PLAS-MCNC: 2.9 G/DL (ref 2–3.5)
GLOBULIN PLAS-MCNC: 3 G/DL (ref 2–3.5)
GLOBULIN PLAS-MCNC: 3.3 G/DL (ref 2–3.5)
GLUCOSE BLD-MCNC: 100 MG/DL (ref 70–99)
GLUCOSE BLD-MCNC: 101 MG/DL (ref 70–99)
GLUCOSE BLD-MCNC: 101 MG/DL (ref 70–99)
GLUCOSE BLD-MCNC: 104 MG/DL (ref 70–99)
GLUCOSE BLD-MCNC: 107 MG/DL (ref 70–99)
GLUCOSE BLD-MCNC: 108 MG/DL (ref 70–99)
GLUCOSE BLD-MCNC: 110 MG/DL (ref 70–99)
GLUCOSE BLD-MCNC: 112 MG/DL (ref 70–99)
GLUCOSE BLD-MCNC: 117 MG/DL (ref 70–99)
GLUCOSE BLD-MCNC: 126 MG/DL (ref 70–99)
GLUCOSE BLD-MCNC: 126 MG/DL (ref 70–99)
GLUCOSE BLD-MCNC: 130 MG/DL (ref 70–99)
GLUCOSE BLD-MCNC: 131 MG/DL (ref 70–99)
GLUCOSE BLD-MCNC: 142 MG/DL (ref 70–99)
GLUCOSE BLD-MCNC: 152 MG/DL (ref 70–99)
GLUCOSE BLD-MCNC: 177 MG/DL (ref 70–99)
GLUCOSE BLD-MCNC: 207 MG/DL (ref 70–99)
GLUCOSE BLD-MCNC: 61 MG/DL (ref 70–99)
GLUCOSE BLD-MCNC: 78 MG/DL (ref 70–99)
GLUCOSE BLD-MCNC: 79 MG/DL (ref 70–99)
GLUCOSE BLD-MCNC: 80 MG/DL (ref 70–99)
GLUCOSE BLD-MCNC: 81 MG/DL (ref 70–99)
GLUCOSE BLD-MCNC: 82 MG/DL (ref 70–99)
GLUCOSE BLD-MCNC: 83 MG/DL (ref 70–99)
GLUCOSE BLD-MCNC: 83 MG/DL (ref 70–99)
GLUCOSE BLD-MCNC: 84 MG/DL (ref 70–99)
GLUCOSE BLD-MCNC: 85 MG/DL (ref 70–99)
GLUCOSE BLD-MCNC: 86 MG/DL (ref 70–99)
GLUCOSE BLD-MCNC: 87 MG/DL (ref 70–99)
GLUCOSE BLD-MCNC: 89 MG/DL (ref 70–99)
GLUCOSE BLD-MCNC: 90 MG/DL (ref 70–99)
GLUCOSE BLD-MCNC: 91 MG/DL (ref 70–99)
GLUCOSE BLD-MCNC: 94 MG/DL (ref 70–99)
GLUCOSE BLD-MCNC: 94 MG/DL (ref 70–99)
GLUCOSE BLD-MCNC: 97 MG/DL (ref 70–99)
HCO3 BLDA-SCNC: 25.2 MEQ/L (ref 21–27)
HCT VFR BLD AUTO: 33.1 %
HCT VFR BLD AUTO: 33.4 %
HCT VFR BLD AUTO: 33.6 %
HCT VFR BLD AUTO: 33.9 %
HCT VFR BLD AUTO: 34.3 %
HCT VFR BLD AUTO: 35.4 %
HCT VFR BLD AUTO: 35.6 %
HCT VFR BLD AUTO: 42 %
HEPARIN AB PPP QL PL AGG: NEGATIVE
HGB BLD-MCNC: 10.1 G/DL
HGB BLD-MCNC: 10.3 G/DL
HGB BLD-MCNC: 10.4 G/DL
HGB BLD-MCNC: 10.7 G/DL
HGB BLD-MCNC: 10.7 G/DL
HGB BLD-MCNC: 11.2 G/DL
HGB BLD-MCNC: 11.2 G/DL
HGB BLD-MCNC: 12.5 G/DL
HGB BLD-MCNC: 9.8 G/DL
IMM GRANULOCYTES # BLD AUTO: 0.12 X10(3) UL (ref 0–1)
IMM GRANULOCYTES # BLD AUTO: 0.17 X10(3) UL (ref 0–1)
IMM GRANULOCYTES # BLD AUTO: 0.22 X10(3) UL (ref 0–1)
IMM GRANULOCYTES # BLD AUTO: 0.22 X10(3) UL (ref 0–1)
IMM GRANULOCYTES # BLD AUTO: 0.25 X10(3) UL (ref 0–1)
IMM GRANULOCYTES # BLD AUTO: 0.34 X10(3) UL (ref 0–1)
IMM GRANULOCYTES # BLD AUTO: 0.35 X10(3) UL (ref 0–1)
IMM GRANULOCYTES NFR BLD: 0.8 %
IMM GRANULOCYTES NFR BLD: 1 %
IMM GRANULOCYTES NFR BLD: 1.3 %
IMM GRANULOCYTES NFR BLD: 1.4 %
IMM GRANULOCYTES NFR BLD: 1.5 %
IMM GRANULOCYTES NFR BLD: 1.8 %
IMM GRANULOCYTES NFR BLD: 1.9 %
INR BLD: 1.2 (ref 0.8–1.2)
L/M: 2 L/MIN
LACTATE SERPL-SCNC: 0.9 MMOL/L (ref 0.5–2)
LIPASE SERPL-CCNC: 33 U/L (ref 12–53)
LYMPHOCYTES # BLD AUTO: 0.56 X10(3) UL (ref 1–4)
LYMPHOCYTES # BLD AUTO: 0.81 X10(3) UL (ref 1–4)
LYMPHOCYTES # BLD AUTO: 0.89 X10(3) UL (ref 1–4)
LYMPHOCYTES # BLD AUTO: 1.01 X10(3) UL (ref 1–4)
LYMPHOCYTES # BLD AUTO: 1.69 X10(3) UL (ref 1–4)
LYMPHOCYTES # BLD AUTO: 2.36 X10(3) UL (ref 1–4)
LYMPHOCYTES # BLD AUTO: 2.79 X10(3) UL (ref 1–4)
LYMPHOCYTES NFR BLD AUTO: 10.6 %
LYMPHOCYTES NFR BLD AUTO: 15.7 %
LYMPHOCYTES NFR BLD AUTO: 17.4 %
LYMPHOCYTES NFR BLD AUTO: 3.3 %
LYMPHOCYTES NFR BLD AUTO: 3.7 %
LYMPHOCYTES NFR BLD AUTO: 5 %
LYMPHOCYTES NFR BLD AUTO: 6.6 %
MAGNESIUM SERPL-MCNC: 1.6 MG/DL (ref 1.6–2.6)
MAGNESIUM SERPL-MCNC: 1.8 MG/DL (ref 1.6–2.6)
MAGNESIUM SERPL-MCNC: 2 MG/DL (ref 1.6–2.6)
MCH RBC QN AUTO: 30.6 PG (ref 26–34)
MCH RBC QN AUTO: 30.9 PG (ref 26–34)
MCH RBC QN AUTO: 31.3 PG (ref 26–34)
MCH RBC QN AUTO: 31.4 PG (ref 26–34)
MCH RBC QN AUTO: 31.6 PG (ref 26–34)
MCH RBC QN AUTO: 31.7 PG (ref 26–34)
MCH RBC QN AUTO: 31.7 PG (ref 26–34)
MCH RBC QN AUTO: 31.8 PG (ref 26–34)
MCHC RBC AUTO-ENTMCNC: 28.9 G/DL (ref 31–37)
MCHC RBC AUTO-ENTMCNC: 29.8 G/DL (ref 31–37)
MCHC RBC AUTO-ENTMCNC: 30.2 G/DL (ref 31–37)
MCHC RBC AUTO-ENTMCNC: 30.5 G/DL (ref 31–37)
MCHC RBC AUTO-ENTMCNC: 30.8 G/DL (ref 31–37)
MCHC RBC AUTO-ENTMCNC: 31 G/DL (ref 31–37)
MCHC RBC AUTO-ENTMCNC: 31.2 G/DL (ref 31–37)
MCHC RBC AUTO-ENTMCNC: 31.5 G/DL (ref 31–37)
MCV RBC AUTO: 101.2 FL
MCV RBC AUTO: 102.3 FL
MCV RBC AUTO: 102.4 FL
MCV RBC AUTO: 102.8 FL
MCV RBC AUTO: 102.9 FL
MCV RBC AUTO: 104.1 FL
MCV RBC AUTO: 108.3 FL
MCV RBC AUTO: 99.7 FL
METHGB MFR BLD: 0.9 % SAT (ref 0.4–1.5)
MONOCYTES # BLD AUTO: 0.28 X10(3) UL (ref 0.1–1)
MONOCYTES # BLD AUTO: 0.36 X10(3) UL (ref 0.1–1)
MONOCYTES # BLD AUTO: 0.42 X10(3) UL (ref 0.1–1)
MONOCYTES # BLD AUTO: 0.58 X10(3) UL (ref 0.1–1)
MONOCYTES # BLD AUTO: 0.68 X10(3) UL (ref 0.1–1)
MONOCYTES # BLD AUTO: 0.81 X10(3) UL (ref 0.1–1)
MONOCYTES # BLD AUTO: 0.86 X10(3) UL (ref 0.1–1)
MONOCYTES NFR BLD AUTO: 2.1 %
MONOCYTES NFR BLD AUTO: 2.1 %
MONOCYTES NFR BLD AUTO: 2.3 %
MONOCYTES NFR BLD AUTO: 2.9 %
MONOCYTES NFR BLD AUTO: 3.3 %
MONOCYTES NFR BLD AUTO: 5.4 %
MONOCYTES NFR BLD AUTO: 6 %
NEUTROPHILS # BLD AUTO: 11.07 X10 (3) UL (ref 1.5–7.7)
NEUTROPHILS # BLD AUTO: 11.07 X10(3) UL (ref 1.5–7.7)
NEUTROPHILS # BLD AUTO: 13.01 X10 (3) UL (ref 1.5–7.7)
NEUTROPHILS # BLD AUTO: 13.01 X10(3) UL (ref 1.5–7.7)
NEUTROPHILS # BLD AUTO: 13.81 X10 (3) UL (ref 1.5–7.7)
NEUTROPHILS # BLD AUTO: 13.81 X10(3) UL (ref 1.5–7.7)
NEUTROPHILS # BLD AUTO: 15.3 X10 (3) UL (ref 1.5–7.7)
NEUTROPHILS # BLD AUTO: 15.3 X10(3) UL (ref 1.5–7.7)
NEUTROPHILS # BLD AUTO: 18.33 X10 (3) UL (ref 1.5–7.7)
NEUTROPHILS # BLD AUTO: 18.33 X10(3) UL (ref 1.5–7.7)
NEUTROPHILS # BLD AUTO: 21.72 X10 (3) UL (ref 1.5–7.7)
NEUTROPHILS # BLD AUTO: 21.72 X10(3) UL (ref 1.5–7.7)
NEUTROPHILS # BLD AUTO: 9.88 X10 (3) UL (ref 1.5–7.7)
NEUTROPHILS # BLD AUTO: 9.88 X10(3) UL (ref 1.5–7.7)
NEUTROPHILS NFR BLD AUTO: 72.8 %
NEUTROPHILS NFR BLD AUTO: 77.8 %
NEUTROPHILS NFR BLD AUTO: 81.8 %
NEUTROPHILS NFR BLD AUTO: 89.8 %
NEUTROPHILS NFR BLD AUTO: 91.1 %
NEUTROPHILS NFR BLD AUTO: 91.4 %
NEUTROPHILS NFR BLD AUTO: 91.7 %
NT-PROBNP SERPL-MCNC: 2409 PG/ML (ref ?–450)
OSMOLALITY SERPL CALC.SUM OF ELEC: 297 MOSM/KG (ref 275–295)
OSMOLALITY SERPL CALC.SUM OF ELEC: 298 MOSM/KG (ref 275–295)
OSMOLALITY SERPL CALC.SUM OF ELEC: 302 MOSM/KG (ref 275–295)
OSMOLALITY SERPL CALC.SUM OF ELEC: 304 MOSM/KG (ref 275–295)
OSMOLALITY SERPL CALC.SUM OF ELEC: 304 MOSM/KG (ref 275–295)
OSMOLALITY SERPL CALC.SUM OF ELEC: 306 MOSM/KG (ref 275–295)
OSMOLALITY SERPL CALC.SUM OF ELEC: 307 MOSM/KG (ref 275–295)
OSMOLALITY SERPL CALC.SUM OF ELEC: 307 MOSM/KG (ref 275–295)
OSMOLALITY SERPL CALC.SUM OF ELEC: 312 MOSM/KG (ref 275–295)
OXYHGB MFR BLDA: 93.4 % (ref 92–100)
P AXIS: 43 DEGREES
P-R INTERVAL: 154 MS
PCO2 BLDA: 53 MM HG (ref 35–45)
PH BLDA: 7.32 [PH] (ref 7.35–7.45)
PHOSPHATE SERPL-MCNC: 3 MG/DL (ref 2.4–5.1)
PHOSPHATE SERPL-MCNC: 3.5 MG/DL (ref 2.4–5.1)
PLATELET # BLD AUTO: 109 10(3)UL (ref 150–450)
PLATELET # BLD AUTO: 113 10(3)UL (ref 150–450)
PLATELET # BLD AUTO: 118 10(3)UL (ref 150–450)
PLATELET # BLD AUTO: 138 10(3)UL (ref 150–450)
PLATELET # BLD AUTO: 176 10(3)UL (ref 150–450)
PLATELET # BLD AUTO: 186 10(3)UL (ref 150–450)
PLATELET # BLD AUTO: 80 10(3)UL (ref 150–450)
PLATELET # BLD AUTO: 93 10(3)UL (ref 150–450)
PLATELET MORPHOLOGY: NORMAL
PO2 BLDA: 75 MM HG (ref 80–100)
POTASSIUM SERPL-SCNC: 4.3 MMOL/L (ref 3.5–5.1)
POTASSIUM SERPL-SCNC: 4.4 MMOL/L (ref 3.5–5.1)
POTASSIUM SERPL-SCNC: 4.5 MMOL/L (ref 3.5–5.1)
POTASSIUM SERPL-SCNC: 4.5 MMOL/L (ref 3.5–5.1)
POTASSIUM SERPL-SCNC: 4.6 MMOL/L (ref 3.5–5.1)
POTASSIUM SERPL-SCNC: 4.7 MMOL/L (ref 3.5–5.1)
POTASSIUM SERPL-SCNC: 5.1 MMOL/L (ref 3.5–5.1)
PROT SERPL-MCNC: 4.7 G/DL (ref 5.7–8.2)
PROT SERPL-MCNC: 5.2 G/DL (ref 5.7–8.2)
PROT SERPL-MCNC: 5.4 G/DL (ref 5.7–8.2)
PROT SERPL-MCNC: 5.5 G/DL (ref 5.7–8.2)
PROT SERPL-MCNC: 5.7 G/DL (ref 5.7–8.2)
PROT SERPL-MCNC: 6.3 G/DL (ref 5.7–8.2)
PROTHROMBIN TIME: 15.3 SECONDS (ref 11.6–14.8)
Q-T INTERVAL: 306 MS
Q-T INTERVAL: 314 MS
QRS DURATION: 70 MS
QRS DURATION: 74 MS
QTC CALCULATION (BEZET): 370 MS
QTC CALCULATION (BEZET): 454 MS
R AXIS: -11 DEGREES
R AXIS: -8 DEGREES
RBC # BLD AUTO: 3.13 X10(6)UL
RBC # BLD AUTO: 3.18 X10(6)UL
RBC # BLD AUTO: 3.25 X10(6)UL
RBC # BLD AUTO: 3.28 X10(6)UL
RBC # BLD AUTO: 3.39 X10(6)UL
RBC # BLD AUTO: 3.46 X10(6)UL
RBC # BLD AUTO: 3.57 X10(6)UL
RBC # BLD AUTO: 4.08 X10(6)UL
SODIUM SERPL-SCNC: 142 MMOL/L (ref 136–145)
SODIUM SERPL-SCNC: 144 MMOL/L (ref 136–145)
SODIUM SERPL-SCNC: 145 MMOL/L (ref 136–145)
SODIUM SERPL-SCNC: 146 MMOL/L (ref 136–145)
SODIUM SERPL-SCNC: 146 MMOL/L (ref 136–145)
SODIUM SERPL-SCNC: 147 MMOL/L (ref 136–145)
SODIUM SERPL-SCNC: 148 MMOL/L (ref 136–145)
T AXIS: 122 DEGREES
T AXIS: 175 DEGREES
TSI SER-ACNC: 1.88 UIU/ML (ref 0.55–4.78)
VENTRICULAR RATE: 126 BPM
VENTRICULAR RATE: 88 BPM
WBC # BLD AUTO: 12.3 X10(3) UL (ref 4–11)
WBC # BLD AUTO: 12.3 X10(3) UL (ref 4–11)
WBC # BLD AUTO: 13.6 X10(3) UL (ref 4–11)
WBC # BLD AUTO: 15.9 X10(3) UL (ref 4–11)
WBC # BLD AUTO: 16.8 X10(3) UL (ref 4–11)
WBC # BLD AUTO: 17.8 X10(3) UL (ref 4–11)
WBC # BLD AUTO: 20 X10(3) UL (ref 4–11)
WBC # BLD AUTO: 23.8 X10(3) UL (ref 4–11)

## 2024-01-01 PROCEDURE — 99232 SBSQ HOSP IP/OBS MODERATE 35: CPT | Performed by: INTERNAL MEDICINE

## 2024-01-01 PROCEDURE — 05HD33Z INSERTION OF INFUSION DEVICE INTO RIGHT CEPHALIC VEIN, PERCUTANEOUS APPROACH: ICD-10-PCS | Performed by: HOSPITALIST

## 2024-01-01 PROCEDURE — 99291 CRITICAL CARE FIRST HOUR: CPT | Performed by: EMERGENCY MEDICINE

## 2024-01-01 PROCEDURE — 93325 DOPPLER ECHO COLOR FLOW MAPG: CPT | Performed by: NURSE PRACTITIONER

## 2024-01-01 PROCEDURE — 99223 1ST HOSP IP/OBS HIGH 75: CPT | Performed by: STUDENT IN AN ORGANIZED HEALTH CARE EDUCATION/TRAINING PROGRAM

## 2024-01-01 PROCEDURE — XW0334A INTRODUCTION OF CEFEPIME-TANIBORBACTAM ANTI-INFECTIVE INTO PERIPHERAL VEIN, PERCUTANEOUS APPROACH, NEW TECHNOLOGY GROUP 10: ICD-10-PCS | Performed by: HOSPITALIST

## 2024-01-01 PROCEDURE — 99233 SBSQ HOSP IP/OBS HIGH 50: CPT | Performed by: INTERNAL MEDICINE

## 2024-01-01 PROCEDURE — 99238 HOSP IP/OBS DSCHRG MGMT 30/<: CPT | Performed by: HOSPITALIST

## 2024-01-01 PROCEDURE — 0D9N8ZZ DRAINAGE OF SIGMOID COLON, VIA NATURAL OR ARTIFICIAL OPENING ENDOSCOPIC: ICD-10-PCS | Performed by: STUDENT IN AN ORGANIZED HEALTH CARE EDUCATION/TRAINING PROGRAM

## 2024-01-01 PROCEDURE — 71045 X-RAY EXAM CHEST 1 VIEW: CPT | Performed by: INTERNAL MEDICINE

## 2024-01-01 PROCEDURE — 0DBM0ZZ EXCISION OF DESCENDING COLON, OPEN APPROACH: ICD-10-PCS | Performed by: SURGERY

## 2024-01-01 PROCEDURE — 3E0T3BZ INTRODUCTION OF ANESTHETIC AGENT INTO PERIPHERAL NERVES AND PLEXI, PERCUTANEOUS APPROACH: ICD-10-PCS | Performed by: STUDENT IN AN ORGANIZED HEALTH CARE EDUCATION/TRAINING PROGRAM

## 2024-01-01 PROCEDURE — 0D1M0Z4 BYPASS DESCENDING COLON TO CUTANEOUS, OPEN APPROACH: ICD-10-PCS | Performed by: SURGERY

## 2024-01-01 PROCEDURE — 93321 DOPPLER ECHO F-UP/LMTD STD: CPT | Performed by: NURSE PRACTITIONER

## 2024-01-01 PROCEDURE — 71045 X-RAY EXAM CHEST 1 VIEW: CPT | Performed by: EMERGENCY MEDICINE

## 2024-01-01 PROCEDURE — 71045 X-RAY EXAM CHEST 1 VIEW: CPT | Performed by: NURSE PRACTITIONER

## 2024-01-01 PROCEDURE — 99232 SBSQ HOSP IP/OBS MODERATE 35: CPT | Performed by: HOSPITALIST

## 2024-01-01 PROCEDURE — 99223 1ST HOSP IP/OBS HIGH 75: CPT | Performed by: SURGERY

## 2024-01-01 PROCEDURE — 74177 CT ABD & PELVIS W/CONTRAST: CPT | Performed by: EMERGENCY MEDICINE

## 2024-01-01 PROCEDURE — 44146 PARTIAL REMOVAL OF COLON: CPT

## 2024-01-01 PROCEDURE — 0DTN0ZZ RESECTION OF SIGMOID COLON, OPEN APPROACH: ICD-10-PCS | Performed by: SURGERY

## 2024-01-01 PROCEDURE — 99223 1ST HOSP IP/OBS HIGH 75: CPT | Performed by: HOSPITALIST

## 2024-01-01 PROCEDURE — 70450 CT HEAD/BRAIN W/O DYE: CPT | Performed by: INTERNAL MEDICINE

## 2024-01-01 PROCEDURE — 93308 TTE F-UP OR LMTD: CPT | Performed by: NURSE PRACTITIONER

## 2024-01-01 PROCEDURE — 0DTP0ZZ RESECTION OF RECTUM, OPEN APPROACH: ICD-10-PCS | Performed by: SURGERY

## 2024-01-01 PROCEDURE — B54MZZA ULTRASONOGRAPHY OF RIGHT UPPER EXTREMITY VEINS, GUIDANCE: ICD-10-PCS | Performed by: HOSPITALIST

## 2024-01-01 PROCEDURE — 44146 PARTIAL REMOVAL OF COLON: CPT | Performed by: SURGERY

## 2024-01-01 PROCEDURE — 74019 RADEX ABDOMEN 2 VIEWS: CPT | Performed by: NURSE PRACTITIONER

## 2024-01-01 RX ORDER — FUROSEMIDE 10 MG/ML
40 INJECTION INTRAMUSCULAR; INTRAVENOUS EVERY 8 HOURS PRN
Status: DISCONTINUED | OUTPATIENT
Start: 2024-01-01 | End: 2024-01-01

## 2024-01-01 RX ORDER — FAMOTIDINE 10 MG/ML
20 INJECTION, SOLUTION INTRAVENOUS 2 TIMES DAILY
Status: DISCONTINUED | OUTPATIENT
Start: 2024-01-01 | End: 2024-01-01

## 2024-01-01 RX ORDER — POLYETHYLENE GLYCOL 3350 17 G/17G
17 POWDER, FOR SOLUTION ORAL DAILY PRN
Status: DISCONTINUED | OUTPATIENT
Start: 2024-01-01 | End: 2024-01-01

## 2024-01-01 RX ORDER — IPRATROPIUM BROMIDE AND ALBUTEROL SULFATE 2.5; .5 MG/3ML; MG/3ML
3 SOLUTION RESPIRATORY (INHALATION) ONCE AS NEEDED
Status: DISCONTINUED | OUTPATIENT
Start: 2024-01-01 | End: 2024-01-01 | Stop reason: ALTCHOICE

## 2024-01-01 RX ORDER — ACETAMINOPHEN 10 MG/ML
1000 INJECTION, SOLUTION INTRAVENOUS EVERY 6 HOURS
Status: DISCONTINUED | OUTPATIENT
Start: 2024-01-01 | End: 2024-01-01

## 2024-01-01 RX ORDER — PREGABALIN 50 MG/1
50 CAPSULE ORAL 2 TIMES DAILY
Status: DISCONTINUED | OUTPATIENT
Start: 2024-01-01 | End: 2024-01-01

## 2024-01-01 RX ORDER — SODIUM CHLORIDE FOR INHALATION 3 %
3 VIAL, NEBULIZER (ML) INHALATION
Status: DISCONTINUED | OUTPATIENT
Start: 2024-01-01 | End: 2024-01-01

## 2024-01-01 RX ORDER — HYDROMORPHONE HYDROCHLORIDE 1 MG/ML
0.4 INJECTION, SOLUTION INTRAMUSCULAR; INTRAVENOUS; SUBCUTANEOUS EVERY 5 MIN PRN
Status: ACTIVE | OUTPATIENT
Start: 2024-01-01 | End: 2024-01-01

## 2024-01-01 RX ORDER — DEXTROSE MONOHYDRATE 25 G/50ML
50 INJECTION, SOLUTION INTRAVENOUS
Status: DISCONTINUED | OUTPATIENT
Start: 2024-01-01 | End: 2024-01-01 | Stop reason: HOSPADM

## 2024-01-01 RX ORDER — SODIUM CHLORIDE 0.9 % (FLUSH) 0.9 %
10 SYRINGE (ML) INJECTION AS NEEDED
Status: DISCONTINUED | OUTPATIENT
Start: 2024-01-01 | End: 2024-01-01

## 2024-01-01 RX ORDER — SCOLOPAMINE TRANSDERMAL SYSTEM 1 MG/1
1 PATCH, EXTENDED RELEASE TRANSDERMAL
Status: DISCONTINUED | OUTPATIENT
Start: 2024-01-01 | End: 2024-01-01

## 2024-01-01 RX ORDER — POTASSIUM CHLORIDE 1.5 G/1.58G
20 POWDER, FOR SOLUTION ORAL 2 TIMES DAILY
Status: ON HOLD | COMMUNITY
End: 2024-01-01

## 2024-01-01 RX ORDER — ACETAMINOPHEN 10 MG/ML
1000 INJECTION, SOLUTION INTRAVENOUS EVERY 6 HOURS
Status: CANCELLED | OUTPATIENT
Start: 2024-01-01

## 2024-01-01 RX ORDER — METOPROLOL TARTRATE 25 MG/1
25 TABLET, FILM COATED ORAL
Status: DISCONTINUED | OUTPATIENT
Start: 2024-01-01 | End: 2024-01-01

## 2024-01-01 RX ORDER — INSULIN ASPART 100 [IU]/ML
INJECTION, SOLUTION INTRAVENOUS; SUBCUTANEOUS ONCE
Status: DISCONTINUED | OUTPATIENT
Start: 2024-01-01 | End: 2024-01-01 | Stop reason: ALTCHOICE

## 2024-01-01 RX ORDER — METRONIDAZOLE 500 MG/100ML
500 INJECTION, SOLUTION INTRAVENOUS EVERY 12 HOURS
Status: DISCONTINUED | OUTPATIENT
Start: 2024-01-01 | End: 2024-01-01

## 2024-01-01 RX ORDER — FERROUS SULFATE 325(65) MG
325 TABLET, DELAYED RELEASE (ENTERIC COATED) ORAL
Status: DISCONTINUED | OUTPATIENT
Start: 2024-01-01 | End: 2024-01-01

## 2024-01-01 RX ORDER — DULOXETIN HYDROCHLORIDE 30 MG/1
30 CAPSULE, DELAYED RELEASE ORAL DAILY
Status: ON HOLD | COMMUNITY
Start: 2024-01-01 | End: 2024-01-01

## 2024-01-01 RX ORDER — DIPHENHYDRAMINE HYDROCHLORIDE 50 MG/ML
12.5 INJECTION INTRAMUSCULAR; INTRAVENOUS AS NEEDED
Status: ACTIVE | OUTPATIENT
Start: 2024-01-01 | End: 2024-01-01

## 2024-01-01 RX ORDER — IPRATROPIUM BROMIDE AND ALBUTEROL SULFATE 2.5; .5 MG/3ML; MG/3ML
3 SOLUTION RESPIRATORY (INHALATION) EVERY 4 HOURS PRN
Status: DISCONTINUED | OUTPATIENT
Start: 2024-01-01 | End: 2024-01-01

## 2024-01-01 RX ORDER — DEXTROSE MONOHYDRATE 25 G/50ML
50 INJECTION, SOLUTION INTRAVENOUS
Status: DISCONTINUED | OUTPATIENT
Start: 2024-01-01 | End: 2024-01-01

## 2024-01-01 RX ORDER — QUETIAPINE FUMARATE 25 MG/1
25 TABLET, FILM COATED ORAL NIGHTLY
Status: DISCONTINUED | OUTPATIENT
Start: 2024-01-01 | End: 2024-01-01

## 2024-01-01 RX ORDER — HYDROMORPHONE HYDROCHLORIDE 1 MG/ML
0.4 INJECTION, SOLUTION INTRAMUSCULAR; INTRAVENOUS; SUBCUTANEOUS EVERY 2 HOUR PRN
Status: DISCONTINUED | OUTPATIENT
Start: 2024-01-01 | End: 2024-01-01

## 2024-01-01 RX ORDER — NALOXONE HYDROCHLORIDE 0.4 MG/ML
0.08 INJECTION, SOLUTION INTRAMUSCULAR; INTRAVENOUS; SUBCUTANEOUS AS NEEDED
Status: ACTIVE | OUTPATIENT
Start: 2024-01-01 | End: 2024-01-01

## 2024-01-01 RX ORDER — DEXTROSE MONOHYDRATE 50 MG/ML
INJECTION, SOLUTION INTRAVENOUS CONTINUOUS
Status: DISCONTINUED | OUTPATIENT
Start: 2024-01-01 | End: 2024-01-01

## 2024-01-01 RX ORDER — NICOTINE POLACRILEX 4 MG
30 LOZENGE BUCCAL
Status: DISCONTINUED | OUTPATIENT
Start: 2024-01-01 | End: 2024-01-01 | Stop reason: HOSPADM

## 2024-01-01 RX ORDER — ALBUTEROL SULFATE 90 UG/1
2 INHALANT RESPIRATORY (INHALATION) EVERY 4 HOURS PRN
Status: DISCONTINUED | OUTPATIENT
Start: 2024-01-01 | End: 2024-01-01

## 2024-01-01 RX ORDER — METOPROLOL TARTRATE 1 MG/ML
5 INJECTION, SOLUTION INTRAVENOUS EVERY 4 HOURS PRN
Status: DISCONTINUED | OUTPATIENT
Start: 2024-01-01 | End: 2024-01-01

## 2024-01-01 RX ORDER — NICOTINE POLACRILEX 4 MG
15 LOZENGE BUCCAL
Status: DISCONTINUED | OUTPATIENT
Start: 2024-01-01 | End: 2024-01-01 | Stop reason: HOSPADM

## 2024-01-01 RX ORDER — ENOXAPARIN SODIUM 100 MG/ML
40 INJECTION SUBCUTANEOUS DAILY
COMMUNITY

## 2024-01-01 RX ORDER — PANTOPRAZOLE SODIUM 40 MG/1
40 TABLET, DELAYED RELEASE ORAL
Status: DISCONTINUED | OUTPATIENT
Start: 2024-01-01 | End: 2024-01-01 | Stop reason: ALTCHOICE

## 2024-01-01 RX ORDER — ATROPINE SULFATE 10 MG/ML
2 SOLUTION/ DROPS OPHTHALMIC EVERY 2 HOUR PRN
Status: DISCONTINUED | OUTPATIENT
Start: 2024-01-01 | End: 2024-01-01

## 2024-01-01 RX ORDER — BISACODYL 10 MG
10 SUPPOSITORY, RECTAL RECTAL
Status: DISCONTINUED | OUTPATIENT
Start: 2024-01-01 | End: 2024-01-01

## 2024-01-01 RX ORDER — OXYCODONE HYDROCHLORIDE 5 MG/1
5 TABLET ORAL EVERY 4 HOURS PRN
Status: DISCONTINUED | OUTPATIENT
Start: 2024-01-01 | End: 2024-01-01

## 2024-01-01 RX ORDER — ECHINACEA PURPUREA EXTRACT 125 MG
1 TABLET ORAL
Status: DISCONTINUED | OUTPATIENT
Start: 2024-01-01 | End: 2024-01-01

## 2024-01-01 RX ORDER — TRAMADOL HYDROCHLORIDE 25 MG/1
25 TABLET, COATED ORAL EVERY 8 HOURS PRN
COMMUNITY

## 2024-01-01 RX ORDER — MIRTAZAPINE 7.5 MG/1
7.5 TABLET, FILM COATED ORAL NIGHTLY
Status: ON HOLD | COMMUNITY
Start: 2024-01-01 | End: 2024-01-01

## 2024-01-01 RX ORDER — NALOXONE HYDROCHLORIDE 0.4 MG/ML
0.08 INJECTION, SOLUTION INTRAMUSCULAR; INTRAVENOUS; SUBCUTANEOUS AS NEEDED
Status: DISCONTINUED | OUTPATIENT
Start: 2024-01-01 | End: 2024-01-01 | Stop reason: HOSPADM

## 2024-01-01 RX ORDER — ACETAMINOPHEN 500 MG
500 TABLET ORAL EVERY 4 HOURS PRN
Status: DISCONTINUED | OUTPATIENT
Start: 2024-01-01 | End: 2024-01-01

## 2024-01-01 RX ORDER — SODIUM CHLORIDE, SODIUM LACTATE, POTASSIUM CHLORIDE, CALCIUM CHLORIDE 600; 310; 30; 20 MG/100ML; MG/100ML; MG/100ML; MG/100ML
2 INJECTION, SOLUTION INTRAVENOUS CONTINUOUS
Status: DISCONTINUED | OUTPATIENT
Start: 2024-01-01 | End: 2024-01-01

## 2024-01-01 RX ORDER — METOCLOPRAMIDE HYDROCHLORIDE 5 MG/ML
5 INJECTION INTRAMUSCULAR; INTRAVENOUS EVERY 8 HOURS PRN
Status: DISCONTINUED | OUTPATIENT
Start: 2024-01-01 | End: 2024-01-01

## 2024-01-01 RX ORDER — MAGNESIUM SULFATE HEPTAHYDRATE 40 MG/ML
2 INJECTION, SOLUTION INTRAVENOUS ONCE
Status: COMPLETED | OUTPATIENT
Start: 2024-01-01 | End: 2024-01-01

## 2024-01-01 RX ORDER — METOPROLOL TARTRATE 1 MG/ML
5 INJECTION, SOLUTION INTRAVENOUS ONCE
Status: COMPLETED | OUTPATIENT
Start: 2024-01-01 | End: 2024-01-01

## 2024-01-01 RX ORDER — METOPROLOL TARTRATE 1 MG/ML
5 INJECTION, SOLUTION INTRAVENOUS EVERY 4 HOURS PRN
Status: CANCELLED | OUTPATIENT
Start: 2024-01-01

## 2024-01-01 RX ORDER — ACETAMINOPHEN 10 MG/ML
1000 INJECTION, SOLUTION INTRAVENOUS EVERY 6 HOURS PRN
Status: DISCONTINUED | OUTPATIENT
Start: 2024-01-01 | End: 2024-01-01

## 2024-01-01 RX ORDER — HYDROCODONE BITARTRATE AND ACETAMINOPHEN 5; 325 MG/1; MG/1
2 TABLET ORAL ONCE AS NEEDED
Status: DISCONTINUED | OUTPATIENT
Start: 2024-01-01 | End: 2024-01-01 | Stop reason: ALTCHOICE

## 2024-01-01 RX ORDER — FUROSEMIDE 10 MG/ML
40 INJECTION INTRAMUSCULAR; INTRAVENOUS
Status: DISCONTINUED | OUTPATIENT
Start: 2024-01-01 | End: 2024-01-01

## 2024-01-01 RX ORDER — ATORVASTATIN CALCIUM 40 MG/1
40 TABLET, FILM COATED ORAL NIGHTLY
Qty: 90 TABLET | Refills: 0 | Status: CANCELLED | OUTPATIENT
Start: 2024-01-01

## 2024-01-01 RX ORDER — METOPROLOL TARTRATE 1 MG/ML
5 INJECTION, SOLUTION INTRAVENOUS ONCE
Status: DISCONTINUED | OUTPATIENT
Start: 2024-01-01 | End: 2024-01-01

## 2024-01-01 RX ORDER — HYDROMORPHONE HYDROCHLORIDE 1 MG/ML
0.6 INJECTION, SOLUTION INTRAMUSCULAR; INTRAVENOUS; SUBCUTANEOUS EVERY 5 MIN PRN
Status: DISCONTINUED | OUTPATIENT
Start: 2024-01-01 | End: 2024-01-01 | Stop reason: HOSPADM

## 2024-01-01 RX ORDER — HYDROMORPHONE HYDROCHLORIDE 1 MG/ML
0.2 INJECTION, SOLUTION INTRAMUSCULAR; INTRAVENOUS; SUBCUTANEOUS EVERY 5 MIN PRN
Status: DISCONTINUED | OUTPATIENT
Start: 2024-01-01 | End: 2024-01-01 | Stop reason: HOSPADM

## 2024-01-01 RX ORDER — DIPHENHYDRAMINE HYDROCHLORIDE 50 MG/ML
12.5 INJECTION INTRAMUSCULAR; INTRAVENOUS EVERY 8 HOURS PRN
Status: DISCONTINUED | OUTPATIENT
Start: 2024-01-01 | End: 2024-01-01

## 2024-01-01 RX ORDER — BENZONATATE 100 MG/1
200 CAPSULE ORAL 3 TIMES DAILY PRN
Status: DISCONTINUED | OUTPATIENT
Start: 2024-01-01 | End: 2024-01-01

## 2024-01-01 RX ORDER — MAGNESIUM OXIDE 400 MG/1
400 TABLET ORAL ONCE
Status: DISCONTINUED | OUTPATIENT
Start: 2024-01-01 | End: 2024-01-01

## 2024-01-01 RX ORDER — LORAZEPAM 2 MG/ML
2 INJECTION INTRAMUSCULAR EVERY 4 HOURS PRN
Status: DISCONTINUED | OUTPATIENT
Start: 2024-01-01 | End: 2024-01-01

## 2024-01-01 RX ORDER — LORAZEPAM 2 MG/ML
1 INJECTION INTRAMUSCULAR EVERY 4 HOURS PRN
Status: DISCONTINUED | OUTPATIENT
Start: 2024-01-01 | End: 2024-01-01

## 2024-01-01 RX ORDER — LEVOTHYROXINE SODIUM 125 UG/1
125 TABLET ORAL
Status: DISCONTINUED | OUTPATIENT
Start: 2024-01-01 | End: 2024-01-01

## 2024-01-01 RX ORDER — NICOTINE POLACRILEX 4 MG
30 LOZENGE BUCCAL
Status: DISCONTINUED | OUTPATIENT
Start: 2024-01-01 | End: 2024-01-01

## 2024-01-01 RX ORDER — PREDNISONE 10 MG/1
10 TABLET ORAL
Status: DISCONTINUED | OUTPATIENT
Start: 2024-01-01 | End: 2024-01-01

## 2024-01-01 RX ORDER — BENZOCAINE/MENTHOL 6 MG-10 MG
LOZENGE MUCOUS MEMBRANE 3 TIMES DAILY PRN
Status: DISCONTINUED | OUTPATIENT
Start: 2024-01-01 | End: 2024-01-01

## 2024-01-01 RX ORDER — MORPHINE SULFATE 2 MG/ML
1 INJECTION, SOLUTION INTRAMUSCULAR; INTRAVENOUS
Status: DISCONTINUED | OUTPATIENT
Start: 2024-01-01 | End: 2024-01-01

## 2024-01-01 RX ORDER — ONDANSETRON 2 MG/ML
4 INJECTION INTRAMUSCULAR; INTRAVENOUS EVERY 6 HOURS PRN
Status: DISCONTINUED | OUTPATIENT
Start: 2024-01-01 | End: 2024-01-01

## 2024-01-01 RX ORDER — BENZOCAINE/MENTHOL 6 MG-10 MG
LOZENGE MUCOUS MEMBRANE 3 TIMES DAILY PRN
Status: CANCELLED | OUTPATIENT
Start: 2024-01-01

## 2024-01-01 RX ORDER — HYDROMORPHONE HYDROCHLORIDE 1 MG/ML
0.4 INJECTION, SOLUTION INTRAMUSCULAR; INTRAVENOUS; SUBCUTANEOUS EVERY 5 MIN PRN
Status: DISCONTINUED | OUTPATIENT
Start: 2024-01-01 | End: 2024-01-01 | Stop reason: HOSPADM

## 2024-01-01 RX ORDER — INSULIN DEGLUDEC 100 U/ML
10 INJECTION, SOLUTION SUBCUTANEOUS NIGHTLY
Status: DISCONTINUED | OUTPATIENT
Start: 2024-01-01 | End: 2024-01-01

## 2024-01-01 RX ORDER — FOLIC ACID 1 MG/1
1 TABLET ORAL DAILY
Status: DISCONTINUED | OUTPATIENT
Start: 2024-01-01 | End: 2024-01-01

## 2024-01-01 RX ORDER — METOPROLOL TARTRATE 1 MG/ML
5 INJECTION, SOLUTION INTRAVENOUS EVERY 6 HOURS
Status: CANCELLED | OUTPATIENT
Start: 2024-01-01

## 2024-01-01 RX ORDER — IPRATROPIUM BROMIDE AND ALBUTEROL SULFATE 2.5; .5 MG/3ML; MG/3ML
3 SOLUTION RESPIRATORY (INHALATION) EVERY 6 HOURS PRN
Status: DISCONTINUED | OUTPATIENT
Start: 2024-01-01 | End: 2024-01-01

## 2024-01-01 RX ORDER — FUROSEMIDE 10 MG/ML
40 INJECTION INTRAMUSCULAR; INTRAVENOUS ONCE
Status: COMPLETED | OUTPATIENT
Start: 2024-01-01 | End: 2024-01-01

## 2024-01-01 RX ORDER — FOLIC ACID 1 MG/1
1 TABLET ORAL DAILY
COMMUNITY

## 2024-01-01 RX ORDER — HYDROMORPHONE HYDROCHLORIDE 1 MG/ML
0.2 INJECTION, SOLUTION INTRAMUSCULAR; INTRAVENOUS; SUBCUTANEOUS EVERY 2 HOUR PRN
Status: DISCONTINUED | OUTPATIENT
Start: 2024-01-01 | End: 2024-01-01

## 2024-01-01 RX ORDER — HALOPERIDOL 5 MG/ML
2 INJECTION INTRAMUSCULAR
Status: DISCONTINUED | OUTPATIENT
Start: 2024-01-01 | End: 2024-01-01

## 2024-01-01 RX ORDER — IPRATROPIUM BROMIDE AND ALBUTEROL SULFATE 2.5; .5 MG/3ML; MG/3ML
3 SOLUTION RESPIRATORY (INHALATION) EVERY 6 HOURS PRN
COMMUNITY

## 2024-01-01 RX ORDER — SODIUM CHLORIDE 9 MG/ML
125 INJECTION, SOLUTION INTRAVENOUS CONTINUOUS
Status: DISCONTINUED | OUTPATIENT
Start: 2024-01-01 | End: 2024-01-01

## 2024-01-01 RX ORDER — SODIUM CHLORIDE, SODIUM LACTATE, POTASSIUM CHLORIDE, CALCIUM CHLORIDE 600; 310; 30; 20 MG/100ML; MG/100ML; MG/100ML; MG/100ML
INJECTION, SOLUTION INTRAVENOUS CONTINUOUS
Status: DISCONTINUED | OUTPATIENT
Start: 2024-01-01 | End: 2024-01-01

## 2024-01-01 RX ORDER — SODIUM CHLORIDE 9 MG/ML
INJECTION, SOLUTION INTRAVENOUS CONTINUOUS
Status: ACTIVE | OUTPATIENT
Start: 2024-01-01 | End: 2024-01-01

## 2024-01-01 RX ORDER — HYDROMORPHONE HYDROCHLORIDE 1 MG/ML
0.6 INJECTION, SOLUTION INTRAMUSCULAR; INTRAVENOUS; SUBCUTANEOUS EVERY 5 MIN PRN
Status: ACTIVE | OUTPATIENT
Start: 2024-01-01 | End: 2024-01-01

## 2024-01-01 RX ORDER — ACETAMINOPHEN 650 MG/1
650 SUPPOSITORY RECTAL EVERY 4 HOURS PRN
Status: DISCONTINUED | OUTPATIENT
Start: 2024-01-01 | End: 2024-01-01

## 2024-01-01 RX ORDER — SODIUM CHLORIDE FOR INHALATION 3 %
3 VIAL, NEBULIZER (ML) INHALATION 2 TIMES DAILY
Status: DISCONTINUED | OUTPATIENT
Start: 2024-01-01 | End: 2024-01-01

## 2024-01-01 RX ORDER — HYDROCODONE BITARTRATE AND ACETAMINOPHEN 5; 325 MG/1; MG/1
1 TABLET ORAL ONCE AS NEEDED
Status: DISCONTINUED | OUTPATIENT
Start: 2024-01-01 | End: 2024-01-01 | Stop reason: ALTCHOICE

## 2024-01-01 RX ORDER — DEXTROSE, SODIUM CHLORIDE, SODIUM LACTATE, POTASSIUM CHLORIDE, AND CALCIUM CHLORIDE 5; .6; .31; .03; .02 G/100ML; G/100ML; G/100ML; G/100ML; G/100ML
INJECTION, SOLUTION INTRAVENOUS CONTINUOUS
Status: DISCONTINUED | OUTPATIENT
Start: 2024-01-01 | End: 2024-01-01

## 2024-01-01 RX ORDER — HYDROMORPHONE HYDROCHLORIDE 1 MG/ML
0.2 INJECTION, SOLUTION INTRAMUSCULAR; INTRAVENOUS; SUBCUTANEOUS EVERY 5 MIN PRN
Status: ACTIVE | OUTPATIENT
Start: 2024-01-01 | End: 2024-01-01

## 2024-01-01 RX ORDER — ACETAMINOPHEN 500 MG
1000 TABLET ORAL ONCE AS NEEDED
Status: DISCONTINUED | OUTPATIENT
Start: 2024-01-01 | End: 2024-01-01 | Stop reason: ALTCHOICE

## 2024-01-01 RX ORDER — FAMOTIDINE 20 MG/1
20 TABLET, FILM COATED ORAL 2 TIMES DAILY
Status: DISCONTINUED | OUTPATIENT
Start: 2024-01-01 | End: 2024-01-01

## 2024-01-01 RX ORDER — HALOPERIDOL 5 MG/ML
1 INJECTION INTRAMUSCULAR
Status: DISCONTINUED | OUTPATIENT
Start: 2024-01-01 | End: 2024-01-01

## 2024-01-01 RX ORDER — LANSOPRAZOLE 30 MG/1
30 TABLET, ORALLY DISINTEGRATING, DELAYED RELEASE ORAL
Status: DISCONTINUED | OUTPATIENT
Start: 2024-01-01 | End: 2024-01-01

## 2024-01-01 RX ORDER — POTASSIUM CHLORIDE 1500 MG/1
2 TABLET, EXTENDED RELEASE ORAL DAILY
COMMUNITY

## 2024-01-01 RX ORDER — PREDNISONE 1 MG/1
1 TABLET ORAL
Status: DISCONTINUED | OUTPATIENT
Start: 2024-01-01 | End: 2024-01-01

## 2024-01-01 RX ORDER — PREDNISONE 20 MG/1
20 TABLET ORAL
Status: DISCONTINUED | OUTPATIENT
Start: 2024-01-01 | End: 2024-01-01

## 2024-01-01 RX ORDER — LEVOTHYROXINE SODIUM 100 UG/1
100 TABLET ORAL DAILY
Qty: 90 TABLET | Refills: 0 | Status: CANCELLED | OUTPATIENT
Start: 2024-01-01

## 2024-01-01 RX ORDER — SODIUM CHLORIDE, SODIUM LACTATE, POTASSIUM CHLORIDE, CALCIUM CHLORIDE 600; 310; 30; 20 MG/100ML; MG/100ML; MG/100ML; MG/100ML
INJECTION, SOLUTION INTRAVENOUS CONTINUOUS
Status: DISCONTINUED | OUTPATIENT
Start: 2024-01-01 | End: 2024-01-01 | Stop reason: HOSPADM

## 2024-01-01 RX ORDER — MAGNESIUM OXIDE 400 MG/1
400 TABLET ORAL DAILY
Status: DISCONTINUED | OUTPATIENT
Start: 2024-01-01 | End: 2024-01-01

## 2024-01-01 RX ORDER — FERROUS SULFATE 325(65) MG
325 TABLET, DELAYED RELEASE (ENTERIC COATED) ORAL
COMMUNITY

## 2024-01-01 RX ORDER — ACETAMINOPHEN 500 MG
1000 TABLET ORAL EVERY 8 HOURS SCHEDULED
Status: DISCONTINUED | OUTPATIENT
Start: 2024-01-01 | End: 2024-01-01 | Stop reason: ALTCHOICE

## 2024-01-01 RX ORDER — GLYCOPYRROLATE 0.2 MG/ML
0.4 INJECTION, SOLUTION INTRAMUSCULAR; INTRAVENOUS
Status: DISCONTINUED | OUTPATIENT
Start: 2024-01-01 | End: 2024-01-01

## 2024-01-01 RX ORDER — FAMOTIDINE 10 MG/ML
20 INJECTION, SOLUTION INTRAVENOUS 2 TIMES DAILY
Status: CANCELLED | OUTPATIENT
Start: 2024-01-01

## 2024-01-01 RX ORDER — DIPHENHYDRAMINE HYDROCHLORIDE 50 MG/ML
12.5 INJECTION INTRAMUSCULAR; INTRAVENOUS ONCE
Status: COMPLETED | OUTPATIENT
Start: 2024-01-01 | End: 2024-01-01

## 2024-01-01 RX ORDER — ATORVASTATIN CALCIUM 40 MG/1
40 TABLET, FILM COATED ORAL NIGHTLY
Status: DISCONTINUED | OUTPATIENT
Start: 2024-01-01 | End: 2024-01-01

## 2024-01-01 RX ORDER — SENNOSIDES 8.6 MG
17.2 TABLET ORAL NIGHTLY PRN
Status: DISCONTINUED | OUTPATIENT
Start: 2024-01-01 | End: 2024-01-01

## 2024-01-01 RX ORDER — ENOXAPARIN SODIUM 100 MG/ML
40 INJECTION SUBCUTANEOUS DAILY
Status: DISCONTINUED | OUTPATIENT
Start: 2024-01-01 | End: 2024-01-01

## 2024-01-01 RX ORDER — FUROSEMIDE 10 MG/ML
40 INJECTION INTRAMUSCULAR; INTRAVENOUS DAILY
Status: DISCONTINUED | OUTPATIENT
Start: 2024-01-01 | End: 2024-01-01

## 2024-01-01 RX ORDER — NICOTINE POLACRILEX 4 MG
15 LOZENGE BUCCAL
Status: DISCONTINUED | OUTPATIENT
Start: 2024-01-01 | End: 2024-01-01

## 2024-01-09 ENCOUNTER — TELEPHONE (OUTPATIENT)
Dept: FAMILY MEDICINE CLINIC | Facility: CLINIC | Age: 89
End: 2024-01-09

## 2024-01-09 DIAGNOSIS — I10 HYPERTENSION, BENIGN: ICD-10-CM

## 2024-01-10 NOTE — TELEPHONE ENCOUNTER
Hypertension Medications Protocol [id number]/09/2024 03:33 PM   Protocol Details CMP or BMP in past 12 months    Last serum creatinine< 2.0    Appointment in past 6 or next 3 months        Requested Prescriptions     Pending Prescriptions Disp Refills    METOPROLOL TARTRATE 25 MG Oral Tab [Pharmacy Med Name: METOPROLOL TARTRATE 25MG TABLETS] 180 tablet 0     Sig: TAKE 1 TABLET(25 MG) BY MOUTH TWICE DAILY     LOV 10/19/2023     Patient was asked to follow-up in: 3 months    Appointment scheduled: 1/23/2024 Tai Chavarria NP     Medication refilled per protocol.

## 2024-01-10 NOTE — TELEPHONE ENCOUNTER
Pt called to request refill for medication Metoprolol. She only has enough to last her until the end of the week.     Please advise

## 2024-01-25 ENCOUNTER — OFFICE VISIT (OUTPATIENT)
Dept: FAMILY MEDICINE CLINIC | Facility: CLINIC | Age: 89
End: 2024-01-25
Payer: MEDICARE

## 2024-01-25 VITALS
OXYGEN SATURATION: 97 % | RESPIRATION RATE: 16 BRPM | HEART RATE: 82 BPM | TEMPERATURE: 97 F | DIASTOLIC BLOOD PRESSURE: 66 MMHG | SYSTOLIC BLOOD PRESSURE: 134 MMHG

## 2024-01-25 DIAGNOSIS — I10 HYPERTENSION, BENIGN: ICD-10-CM

## 2024-01-25 DIAGNOSIS — R60.0 PEDAL EDEMA: ICD-10-CM

## 2024-01-25 DIAGNOSIS — K13.70 ORAL LESION: Primary | ICD-10-CM

## 2024-01-25 PROCEDURE — 99214 OFFICE O/P EST MOD 30 MIN: CPT | Performed by: NURSE PRACTITIONER

## 2024-01-25 RX ORDER — AMLODIPINE BESYLATE 5 MG/1
5 TABLET ORAL NIGHTLY
COMMUNITY
Start: 2024-01-25

## 2024-01-25 NOTE — PATIENT INSTRUCTIONS
Check blood pressure 2-3 times weekly at a different time of day each check.     Sit quietly for at least 5 minutes prior to checking blood pressure.    Sit up straight with both feet flat on the floor, with arm elevated to approximately the level of your heart.     Record blood pressure readings (date, time and result).  If more than 30% of readings are over 140 on the top or 90 on the bottom, notify our office.

## 2024-01-25 NOTE — PROGRESS NOTES
Chief Complaint   Patient presents with    ER F/U     Seen on 11/14/ for fall       HPI:  Presents with daughter with approx 1 month history of painful area to right lateral tongue. Notes she did break tooth before Thanksgiving and broken tooth is about location of pain to tongue. Reports did see dentist about broken tooth and he reportedly recommended no intervention if it was not bothering her. Reports tongue is more painful with eating, chewing and swallowing but denies difficulty swallowing, choking, tongue swelling. Has not been treating with anything.     Also reports bilateral lower leg edema. Was hospitalized in November after fall and discharged to [facility] on 11/17 for 2 weeks of inpatient rehab. Then was sent back to [facility], where she has lived for some time. While in hospital she was started on amlodipine for HTN management. Does feel the swelling started after hospitalization. Reports blood pressures have been well controlled. Denies chest pain, palpitations, SOB, COONEY, headaches, dizziness, lightheadedness, visual changes. Is able to walk with walker at her baseline level.      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)    Macular cyst, hole, or pseudohole, right eye    Nonexudative age-related macular degeneration, right eye, intermediate dry stage    PMR (polymyalgia rheumatica) ([facility])    Joo  stone degeneration of retina, bilateral    Asymptomatic carotid artery stenosis, bilateral    Weakness    Constipation    Thrombocytopenia (HCC)    Syncope and collapse    Concussion with loss of consciousness    Fall, initial encounter    Laceration of right little finger without foreign body without damage to nail, initial encounter    Neuropathy       Current Outpatient Medications   Medication Sig Dispense Refill    amLODIPine 5 MG Oral Tab Take 1 tablet (5 mg total) by mouth at bedtime.      METOPROLOL TARTRATE 25 MG Oral Tab TAKE 1 TABLET(25 MG) BY MOUTH TWICE DAILY 180 tablet 0    pregabalin 25 MG Oral Cap Take 1 capsule (25 mg total) by mouth 2 (two) times daily. 180 capsule 0    sertraline 25 MG Oral Tab Take 1 tablet (25 mg total) by mouth daily. 30 tablet 2    nystatin 100,000 Units/g External Cream       docusate sodium 100 MG Oral Cap Take 1 capsule (100 mg total) by mouth 2 (two) times daily as needed for constipation.      ergocalciferol 1.25 MG (24651 UT) Oral Cap Take 1 capsule (50,000 Units total) by mouth once a week.      ATORVASTATIN 40 MG Oral Tab TAKE 1 TABLET(40 MG) BY MOUTH EVERY NIGHT 90 tablet 1    LEVOTHYROXINE 100 MCG Oral Tab TAKE 1 TABLET(100 MCG) BY MOUTH DAILY 90 tablet 1    meclizine 12.5 MG Oral Tab Take 1-2 tablets (12.5-25 mg total) by mouth 3 (three) times daily as needed for Dizziness. 30 tablet 1    predniSONE 1 MG Oral Tab Take 1 tablet (1 mg total) by mouth daily with breakfast. 90 tablet 2    Multiple Vitamins-Minerals (PRESERVISION AREDS 2 OR) Take by mouth.      EYE DROPS RELIEF OP Apply 1 drop to eye daily as needed.      acetaminophen 500 MG Oral Tab Take 2 tablets (1,000 mg total) by mouth every 12 (twelve) hours as needed for Pain.      ofloxacin 0.3 % Ophthalmic Solution as needed.      Betamethasone Dipropionate 0.05 % External Ointment Apply BID as needed 45 g 3       Physical Exam  /66   Pulse 82   Temp 97.2 °F (36.2 °C)   Resp 16   SpO2 97%    Constitutional: well developed, well nourished, in no apparent distress  HEENT: Normocephalic and atraumatic. Right lateral tongue noted with erythematous, excoriated lesion without drainage or induration near area of broken tooth. Rest of oropharynx is pink and moist without lesions.  Eyes: Conjunctivae are pink and moist without exudate or drainage.  Lymphadenopathy: No cervical adenopathy.   Cardiovascular: Normal rate, regular rhythm.  No murmur.   Pulmonary/Chest: No respiratory distress. Effort normal. Breath sounds clear bilaterally. No wheezes, rhonchi or rales  Ext: Bilateral lower legs with pitting edema w/o erythema, warmth to touch,  TTP.   Skin: Skin is warm and dry.     A/P:    Encounter Diagnoses   Name Primary?    Oral lesion- likely related to rubbing on jagged tooth surface. Can trial Orajel or chloraseptic (applied to Q-tip and placed directly on sore) for pain management. Discussed should see dentist for options to cover or repair tooth as lesion will not heal as long as rubbing on broken tooth surface. Verbalized understanding of instructions and agreeable to this plan of care.    Yes    Pedal edema- likely side effect of amlodipine but BP well controlled. Change to taking amlodipine at night instead of during the day. Track home BP and see me in 3 weeks. Discussed if no improvement in edema can consider amlodipine 2.5mg BID or change of medication. See me sooner if worsening.        Hypertension, benign- management as above.         No orders of the defined types were placed in this encounter.      Meds & Refills for this Visit:  Requested Prescriptions      No prescriptions requested or ordered in this encounter       Imaging & Consults:  None    Return in about 3 weeks (around 2/15/2024) for BP follow up. .  Patient Instructions                         Check blood pressure 2-3 times weekly at a different time of day each check.     Sit quietly for at least 5 minutes prior to checking blood  pressure.    Sit up straight with both feet flat on the floor, with arm elevated to approximately the level of your heart.     Record blood pressure readings (date, time and result).  If more than 30% of readings are over 140 on the top or 90 on the bottom, notify our office.       All questions were answered and the patient understands the plan.

## 2024-01-31 ENCOUNTER — HOSPITAL ENCOUNTER (OUTPATIENT)
Age: 89
Discharge: ANOTHER HOSPITAL | End: 2024-01-31
Payer: MEDICARE

## 2024-01-31 ENCOUNTER — APPOINTMENT (OUTPATIENT)
Dept: GENERAL RADIOLOGY | Age: 89
End: 2024-01-31
Attending: PHYSICIAN ASSISTANT
Payer: MEDICARE

## 2024-01-31 ENCOUNTER — HOSPITAL ENCOUNTER (OUTPATIENT)
Facility: HOSPITAL | Age: 89
Setting detail: OBSERVATION
Discharge: HOME | End: 2024-02-01
Attending: EMERGENCY MEDICINE | Admitting: INTERNAL MEDICINE
Payer: MEDICARE

## 2024-01-31 VITALS
BODY MASS INDEX: 27 KG/M2 | DIASTOLIC BLOOD PRESSURE: 73 MMHG | SYSTOLIC BLOOD PRESSURE: 161 MMHG | HEART RATE: 104 BPM | WEIGHT: 166.44 LBS | TEMPERATURE: 99 F | OXYGEN SATURATION: 95 % | RESPIRATION RATE: 20 BRPM

## 2024-01-31 DIAGNOSIS — E03.9 HYPOTHYROIDISM, UNSPECIFIED TYPE: ICD-10-CM

## 2024-01-31 DIAGNOSIS — R06.82 TACHYPNEA: ICD-10-CM

## 2024-01-31 DIAGNOSIS — J21.0 RSV (ACUTE BRONCHIOLITIS DUE TO RESPIRATORY SYNCYTIAL VIRUS): Primary | ICD-10-CM

## 2024-01-31 DIAGNOSIS — J98.01 ACUTE BRONCHOSPASM: Primary | ICD-10-CM

## 2024-01-31 DIAGNOSIS — R00.0 TACHYCARDIA WITH HEART RATE 100-120 BEATS PER MINUTE: ICD-10-CM

## 2024-01-31 DIAGNOSIS — R06.02 SHORTNESS OF BREATH: ICD-10-CM

## 2024-01-31 DIAGNOSIS — B33.8 RESPIRATORY SYNCYTIAL VIRUS (RSV): ICD-10-CM

## 2024-01-31 DIAGNOSIS — R05.1 ACUTE COUGH: ICD-10-CM

## 2024-01-31 LAB
ALBUMIN SERPL-MCNC: 3.2 G/DL (ref 3.4–5)
ALBUMIN/GLOB SERPL: 0.8 {RATIO} (ref 1–2)
ALP LIVER SERPL-CCNC: 103 U/L
ALT SERPL-CCNC: 16 U/L
ANION GAP SERPL CALC-SCNC: 5 MMOL/L (ref 0–18)
AST SERPL-CCNC: 17 U/L (ref 15–37)
BASOPHILS # BLD AUTO: 0.02 X10(3) UL (ref 0–0.2)
BASOPHILS NFR BLD AUTO: 0.2 %
BILIRUB SERPL-MCNC: 0.7 MG/DL (ref 0.1–2)
BUN BLD-MCNC: 13 MG/DL (ref 9–23)
CALCIUM BLD-MCNC: 8.2 MG/DL (ref 8.5–10.1)
CHLORIDE SERPL-SCNC: 111 MMOL/L (ref 98–112)
CO2 SERPL-SCNC: 25 MMOL/L (ref 21–32)
CREAT BLD-MCNC: 0.64 MG/DL
EGFRCR SERPLBLD CKD-EPI 2021: 83 ML/MIN/1.73M2 (ref 60–?)
EOSINOPHIL # BLD AUTO: 0 X10(3) UL (ref 0–0.7)
EOSINOPHIL NFR BLD AUTO: 0 %
ERYTHROCYTE [DISTWIDTH] IN BLOOD BY AUTOMATED COUNT: 14.4 %
FLUAV + FLUBV RNA SPEC NAA+PROBE: NEGATIVE
FLUAV + FLUBV RNA SPEC NAA+PROBE: NEGATIVE
GLOBULIN PLAS-MCNC: 3.8 G/DL (ref 2.8–4.4)
GLUCOSE BLD-MCNC: 133 MG/DL (ref 70–99)
HCT VFR BLD AUTO: 38.8 %
HGB BLD-MCNC: 12.5 G/DL
IMM GRANULOCYTES # BLD AUTO: 0.06 X10(3) UL (ref 0–1)
IMM GRANULOCYTES NFR BLD: 0.6 %
LACTATE SERPL-SCNC: 0.8 MMOL/L (ref 0.4–2)
LYMPHOCYTES # BLD AUTO: 0.61 X10(3) UL (ref 1–4)
LYMPHOCYTES NFR BLD AUTO: 6.1 %
MCH RBC QN AUTO: 31.3 PG (ref 26–34)
MCHC RBC AUTO-ENTMCNC: 32.2 G/DL (ref 31–37)
MCV RBC AUTO: 97.2 FL
MONOCYTES # BLD AUTO: 0.49 X10(3) UL (ref 0.1–1)
MONOCYTES NFR BLD AUTO: 4.9 %
NEUTROPHILS # BLD AUTO: 8.74 X10 (3) UL (ref 1.5–7.7)
NEUTROPHILS # BLD AUTO: 8.74 X10(3) UL (ref 1.5–7.7)
NEUTROPHILS NFR BLD AUTO: 88.2 %
NT-PROBNP SERPL-MCNC: 185 PG/ML (ref ?–450)
OSMOLALITY SERPL CALC.SUM OF ELEC: 294 MOSM/KG (ref 275–295)
PLATELET # BLD AUTO: 134 10(3)UL (ref 150–450)
POTASSIUM SERPL-SCNC: 3.8 MMOL/L (ref 3.5–5.1)
PROT SERPL-MCNC: 7 G/DL (ref 6.4–8.2)
RBC # BLD AUTO: 3.99 X10(6)UL
RSV RNA SPEC NAA+PROBE: POSITIVE
SARS-COV-2 RNA RESP QL NAA+PROBE: NOT DETECTED
SODIUM SERPL-SCNC: 141 MMOL/L (ref 136–145)
TROPONIN I SERPL HS-MCNC: 9 NG/L
WBC # BLD AUTO: 9.9 X10(3) UL (ref 4–11)

## 2024-01-31 PROCEDURE — 71046 X-RAY EXAM CHEST 2 VIEWS: CPT | Performed by: PHYSICIAN ASSISTANT

## 2024-01-31 PROCEDURE — 99223 1ST HOSP IP/OBS HIGH 75: CPT | Performed by: HOSPITALIST

## 2024-01-31 PROCEDURE — 94640 AIRWAY INHALATION TREATMENT: CPT | Performed by: NURSE PRACTITIONER

## 2024-01-31 PROCEDURE — 99214 OFFICE O/P EST MOD 30 MIN: CPT | Performed by: NURSE PRACTITIONER

## 2024-01-31 RX ORDER — PREDNISONE 20 MG/1
40 TABLET ORAL ONCE
Status: COMPLETED | OUTPATIENT
Start: 2024-01-31 | End: 2024-01-31

## 2024-01-31 RX ORDER — IPRATROPIUM BROMIDE AND ALBUTEROL SULFATE 2.5; .5 MG/3ML; MG/3ML
3 SOLUTION RESPIRATORY (INHALATION) ONCE
Status: COMPLETED | OUTPATIENT
Start: 2024-01-31 | End: 2024-01-31

## 2024-01-31 RX ORDER — LEVOTHYROXINE SODIUM 0.1 MG/1
100 TABLET ORAL DAILY
Qty: 90 TABLET | Refills: 1 | Status: SHIPPED | OUTPATIENT
Start: 2024-01-31

## 2024-01-31 NOTE — TELEPHONE ENCOUNTER
Requested Prescriptions     Pending Prescriptions Disp Refills    LEVOTHYROXINE 100 MCG Oral Tab [Pharmacy Med Name: LEVOTHYROXINE 0.100MG (100MCG) TAB] 90 tablet 1     Sig: TAKE 1 TABLET(100 MCG) BY MOUTH DAILY     LOV 1/25/2024     Appointment scheduled: 2/15/2024 Tai Chavarria NP     Medication refilled per protocol.    Thyroid Supplements Protocol Qyrcww1101/31/2024 10:25 AM   Protocol Details TSH test in past 12 months    TSH value between 0.350 and 5.500 IU/ml    Appointment in past 12 or next 3 months

## 2024-02-01 VITALS
HEIGHT: 67 IN | WEIGHT: 166.19 LBS | SYSTOLIC BLOOD PRESSURE: 135 MMHG | OXYGEN SATURATION: 95 % | TEMPERATURE: 98 F | BODY MASS INDEX: 26.08 KG/M2 | HEART RATE: 90 BPM | DIASTOLIC BLOOD PRESSURE: 65 MMHG | RESPIRATION RATE: 18 BRPM

## 2024-02-01 PROBLEM — J21.0 RSV (ACUTE BRONCHIOLITIS DUE TO RESPIRATORY SYNCYTIAL VIRUS): Status: ACTIVE | Noted: 2024-01-01

## 2024-02-01 PROBLEM — J21.0 RSV (ACUTE BRONCHIOLITIS DUE TO RESPIRATORY SYNCYTIAL VIRUS): Status: ACTIVE | Noted: 2024-02-01

## 2024-02-01 PROBLEM — B33.8 RESPIRATORY SYNCYTIAL VIRUS (RSV): Status: ACTIVE | Noted: 2024-02-01

## 2024-02-01 PROBLEM — B33.8 RESPIRATORY SYNCYTIAL VIRUS (RSV): Status: ACTIVE | Noted: 2024-01-01

## 2024-02-01 PROCEDURE — 99239 HOSP IP/OBS DSCHRG MGMT >30: CPT | Performed by: INTERNAL MEDICINE

## 2024-02-01 RX ORDER — ALBUTEROL SULFATE 90 UG/1
1 AEROSOL, METERED RESPIRATORY (INHALATION) EVERY 6 HOURS PRN
Qty: 1 EACH | Refills: 0 | Status: SHIPPED | OUTPATIENT
Start: 2024-02-01

## 2024-02-01 RX ORDER — POTASSIUM CHLORIDE 20 MEQ/1
40 TABLET, EXTENDED RELEASE ORAL ONCE
Status: COMPLETED | OUTPATIENT
Start: 2024-02-01 | End: 2024-02-01

## 2024-02-01 RX ORDER — SERTRALINE HYDROCHLORIDE 25 MG/1
25 TABLET, FILM COATED ORAL DAILY
Status: DISCONTINUED | OUTPATIENT
Start: 2024-02-01 | End: 2024-02-01

## 2024-02-01 RX ORDER — IPRATROPIUM BROMIDE AND ALBUTEROL SULFATE 2.5; .5 MG/3ML; MG/3ML
3 SOLUTION RESPIRATORY (INHALATION)
Status: DISCONTINUED | OUTPATIENT
Start: 2024-02-01 | End: 2024-02-01

## 2024-02-01 RX ORDER — CODEINE PHOSPHATE AND GUAIFENESIN 10; 100 MG/5ML; MG/5ML
5 SOLUTION ORAL EVERY 6 HOURS PRN
Qty: 118 ML | Refills: 0 | Status: SHIPPED | OUTPATIENT
Start: 2024-02-01

## 2024-02-01 RX ORDER — AMLODIPINE BESYLATE 5 MG/1
5 TABLET ORAL NIGHTLY
Status: DISCONTINUED | OUTPATIENT
Start: 2024-02-01 | End: 2024-02-01

## 2024-02-01 RX ORDER — GUAIFENESIN 600 MG/1
600 TABLET, EXTENDED RELEASE ORAL 2 TIMES DAILY
Qty: 28 TABLET | Refills: 0 | Status: SHIPPED | OUTPATIENT
Start: 2024-02-01 | End: 2024-02-15

## 2024-02-01 RX ORDER — DOCUSATE SODIUM 100 MG/1
100 CAPSULE, LIQUID FILLED ORAL 2 TIMES DAILY PRN
Status: DISCONTINUED | OUTPATIENT
Start: 2024-02-01 | End: 2024-02-01

## 2024-02-01 RX ORDER — PREGABALIN 25 MG/1
25 CAPSULE ORAL 2 TIMES DAILY
Status: DISCONTINUED | OUTPATIENT
Start: 2024-02-01 | End: 2024-02-01

## 2024-02-01 RX ORDER — MELATONIN
3 NIGHTLY PRN
Status: DISCONTINUED | OUTPATIENT
Start: 2024-02-01 | End: 2024-02-01

## 2024-02-01 RX ORDER — METOCLOPRAMIDE HYDROCHLORIDE 5 MG/ML
10 INJECTION INTRAMUSCULAR; INTRAVENOUS EVERY 8 HOURS PRN
Status: DISCONTINUED | OUTPATIENT
Start: 2024-02-01 | End: 2024-02-01

## 2024-02-01 RX ORDER — ATORVASTATIN CALCIUM 40 MG/1
40 TABLET, FILM COATED ORAL NIGHTLY
Status: DISCONTINUED | OUTPATIENT
Start: 2024-02-01 | End: 2024-02-01

## 2024-02-01 RX ORDER — ECHINACEA PURPUREA EXTRACT 125 MG
1 TABLET ORAL
Status: DISCONTINUED | OUTPATIENT
Start: 2024-02-01 | End: 2024-02-01

## 2024-02-01 RX ORDER — PREDNISONE 10 MG/1
10 TABLET ORAL DAILY
Qty: 5 TABLET | Refills: 0 | Status: SHIPPED | OUTPATIENT
Start: 2024-02-01 | End: 2024-02-06

## 2024-02-01 RX ORDER — GUAIFENESIN 600 MG/1
600 TABLET, EXTENDED RELEASE ORAL 2 TIMES DAILY
Status: DISCONTINUED | OUTPATIENT
Start: 2024-02-01 | End: 2024-02-01

## 2024-02-01 RX ORDER — PREDNISONE 20 MG/1
40 TABLET ORAL DAILY
Status: DISCONTINUED | OUTPATIENT
Start: 2024-02-01 | End: 2024-02-01

## 2024-02-01 RX ORDER — ENOXAPARIN SODIUM 100 MG/ML
40 INJECTION SUBCUTANEOUS DAILY
Status: DISCONTINUED | OUTPATIENT
Start: 2024-02-01 | End: 2024-02-01

## 2024-02-01 RX ORDER — ONDANSETRON 2 MG/ML
4 INJECTION INTRAMUSCULAR; INTRAVENOUS EVERY 6 HOURS PRN
Status: DISCONTINUED | OUTPATIENT
Start: 2024-02-01 | End: 2024-02-01

## 2024-02-01 RX ORDER — ACETAMINOPHEN 500 MG
500 TABLET ORAL EVERY 4 HOURS PRN
Status: DISCONTINUED | OUTPATIENT
Start: 2024-02-01 | End: 2024-02-01

## 2024-02-01 RX ORDER — HYDROCODONE BITARTRATE AND HOMATROPINE METHYLBROMIDE ORAL SOLUTION 5; 1.5 MG/5ML; MG/5ML
5 LIQUID ORAL EVERY 4 HOURS PRN
Status: DISCONTINUED | OUTPATIENT
Start: 2024-02-01 | End: 2024-02-01

## 2024-02-01 RX ORDER — LEVOTHYROXINE SODIUM 0.1 MG/1
100 TABLET ORAL DAILY
Status: DISCONTINUED | OUTPATIENT
Start: 2024-02-01 | End: 2024-02-01

## 2024-02-01 NOTE — CM/SW NOTE
02/01/24 1200   CM/SW Referral Data   Referral Source Social Work (self-referral)   Reason for Referral Discharge planning   Informant Patient;EMR;Daughter   Patient Info   Patient's Current Mental Status at Time of Assessment Alert;Oriented   Patient's Home Environment Independent Living   Patient lives with Alone   Discharge Needs   Anticipated D/C needs Outpatient therapy     Patient is a 89 y/o female who admitted with Respiratory syncytial virus (RSV) and Shortness of breath. PT recommending HH.    Met with patient, who was alert and oriented, to discuss discharge planning. She resides at [facility]. Her daughter (Radha) is very involved and helps her at home. She brought up needing 3 midnights if she needs [facility] because she does not want to private pay at [facility] again (she has recent history of respite stays at [facility] and [facility]). Informed her of Medicare criteria for JACOB, 3 inpatient status midnights. Informed her she is currently under Observation status and that PT is currently recommending HH rather than [facility]. She became upset and focused on the fact she is under Observation. Attempted to explain that there is certain Medicare criteria to determine if a patient is appropriate for IP vs OBS. Despite explanation patient remained upset. She eventually shared she has been receiving on-site PT/OT. She recently graduated from OT and is still receiving PT. Discussed PT rec of HH and he would like to resume the on-site PT program. She was agreeable with SW reaching out to daughter (Radha) to also discuss discharge planning.     Spoke with Radha and discussed above. She was appreciative of the update. She confirmed best plan would be fore patient to return home ([facility]) and resume the on-site PT program.    RYANN/FRANKIE to remain available for support and/or discharge planning.    ELICIA Angulo  Discharge Planner  [phone]

## 2024-02-01 NOTE — PLAN OF CARE
Pt is aox4, VSS, afebrile. Very anxious. On RA. Tele NSR. Worked with PT this AM, up in chair with call light in place. Pt has n c/o pain or n/v/d. C/o SOB but explained to pt she is going to be SOB with RSV and SOB due to her cough. Tolerating regular diet. IV saline locked. Electrolyte protocol, lovenox. Pt glasses got lost in ED. Pt is resting in chair with call light in place. Anticipate discharge this afternoon. Will continue to monitor.         Problem: SAFETY ADULT - FALL  Goal: Free from fall injury  Description: INTERVENTIONS:  - Assess pt frequently for physical needs  - Identify cognitive and physical deficits and behaviors that affect risk of falls.  - [place] fall precautions as indicated by assessment.  - Educate pt/family on patient safety including physical limitations  - Instruct pt to call for assistance with activity based on assessment  - Modify environment to reduce risk of injury  - Provide assistive devices as appropriate  - Consider OT/PT consult to assist with strengthening/mobility  - Encourage toileting schedule  Outcome: Progressing     Problem: RESPIRATORY - ADULT  Goal: Achieves optimal ventilation and oxygenation  Description: INTERVENTIONS:  - Assess for changes in respiratory status  - Assess for changes in mentation and behavior  - Position to facilitate oxygenation and minimize respiratory effort  - Oxygen supplementation based on oxygen saturation or ABGs  - Provide Smoking Cessation handout, if applicable  - Encourage broncho-pulmonary hygiene including cough, deep breathe, Incentive Spirometry  - Assess the need for suctioning and perform as needed  - Assess and instruct to report SOB or any respiratory difficulty  - Respiratory Therapy support as indicated  - Manage/alleviate anxiety  - Monitor for signs/symptoms of CO2 retention  Outcome: Progressing

## 2024-02-01 NOTE — DISCHARGE INSTRUCTIONS
Resume on-site physical therapy program at [facility] upon discharge.      
Abormal VS: Temp > 100F or < 96.8F; SBP < 90 mmHG; HR > 120bpm; Resp > 24/min

## 2024-02-01 NOTE — ED PROVIDER NOTES
Patient Seen in: [facility]      History     Chief Complaint   Patient presents with    Cough/URI     Stated Complaint: Cough    Subjective:   HPI    Patient is a 89-year-old female with PMR, hypertension, neuropathy and history of MI here for evaluation of dry, non productive cough.  Symptoms started Saturday, 4 days ago.  Patient states symptoms have been stable since onset.  Patient has coughing episodes which results in shortness of breath.  Patient states that she is fatigued due to persistent coughing.  Denies fevers, chills or bodyaches.  Has been tolerating p.o. intake with no abdominal pain, nausea or diarrhea.    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     TOTAL ABDOM HYSTERECTOMY                  Social History     Socioeconomic History    Marital status:    Occupational History    Occupation: Retired    Tobacco Use    Smoking status: Former     Types: Cigarettes     Quit date: 1972     Years since quittin.1    Smokeless tobacco: Never   Vaping Use    Vaping Use: Never used   Substance and Sexual Activity    Alcohol use: Not Currently     Alcohol/week: 7.0 standard drinks of alcohol     Types: 7 Glasses of wine per week     Comment: 1/2 glass    Drug use: No   Other Topics Concern    Caffeine Concern Yes     Comment: 2 cup daily    Sleep Concern No    Exercise No    Seat Belt Yes    Self-Exams Yes     Social Determinants of Health     Food Insecurity: No Food Insecurity (2023)     Food Insecurity     Food Insecurity: Never true   Transportation Needs: No Transportation Needs (11/14/2023)    Transportation Needs     Lack of Transportation: No   Housing Stability: Low Risk  (11/14/2023)    Housing Stability     Housing Instability: No              Review of Systems    Positive for stated complaint: Cough  Other systems are as noted in HPI.  Constitutional and vital signs reviewed.      All other systems reviewed and negative except as noted above.    Physical Exam     ED Triage Vitals [01/31/24 1914]   BP (!) 161/73   Pulse 104   Resp 20   Temp 98.9 °F (37.2 °C)   Temp src Temporal   SpO2 96 %   O2 Device None (Room air)       Current:BP (!) 161/73   Pulse 104   Temp 98.9 °F (37.2 °C) (Temporal)   Resp 20   Wt 75.5 kg   SpO2 96%   BMI 26.87 kg/m²         Physical Exam  Vitals and nursing note reviewed.   Constitutional:       General: She is not in acute distress.     Appearance: Normal appearance. She is not ill-appearing, toxic-appearing or diaphoretic.   HENT:      Nose: No congestion.      Mouth/Throat:      Mouth: Mucous membranes are moist.   Eyes:      Conjunctiva/sclera: Conjunctivae normal.      Pupils: Pupils are equal, round, and reactive to light.   Cardiovascular:      Rate and Rhythm: Regular rhythm. Tachycardia present.   Pulmonary:      Effort: Tachypnea and prolonged expiration present.      Breath sounds: Wheezing present.   Neurological:      Mental Status: She is alert.           ED Course   Labs Reviewed - No data to display  XR CHEST PA + LAT CHEST (CPT=71046)    Result Date: 1/31/2024  CONCLUSION:  1. No acute cardiopulmonary pathology. 2. Moderate colonic gas may represent paralytic ileus.   LOCATION:  [id number]   Dictated by (CST): Ry Calhoun MD on 1/31/2024 at 7:23 PM     Finalized by (CST): Ry Calhoun MD on 1/31/2024 at 7:25 PM                [facility]             Medical Decision Making  Bronchospastic cough on exam, diffuse wheezing, tachypnea and tachycardia  upon admission.  Chest x-ray unremarkable for acute findings.  Patient was given prednisone and DuoNeb treatment here after long discussion of ER for closer observation and management.  Daughter and patient would like to trial nebulizer treatment prior to emergency department.  Patient did receive 1 DuoNeb treatment and prednisone with continued persistent coughing, wheezing and tachypnea.  Daughter to transport patient directly to [place] emergency department via car.    Amount and/or Complexity of Data Reviewed  Radiology: ordered. Decision-making details documented in ED Course.        Disposition and Plan     Clinical Impression:  1. Acute bronchospasm    2. Tachypnea    3. Tachycardia with heart rate 100-120 beats per minute         Disposition:  Ic to ed  1/31/2024  8:09 pm    Follow-up:  No follow-up provider specified.        Medications Prescribed:  Current Discharge Medication List

## 2024-02-01 NOTE — PHYSICAL THERAPY NOTE
PHYSICAL THERAPY EVALUATION - INPATIENT     Room Number: 523/523-A  Evaluation Date: 2/1/2024  Type of Evaluation: Initial  Physician Order: PT Eval and Treat    Presenting Problem: SOB  Co-Morbidities : HTN, DMII, CAD, Hypothyroidism, Retinopathy, macular degeneration  Reason for Therapy: Mobility Dysfunction and Discharge Planning    History related to current admission: Patient is a 89 year old female admitted on 1/31/2024: Presents from [facility] with SOB since Saturday dx RSV bronchitis with bronchospasm    11/14-11/17/23: fall > JACOB  8/29-8/31/23: syncope and collapse > JACOB    ASSESSMENT   In this PT evaluation, the patient presents with the following impairments 1) Pt yelling at therapist throughout session - educated on acceptable behavior towards staff with pt later apologizing, 2) Decreased activity tolerance and endurance however pt also declining gait training and only participating in transfer OOBTC which pt was able to perform at [facility]. 3) Desat to 88% on RA with transfer, encouraged pursed lip breathing with pt returning to 90s after 1 minute of rest.  These impairments and comorbidities manifest themselves as functional limitations in independent bed mobility, transfers, and gait.  The patient is below baseline and would benefit from skilled inpatient PT to address the above deficits to assist patient in returning to prior to level of function.   Functional outcome measures completed include AMPA.  The AM-PAC '6-Clicks' Inpatient Basic Mobility Short Form was completed and this patient is demonstrating a Approx Degree of Impairment: 41.77%  degree of impairment in mobility. Research supports that patients with this level of impairment may benefit from HHPT/OT. Pt reports at baseline she is able to ambulate with RW, has been receiving \"ALS\" assist at [facility] which she describes as supervision assist for ambulation, w/c transfer to dining zarate, and assist with shower transfers.      DISCHARGE RECOMMENDATIONS  PT Discharge Recommendations: Home with home health PT/OT    PLAN  PT Treatment Plan: Bed mobility;Body mechanics;Coordination;Endurance;Energy conservation;Patient education;Family education;Gait training;Range of motion;Neuromuscular re-educate;Strengthening;Transfer training;Balance training  Rehab Potential : Good  Frequency (Obs): 3-5x/week  Number of Visits to Meet Established Goals: 5      CURRENT GOALS    Goal #1 Patient is able to demonstrate supine - sit EOB @ level: supervision     Goal #2 Patient is able to demonstrate transfers EOB to/from Chair/Wheelchair at assistance level: supervision     Goal #3 Patient is able to ambulate 150 feet with assist device: walker - rolling at assistance level: supervision     Goal #4    Goal #5    Goal #6    Goal Comments: Goals established on 2/1/2024    HOME SITUATION  Type of Home: Independent living facility ([facility])   Home Layout: One level                Lives With: Alone  Drives: No  Patient Owned Equipment: Rolling walker (adjustable bed)  Patient Regularly Uses: Glasses    Prior Level of [place]: Pt reports hx of multiple falls, usually can walk \"all over\" <> the dining zarate, states had recently been at [facility] for 2 weeks for rehab after a fall, usually indep with ADLs, since dc from rehab and per EMR in 11/2023 as well pt was receiving \"ALS\" at [facility] which she describes as supervision assist for walking, w/c transport to the dining zarate if she needs it, and assist with shower transfers     SUBJECTIVE  Pt upset and yelling various complaints at therapist at beginning of session, redirected and de-escalated pt with supportive listening and setting clear boundaries of acceptable behavior during session       OBJECTIVE  Precautions: Low vision;Hard of hearing  Fall Risk: Standard fall risk    WEIGHT BEARING RESTRICTION  Weight Bearing Restriction: None                PAIN ASSESSMENT  Rating:  Unable to rate  Location: L foot (because of the shoe but pt did not want me to take her shoe off)  Management Techniques: Activity promotion;Body mechanics;Repositioning    COGNITION  Overall Cognitive Status:  WFL - within functional limits    RANGE OF MOTION AND STRENGTH ASSESSMENT  Upper extremity ROM and strength are within functional limits     Lower extremity ROM is within functional limits     Lower extremity strength is within functional limits       BALANCE  Static Sitting: Good  Dynamic Sitting: Good  Static Standing: Fair -  Dynamic Standing: Fair -    ADDITIONAL TESTS                                    ACTIVITY TOLERANCE                         O2 WALK  Oxygen Therapy  SPO2% on Room Air at Rest: 93  SPO2% Ambulation on Room Air: 88    NEUROLOGICAL FINDINGS                        AM-PAC '6-Clicks' INPATIENT SHORT FORM - BASIC MOBILITY  How much difficulty does the patient currently have...  Patient Difficulty: Turning over in bed (including adjusting bedclothes, sheets and blankets)?: None   Patient Difficulty: Sitting down on and standing up from a chair with arms (e.g., wheelchair, bedside commode, etc.): A Little   Patient Difficulty: Moving from lying on back to sitting on the side of the bed?: None   How much help from another person does the patient currently need...   Help from Another: Moving to and from a bed to a chair (including a wheelchair)?: A Little   Help from Another: Need to walk in hospital room?: A Little   Help from Another: Climbing 3-5 steps with a railing?: A Lot       AM-PAC Score:  Raw Score: 19   Approx Degree of Impairment: 41.77%   Standardized Score (AM-PAC Scale): 45.44   CMS Modifier (G-Code): CK    FUNCTIONAL ABILITY STATUS  Gait Assessment   Functional Mobility/Gait Assessment  Gait Assistance: Contact guard assist  Distance (ft): 3  Assistive Device: Rolling walker  Pattern: Shuffle    Skilled Therapy Provided     Bed Mobility:  Rolling: mod I   Supine to sit: mod I -  pt initially asking for assist however able to perform the task with cuing for body mechanics and positioning for leverage and use of bed rail      Transfer Mobility:  Sit to stand: CGA - VC for UE placement, required increased time but able to perform without external assist    Stand to sit: CGA  Gait = CGA to bedside chair only - pt encouraged to participate in in room or hallway ambulation however pt declined    Therapist's Comments: encouraged ambulation <> bathroom with staff, OOBTC with education on EC and pacing techniques during functional activities     Exercise/Education Provided:  Bed mobility  Body mechanics  Energy conservation  Functional activity tolerated  Transfer training    Patient End of Session: Up in chair;Needs met;RN aware of session/findings;Call light within reach      Patient Evaluation Complexity Level:  History Low - no personal factors and/or co-morbidities   Examination of body systems Low - addressing 1-2 elements   Clinical Presentation Low - Stable   Clinical Decision Making Low - Stable       PT Session Time: 24 minutes  Gait Training:  minutes  Therapeutic Activity: 15 minutes  Neuromuscular Re-education:  minutes  Therapeutic Exercise:  minutes

## 2024-02-01 NOTE — ED QUICK NOTES
Orders for admission, patient is aware of plan and ready to go upstairs. Any questions, please call ED RN javier at extension [phone].     Patient Covid vaccination status: Fully vaccinated     COVID Test Ordered in ED: SARS-CoV-2/Flu A and B/RSV by PCR (GeneXpert)    COVID Suspicion at Admission: N/A    Running Infusions:  None    Mental Status/LOC at time of transport: 4/4     Other pertinent information: RSV+ and uses a walker   CIWA score: N/A   NIH score:  N/A

## 2024-02-01 NOTE — PROGRESS NOTES
NURSING DISCHARGE NOTE    Discharged Home via Wheelchair.  Accompanied by Family member  Belongings Taken by patient/family    Pt discharged. Given paperwork and explained. All questions answered. IV removed, tele removed.

## 2024-02-01 NOTE — PROGRESS NOTES
NURSING ADMISSION NOTE      Patient admitted via Cart  Oriented to room.  Safety precautions initiated.  Bed in low position.  Call light in reach.    Pt admitted from [facility] accompanied by daughter. Med rec completed w/ daughters help.

## 2024-02-01 NOTE — PLAN OF CARE
Problem: RESPIRATORY - ADULT  Goal: Achieves optimal ventilation and oxygenation  Description: INTERVENTIONS:  - Assess for changes in respiratory status  - Assess for changes in mentation and behavior  - Position to facilitate oxygenation and minimize respiratory effort  - Oxygen supplementation based on oxygen saturation or ABGs  - Provide Smoking Cessation handout, if applicable  - Encourage broncho-pulmonary hygiene including cough, deep breathe, Incentive Spirometry  - Assess the need for suctioning and perform as needed  - Assess and instruct to report SOB or any respiratory difficulty  - Respiratory Therapy support as indicated  - Manage/alleviate anxiety  - Monitor for signs/symptoms of CO2 retention  Outcome: Progressing   Received patient on RA. Breath sounds slightly diminished bilaterally with an upper airway wheeze. Having dyspnea while at rest. Nebs scheduled Q4.

## 2024-02-01 NOTE — ED PROVIDER NOTES
Patient Seen in: [facility] Emergency Department      History     Chief Complaint   Patient presents with    Cough/URI     Stated Complaint: Dyspnea    Subjective:   HPI    89-year-old female presenting with cough she has had a cough the last couple days having some trouble breathing prompting her visit here she went to immediate care received breathing treatment steroid has not helped her breathing and her cough.  Coughing is worse at night.  Denies fevers chills abdominal pain urinary complaints.    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     TOTAL ABDOM HYSTERECTOMY                  Social History     Socioeconomic History    Marital status:    Occupational History    Occupation: Retired    Tobacco Use    Smoking status: Former     Types: Cigarettes     Quit date: 1972     Years since quittin.1    Smokeless tobacco: Never   Vaping Use    Vaping Use: Never used   Substance and Sexual Activity    Alcohol use: Not Currently     Alcohol/week: 7.0 standard drinks of alcohol     Types: 7 Glasses of wine per week     Comment: 1/2 glass    Drug use: No   Other Topics Concern    Caffeine Concern Yes     Comment: 2 cup daily    Sleep Concern No    Exercise No    Seat Belt Yes    Self-Exams Yes     Social Determinants of Health     Food Insecurity: No Food Insecurity (2023)    Food Insecurity     Food Insecurity: Never true   Transportation Needs: No Transportation Needs (2023)    Transportation Needs     Lack of Transportation: No    Housing Stability: Low Risk  (11/14/2023)    Housing Stability     Housing Instability: No              Review of Systems    Positive for stated complaint: Dyspnea  Other systems are as noted in HPI.  Constitutional and vital signs reviewed.      All other systems reviewed and negative except as noted above.    Physical Exam     ED Triage Vitals [01/31/24 2116]   /78   Pulse 88   Resp 22   Temp 98.1 °F (36.7 °C)   Temp src    SpO2 93 %   O2 Device None (Room air)       Current:/66   Pulse 77   Temp 98.1 °F (36.7 °C)   Resp 24   Ht 170.2 cm (5' 7\")   Wt 71.7 kg   SpO2 100%   BMI 24.75 kg/m²         Physical Exam  Generally the patient is alert and oriented ×3 and appears in no distress  HEENT exam: Normal  Lungs are clear to auscultation bilaterally with no wheezes retractions or rhonchi noted  Cardiovascular exam shows a regular rate and rhythm  Abdomen soft nontender with no rebound tenderness noted  Extremity exam shows no clubbing cyanosis or edema  Skin exam shows no rashes or lacerations  Neuro exam shows no focal deficits  Back exam shows no tenderness       ED Course     Labs Reviewed   COMP METABOLIC PANEL (14) - Abnormal; Notable for the following components:       Result Value    Glucose 133 (*)     Calcium, Total 8.2 (*)     Albumin 3.2 (*)     A/G Ratio 0.8 (*)     All other components within normal limits   SARS-COV-2/FLU A AND B/RSV BY PCR (GENEXPERT) - Abnormal; Notable for the following components:    RSV by PCR Positive (*)     All other components within normal limits    Narrative:     This test is intended for the qualitative detection and differentiation of SARS-CoV-2, influenza A, influenza B, and respiratory syncytial virus (RSV) viral RNA in nasopharyngeal or nares swabs from individuals suspected of respiratory viral infection consistent with COVID-19 by their healthcare provider. Signs and symptoms of respiratory viral infection due to SARS-CoV-2, influenza, and RSV can  be similar.    Test performed using the Xpert Xpress SARS-CoV-2/FLU/RSV (real time RT-PCR)  assay on the GeneXpert instrument, [employer], [place] [address].   This test is being used under the Food and Drug Administration's Emergency Use Authorization.    The authorized Fact Sheet for Healthcare Providers for this assay is available upon request from the laboratory.   CBC W/ DIFFERENTIAL - Abnormal; Notable for the following components:    .0 (*)     Neutrophil Absolute Prelim 8.74 (*)     Neutrophil Absolute 8.74 (*)     Lymphocyte Absolute 0.61 (*)     All other components within normal limits   LACTIC ACID, PLASMA - Normal   TROPONIN I HIGH SENSITIVITY - Normal   PRO BETA NATRIURETIC PEPTIDE - Normal   CBC WITH DIFFERENTIAL WITH PLATELET    Narrative:     The following orders were created for panel order CBC With Differential With Platelet.  Procedure                               Abnormality         Status                     ---------                               -----------         ------                     CBC W/ DIFFERENTIAL[[id number]]          Abnormal            Final result                 Please view results for these tests on the individual orders.   RAINBOW DRAW BLUE   BLOOD CULTURE   BLOOD CULTURE     EKG    Rate, intervals and axes as noted on EKG Report.  Rate: 96  Rhythm: Sinus Rhythm  Reading: No areas of acute ST segment elevation or depression                 Frontal diagnosis includes sepsis, pneumonia         MDM                                         Medical Decision Making  89-year-old female presenting to the emergency department for difficulty breathing.  The patient was noted to have difficulty breathing at the immediate care and now presents here.  IV established cardiac monitor shows sinus rhythm pulse ox shows no signs of hypoxia.  COVID-negative RSV positive CBC shows stable hemoglobin level metabolic panel stable renal function no elevation troponin indicative of NSTEMI BNP  level normal showing no signs of pulmonary edema outpatient chest x-ray shows no focal consolidation patient here still complaining of difficulty breathing she has rhonchorous breath sounds diffusely she was given breathing treatment she will be admitted for further evaluation  This note was prepared using Dragon Medical voice recognition dictation software.  As a result errors may occur.  When identified to these areas have been corrected.  While every attempt is made to correct errors during dictation discrepancies may still exist.  Please contact if there are any errors    Problems Addressed:  Respiratory syncytial virus (RSV): acute illness or injury  Shortness of breath: acute illness or injury    Amount and/or Complexity of Data Reviewed  Labs: ordered. Decision-making details documented in ED Course.  ECG/medicine tests: ordered and independent interpretation performed. Decision-making details documented in ED Course.    Risk  Decision regarding hospitalization.        Disposition and Plan     Clinical Impression:  1. Shortness of breath    2. Respiratory syncytial virus (RSV)         Disposition:  There is no disposition on file for this visit.  There is no disposition time on file for this visit.    Follow-up:  No follow-up provider specified.        Medications Prescribed:  Current Discharge Medication List

## 2024-02-01 NOTE — PLAN OF CARE
Pt from [facility], plan to dc to [facility]  Aox4, wears glasses and partial upper  VSS on RA-> placed on 1L for comfort d/t feeling of SOB  afebrile  NSR/ST on tele, receiving lovenox  Electrolyte cardiac replacement  Continent for bowels, incontinent for urine. Purewick in place  Up x1 w/ walker, bed alarm on and call light within reach. PT/OT ordered  Cardiac diet  Receiving Nebs q4 and PO Prednisone   Pt updated on current POC, no questions at this time    Problem: Patient/Family Goals  Goal: Patient/Family Long Term Goal  Description: Patient's Long Term Goal: dc    Interventions:  - Nebs  - See additional Care Plan goals for specific interventions  Outcome: Progressing     Problem: RESPIRATORY - ADULT  Goal: Achieves optimal ventilation and oxygenation  Description: INTERVENTIONS:  - Assess for changes in respiratory status  - Assess for changes in mentation and behavior  - Position to facilitate oxygenation and minimize respiratory effort  - Oxygen supplementation based on oxygen saturation or ABGs  - Provide Smoking Cessation handout, if applicable  - Encourage broncho-pulmonary hygiene including cough, deep breathe, Incentive Spirometry  - Assess the need for suctioning and perform as needed  - Assess and instruct to report SOB or any respiratory difficulty  - Respiratory Therapy support as indicated  - Manage/alleviate anxiety  - Monitor for signs/symptoms of CO2 retention  Outcome: Progressing     Problem: SAFETY ADULT - FALL  Goal: Free from fall injury  Description: INTERVENTIONS:  - Assess pt frequently for physical needs  - Identify cognitive and physical deficits and behaviors that affect risk of falls.  - [place] fall precautions as indicated by assessment.  - Educate pt/family on patient safety including physical limitations  - Instruct pt to call for assistance with activity based on assessment  - Modify environment to reduce risk of injury  - Provide assistive devices as  appropriate  - Consider OT/PT consult to assist with strengthening/mobility  - Encourage toileting schedule  Reactivated

## 2024-02-01 NOTE — H&P
[facility]     Alyssa Montgoemry Patient Status:  Emergency    1932 MRN [id number]   Formerly [facility] EMERGENCY DEPARTMENT Attending Stephen No MD   Hosp Day # 0 PCP Mony Hall DO     Chief Complaint: SOB, cough    Subjective:    History of Present Illness:     Alyssa Montgomery is a 89 year old female with HTN, DMII, CAD  presents to the ER with c/o cough, congestion, shortness of breath since Saturday.  Pt states her symptoms have progressively worsened  since onset.  She has frequent coughing episodes with difficulty catching her breath.  Her cough is dry.  She also c/o fatigue, weakness, low grade fevers, chills.  She denies lightheadedness, dizziness, nausea, vomiting.  She was seen at Immediate care before being transferred to the main ER.  She tested positive for RSV.    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 Her smoking use included cigarettes. She has never used smokeless tobacco. She reports that she does not currently use alcohol after a past usage of about 7.0 standard drinks of alcohol per week. She reports that she does not use drugs.     Allergies: No Known  Allergies    Medications:    Current Facility-Administered Medications on File Prior to Encounter   Medication Dose Route Frequency Provider Last Rate Last Admin    [COMPLETED] predniSONE (Deltasone) tab 40 mg  40 mg Oral Once BeRadha APRN   40 mg at 24    [COMPLETED] ipratropium-albuterol (Duoneb) 0.5-2.5 (3) MG/3ML inhalation solution 3 mL  3 mL Nebulization Once BeRadha keene APRN   3 mL at 24    [] sodium chloride 0.9% infusion   Intravenous Continuous Haseeb Herrera MD        [COMPLETED] metoprolol tartrate (Lopressor) tab 25 mg  25 mg Oral Once Harpreet Govea MD   25 mg at 23 1543    [COMPLETED] acetaminophen (Tylenol Extra Strength) tab 1,000 mg  1,000 mg Oral Once Haseeb Herrera MD   1,000 mg at 23 1606     Current Outpatient Medications on File Prior to Encounter   Medication Sig Dispense Refill    LEVOTHYROXINE 100 MCG Oral Tab TAKE 1 TABLET(100 MCG) BY MOUTH DAILY 90 tablet 1    amLODIPine 5 MG Oral Tab Take 1 tablet (5 mg total) by mouth at bedtime.      METOPROLOL TARTRATE 25 MG Oral Tab TAKE 1 TABLET(25 MG) BY MOUTH TWICE DAILY 180 tablet 0    pregabalin 25 MG Oral Cap Take 1 capsule (25 mg total) by mouth 2 (two) times daily. 180 capsule 0    sertraline 25 MG Oral Tab Take 1 tablet (25 mg total) by mouth daily. 30 tablet 2    nystatin 100,000 Units/g External Cream       docusate sodium 100 MG Oral Cap Take 1 capsule (100 mg total) by mouth 2 (two) times daily as needed for constipation.      ergocalciferol 1.25 MG (41664 UT) Oral Cap Take 1 capsule (50,000 Units total) by mouth once a week.      ATORVASTATIN 40 MG Oral Tab TAKE 1 TABLET(40 MG) BY MOUTH EVERY NIGHT 90 tablet 1    meclizine 12.5 MG Oral Tab Take 1-2 tablets (12.5-25 mg total) by mouth 3 (three) times daily as needed for Dizziness. 30 tablet 1    predniSONE 1 MG Oral Tab Take 1 tablet (1 mg total) by mouth daily with breakfast. 90 tablet 2    Multiple Vitamins-Minerals (PRESERVISION AREDS  2 OR) Take by mouth.      EYE DROPS RELIEF OP Apply 1 drop to eye daily as needed.      acetaminophen 500 MG Oral Tab Take 2 tablets (1,000 mg total) by mouth every 12 (twelve) hours as needed for Pain.      ofloxacin 0.3 % Ophthalmic Solution as needed.      Betamethasone Dipropionate 0.05 % External Ointment Apply BID as needed 45 g 3       Review of Systems:   A comprehensive review of systems was completed.    Pertinent positives and negatives noted in the HPI.    Objective:   Physical Exam:    /66   Pulse 77   Temp 98.1 °F (36.7 °C)   Resp 24   Ht 5' 7\" (1.702 m)   Wt 158 lb (71.7 kg)   SpO2 100%   BMI 24.75 kg/m²   General: No acute distress, Alert  Respiratory: diffuse wheezing  Cardiovascular: S1, S2. Regular rate and rhythm  Abdomen: Soft, Non-tender, non-distended, positive bowel sounds  Neuro: No new focal deficits  Extremities: No edema      Results:    Labs:      Labs Last 24 Hours:    Recent Labs   Lab 01/31/24  2226   RBC 3.99   HGB 12.5   HCT 38.8   MCV 97.2   MCH 31.3   MCHC 32.2   RDW 14.4   NEPRELIM 8.74*   WBC 9.9   .0*       Recent Labs   Lab 01/31/24  2226   *   BUN 13   CREATSERUM 0.64   EGFRCR 83   CA 8.2*   ALB 3.2*      K 3.8      CO2 25.0   ALKPHO 103   AST 17   ALT 16   BILT 0.7   TP 7.0       No results found for: \"PT\", \"INR\"    Recent Labs   Lab 01/31/24  2226   TROPHS 9       Recent Labs   Lab 01/31/24  2226   PBNP 185       No results for input(s): \"PCT\" in the last 168 hours.    Imaging: Imaging data reviewed in Epic.    Assessment & Plan:      #RSV bronchitis with bronchospasm  Bronchodilator protocol  PO steroids  Supplemental oxygen as needed  CXR reviewed    #HTN, controlled    #DL, statin    #CAD, stable, resume home meds    #Hypothyroidism, Synthroid    #Retinopathy, macular degeneration        Plan of care discussed with pt and RN.    Johnathan Castillo MD    Supplementary Documentation:     The 21st Century Cures Act makes medical notes like  these available to patients in the interest of transparency. Please be advised this is a medical document. Medical documents are intended to carry relevant information, facts as evident, and the clinical opinion of the practitioner. The medical note is intended as peer to peer communication and may appear blunt or direct. It is written in medical language and may contain abbreviations or verbiage that are unfamiliar.

## 2024-02-01 NOTE — DISCHARGE SUMMARY
[place] HOSPITALIST  DISCHARGE SUMMARY     Alyssa Montgomery Patient Status:  Observation    1932 MRN [id number]   Location [facility] 5NW-A Attending Pritesh Briceno MD   Hosp Day # 0 PCP Mony Hall DO     Date of Admission: 2024  Date of Discharge:   24  Discharge Disposition: home with HH    Discharge Diagnosis:  #RSV bronchitis with bronchospasm  #HTN  #HLD  #CAD  #Hypothyroidism  #Retinopathy, macular degeneration    History of Present Illness: (per Dr. Castillo), Alyssa Montgomery is a 89 year old female with HTN, DMII, CAD  presents to the ER with c/o cough, congestion, shortness of breath since Saturday.  Pt states her symptoms have progressively worsened  since onset.  She has frequent coughing episodes with difficulty catching her breath.  Her cough is dry.  She also c/o fatigue, weakness, low grade fevers, chills.  She denies lightheadedness, dizziness, nausea, vomiting.  She was seen at Immediate care before being transferred to the main ER.  She tested positive for RSV.     Brief Synopsis: admitted with RSV bronchospasm. Treated supportively with nebs/steroids with rapid improvement in her clinical condition. She was not hypoxic. She was discharged to home with anti-tussive, PRN inhalers, and prednisone burst with recommendation to f/u with PCP in outpt setting.     Lace+ Score: 79  59-90 High Risk  29-58 Medium Risk  0-28   Low Risk       TCM Follow-Up Recommendation:  LACE > 58: High Risk of readmission after discharge from the hospital.      Procedures during hospitalization:   none    Incidental or significant findings and recommendations (brief descriptions):  As above    Lab/Test results pending at Discharge:   none    Consultants:  None     Discharge Medication List:     Discharge Medications        START taking these medications        Instructions Prescription details   albuterol 108 (90 Base) MCG/ACT Aers  Commonly known as: Ventolin HFA      Inhale 1 puff into the lungs  every 6 (six) hours as needed for Wheezing or Shortness of Breath.   Quantity: 1 each  Refills: 0     guaiFENesin  MG Tb12  Commonly known as: Mucinex      Take 1 tablet (600 mg total) by mouth 2 (two) times daily for 14 days.   Stop taking on: February 15, 2024  Quantity: 28 tablet  Refills: 0     guaiFENesin-codeine 100-10 MG/5ML Soln  Commonly known as: Robitussin AC      Take 5 mL by mouth every 6 (six) hours as needed for cough.   Quantity: 118 mL  Refills: 0            CHANGE how you take these medications        Instructions Prescription details   predniSONE 1 MG Tabs  Commonly known as: Deltasone  What changed: Another medication with the same name was added. Make sure you understand how and when to take each.      Take 1 tablet (1 mg total) by mouth daily with breakfast.   Quantity: 90 tablet  Refills: 2     predniSONE 10 MG Tabs  Commonly known as: Deltasone  What changed: You were already taking a medication with the same name, and this prescription was added. Make sure you understand how and when to take each.      Take 1 tablet (10 mg total) by mouth daily for 5 days. 50mg x 2 days, then 40mg x 3 days, then 30mg x 3 days then 20mg x 3 days then 10mg x 3 days then stop   Quantity: 5 tablet  Refills: 0            CONTINUE taking these medications        Instructions Prescription details   acetaminophen 500 MG Tabs  Commonly known as: Tylenol Extra Strength      Take 2 tablets (1,000 mg total) by mouth every 12 (twelve) hours as needed for Pain.   Refills: 0     amLODIPine 5 MG Tabs  Commonly known as: Norvasc      Take 1 tablet (5 mg total) by mouth at bedtime.   Refills: 0     atorvastatin 40 MG Tabs  Commonly known as: Lipitor      TAKE 1 TABLET(40 MG) BY MOUTH EVERY NIGHT   Quantity: 90 tablet  Refills: 1     docusate sodium 100 MG Caps  Commonly known as: Colace      Take 1 capsule (100 mg total) by mouth 2 (two) times daily as needed for constipation.   Refills: 0     ergocalciferol 1.25 MG  (40639 UT) Caps  Commonly known as: ERGOCALCIFEROL      Take 1 capsule (50,000 Units total) by mouth once a week.   Refills: 0     EYE DROPS RELIEF OP      Apply 1 drop to eye daily as needed.   Refills: 0     levothyroxine 100 MCG Tabs  Commonly known as: Synthroid      TAKE 1 TABLET(100 MCG) BY MOUTH DAILY   Quantity: 90 tablet  Refills: 1     metoprolol tartrate 25 MG Tabs  Commonly known as: Lopressor      TAKE 1 TABLET(25 MG) BY MOUTH TWICE DAILY   Quantity: 180 tablet  Refills: 0     pregabalin 25 MG Caps  Commonly known as: Lyrica      Take 1 capsule (25 mg total) by mouth 2 (two) times daily.   Quantity: 180 capsule  Refills: 0               Where to Get Your Medications        These medications were sent to [id number] - [facility] [address], [phone], [phone]  [address] [address]      Phone: [phone]   albuterol 108 (90 Base) MCG/ACT Aers  guaiFENesin  MG Tb12  guaiFENesin-codeine 100-10 MG/5ML Soln  predniSONE 10 MG Tabs         [place]    Follow-up appointment:   Mony Hall DO  [address]  [address]  [facility] [address] [phone]    Follow up in 2 week(s)  Follow up    Appointments for Next 30 Days 2/1/2024 - 3/2/2024        Date Arrival Time Visit Type Length Department Provider     2/15/2024  1:30 PM  EXAM - ESTABLISHED [2564] 30 min [facility] Tai Chavarria NP    Patient Instructions:         Location Instructions:     Masks are optional for all patients and visitors, unless otherwise indicated.                      Vital signs:  Temp:  [97.8 °F (36.6 °C)-98.9 °F (37.2 °C)] 98 °F (36.7 °C)  Pulse:  [] 90  Resp:  [18-24] 18  BP: (135-161)/(56-78) 135/65  SpO2:  [90 %-100 %] 95 %    Physical Exam:    General: No acute distress   Lungs: clear to auscultation  Cardiovascular: S1, S2  Abdomen:  Soft      -----------------------------------------------------------------------------------------------  PATIENT DISCHARGE INSTRUCTIONS: See electronic chart    Gabriella Maravilla,     Total time spent on discharge plannin minutes     The  Century Cures Act makes medical notes like these available to patients in the interest of transparency. Please be advised this is a medical document. Medical documents are intended to carry relevant information, facts as evident, and the clinical opinion of the practitioner. The medical note is intended as peer to peer communication and may appear blunt or direct. It is written in medical language and may contain abbreviations or verbiage that are unfamiliar.

## 2024-02-02 ENCOUNTER — PATIENT OUTREACH (OUTPATIENT)
Dept: CASE MANAGEMENT | Age: 89
End: 2024-02-02

## 2024-02-02 DIAGNOSIS — Z02.9 ENCOUNTERS FOR UNSPECIFIED ADMINISTRATIVE PURPOSE: Primary | ICD-10-CM

## 2024-02-02 PROCEDURE — 1111F DSCHRG MED/CURRENT MED MERGE: CPT

## 2024-02-02 RX ORDER — PREDNISONE 20 MG/1
40 TABLET ORAL DAILY
COMMUNITY
Start: 2024-02-01

## 2024-02-02 NOTE — PROGRESS NOTES
Initial Post Discharge Follow Up   Discharge Date: 2/1/24  Contact Date: 2/2/2024    Consent Verification:  Assessment Completed With: Patient  HIPAA Verified?  Yes    Discharge Dx:   RSV bronchitis with bronchospasm     General:   How have you been since your discharge from the hospital? Pt reports she is doing okay. Pt is taking her medications. Pt did sleep better last night. Pt feels over all she is getting better. Pt is using the Albuterol inhaler as needed which is helping. Pt does have a cough but feels it is better today. Pt is wearing a mask for a few days if around others. Pt is staying away from others within her building and eating at a separate location for a few days. Pt denies a fever. Pt has some SOB with activity but feels it may be psychological. Pt does feel anxious and would like to get back to her normal routine. Pt is not on oxygen at home. Radha her dtr cancelled PT at her facility this Thursday to allow her time to recover but plans to start next week. PT is through her independent living facility. Pt is ambulating within her apartment and resting as needed if she feels SOB. Pt has a B/P cuff but does not usually check her B/P by herself. Pt stated she plans to have her B/P check two times a week starting next week per PT. Pt is going to ask her family to show her how to do it herself  or ask her PCP to show her how to use it. Advised pt to bring her B/P cuff to her appt with PCP to compare readings to ensure it is accurate as it is a wrist cuff. Pt plans to get new glasses next week since she lost hers during the hospital stay. Pt is eating and drinking well. Pt denies CP, worsening SOB/cough, confusion, n/v/d, dizziness, worsening weakness, wheezing and pain.   Do you have any pain since discharge?  No    How well was your pain managed while in the hospital?   On a scale of 1-5   1- Very Poor and 5- Very well   Very Well  When you were leaving the hospital were your discharge instructions  reviewed with you? Yes  How well were your discharge instructions explained to you?   On a scale of 1-5   1- Very Poor and 5- Very well   Very Well  Do you have any questions about your discharge instructions?  No  Before leaving the hospital was your diagnoses explained to you? Yes  Do you have any questions about your diagnoses? No  Are you able to perform normal daily activities of living as you have prior to your hospital stay (dressing, bathing, ambulating to the bathroom, etc)? no  If No, What are some barriers or concerns?  Taking her time with activity.   (NCM) Was patient given a different diet per AVS? no    Medications: Reviewed medication list with the patient. Medications are up to date.   Current Outpatient Medications   Medication Sig Dispense Refill    guaiFENesin  MG Oral Tablet 12 Hr Take 1 tablet (600 mg total) by mouth 2 (two) times daily for 14 days. 28 tablet 0    guaiFENesin-codeine 100-10 MG/5ML Oral Solution Take 5 mL by mouth every 6 (six) hours as needed for cough. 118 mL 0    albuterol 108 (90 Base) MCG/ACT Inhalation Aero Soln Inhale 1 puff into the lungs every 6 (six) hours as needed for Wheezing or Shortness of Breath. 1 each 0    PREDNISONE 10 MG Oral Tab Take 1 tablet (10 mg total) by mouth daily for 5 days. 50mg x 2 days, then 40mg x 3 days, then 30mg x 3 days then 20mg x 3 days then 10mg x 3 days then stop 5 tablet 0    LEVOTHYROXINE 100 MCG Oral Tab TAKE 1 TABLET(100 MCG) BY MOUTH DAILY 90 tablet 1    amLODIPine 5 MG Oral Tab Take 1 tablet (5 mg total) by mouth at bedtime.      METOPROLOL TARTRATE 25 MG Oral Tab TAKE 1 TABLET(25 MG) BY MOUTH TWICE DAILY 180 tablet 0    pregabalin 25 MG Oral Cap Take 1 capsule (25 mg total) by mouth 2 (two) times daily. 180 capsule 0    docusate sodium 100 MG Oral Cap Take 1 capsule (100 mg total) by mouth 2 (two) times daily as needed for constipation.      ergocalciferol 1.25 MG (65665 UT) Oral Cap Take 1 capsule (50,000 Units total) by  mouth once a week.      ATORVASTATIN 40 MG Oral Tab TAKE 1 TABLET(40 MG) BY MOUTH EVERY NIGHT 90 tablet 1    predniSONE 1 MG Oral Tab Take 1 tablet (1 mg total) by mouth daily with breakfast. 90 tablet 2    EYE DROPS RELIEF OP Apply 1 drop to eye daily as needed.      acetaminophen 500 MG Oral Tab Take 2 tablets (1,000 mg total) by mouth every 12 (twelve) hours as needed for Pain.       Were there any changes to your current medication(s) noted on the AVS? Yes  If so, were these medication changes discussed with you prior to leaving the hospital? Yes  If a new medication was prescribed:    Was the new medication's purpose & side effects reviewed? Yes  Do you have any questions about your new medication? No  Did you  your discharge medications when you left the hospital? Yes  Let's go over your medications together to make sure we are not missing anything. Medications Reviewed  Are there any reasons that keep you from taking your medication as prescribed? No  Are you having any concerns with constipation? No  Did patient receive their flu shot (Sept-March)? Yes- Received at [facility] during her stay, cannot recall the exact date/time.    Discharge medications reviewed/discussed/and reconciled against outpatient medications with patient.  Any changes or updates to medications sent to PCP.  Patient Acknowledged     Referrals/orders at D/C:  Referrals/orders placed at D/C? yes- pt declined the need for HHC RN.   What services:   PT   (If HH was ordered) Has HH been set up?  Yes    If Yes: With Whom: Through her Independent Living facility.   Except for Home Health Services mentioned above, have you scheduled these other services? Not Applicable    DME ordered at D/C? No    Discharge orders, AVS reviewed and discussed with patient. Any changes or updates to orders sent to PCP.  Patient Acknowledged      SDOH:     Transportation Needs: No Transportation Needs (2/2/2024)    Transportation Needs     Lack of  Transportation: No     Financial Resource Strain: Low Risk  (2/2/2024)    Financial Resource Strain     Difficulty of Paying Living Expenses: Not very hard     Med Affordability: No       Follow up appointments:  Reviewed recommended follow up appointment. See below. Pt denies any barriers to keeping/getting to HFU appts. Pt stated her dtr will bring her to the appt.     Your appointments       Date & Time Appointment Department ([place])    Feb 15, 2024  1:30 PM CST Exam - Established with Tai Chavarria NP [facility]              [facility]  [address]  [facility] [id number] [phone]            TCC  Was TCC ordered: No      PCP (If no TCC appointment)  Does patient already have a PCP appointment scheduled? Yes  NCM Confirmed PCP office TCM appointment with patient- Pt has an appt on 2/15/24, pt declined a sooner appt.       Specialist    Does the patient have any other follow up appointment(s) needing to be scheduled? No    Is there any reason as to why you cannot make your appointment(s)?  No     Needs post D/C:   Now that you are home, are there any needs or concerns you need addressed before your next visit with your PCP?  (DME, meds, questions, etc.): No    Interventions by NCM:   All d/c instructions reviewed with the pt. Reviewed when to call MD vs when to call 911 or go the ED. Discussed s/s of RSV. Educated pt on the importance of taking all meds as prescribed as well as close f/u with PCP/specialists. Pt verbalized understanding and will contact the office with any further questions or concerns.       Overall Rating:   How would you rate the care you received while in the hospital? good    CCM referral placed:    No- already enrolled

## 2024-02-12 ENCOUNTER — TELEPHONE (OUTPATIENT)
Dept: FAMILY MEDICINE CLINIC | Facility: CLINIC | Age: 89
End: 2024-02-12

## 2024-02-12 NOTE — TELEPHONE ENCOUNTER
Pt was hospitalized with RSV.     Future Appointments   Date Time Provider Department Center   2/13/2024 11:00 AM Tai Chavarria NP [facility] [place]      Pt is requesting a refill of Guaifenesin- Codeine as she continues to have a \"bad cough>\"     Routed to AYDE Chavarria NP  ,

## 2024-02-12 NOTE — TELEPHONE ENCOUNTER
Pt calling for medication refill on   guaiFENesin-codeine 100-10 MG/5ML Oral Solution 118 mL   Pt was in the ER 10 days ago they prescribe this medication she was positive for RSV, pt is still coughing very bad   Send to   [id number] [place], [place] - [address], [phone], [phone]

## 2024-02-13 ENCOUNTER — OFFICE VISIT (OUTPATIENT)
Dept: FAMILY MEDICINE CLINIC | Facility: CLINIC | Age: 89
End: 2024-02-13
Payer: MEDICARE

## 2024-02-13 VITALS
TEMPERATURE: 97 F | OXYGEN SATURATION: 98 % | RESPIRATION RATE: 18 BRPM | HEART RATE: 84 BPM | DIASTOLIC BLOOD PRESSURE: 64 MMHG | SYSTOLIC BLOOD PRESSURE: 126 MMHG

## 2024-02-13 DIAGNOSIS — I10 BENIGN ESSENTIAL HTN: ICD-10-CM

## 2024-02-13 DIAGNOSIS — B33.8 RSV (RESPIRATORY SYNCYTIAL VIRUS INFECTION): Primary | ICD-10-CM

## 2024-02-13 DIAGNOSIS — R05.1 ACUTE COUGH: ICD-10-CM

## 2024-02-13 RX ORDER — PREDNISONE 10 MG/1
TABLET ORAL
Qty: 39 TABLET | Refills: 0 | Status: SHIPPED | OUTPATIENT
Start: 2024-02-13

## 2024-02-13 RX ORDER — ALBUTEROL SULFATE 90 UG/1
2 AEROSOL, METERED RESPIRATORY (INHALATION) EVERY 4 HOURS PRN
Qty: 8 G | Refills: 0 | Status: SHIPPED | OUTPATIENT
Start: 2024-02-13

## 2024-02-13 RX ORDER — CODEINE PHOSPHATE/GUAIFENESIN 10-100MG/5
5 LIQUID (ML) ORAL NIGHTLY PRN
Qty: 50 ML | Refills: 0 | Status: SHIPPED | OUTPATIENT
Start: 2024-02-13 | End: 2024-02-23

## 2024-02-13 NOTE — PATIENT INSTRUCTIONS
Prednisone 10mg tabs- take as below (take all tabs for the day in the morning, at the same time):  Take 6 tabs daily for 3 days, take 4 tabs daily for 3 days, take 2 tabs daily for 3 days, take 1 tab daily for 3 days.       Use inhaler (as instructed below) as needed after that. Be sure to space doses at least 4 hours apart.     Inhaler should be primed before first use only by pressing inhaler down once or twice, without inhaler your mouth, until you see medication squirt into the air. You do not need to do this each time you use the inhaler.     Shake inhaler prior to each use.     Place mouth piece loosely in mouth. DO NOT close lips tightly around inhaler.     Place inhaler in one end of the spacer and the other end of spacer in your mouth. Activate inhaler into the spacer AND THEN take deep breath in. If the spacer makes a noise during use, you are breathing in too quickly.     Wait 1-2 minutes and repeat above procedure, for a total of 2 activations of inhaler each use.

## 2024-02-13 NOTE — PROGRESS NOTES
Subjective:   Alyssa Montgomery is a 89 year old female who presents for hospital follow up.   She was discharged from [facility] to Home or Self Care  Admission Date: 1/31/24   Discharge Date: 2/1/24  Hospital Discharge Diagnosis: as below    Interactive contact within 2 business days post discharge first initiated on Date: 2/2/2024    During the visit, the following was completed:  Obtained and reviewed discharge summary, continuity of care documents, and Hospitalist notes  Reviewed Labs (CBC, CMP), Labs (Cardiac markers), X-Ray radiology results, and blood cultures    HPI:   Presents with daughter for follow up of HTN and also recent hospitalization for RSV. At visit with me on 1/25, HTN management was changed to take amlodipine nightly due to ankle swelling. Then presented to ER on 1/31 with cough, congestion, SOB, fatigue and fevers. Tested negative for influenza A&B and COVID but positive for RSV. Was admitted for supportive care with steroids (burst not taper) and nebulizer treatment with rapid improvement. Was discharges home on prednisone taper and Cheratussin. Of note, CXR with incidental finding of moderate colonic gas in upper abd.   In office today reports is feeling better but still has persistent and frequent cough whish will occasionally cause brief episode of not being able to catch breath. Denies fevers, chills, sinus congestion, ear pain. Reports is eating and drinking normally. Is somewhat constipated due to Cheratussin but did move bowels yesterday. Denies N/V, abd pains. Reports has been taking amlodipine at night and notes improvement in swelling of ankles. Denies CP or palpitations.       History/Other:   Current Medications:  Medication Reconciliation:  I am aware of an inpatient discharge within the last 30 days.  The discharge medication list has been reconciled with the patient's current medication list and reviewed by me.  See medication list for additions of new medication, and changes  to current doses of medications and discontinued medications.  No outpatient medications have been marked as taking for the 2/13/24 encounter (Office Visit) with Tai Chavarria NP.       Review of Systems:  GENERAL: weight stable, energy stable, no sweating  SKIN: denies any unusual skin lesions  EYES: denies blurred vision or double vision  HEENT: denies nasal congestion, sinus pain or ST  LUNGS: as above  CARDIOVASCULAR: denies chest pain on exertion or palpitations  GI: denies abdominal pain, denies heartburn, denies diarrhea  NEURO: denies headaches, denies dizziness, denies weakness    Objective:   No LMP recorded. (Menstrual status: Menopause).  Estimated body mass index is 26.03 kg/m² as calculated from the following:    Height as of 1/31/24: 5' 7\" (1.702 m).    Weight as of 2/1/24: 166 lb 3.2 oz (75.4 kg).   /64   Pulse 84   Temp 97 °F (36.1 °C)   Resp 18   SpO2 98%    GENERAL: well developed, well nourished, in no apparent distress  SKIN: no rashes, no suspicious lesions  HEENT: atraumatic, normocephalic.   EYES: PERRLA, EOMI, conjunctiva are clear  NECK: supple, no adenopathy, no bruits  CHEST: no chest tenderness  LUNGS: clear diminished bilaterally to auscultation. No wheezes, rhonchi or rales noted but frequent dry sounding cough noted during exam.   CARDIO: RRR without murmur  GI: good BS's, no masses, HSM or tenderness  EXTREMITIES: no cyanosis, clubbing. Trace edema to lower legs  NEURO: Oriented times three, cranial nerves are intact, motor and sensory are grossly intact    Assessment & Plan:   1. RSV (respiratory syncytial virus infection) (Primary)- slowly improving. Management of cough as below.     2. Acute cough- prednisone taper. Medication administration, use and side effects discussed. Albuterol inhaler with spacer. May continue Cheratussin PRN. Warned pt about sedation with this medication and advised pt about necessary precautions and activities to avoid.See me in ~10 days for  follow up, sooner if worsening. Verbalized understanding of instructions and agreeable to this plan of care.     3. Benign essential HTN- stable. Continue current management with amlodipine at night. Verbalized understanding.         No follow-ups on file.

## 2024-02-21 ENCOUNTER — OFFICE VISIT (OUTPATIENT)
Dept: FAMILY MEDICINE CLINIC | Facility: CLINIC | Age: 89
End: 2024-02-21
Payer: MEDICARE

## 2024-02-21 VITALS
HEART RATE: 80 BPM | RESPIRATION RATE: 18 BRPM | DIASTOLIC BLOOD PRESSURE: 64 MMHG | SYSTOLIC BLOOD PRESSURE: 124 MMHG | TEMPERATURE: 97 F | OXYGEN SATURATION: 97 %

## 2024-02-21 DIAGNOSIS — I10 BENIGN ESSENTIAL HTN: ICD-10-CM

## 2024-02-21 DIAGNOSIS — B33.8 RSV (RESPIRATORY SYNCYTIAL VIRUS INFECTION): ICD-10-CM

## 2024-02-21 DIAGNOSIS — R05.1 ACUTE COUGH: Primary | ICD-10-CM

## 2024-02-21 PROCEDURE — 99213 OFFICE O/P EST LOW 20 MIN: CPT | Performed by: NURSE PRACTITIONER

## 2024-02-21 RX ORDER — AMLODIPINE BESYLATE 5 MG/1
5 TABLET ORAL NIGHTLY
Qty: 90 TABLET | Refills: 2 | Status: SHIPPED | OUTPATIENT
Start: 2024-02-21

## 2024-02-21 NOTE — PROGRESS NOTES
Chief Complaint   Patient presents with    Follow - Up     Cough         HPI:  Presents with daughter for follow up of visit with me on 2/13 for RSV infection requiring hospitalization. Did have lingering cough at that visit, managed with prednisone burst, albuterol inhaler and Cheratussin.   In office today reports she is feeling much better. Reports still has occasional cough but rare now. Denies fever/chills, wheezing, SOB,  body aches or fatigue. Also, reports needs refill of amlodipine. Was changed to take this at night as it was causing ankle swelling but she had good BP control on medication. Reports while taking at night noted BP remains controlled but ankle swelling is much better. Denies chest pain, palpitations, SOB, COONEY, headaches.      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)    Macular cyst, hole, or pseudohole, right eye    Nonexudative age-related macular degeneration, right eye, intermediate dry stage    PMR (polymyalgia rheumatica) ([facility])    Paving stone degeneration of retina, bilateral    Asymptomatic carotid artery stenosis, bilateral    Weakness    Constipation    Thrombocytopenia (HCC)    Syncope and collapse    Concussion with loss of consciousness    Fall, initial encounter    Laceration of right little finger without foreign body without damage to nail, initial  encounter    Neuropathy    Shortness of breath    Respiratory syncytial virus (RSV)    RSV (acute bronchiolitis due to respiratory syncytial virus)       Current Outpatient Medications   Medication Sig Dispense Refill    amLODIPine 5 MG Oral Tab Take 1 tablet (5 mg total) by mouth at bedtime. 90 tablet 2    predniSONE 10 MG Oral Tab Take 6 tabs daily for 3 days, then take 4 tabs daily for 3 days, then take 2 tabs daily for 3 day, then take 1 tab daily for 3 days. 39 tablet 0    albuterol (PROAIR HFA) 108 (90 Base) MCG/ACT Inhalation Aero Soln Inhale 2 puffs into the lungs every 4 (four) hours as needed. 8 g 0    Spacer/Aero-Holding Chambers Does not apply Device For use with albuterol inhaler 1 each 0    guaiFENesin-Codeine 100-10 MG/5ML Oral Syrup Take 5 mL by mouth nightly as needed (cough). 50 mL 0    albuterol 108 (90 Base) MCG/ACT Inhalation Aero Soln Inhale 1 puff into the lungs every 6 (six) hours as needed for Wheezing or Shortness of Breath. 1 each 0    LEVOTHYROXINE 100 MCG Oral Tab TAKE 1 TABLET(100 MCG) BY MOUTH DAILY 90 tablet 1    amLODIPine 5 MG Oral Tab Take 1 tablet (5 mg total) by mouth at bedtime.      METOPROLOL TARTRATE 25 MG Oral Tab TAKE 1 TABLET(25 MG) BY MOUTH TWICE DAILY 180 tablet 0    pregabalin 25 MG Oral Cap Take 1 capsule (25 mg total) by mouth 2 (two) times daily. 180 capsule 0    docusate sodium 100 MG Oral Cap Take 1 capsule (100 mg total) by mouth 2 (two) times daily as needed for constipation.      ergocalciferol 1.25 MG (75471 UT) Oral Cap Take 1 capsule (50,000 Units total) by mouth once a week.      ATORVASTATIN 40 MG Oral Tab TAKE 1 TABLET(40 MG) BY MOUTH EVERY NIGHT 90 tablet 1    predniSONE 1 MG Oral Tab Take 1 tablet (1 mg total) by mouth daily with breakfast. 90 tablet 2    EYE DROPS RELIEF OP Apply 1 drop to eye daily as needed.      acetaminophen 500 MG Oral Tab Take 2 tablets (1,000 mg total) by mouth every 12 (twelve) hours as needed for Pain.         Physical Exam  BP  124/64   Pulse 80   Temp 97.2 °F (36.2 °C)   Resp 18   SpO2 97%   Constitutional: well developed, well nourished, in no apparent distress  HEENT: Normocephalic and atraumatic.   Eyes: Conjunctivae are pink and moist without exudate or drainage. EOMI.  Neck: Normal range of motion. Neck supple, no JVD.   Cardiovascular: Normal rate, regular rhythm.  No murmur.   Pulmonary/Chest: No respiratory distress. Effort normal. Breath sounds clear bilaterally. No wheezes, rhonchi or rales  Skin: Skin is warm and day. No rash noted. No erythema. No pallor.     A/P:    Encounter Diagnoses   Name Primary?    Acute cough- improved, take prednisone to completion. Notify office if symptoms worsen. Verbalized understanding of instructions and agreeable to this plan of care.    Yes    RSV (respiratory syncytial virus infection)- resolved.      Benign essential HTN- continue current management with amlodipine at night. Refill provided.         No orders of the defined types were placed in this encounter.      Meds & Refills for this Visit:  Requested Prescriptions     Signed Prescriptions Disp Refills    amLODIPine 5 MG Oral Tab 90 tablet 2     Sig: Take 1 tablet (5 mg total) by mouth at bedtime.       Imaging & Consults:  None    No follow-ups on file.  There are no Patient Instructions on file for this visit.    All questions were answered and the patient understands the plan.

## 2024-02-22 DIAGNOSIS — E78.00 HYPERCHOLESTEREMIA: ICD-10-CM

## 2024-02-22 RX ORDER — ATORVASTATIN CALCIUM 40 MG/1
40 TABLET, FILM COATED ORAL NIGHTLY
Qty: 90 TABLET | Refills: 0 | Status: SHIPPED | OUTPATIENT
Start: 2024-02-22

## 2024-02-22 NOTE — TELEPHONE ENCOUNTER
Requested Prescriptions     Pending Prescriptions Disp Refills    ATORVASTATIN 40 MG Oral Tab [Pharmacy Med Name: ATORVASTATIN 40MG TABLETS] 90 tablet 1     Sig: TAKE 1 TABLET(40 MG) BY MOUTH EVERY NIGHT     LOV 2/21/2024     Patient was asked to follow-up in: Follow-up not documented in note    Appointment scheduled: 4/23/2024 Mony Hall, DO     Medication refilled per protocol.

## 2024-03-04 ENCOUNTER — TELEPHONE (OUTPATIENT)
Dept: FAMILY MEDICINE CLINIC | Facility: CLINIC | Age: 89
End: 2024-03-04

## 2024-03-04 DIAGNOSIS — G62.9 NEUROPATHY: ICD-10-CM

## 2024-03-04 RX ORDER — PREGABALIN 25 MG/1
25 CAPSULE ORAL 2 TIMES DAILY
Qty: 180 CAPSULE | Refills: 0 | Status: SHIPPED | OUTPATIENT
Start: 2024-03-04

## 2024-03-04 NOTE — TELEPHONE ENCOUNTER
Patient is calling she only has 2 pills of her Pregabalin 25 mg oral capsules refilled to [facility] please send as soon as possible.

## 2024-03-04 NOTE — TELEPHONE ENCOUNTER
LOV 2/21/24 was acute.     Future Appointments   Date Time Provider Department Center   4/23/2024  3:40 PM Mony Hall,  [facility]      Pt requests refill of Pregabalin as she only has two capsules left.      Routed to Dr Hall.

## 2024-03-05 RX ORDER — PREGABALIN 25 MG/1
25 CAPSULE ORAL 2 TIMES DAILY
Qty: 180 CAPSULE | Refills: 0 | OUTPATIENT
Start: 2024-03-05

## 2024-03-06 NOTE — PROGRESS NOTES
Chief Complaint:  Patient presents with:  PMR: cureently on 14 mg - had labs done   Medication Request: refill on 1 mg Prednisone    HPI:  This is a 80year old female patient presenting for PMR (cureently on 14 mg - had labs done ) and Medication Request QD x 1 week, then 4 tablets PO QD x 1 week, then 2 tablets PO QD x 1 week, then stop. This is in addition to a 10mg tablet daily. Disp: 150 tablet Rfl: 0   Levothyroxine Sodium 112 MCG Oral Tab Take 1 tablet (112 mcg total) by mouth once daily.  Disp: 90 ta in 1 weeks. F/u in 1 month. To call if symptoms worsening. Plan for continued slow taper (decrease 1mg per month) given relapse. Declines DEXA scan and aware of increased risk of osteoporosis. Continuing to monitor diet at home.   -     predniSONE 1 MG Ora PACU > floor

## 2024-03-19 ENCOUNTER — TELEPHONE (OUTPATIENT)
Dept: FAMILY MEDICINE CLINIC | Facility: CLINIC | Age: 89
End: 2024-03-19

## 2024-03-19 DIAGNOSIS — H91.90 HEARING LOSS, UNSPECIFIED HEARING LOSS TYPE, UNSPECIFIED LATERALITY: Primary | ICD-10-CM

## 2024-03-19 NOTE — TELEPHONE ENCOUNTER
Referral request [facility]    Hearing evaluation    Evaluate and treat    Diagnosis: Hearing impairment    6 visits

## 2024-04-11 ENCOUNTER — TELEPHONE (OUTPATIENT)
Dept: FAMILY MEDICINE CLINIC | Facility: CLINIC | Age: 89
End: 2024-04-11

## 2024-04-11 DIAGNOSIS — I10 HYPERTENSION, BENIGN: ICD-10-CM

## 2024-04-11 NOTE — TELEPHONE ENCOUNTER
Patient called needing a refill on her medication and she is almost out of it 4 left . She needing a refill and she does have an apt here     METOPROLOL TARTRATE 25 MG Oral Tab     [id number] - [place], [place] - [address] [place] [address] AT [facility], [phone],

## 2024-04-23 ENCOUNTER — OFFICE VISIT (OUTPATIENT)
Dept: FAMILY MEDICINE CLINIC | Facility: CLINIC | Age: 89
End: 2024-04-23
Payer: MEDICARE

## 2024-04-23 VITALS
TEMPERATURE: 97 F | HEIGHT: 67 IN | SYSTOLIC BLOOD PRESSURE: 130 MMHG | RESPIRATION RATE: 16 BRPM | WEIGHT: 176.63 LBS | HEART RATE: 80 BPM | BODY MASS INDEX: 27.72 KG/M2 | DIASTOLIC BLOOD PRESSURE: 68 MMHG

## 2024-04-23 DIAGNOSIS — H35.3221 EXUDATIVE AGE-RELATED MACULAR DEGENERATION OF LEFT EYE WITH ACTIVE CHOROIDAL NEOVASCULARIZATION (HCC): ICD-10-CM

## 2024-04-23 DIAGNOSIS — E03.9 HYPOTHYROIDISM, UNSPECIFIED TYPE: ICD-10-CM

## 2024-04-23 DIAGNOSIS — Z00.00 ENCOUNTER FOR ANNUAL HEALTH EXAMINATION: Primary | ICD-10-CM

## 2024-04-23 DIAGNOSIS — E78.00 HYPERCHOLESTEREMIA: ICD-10-CM

## 2024-04-23 DIAGNOSIS — M35.3 PMR (POLYMYALGIA RHEUMATICA) (HCC): ICD-10-CM

## 2024-04-23 DIAGNOSIS — D69.6 THROMBOCYTOPENIA (HCC): ICD-10-CM

## 2024-04-23 DIAGNOSIS — G62.9 NEUROPATHY: ICD-10-CM

## 2024-04-23 DIAGNOSIS — I10 BENIGN ESSENTIAL HTN: ICD-10-CM

## 2024-04-23 PROCEDURE — 99214 OFFICE O/P EST MOD 30 MIN: CPT | Performed by: FAMILY MEDICINE

## 2024-04-23 PROCEDURE — G0439 PPPS, SUBSEQ VISIT: HCPCS | Performed by: FAMILY MEDICINE

## 2024-04-23 NOTE — PROGRESS NOTES
Subjective:   Alyssa Montgomery is a 89 year old female who presents for a Medicare Subsequent Annual Wellness visit (Pt already had Initial Annual Wellness) and scheduled follow up of multiple significant but stable problems.     Chronic conditions:  PMR:  Following with Dr. Shelton. On 1mg prednisone. Notes no pain currently.      Macular degeneration:  Following with Dr. Serrano. No further injections and symptoms have stabilized.      CAD, HTN:  Currently on aspirin, metoprolol, atorvastatin. Previously on lisinopril/HCTZ- stopped due to low BPs.  Denies Headaches, SOB, vision changes, chest pain and LE swelling.     Hypothyroidism: On levothyroxine 100 mcg. Last TSH 11/2023-normal. 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    Health Maintenance:  Vaccines: reviewed as below. Indicated today: Shingrix  Immunization History   Administered Date(s) Administered    Covid-19 Vaccine Moderna 100 mcg/0.5 ml 03/04/2021, 04/01/2021, 11/04/2021, 04/22/2022    Covid-19 Vaccine Moderna Bivalent 50mcg/0.5mL 12+ years 12/29/2022    DT [birth year]01/1992    FLU VAC High Dose 65 YRS & Older PRSV Free (67689) 09/16/2019, 09/03/2020, 10/05/2021, 10/23/2022    FLULAVAL 6 months & older 0.5 ml Prefilled syringe (59439) 09/29/2017, 09/20/2018    FLUZONE 6 months and older PFS 0.5 ml (98769) 09/20/2018    HIGH DOSE FLU 65 YRS AND OLDER PRSV FREE SINGLE D ([id number]) FLU CLINIC 10/05/2021, 10/18/2022    Pneumococcal (Prevnar 13) 03/25/2019    Pneumovax 23 08/08/2022   Deferred Date(s) Deferred    Pneumococcal (Prevnar 13) 09/01/2017     Obesity screening: Body mass index is 27.66 kg/m².  Diabetes screening: Negative  Hypercholesterolemia screening:  Lab Results   Component Value Date    HDL 62 (H) 06/05/2023    HDL 58 08/16/2022    HDL 54 07/13/2021     Lab Results   Component Value Date    LDL 52 06/05/2023    LDL 52 08/16/2022    LDL 52  07/13/2021     Lab Results   Component Value Date    TRIG 105 06/05/2023    TRIG 86 08/16/2022    TRIG 91 07/13/2021        Depression screen: no concerns  Cervical Cancer screening: N/A  Colon Cancer screening: Declines further screening  Breast Cancer screening: Declines further screening  Osteoporosis: Declines DEXA today    History/Other:   Fall Risk Assessment:   She has been screened for Falls and is High Risk. Fall Prevention information provided to patient in After Visit Summary.    Do you feel unsteady when standing or walking?: Yes  Do you worry about falling?: Yes  Have you fallen in the past year?: Yes  Were you injured?: No     Cognitive Assessment:   Abnormal  What day of the week is this?: Correct  What month is it?: Correct  What year is it?: Incorrect  Recall \"Ball\": Correct  Recall \"Flag\": Correct  Recall \"Tree\": Correct    Functional Ability/Status:   Alyssa Montgomery has some abnormal functions as listed below:  She has Driving difficulties based on screening of functional status. She has Meal Preparation difficulties based on screening of functional status.She has difficulties Managing Money/Bills based on screening of functional status.She has difficulties Shopping for Groceries based on screening of functional status. She has Hearing problems based on screening of functional status.She has Vision problems based on screening of functional status. She has Walking problems based on screening of functional status.       Depression Screening (PHQ-2/PHQ-9): PHQ-2 SCORE: 0  , done 4/23/2024             Advanced Directives:   She does have a Living Will but we do NOT have it on file in Epic.    She has a Power of  for Health Care on file in [facility].  Discussed Advance Care Planning with patient (and family/surrogate if present). Standard forms made available to patient in After Visit Summary.      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)    Macular cyst, hole, or pseudohole, right eye    Nonexudative age-related macular degeneration, right eye, intermediate dry stage    PMR (polymyalgia rheumatica) (HCC)    Paving stone degeneration of retina, bilateral    Asymptomatic carotid artery stenosis, bilateral    Weakness    Constipation    Thrombocytopenia (HCC)    Syncope and collapse    Concussion with loss of consciousness    Fall, initial encounter    Laceration of right little finger without foreign body without damage to nail, initial encounter    Neuropathy    Shortness of breath    Respiratory syncytial virus (RSV)    RSV (acute bronchiolitis due to respiratory syncytial virus)     Allergies:  She has No Known Allergies.    Current Medications:  Outpatient Medications Marked as Taking for the 4/23/24 encounter (Office Visit) with Mony Hall, DO   Medication Sig    metoprolol tartrate 25 MG Oral Tab Take 1 tablet (25 mg total) by mouth 2 (two) times daily.    pregabalin 25 MG Oral Cap Take 1 capsule (25 mg total) by mouth 2 (two) times daily.    atorvastatin 40 MG Oral Tab TAKE 1 TABLET(40 MG) BY MOUTH EVERY NIGHT    amLODIPine 5 MG Oral Tab Take 1 tablet (5 mg total) by mouth at bedtime.    predniSONE 10 MG Oral Tab Take 6 tabs daily for 3 days, then take 4 tabs daily for 3 days, then take 2 tabs daily for 3 day, then take 1 tab daily for 3 days.    albuterol (PROAIR HFA) 108 (90 Base) MCG/ACT Inhalation Aero Soln Inhale 2 puffs into the lungs every 4 (four) hours as needed.    Spacer/Aero-Holding Chambers Does not apply Device For use with albuterol inhaler    albuterol 108 (90 Base) MCG/ACT Inhalation Aero Soln Inhale 1 puff into the lungs every 6 (six) hours  as needed for Wheezing or Shortness of Breath.    LEVOTHYROXINE 100 MCG Oral Tab TAKE 1 TABLET(100 MCG) BY MOUTH DAILY    amLODIPine 5 MG Oral Tab Take 1 tablet (5 mg total) by mouth at bedtime.    docusate sodium 100 MG Oral Cap Take 1 capsule (100 mg total) by mouth 2 (two) times daily as needed for constipation.    ergocalciferol 1.25 MG (07505 UT) Oral Cap Take 1 capsule (50,000 Units total) by mouth once a week.    predniSONE 1 MG Oral Tab Take 1 tablet (1 mg total) by mouth daily with breakfast.    EYE DROPS RELIEF OP Apply 1 drop to eye daily as needed.    acetaminophen 500 MG Oral Tab Take 2 tablets (1,000 mg total) by mouth every 12 (twelve) hours as needed for Pain.       Medical History:  She  has a past medical history of Essential hypertension, Heart attack (HCC), Hyperlipidemia, Hypothyroidism (2012), Macular degeneration, and Retinopathy of both eyes (2014).  Surgical History:  She  has a past surgical history that includes cataract surgery, complex; d & c (1968); hysterectomy (1972); oophorectomy (1965); skin surgery (2019); and total abdom hysterectomy.   Family History:  Her family history includes Breast Cancer in her sister; Diabetes in her sister; Heart Attack in her father; Hypertension in her mother.  Social History:  She  reports that she quit smoking about 52 years ago. Her smoking use included cigarettes. She has never used smokeless tobacco. She reports that she does not currently use alcohol after a past usage of about 7.0 standard drinks of alcohol per week. She reports that she does not use drugs.    Tobacco:  She smoked tobacco in the past but quit greater than 12 months ago.  Social History     Tobacco Use   Smoking Status Former    Current packs/day: 0.00    Types: Cigarettes    Quit date: 1972    Years since quittin.3   Smokeless Tobacco Never        CAGE Alcohol Screen:   CAGE screening score of 0 on 2024, showing low risk of alcohol  abuse.      Patient Care Team:  Mony Hall DO as PCP - General (Family Medicine)  Homar Serrano MD (OPHTHALMOLOGY)  An Shelton DO (RHEUMATOLOGY)  Navid Johnson MD (CARDIOLOGY)  Virgen Ingram MD (MOHS Micrographic Surgery)  Felisa Glass CMA as [facility] Care Manager    Review of Systems     Negative except visual difficulties, weakness    Objective:   Physical Exam  General Appearance:  Alert, cooperative, no distress, appears stated age   Head:  Normocephalic, without obvious abnormality, atraumatic   Eyes:  conjunctiva/corneas clear, EOM's intact both eyes   Ears:  Normal TM's and external ear canals, both ears   Throat: Lips, mucosa, and tongue normal; teeth and gums normal   Neck: Supple, symmetrical, trachea midline, no adenopathy;  thyroid: not enlarged, symmetric, no tenderness/mass/nodules; no carotid bruit or JVD   Lungs:   Clear to auscultation bilaterally, respirations unlabored   Heart:  Regular rate and rhythm, S1 and S2 normal, no murmur, rub, or gallop   Abdomen:   Soft, non-tender, bowel sounds active all four quadrants,  no masses, no organomegaly   Pelvic: Deferred   Extremities: Extremities normal, atraumatic, no cyanosis or edema   Neurologic: Grossly Normal         /68   Pulse 80   Temp 97.1 °F (36.2 °C)   Resp 16   Ht 5' 7\" (1.702 m)   Wt 176 lb 9.6 oz (80.1 kg)   BMI 27.66 kg/m²  Estimated body mass index is 27.66 kg/m² as calculated from the following:    Height as of this encounter: 5' 7\" (1.702 m).    Weight as of this encounter: 176 lb 9.6 oz (80.1 kg).    Medicare Hearing Assessment:   Hearing Screening    Screening Method: Finger Rub  Finger Rub Result: Pass         Visual Acuity:   Right Eye Visual Acuity:  (cannot see chart)     Left Eye Visual Acuity:  (cannot see chart)     Both Eyes Visual Acuity:  (cannot see chart)              Assessment & Plan:   Alyssa Montgomery is a 89 year old female who presents for a Medicare Assessment.     1. Encounter for annual  health examination (Primary)  2. 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  Stable per ophthalmology.  Continue following with ophthalmology  3. PMR (polymyalgia rheumatica) (HCC)  Stable on 1 mg of prednisone.  To follow-up with rheumatology.  4. Thrombocytopenia (HCC)  Mild.  Plan for CBC every 6 months  5. Benign essential HTN  Normotensive today. Will cont current therapy  6. Hypercholesteremia  Controlled. No SE. Continue statin  7. Hypothyroidism, unspecified type  Controlled. No SE. Continue levothyroxine  8. Neuropathy  Continue Lyrica    The patient indicates understanding of these issues and agrees to the plan.  Continue with current treatment plan.  Reinforced healthy diet, lifestyle, and exercise.    Return to clinic in 6 months    Mony Hall DO, 4/23/2024     Supplementary Documentation:   General Health:  In the past six months, have you lost more than 10 pounds without trying?: 2 - No  Has your appetite been poor?: No  Type of Diet: Balanced  How does the patient maintain a good energy level?: Appropriate Exercise  How would you describe your daily physical activity?: Moderate  How would you describe your current health state?: Fair  How do you maintain positive mental well-being?: Social Interaction;Games;Visiting Friends;Visiting Family  On a scale of 0 to 10, with 0 being no pain and 10 being severe pain, what is your pain level?: 0 - (None)  In the past six months, have you experienced urine leakage?: 0-No  At any time do you feel concerned for the safety/well-being of yourself and/or your children, in your home or elsewhere?: No  Have you had any immunizations at another office such as Influenza, Hepatitis B, Tetanus, or Pneumococcal?: No       Alyssa Montgomery's SCREENING SCHEDULE   Tests on this list are recommended by your physician but may not be covered, or covered at this frequency, by your insurer.    Please check with your insurance carrier before scheduling to verify coverage.   PREVENTATIVE SERVICES FREQUENCY &  COVERAGE DETAILS LAST COMPLETION DATE   Diabetes Screening    Fasting Blood Sugar /  Glucose    One screening every 12 months if never tested or if previously tested but not diagnosed with pre-diabetes   One screening every 6 months if diagnosed with pre-diabetes Lab Results   Component Value Date     (H) 01/31/2024        Cardiovascular Disease Screening    Lipid Panel  Cholesterol  Lipoprotein (HDL)  Triglycerides Covered every 5 years for all Medicare beneficiaries without apparent signs or symptoms of cardiovascular disease Lab Results   Component Value Date    CHOLEST 133 06/05/2023    HDL 62 (H) 06/05/2023    LDL 52 06/05/2023    TRIG 105 06/05/2023         Electrocardiogram (EKG)   Covered if needed at [facility] to Medicare, and non-screening if indicated for medical reasons 11/14/2023      Ultrasound Screening for Abdominal Aortic Aneurysm (AAA) Covered once in a lifetime for one of the following risk factors    Men who are 65-75 years old and have ever smoked    Anyone with a family history -     Colorectal Cancer Screening  Covered for ages 50-85; only need ONE of the following:    Colonoscopy   Covered every 10 years    Covered every 2 years if patient is at high risk or previous colonoscopy was abnormal -    No recommendations at this time    Flexible Sigmoidoscopy   Covered every 4 years -    Fecal Occult Blood Test Covered annually -   Bone Density Screening    Bone density screening    Covered every 2 years after age 65 if diagnosed with risk of osteoporosis or estrogen deficiency.    Covered yearly for long-term glucocorticoid medication use (Steroids) No results found for this or any previous visit.      No recommendations at this time   Pap and Pelvic    Pap   Covered every 2 years for women at normal risk; Annually if at high risk -  No recommendations at this time     Chlamydia Annually if high risk -  No recommendations at this time   Screening Mammogram    Mammogram     Recommend annually for all female patients aged 40 and older    One baseline mammogram covered for patients aged 35-39 -    No recommendations at this time    Immunizations    Influenza Covered once per flu season  Please get every year -  No recommendations at this time    Pneumococcal Each vaccine ([id number] & [id number]) covered once after 65 Prevnar 13: 03/25/2019    [id number]: 08/08/2022     No recommendations at this time    Hepatitis B One screening covered for patients with certain risk factors   -  No recommendations at this time    Tetanus Toxoid Not covered by Medicare Part B unless medically necessary (cut with metal); may be covered with your pharmacy prescription benefits -    Tetanus, Diptheria and Pertusis TD and TDaP Not covered by Medicare Part B -  No recommendations at this time    Zoster Not covered by Medicare Part B; may be covered with your pharmacy  prescription benefits -  Zoster Vaccines(1 of 2) Never done     Annual Monitoring of Persistent Medications (ACE/ARB, digoxin diuretics, anticonvulsants)    Potassium Annually Lab Results   Component Value Date    K 3.8 01/31/2024         Creatinine   Annually Lab Results   Component Value Date    CREATSERUM 0.64 01/31/2024         BUN Annually Lab Results   Component Value Date    BUN 13 01/31/2024       Drug Serum Conc Annually No results found for: \"DIGOXIN\", \"DIG\", \"VALP\"

## 2024-04-24 NOTE — PATIENT INSTRUCTIONS
Alyssa Montgomery's SCREENING SCHEDULE   Tests on this list are recommended by your physician but may not be covered, or covered at this frequency, by your insurer.   Please check with your insurance carrier before scheduling to verify coverage.   PREVENTATIVE SERVICES FREQUENCY &  COVERAGE DETAILS LAST COMPLETION DATE   Diabetes Screening    Fasting Blood Sugar /  Glucose    One screening every 12 months if never tested or if previously tested but not diagnosed with pre-diabetes   One screening every 6 months if diagnosed with pre-diabetes Lab Results   Component Value Date     (H) 01/31/2024        Cardiovascular Disease Screening    Lipid Panel  Cholesterol  Lipoprotein (HDL)  Triglycerides Covered every 5 years for all Medicare beneficiaries without apparent signs or symptoms of cardiovascular disease Lab Results   Component Value Date    CHOLEST 133 06/05/2023    HDL 62 (H) 06/05/2023    LDL 52 06/05/2023    TRIG 105 06/05/2023         Electrocardiogram (EKG)   Covered if needed at [facility] to Medicare, and non-screening if indicated for medical reasons 11/14/2023      Ultrasound Screening for Abdominal Aortic Aneurysm (AAA) Covered once in a lifetime for one of the following risk factors   • Men who are 65-75 years old and have ever smoked   • Anyone with a family history -     Colorectal Cancer Screening  Covered for ages 50-85; only need ONE of the following:    Colonoscopy   Covered every 10 years    Covered every 2 years if patient is at high risk or previous colonoscopy was abnormal -    No recommendations at this time    Flexible Sigmoidoscopy   Covered every 4 years -    Fecal Occult Blood Test Covered annually -   Bone Density Screening    Bone density screening    Covered every 2 years after age 65 if diagnosed with risk of osteoporosis or estrogen deficiency.    Covered yearly for long-term glucocorticoid medication use (Steroids) No results found for this or any previous visit.      No  recommendations at this time   Pap and Pelvic    Pap   Covered every 2 years for women at normal risk; Annually if at high risk -  No recommendations at this time    Chlamydia Annually if high risk -  No recommendations at this time   Screening Mammogram    Mammogram     Recommend annually for all female patients aged 40 and older    One baseline mammogram covered for patients aged 35-39 -    No recommendations at this time    Immunizations    Influenza Covered once per flu season  Please get every year -  No recommendations at this time    Pneumococcal Each vaccine ([id number] & [id number]) covered once after 65 Prevnar 13: 03/25/2019    Fomchwssw98: 08/08/2022     No recommendations at this time    Hepatitis B One screening covered for patients with certain risk factors   -  No recommendations at this time    Tetanus Toxoid Not covered by Medicare Part B unless medically necessary (cut with metal); may be covered with your pharmacy prescription benefits -    Tetanus, Diptheria and Pertusis TD and TDaP Not covered by Medicare Part B -  No recommendations at this time    Zoster Not covered by Medicare Part B; may be covered with your pharmacy  prescription benefits -  Zoster Vaccines(1 of 2) Never done     Annual Monitoring of Persistent Medications (ACE/ARB, digoxin diuretics, anticonvulsants)    Potassium Annually Lab Results   Component Value Date    K 3.8 01/31/2024         Creatinine   Annually Lab Results   Component Value Date    CREATSERUM 0.64 01/31/2024         BUN Annually Lab Results   Component Value Date    BUN 13 01/31/2024       Drug Serum Conc Annually No results found for: \"DIGOXIN\", \"DIG\", \"VALP\"

## 2024-05-06 ENCOUNTER — TELEPHONE (OUTPATIENT)
Dept: FAMILY MEDICINE CLINIC | Facility: CLINIC | Age: 89
End: 2024-05-06

## 2024-05-06 NOTE — TELEPHONE ENCOUNTER
Pts daughter is calling because she wants to discuss [facility] [facility] service and if it would work well for pt

## 2024-05-31 ENCOUNTER — TELEPHONE (OUTPATIENT)
Dept: FAMILY MEDICINE CLINIC | Facility: CLINIC | Age: 89
End: 2024-05-31

## 2024-05-31 DIAGNOSIS — G62.9 NEUROPATHY: ICD-10-CM

## 2024-05-31 NOTE — TELEPHONE ENCOUNTER
LOV 4/23/2024      Future Appointments   Date Time Provider Department Center   10/24/2024  1:40 PM Mony Hall,  [facility] [place]      Routed to Dr. Hall for review - refill pended

## 2024-06-01 RX ORDER — PREGABALIN 25 MG/1
25 CAPSULE ORAL 2 TIMES DAILY
Qty: 180 CAPSULE | Refills: 0 | Status: SHIPPED | OUTPATIENT
Start: 2024-06-01

## 2024-06-03 ENCOUNTER — TELEPHONE (OUTPATIENT)
Dept: FAMILY MEDICINE CLINIC | Facility: CLINIC | Age: 89
End: 2024-06-03

## 2024-06-03 NOTE — TELEPHONE ENCOUNTER
Pt is calling because elaine states they dont have   pregabalin 25 MG Oral Cap     [id number] - [place], [place] - [address] AT [facility], [phone], [phone] [[id number]]

## 2024-06-03 NOTE — TELEPHONE ENCOUNTER
Called daughter back. She says she believes [facility] has the medication because she just got a notification from them. Will call back with any changes. She verbalized understanding and agrees with plan.

## 2024-07-02 ENCOUNTER — TELEPHONE (OUTPATIENT)
Dept: FAMILY MEDICINE CLINIC | Facility: CLINIC | Age: 89
End: 2024-07-02

## 2024-07-02 NOTE — TELEPHONE ENCOUNTER
Called [facility] and confirmed that they have refills and will get one ready for her then notified the daughter that the medication will be available.

## 2024-07-02 NOTE — TELEPHONE ENCOUNTER
Patient daughter call requesting medication refill  Patient is out of med.  metoprolol tartrate 25 MG Oral Tab     [facility] #[id number] - [place], [place] - [address], [phone], [phone]   [address] [address]   Phone: [phone] Fax: [phone]   Hours: Not open 24 hours

## 2024-08-14 ENCOUNTER — TELEPHONE (OUTPATIENT)
Dept: FAMILY MEDICINE CLINIC | Facility: CLINIC | Age: 89
End: 2024-08-14

## 2024-08-14 NOTE — TELEPHONE ENCOUNTER
Daughter called on behalf of her mom. Her mom a couple days ago on Sunday got a scrap on her arm. And it been on and off bleeding and oozing. Daughter said she hasn't seen it yet since she her mom lives in [facility] and the staff there just been putting a band on it. Daughter was hoping this afternoon to get her mom in to get it looked at

## 2024-08-14 NOTE — TELEPHONE ENCOUNTER
Called and talked to daughter and after seeing the wound she does not feel patient needs to be seen for this.

## 2024-08-20 DIAGNOSIS — E03.9 HYPOTHYROIDISM, UNSPECIFIED TYPE: ICD-10-CM

## 2024-08-20 DIAGNOSIS — E78.00 HYPERCHOLESTEREMIA: ICD-10-CM

## 2024-08-20 RX ORDER — LEVOTHYROXINE SODIUM 100 UG/1
100 TABLET ORAL DAILY
Qty: 90 TABLET | Refills: 0 | Status: ON HOLD | OUTPATIENT
Start: 2024-08-20

## 2024-08-20 RX ORDER — ATORVASTATIN CALCIUM 40 MG/1
40 TABLET, FILM COATED ORAL NIGHTLY
Qty: 90 TABLET | Refills: 0 | Status: ON HOLD | OUTPATIENT
Start: 2024-08-20

## 2024-08-20 NOTE — TELEPHONE ENCOUNTER
Requested Prescriptions     Pending Prescriptions Disp Refills    LEVOTHYROXINE 100 MCG Oral Tab [Pharmacy Med Name: LEVOTHYROXINE 0.100MG (100MCG) TAB] 90 tablet 1     Sig: TAKE 1 TABLET(100 MCG) BY MOUTH DAILY    ATORVASTATIN 40 MG Oral Tab [Pharmacy Med Name: ATORVASTATIN 40MG TABLETS] 90 tablet 0     Sig: TAKE 1 TABLET(40 MG) BY MOUTH EVERY NIGHT     LOV 4/23/2024     Patient was asked to follow-up in: Follow-up not documented in note    Appointment scheduled: 10/24/2024 Mony Hall, DO     Medication refilled per protocol.

## 2024-08-26 ENCOUNTER — APPOINTMENT (OUTPATIENT)
Dept: CT IMAGING | Facility: HOSPITAL | Age: 89
End: 2024-08-26
Attending: EMERGENCY MEDICINE
Payer: MEDICARE

## 2024-08-26 ENCOUNTER — HOSPITAL ENCOUNTER (INPATIENT)
Facility: HOSPITAL | Age: 89
LOS: 25 days | Discharge: NURSING, REHAB OR LONG-TERM CARE | End: 2024-09-20
Attending: EMERGENCY MEDICINE | Admitting: HOSPITALIST
Payer: MEDICARE

## 2024-08-26 ENCOUNTER — APPOINTMENT (OUTPATIENT)
Dept: GENERAL RADIOLOGY | Facility: HOSPITAL | Age: 89
End: 2024-08-26
Attending: EMERGENCY MEDICINE
Payer: MEDICARE

## 2024-08-26 DIAGNOSIS — R11.10 INTRACTABLE VOMITING: Primary | ICD-10-CM

## 2024-08-26 DIAGNOSIS — E87.6 HYPOKALEMIA: ICD-10-CM

## 2024-08-26 LAB
ALBUMIN SERPL-MCNC: 4.5 G/DL (ref 3.2–4.8)
ALBUMIN/GLOB SERPL: 1.6 {RATIO} (ref 1–2)
ALP LIVER SERPL-CCNC: 98 U/L
ALT SERPL-CCNC: 8 U/L
ANION GAP SERPL CALC-SCNC: 4 MMOL/L (ref 0–18)
AST SERPL-CCNC: 13 U/L (ref ?–34)
BASOPHILS # BLD AUTO: 0.03 X10(3) UL (ref 0–0.2)
BASOPHILS NFR BLD AUTO: 0.2 %
BILIRUB SERPL-MCNC: 1.1 MG/DL (ref 0.2–0.9)
BILIRUB UR QL STRIP.AUTO: NEGATIVE
BUN BLD-MCNC: 10 MG/DL (ref 9–23)
CALCIUM BLD-MCNC: 9 MG/DL (ref 8.7–10.4)
CHLORIDE SERPL-SCNC: 104 MMOL/L (ref 98–112)
CLARITY UR REFRACT.AUTO: CLEAR
CO2 SERPL-SCNC: 29 MMOL/L (ref 21–32)
CREAT BLD-MCNC: 0.59 MG/DL
EGFRCR SERPLBLD CKD-EPI 2021: 85 ML/MIN/1.73M2 (ref 60–?)
EOSINOPHIL # BLD AUTO: 0 X10(3) UL (ref 0–0.7)
EOSINOPHIL NFR BLD AUTO: 0 %
ERYTHROCYTE [DISTWIDTH] IN BLOOD BY AUTOMATED COUNT: 13.8 %
GLOBULIN PLAS-MCNC: 2.9 G/DL (ref 2–3.5)
GLUCOSE BLD-MCNC: 142 MG/DL (ref 70–99)
GLUCOSE UR STRIP.AUTO-MCNC: NORMAL MG/DL
HCT VFR BLD AUTO: 39.2 %
HGB BLD-MCNC: 13.8 G/DL
IMM GRANULOCYTES # BLD AUTO: 0.08 X10(3) UL (ref 0–1)
IMM GRANULOCYTES NFR BLD: 0.6 %
KETONES UR STRIP.AUTO-MCNC: 40 MG/DL
LEUKOCYTE ESTERASE UR QL STRIP.AUTO: NEGATIVE
LIPASE SERPL-CCNC: 32 U/L (ref 12–53)
LYMPHOCYTES # BLD AUTO: 0.79 X10(3) UL (ref 1–4)
LYMPHOCYTES NFR BLD AUTO: 5.8 %
MCH RBC QN AUTO: 31.5 PG (ref 26–34)
MCHC RBC AUTO-ENTMCNC: 35.2 G/DL (ref 31–37)
MCV RBC AUTO: 89.5 FL
MONOCYTES # BLD AUTO: 1 X10(3) UL (ref 0.1–1)
MONOCYTES NFR BLD AUTO: 7.3 %
NEUTROPHILS # BLD AUTO: 11.73 X10 (3) UL (ref 1.5–7.7)
NEUTROPHILS # BLD AUTO: 11.73 X10(3) UL (ref 1.5–7.7)
NEUTROPHILS NFR BLD AUTO: 86.1 %
NITRITE UR QL STRIP.AUTO: NEGATIVE
OSMOLALITY SERPL CALC.SUM OF ELEC: 285 MOSM/KG (ref 275–295)
PH UR STRIP.AUTO: 7 [PH] (ref 5–8)
PLATELET # BLD AUTO: 121 10(3)UL (ref 150–450)
PLATELETS.RETICULATED NFR BLD AUTO: 3.5 % (ref 0–7)
POTASSIUM SERPL-SCNC: 3.3 MMOL/L (ref 3.5–5.1)
PROT SERPL-MCNC: 7.4 G/DL (ref 5.7–8.2)
PROT UR STRIP.AUTO-MCNC: 30 MG/DL
RBC # BLD AUTO: 4.38 X10(6)UL
SARS-COV-2 RNA RESP QL NAA+PROBE: NOT DETECTED
SODIUM SERPL-SCNC: 137 MMOL/L (ref 136–145)
SP GR UR STRIP.AUTO: 1.01 (ref 1–1.03)
T4 FREE SERPL-MCNC: 1.4 NG/DL (ref 0.8–1.7)
TSI SER-ACNC: 0.29 MIU/ML (ref 0.55–4.78)
UROBILINOGEN UR STRIP.AUTO-MCNC: NORMAL MG/DL
WBC # BLD AUTO: 13.6 X10(3) UL (ref 4–11)

## 2024-08-26 PROCEDURE — 74176 CT ABD & PELVIS W/O CONTRAST: CPT | Performed by: EMERGENCY MEDICINE

## 2024-08-26 PROCEDURE — 99223 1ST HOSP IP/OBS HIGH 75: CPT | Performed by: HOSPITALIST

## 2024-08-26 PROCEDURE — 71045 X-RAY EXAM CHEST 1 VIEW: CPT | Performed by: EMERGENCY MEDICINE

## 2024-08-26 RX ORDER — PREDNISONE 1 MG/1
1 TABLET ORAL
Status: DISCONTINUED | OUTPATIENT
Start: 2024-08-27 | End: 2024-09-20

## 2024-08-26 RX ORDER — ECHINACEA PURPUREA EXTRACT 125 MG
1 TABLET ORAL
Status: DISCONTINUED | OUTPATIENT
Start: 2024-08-26 | End: 2024-09-06

## 2024-08-26 RX ORDER — HALOPERIDOL 5 MG/ML
5 INJECTION INTRAMUSCULAR ONCE
Status: COMPLETED | OUTPATIENT
Start: 2024-08-26 | End: 2024-08-26

## 2024-08-26 RX ORDER — ONDANSETRON 2 MG/ML
4 INJECTION INTRAMUSCULAR; INTRAVENOUS EVERY 4 HOURS PRN
Status: DISCONTINUED | OUTPATIENT
Start: 2024-08-26 | End: 2024-08-26

## 2024-08-26 RX ORDER — ENOXAPARIN SODIUM 100 MG/ML
40 INJECTION SUBCUTANEOUS DAILY
Status: DISCONTINUED | OUTPATIENT
Start: 2024-08-27 | End: 2024-09-20

## 2024-08-26 RX ORDER — METOCLOPRAMIDE HYDROCHLORIDE 5 MG/ML
10 INJECTION INTRAMUSCULAR; INTRAVENOUS ONCE
Status: COMPLETED | OUTPATIENT
Start: 2024-08-26 | End: 2024-08-26

## 2024-08-26 RX ORDER — LEVOTHYROXINE SODIUM 100 UG/1
100 TABLET ORAL
Status: DISCONTINUED | OUTPATIENT
Start: 2024-08-27 | End: 2024-09-20

## 2024-08-26 RX ORDER — SODIUM PHOSPHATE, DIBASIC AND SODIUM PHOSPHATE, MONOBASIC 7; 19 G/230ML; G/230ML
1 ENEMA RECTAL ONCE AS NEEDED
Status: COMPLETED | OUTPATIENT
Start: 2024-08-26 | End: 2024-09-19

## 2024-08-26 RX ORDER — ATORVASTATIN CALCIUM 40 MG/1
40 TABLET, FILM COATED ORAL NIGHTLY
Status: DISCONTINUED | OUTPATIENT
Start: 2024-08-27 | End: 2024-09-20

## 2024-08-26 RX ORDER — METOPROLOL TARTRATE 25 MG/1
25 TABLET, FILM COATED ORAL
Status: DISCONTINUED | OUTPATIENT
Start: 2024-08-26 | End: 2024-08-30

## 2024-08-26 RX ORDER — SODIUM CHLORIDE 9 MG/ML
INJECTION, SOLUTION INTRAVENOUS CONTINUOUS
Status: DISCONTINUED | OUTPATIENT
Start: 2024-08-26 | End: 2024-08-26

## 2024-08-26 RX ORDER — ALBUTEROL SULFATE 90 UG/1
2 INHALANT RESPIRATORY (INHALATION) EVERY 4 HOURS PRN
Status: DISCONTINUED | OUTPATIENT
Start: 2024-08-26 | End: 2024-09-20

## 2024-08-26 RX ORDER — DIPHENHYDRAMINE HYDROCHLORIDE 50 MG/ML
50 INJECTION INTRAMUSCULAR; INTRAVENOUS ONCE
Status: COMPLETED | OUTPATIENT
Start: 2024-08-26 | End: 2024-08-26

## 2024-08-26 RX ORDER — AMLODIPINE BESYLATE 5 MG/1
5 TABLET ORAL NIGHTLY
Status: DISCONTINUED | OUTPATIENT
Start: 2024-08-26 | End: 2024-08-30

## 2024-08-26 RX ORDER — MELATONIN
3 NIGHTLY PRN
Status: DISCONTINUED | OUTPATIENT
Start: 2024-08-26 | End: 2024-09-20

## 2024-08-26 RX ORDER — SENNOSIDES 8.6 MG
17.2 TABLET ORAL NIGHTLY PRN
Status: DISCONTINUED | OUTPATIENT
Start: 2024-08-26 | End: 2024-09-20

## 2024-08-26 RX ORDER — BISACODYL 10 MG
10 SUPPOSITORY, RECTAL RECTAL
Status: DISCONTINUED | OUTPATIENT
Start: 2024-08-26 | End: 2024-09-20

## 2024-08-26 RX ORDER — METOCLOPRAMIDE HYDROCHLORIDE 5 MG/ML
10 INJECTION INTRAMUSCULAR; INTRAVENOUS EVERY 8 HOURS PRN
Status: DISCONTINUED | OUTPATIENT
Start: 2024-08-26 | End: 2024-08-29

## 2024-08-26 RX ORDER — ONDANSETRON 2 MG/ML
4 INJECTION INTRAMUSCULAR; INTRAVENOUS ONCE
Status: COMPLETED | OUTPATIENT
Start: 2024-08-26 | End: 2024-08-26

## 2024-08-26 RX ORDER — SODIUM CHLORIDE 9 MG/ML
125 INJECTION, SOLUTION INTRAVENOUS CONTINUOUS
Status: DISCONTINUED | OUTPATIENT
Start: 2024-08-26 | End: 2024-08-26

## 2024-08-26 RX ORDER — ONDANSETRON 2 MG/ML
4 INJECTION INTRAMUSCULAR; INTRAVENOUS EVERY 6 HOURS PRN
Status: DISCONTINUED | OUTPATIENT
Start: 2024-08-26 | End: 2024-09-20

## 2024-08-26 RX ORDER — AMLODIPINE BESYLATE 5 MG/1
5 TABLET ORAL NIGHTLY
Status: DISCONTINUED | OUTPATIENT
Start: 2024-08-27 | End: 2024-08-26

## 2024-08-26 RX ORDER — SODIUM CHLORIDE, SODIUM LACTATE, POTASSIUM CHLORIDE, CALCIUM CHLORIDE 600; 310; 30; 20 MG/100ML; MG/100ML; MG/100ML; MG/100ML
INJECTION, SOLUTION INTRAVENOUS CONTINUOUS
Status: DISCONTINUED | OUTPATIENT
Start: 2024-08-26 | End: 2024-08-27

## 2024-08-26 RX ORDER — METOPROLOL TARTRATE 25 MG/1
25 TABLET, FILM COATED ORAL
Status: DISCONTINUED | OUTPATIENT
Start: 2024-08-27 | End: 2024-08-26

## 2024-08-26 RX ORDER — PREGABALIN 25 MG/1
25 CAPSULE ORAL 2 TIMES DAILY
Status: DISCONTINUED | OUTPATIENT
Start: 2024-08-26 | End: 2024-09-20

## 2024-08-26 RX ORDER — ACETAMINOPHEN 500 MG
500 TABLET ORAL EVERY 4 HOURS PRN
Status: DISCONTINUED | OUTPATIENT
Start: 2024-08-26 | End: 2024-09-20

## 2024-08-26 RX ORDER — POLYETHYLENE GLYCOL 3350 17 G/17G
17 POWDER, FOR SOLUTION ORAL DAILY PRN
Status: DISCONTINUED | OUTPATIENT
Start: 2024-08-26 | End: 2024-09-20

## 2024-08-26 NOTE — ED INITIAL ASSESSMENT (HPI)
Patient brought in by EMS for n/v since last night. Patient lives at [facility]. Denies chest pain or shortness of breath. No diarrhea or fevers.

## 2024-08-27 ENCOUNTER — APPOINTMENT (OUTPATIENT)
Dept: CV DIAGNOSTICS | Facility: HOSPITAL | Age: 89
End: 2024-08-27
Attending: HOSPITALIST
Payer: MEDICARE

## 2024-08-27 ENCOUNTER — APPOINTMENT (OUTPATIENT)
Dept: GENERAL RADIOLOGY | Facility: HOSPITAL | Age: 89
End: 2024-08-27
Attending: HOSPITALIST
Payer: MEDICARE

## 2024-08-27 LAB
ADENOVIRUS PCR:: NOT DETECTED
ANION GAP SERPL CALC-SCNC: 5 MMOL/L (ref 0–18)
ARTERIAL PATENCY WRIST A: POSITIVE
B PARAPERT DNA SPEC QL NAA+PROBE: NOT DETECTED
B PERT DNA SPEC QL NAA+PROBE: NOT DETECTED
BASE EXCESS BLDA CALC-SCNC: 3.5 MMOL/L (ref ?–2)
BASOPHILS # BLD AUTO: 0.03 X10(3) UL (ref 0–0.2)
BASOPHILS NFR BLD AUTO: 0.2 %
BODY TEMPERATURE: 98.6 F
BUN BLD-MCNC: 7 MG/DL (ref 9–23)
C PNEUM DNA SPEC QL NAA+PROBE: NOT DETECTED
CALCIUM BLD-MCNC: 8.1 MG/DL (ref 8.7–10.4)
CHLORIDE SERPL-SCNC: 107 MMOL/L (ref 98–112)
CO2 SERPL-SCNC: 26 MMOL/L (ref 21–32)
COHGB MFR BLD: 1.8 % SAT (ref 0–3)
CORONAVIRUS 229E PCR:: NOT DETECTED
CORONAVIRUS HKU1 PCR:: NOT DETECTED
CORONAVIRUS NL63 PCR:: NOT DETECTED
CORONAVIRUS OC43 PCR:: NOT DETECTED
CREAT BLD-MCNC: 0.54 MG/DL
EGFRCR SERPLBLD CKD-EPI 2021: 86 ML/MIN/1.73M2 (ref 60–?)
EOSINOPHIL # BLD AUTO: 0 X10(3) UL (ref 0–0.7)
EOSINOPHIL NFR BLD AUTO: 0 %
ERYTHROCYTE [DISTWIDTH] IN BLOOD BY AUTOMATED COUNT: 14 %
FLUAV RNA SPEC QL NAA+PROBE: NOT DETECTED
FLUBV RNA SPEC QL NAA+PROBE: NOT DETECTED
GLUCOSE BLD-MCNC: 114 MG/DL (ref 70–99)
HCO3 BLDA-SCNC: 27.5 MEQ/L (ref 21–27)
HCT VFR BLD AUTO: 34.9 %
HGB BLD-MCNC: 12.1 G/DL
HGB BLD-MCNC: 17 G/DL
IMM GRANULOCYTES # BLD AUTO: 0.07 X10(3) UL (ref 0–1)
IMM GRANULOCYTES NFR BLD: 0.6 %
L/M: 6 L/MIN
LYMPHOCYTES # BLD AUTO: 0.73 X10(3) UL (ref 1–4)
LYMPHOCYTES NFR BLD AUTO: 5.8 %
MAGNESIUM SERPL-MCNC: 1.8 MG/DL (ref 1.6–2.6)
MCH RBC QN AUTO: 31.9 PG (ref 26–34)
MCHC RBC AUTO-ENTMCNC: 34.7 G/DL (ref 31–37)
MCV RBC AUTO: 92.1 FL
METAPNEUMOVIRUS PCR:: NOT DETECTED
METHGB MFR BLD: 0.5 % SAT (ref 0.4–1.5)
MONOCYTES # BLD AUTO: 1.06 X10(3) UL (ref 0.1–1)
MONOCYTES NFR BLD AUTO: 8.4 %
MYCOPLASMA PNEUMONIA PCR:: NOT DETECTED
NEUTROPHILS # BLD AUTO: 10.74 X10 (3) UL (ref 1.5–7.7)
NEUTROPHILS # BLD AUTO: 10.74 X10(3) UL (ref 1.5–7.7)
NEUTROPHILS NFR BLD AUTO: 85 %
OSMOLALITY SERPL CALC.SUM OF ELEC: 285 MOSM/KG (ref 275–295)
OXYHGB MFR BLDA: 93.9 % (ref 92–100)
PARAINFLUENZA 1 PCR:: NOT DETECTED
PARAINFLUENZA 2 PCR:: NOT DETECTED
PARAINFLUENZA 3 PCR:: NOT DETECTED
PARAINFLUENZA 4 PCR:: NOT DETECTED
PCO2 BLDA: 36 MM HG (ref 35–45)
PH BLDA: 7.48 [PH] (ref 7.35–7.45)
PLATELET # BLD AUTO: 87 10(3)UL (ref 150–450)
PO2 BLDA: 78 MM HG (ref 80–100)
POTASSIUM SERPL-SCNC: 3.2 MMOL/L (ref 3.5–5.1)
RBC # BLD AUTO: 3.79 X10(6)UL
RHINOVIRUS/ENTERO PCR:: NOT DETECTED
RSV RNA SPEC QL NAA+PROBE: NOT DETECTED
SARS-COV-2 RNA NPH QL NAA+NON-PROBE: NOT DETECTED
SODIUM SERPL-SCNC: 138 MMOL/L (ref 136–145)
T3FREE SERPL-MCNC: 1.7 PG/ML (ref 2.4–4.2)
WBC # BLD AUTO: 12.6 X10(3) UL (ref 4–11)

## 2024-08-27 PROCEDURE — 71045 X-RAY EXAM CHEST 1 VIEW: CPT | Performed by: HOSPITALIST

## 2024-08-27 PROCEDURE — 93306 TTE W/DOPPLER COMPLETE: CPT | Performed by: HOSPITALIST

## 2024-08-27 PROCEDURE — 99291 CRITICAL CARE FIRST HOUR: CPT | Performed by: HOSPITALIST

## 2024-08-27 PROCEDURE — 99291 CRITICAL CARE FIRST HOUR: CPT | Performed by: INTERNAL MEDICINE

## 2024-08-27 PROCEDURE — 99497 ADVNCD CARE PLAN 30 MIN: CPT | Performed by: HOSPITALIST

## 2024-08-27 PROCEDURE — 99232 SBSQ HOSP IP/OBS MODERATE 35: CPT | Performed by: HOSPITALIST

## 2024-08-27 RX ORDER — MAGNESIUM OXIDE 400 MG/1
400 TABLET ORAL ONCE
Status: COMPLETED | OUTPATIENT
Start: 2024-08-27 | End: 2024-08-27

## 2024-08-27 RX ORDER — FUROSEMIDE 10 MG/ML
40 INJECTION INTRAMUSCULAR; INTRAVENOUS ONCE
Status: COMPLETED | OUTPATIENT
Start: 2024-08-27 | End: 2024-08-27

## 2024-08-27 RX ORDER — IPRATROPIUM BROMIDE AND ALBUTEROL SULFATE 2.5; .5 MG/3ML; MG/3ML
3 SOLUTION RESPIRATORY (INHALATION)
Status: DISCONTINUED | OUTPATIENT
Start: 2024-08-27 | End: 2024-08-30

## 2024-08-27 RX ORDER — METHYLPREDNISOLONE SODIUM SUCCINATE 125 MG/2ML
60 INJECTION, POWDER, LYOPHILIZED, FOR SOLUTION INTRAMUSCULAR; INTRAVENOUS EVERY 6 HOURS
Status: DISCONTINUED | OUTPATIENT
Start: 2024-08-27 | End: 2024-08-27

## 2024-08-27 RX ORDER — POTASSIUM CHLORIDE 1500 MG/1
40 TABLET, EXTENDED RELEASE ORAL ONCE
Status: COMPLETED | OUTPATIENT
Start: 2024-08-27 | End: 2024-08-27

## 2024-08-27 RX ORDER — POTASSIUM CHLORIDE 1500 MG/1
40 TABLET, EXTENDED RELEASE ORAL ONCE
Status: DISCONTINUED | OUTPATIENT
Start: 2024-08-27 | End: 2024-08-27

## 2024-08-27 RX ORDER — BENZONATATE 100 MG/1
100 CAPSULE ORAL 3 TIMES DAILY PRN
Status: DISCONTINUED | OUTPATIENT
Start: 2024-08-27 | End: 2024-09-20

## 2024-08-27 RX ORDER — METHYLPREDNISOLONE SODIUM SUCCINATE 40 MG/ML
40 INJECTION, POWDER, LYOPHILIZED, FOR SOLUTION INTRAMUSCULAR; INTRAVENOUS EVERY 8 HOURS
Status: DISCONTINUED | OUTPATIENT
Start: 2024-08-27 | End: 2024-08-28

## 2024-08-27 NOTE — ED PROVIDER NOTES
Patient Seen in: [facility] Emergency Department      History     Chief Complaint   Patient presents with    Nausea/Vomiting/Diarrhea     Stated Complaint: N/V since last night    Subjective:   HPI    89-year-old female presents to the [facility] apartment with persistent vomiting that started last night.  She is unable to tolerate liquids or solids.  She states that she had an episode where she vomited up some bile 2 days prior and thought she may have aspirated but then seemed to be better and then the vomiting recurred last night.  Patient states she had numerous episodes of vomiting all night long.  No distinct abdominal pain no headache.  No unusual foods.  No diarrhea.  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     Total abdom hysterectomy                  Social History     Socioeconomic History    Marital status:    Occupational History    Occupation: Retired    Tobacco Use    Smoking status: Former     Current packs/day: 0.00     Types: Cigarettes     Quit date: 1972     Years since quittin.6    Smokeless tobacco: Never   Vaping Use    Vaping status: Never Used   Substance and Sexual Activity    Alcohol use: Not Currently     Alcohol/week: 7.0 standard drinks of alcohol     Types: 7 Glasses of wine per week     Comment: 1/2 glass    Drug use: No   Other Topics Concern    Caffeine Concern Yes     Comment: 2 cup daily    Sleep Concern No    Exercise No    Seat Belt Yes    Self-Exams Yes     Social Determinants of  Health     Financial Resource Strain: Low Risk  (2/2/2024)    Financial Resource Strain     Difficulty of Paying Living Expenses: Not very hard     Med Affordability: No   Food Insecurity: No Food Insecurity (2/1/2024)    Food Insecurity     Food Insecurity: Never true   Transportation Needs: No Transportation Needs (2/2/2024)    Transportation Needs     Lack of Transportation: No   Housing Stability: Low Risk  (2/1/2024)    Housing Stability     Housing Instability: No              Review of Systems   All other systems reviewed and are negative.      Positive for stated Chief Complaint: Nausea/Vomiting/Diarrhea    Other systems are as noted in HPI.  Constitutional and vital signs reviewed.      All other systems reviewed and negative except as noted above.    Physical Exam     ED Triage Vitals [08/26/24 1756]   BP (!) 171/72   Pulse 105   Resp 18   Temp 98.8 °F (37.1 °C)   Temp src Oral   SpO2 95 %   O2 Device None (Room air)       Current Vitals:   Vital Signs  BP: 158/71  Pulse: 99  Resp: 22  Temp: 98.7 °F (37.1 °C)  Temp src: Oral  MAP (mmHg): 97    Oxygen Therapy  SpO2: 93 %  O2 Device: None (Room air)            Physical Exam  Vitals and nursing note reviewed.   Constitutional:       Appearance: Normal appearance. She is well-developed. She is obese.   HENT:      Head: Normocephalic and atraumatic.   Cardiovascular:      Rate and Rhythm: Normal rate and regular rhythm.      Pulses: Normal pulses.      Heart sounds: Normal heart sounds.   Pulmonary:      Effort: Pulmonary effort is normal.      Breath sounds: Normal breath sounds. No stridor. No wheezing.   Abdominal:      General: Bowel sounds are normal. There is distension.      Palpations: Abdomen is soft.      Tenderness: There is no abdominal tenderness. There is no rebound.   Musculoskeletal:         General: No tenderness. Normal range of motion.      Cervical back: Normal range of motion and neck supple.   Lymphadenopathy:      Cervical: No cervical  adenopathy.   Skin:     General: Skin is warm and dry.      Capillary Refill: Capillary refill takes less than 2 seconds.      Coloration: Skin is not pale.   Neurological:      General: No focal deficit present.      Mental Status: She is alert and oriented to person, place, and time.      Cranial Nerves: No cranial nerve deficit.      Coordination: Coordination normal.              ED Course     Labs Reviewed   COMP METABOLIC PANEL (14) - Abnormal; Notable for the following components:       Result Value    Glucose 142 (*)     Potassium 3.3 (*)     ALT 8 (*)     Bilirubin, Total 1.1 (*)     All other components within normal limits   CBC WITH DIFFERENTIAL WITH PLATELET - Abnormal; Notable for the following components:    WBC 13.6 (*)     .0 (*)     Neutrophil Absolute Prelim 11.73 (*)     Neutrophil Absolute 11.73 (*)     Lymphocyte Absolute 0.79 (*)     All other components within normal limits   URINALYSIS, ROUTINE - Abnormal; Notable for the following components:    Ketones Urine 40 (*)     Blood Urine 1+ (*)     Protein Urine 30 (*)     RBC Urine 6-10 (*)     Squamous Epi. Cells Few (*)     All other components within normal limits   TSH W REFLEX TO FREE T4 - Abnormal; Notable for the following components:    TSH 0.290 (*)     All other components within normal limits   LIPASE - Normal   RAPID SARS-COV-2 BY PCR - Normal   BASIC METABOLIC PANEL (8)   MAGNESIUM   CBC WITH DIFFERENTIAL WITH PLATELET   T4, FREE (S)   RAINBOW DRAW BLUE             CT ABDOMEN+PELVIS(CPT=74176)    Result Date: 8/26/2024  PROCEDURE:  CT ABDOMEN+PELVIS (CPT=74176)  COMPARISON:  None.  INDICATIONS:  N/V since last night  TECHNIQUE:  Unenhanced multislice CT scanning was performed from the dome of the diaphragm to the pubic symphysis.  Dose reduction techniques were used. Dose information is transmitted to the ACR (American College of Radiology) NRDR (National Radiology Data Registry) which includes the Dose Index Registry.   PATIENT STATED HISTORY: (As transcribed by Technologist)  Patient has nausea and vomiting    FINDINGS:  LIVER:  No enlargement, atrophy, abnormal density, or significant focal lesion.  BILIARY:  No visible dilatation or calcification.  PANCREAS:  No lesion, fluid collection, ductal dilatation, or atrophy.  SPLEEN:  No enlargement or focal lesion.  KIDNEYS:  No mass, obstruction, or calcification.  ADRENALS:  No mass or enlargement.  AORTA/VASCULAR:    Dense vascular calcifications are noted. RETROPERITONEUM:  No mass or adenopathy.  BOWEL/MESENTERY:  Small hiatal hernia. ABDOMINAL WALL:  No mass or hernia.  URINARY BLADDER:  No visible focal wall thickening, lesion, or calculus.  PELVIC NODES:  No adenopathy.  PELVIC ORGANS:  Sequelae of hysterectomy.  No inflammatory changes involving the bowel.  The cecum appears to be midline.  No bowel obstruction. BONES:  No bony lesion or fracture.  LUNG BASES:  Atelectasis at the lung bases is noted.  Peripheral interstitial abnormalities in lung bases are noted. OTHER:  Negative.             CONCLUSION:   1. No evidence of an acute inflammatory process.  2. Small hiatal hernia.    LOCATION:  [facility]   Dictated by (CST): Benjy Garcia MD on 8/26/2024 at 7:09 PM     Finalized by (CST): Benjy Garcia MD on 8/26/2024 at 7:14 PM       XR CHEST AP PORTABLE  (CPT=71045)    Result Date: 8/26/2024  PROCEDURE:  XR CHEST AP PORTABLE  (CPT=71045)  TECHNIQUE:  AP chest radiograph was obtained.  COMPARISON:  [facility], XR, XR CHEST PA + LAT CHEST (CPT=71046), 1/31/2024, 7:04 PM.  INDICATIONS:  N/V since last night  PATIENT STATED HISTORY: (As transcribed by Technologist)  Patient offered no additional history at this time.              CONCLUSION:  Cardiomegaly.  Mild pulmonary venous congestion.  Slight bibasilar atelectasis with more focal airspace disease retrocardiac left lung base.   LOCATION:  [id number]      Dictated by (CST): Christina Mccormack MD on 8/26/2024 at 6:34 PM      Finalized by (CST): Christina Mccormack MD on 8/26/2024 at 6:35 PM              MDM      Patient IV established and blood work obtained.  She is placed on monitor.  She was given Zofran although it seemed to have potentially infiltrated she is still having emesis was given Reglan and Benadryl.  She is given fluid resuscitation.  She had baseline blood work obtained and had a low potassium but otherwise was overly unremarkable she is not have a significant elevated white blood cell count.  I suspect that it might be elevated related to volume contraction.  She had a normal lipase.  We did a CT scan of her abdomen pelvis to rule any type of bowel obstruction or other abnormality that could be giving her the persistent vomiting.  She was found to have a hiatal hernia but otherwise was overall unremarkable CT.  She had a chest x-ray that showed no evidence of pneumothorax or pleural effusion.  She had changes that radiology felt were consistent with some mild venous congestion.  Patient continued to have vomiting and received a dose of Zofran.  She then was vomiting further and received some Haldol.  This seemed to improve her symptoms but she was still having intermittent episodes of dry heaves.  Is felt that she is best served being admitted for fluid resuscitation and replacement of her potassium and control of her emesis.  [facility]  Admission disposition: 8/26/2024  8:35 PM                                        Medical Decision Making      Disposition and Plan     Clinical Impression:  1. Intractable vomiting    2. Hypokalemia         Disposition:  Admit  8/26/2024  8:35 pm    Follow-up:  No follow-up provider specified.        Medications Prescribed:  Current Discharge Medication List                            Hospital Problems       Present on Admission  Date Reviewed: 8/26/2024            ICD-10-CM Noted POA    * (Principal) Intractable vomiting R11.10 8/26/2024  Unknown    Hypokalemia E87.6 8/26/2024 Unknown

## 2024-08-27 NOTE — H&P
[facility]     Alyssa Montgomery Patient Status:  Emergency    1932 MRN [id number]   [facility] EMERGENCY DEPARTMENT Attending Jyotsna Figueroa MD   Hosp Day # 0 PCP Mony Hall DO     Chief Complaint: Vomiting     Subjective:    History of Present Illness:     Alyssa Montgomery is a 89 year old female with a past med history of coronary disease, hypertension, hyperlipidemia, hypothyroidism who presented to the emergency department with nausea and vomiting.  Patient was not feeling well yesterday evening, she began vomiting overnight, multiple episodes, too numerous to count.  Some greenish/bile coloring to her vomiting.  She was unable to eat or drink anything so family brought her to the hospital.  She has not vomited since coming in.  She denies any food exposures.  No change to her medications.  Denies any diarrhea, no abdominal pain, she had a normal bowel movement yesterday.  She has no other acute complaints at this time.    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 Her smoking use included cigarettes. She has never used smokeless tobacco. She reports that she does not currently use alcohol after a  past usage of about 7.0 standard drinks of alcohol per week. She reports that she does not use drugs.     Allergies: No Known Allergies    Medications:    No current facility-administered medications on file prior to encounter.     Current Outpatient Medications on File Prior to Encounter   Medication Sig Dispense Refill    levothyroxine 100 MCG Oral Tab TAKE 1 TABLET(100 MCG) BY MOUTH DAILY 90 tablet 0    ATORVASTATIN 40 MG Oral Tab TAKE 1 TABLET(40 MG) BY MOUTH EVERY NIGHT 90 tablet 0    pregabalin 25 MG Oral Cap Take 1 capsule (25 mg total) by mouth 2 (two) times daily. 180 capsule 0    metoprolol tartrate 25 MG Oral Tab Take 1 tablet (25 mg total) by mouth 2 (two) times daily. 180 tablet 3    amLODIPine 5 MG Oral Tab Take 1 tablet (5 mg total) by mouth at bedtime. 90 tablet 2    predniSONE 10 MG Oral Tab Take 6 tabs daily for 3 days, then take 4 tabs daily for 3 days, then take 2 tabs daily for 3 day, then take 1 tab daily for 3 days. 39 tablet 0    albuterol (PROAIR HFA) 108 (90 Base) MCG/ACT Inhalation Aero Soln Inhale 2 puffs into the lungs every 4 (four) hours as needed. 8 g 0    Spacer/Aero-Holding Chambers Does not apply Device For use with albuterol inhaler 1 each 0    albuterol 108 (90 Base) MCG/ACT Inhalation Aero Soln Inhale 1 puff into the lungs every 6 (six) hours as needed for Wheezing or Shortness of Breath. 1 each 0    amLODIPine 5 MG Oral Tab Take 1 tablet (5 mg total) by mouth at bedtime.      docusate sodium 100 MG Oral Cap Take 1 capsule (100 mg total) by mouth 2 (two) times daily as needed for constipation.      ergocalciferol 1.25 MG (03728 UT) Oral Cap Take 1 capsule (50,000 Units total) by mouth once a week.      predniSONE 1 MG Oral Tab Take 1 tablet (1 mg total) by mouth daily with breakfast. 90 tablet 2    EYE DROPS RELIEF OP Apply 1 drop to eye daily as needed.      acetaminophen 500 MG Oral Tab Take 2 tablets (1,000 mg total) by mouth every 12 (twelve) hours as needed for Pain.          Review of Systems:   A comprehensive review of systems was completed.    Pertinent positives and negatives noted in the HPI.    Objective:   Physical Exam:    BP (!) 165/67   Pulse 98   Temp 98.8 °F (37.1 °C) (Oral)   Resp 18   SpO2 91%   General: No acute distress, Alert, elderly, pleasant   Respiratory: No rhonchi, no wheezes  Cardiovascular: S1, S2. Regular rate and rhythm  Abdomen: Soft, Non-tender, non-distended, positive bowel sounds  Neuro: No new focal deficits  Extremities: No edema    Results:    Labs:      Labs Last 24 Hours:    Recent Labs   Lab 08/26/24  1803   RBC 4.38   HGB 13.8   HCT 39.2   MCV 89.5   MCH 31.5   MCHC 35.2   RDW 13.8   NEPRELIM 11.73*   WBC 13.6*   .0*       Recent Labs   Lab 08/26/24  1816   *   BUN 10   CREATSERUM 0.59   EGFRCR 85   CA 9.0   ALB 4.5      K 3.3*      CO2 29.0   ALKPHO 98   AST 13   ALT 8*   BILT 1.1*   TP 7.4       No results found for: \"PT\", \"INR\"    No results for input(s): \"TROP\", \"TROPHS\", \"CK\" in the last 168 hours.    No results for input(s): \"TROP\", \"PBNP\" in the last 168 hours.    No results for input(s): \"PCT\" in the last 168 hours.    Imaging: Imaging data reviewed in Epic.    Assessment & Plan:      #Intractable N/v   #Dehydration   CT a/p w/o acute findings  U/a clear   IVF's, anti-emetics  CLD, advance as tolerated     #Acute metabolic encephalopathy  Family reports more confused, likely 2/2 above  Check TSH    #CAD  ASA, statin, BB    #HFpEF  Echo 2023 with normal EF, G1DD  CXR with vascular congestion   Currently breathing well on RA  Monitor fluid status while on fluids for dehydration    #Essential HTN - amlodipine  #Hypothyroidism - Synthroid   #PMR - 1mg prednisone daily     #Hypokalemia - repletion protocol     #Leukocytosis - afebrile, monitor off abx      Plan of care discussed with patient, family, er physician     Medhat Dover MD    Supplementary Documentation:     The 21st Century Cures Act makes medical  notes like these available to patients in the interest of transparency. Please be advised this is a medical document. Medical documents are intended to carry relevant information, facts as evident, and the clinical opinion of the practitioner. The medical note is intended as peer to peer communication and may appear blunt or direct. It is written in medical language and may contain abbreviations or verbiage that are unfamiliar.

## 2024-08-27 NOTE — PLAN OF CARE
Patient A&O some slight confusion, trouble remembering she is at the hospital, currently room air, no c/o pain.  Patient came to the floor c/o having \"such a dry mouth\" as soon as she got to drink some water she is feeling much better.  Patient has a wrap on her right wrist, apparently hit it on something in her bedroom and now wrapped with bruising around the wrapping.  Patient diagnosis with RSV in January and still trying to recover from it.

## 2024-08-27 NOTE — CONSULTS
[facility]  Pulmonary/Critical Care Consult note    Alyssa Montgomery Patient Status:  Inpatient    1932 MRN [id number]   Location [facility] 0SW-A Attending Kayla Strickland DO   Hosp Day # 1 PCP Mony Hall DO     Reason for Consultation:  Respiratory distress    History of Present Illness:  Alyssa Montgomery is a a(n) 89 year old female.  With history of coronary artery disease, hypertension, hyperlipidemia and hypothyroidism who presented to the emergency department on 2024 with complaints of nausea and vomiting.  This afternoon the patient was noted to have worsening respiratory status so pulmonary consultation was obtained for further evaluation and management.    The patient has been noted to have cough with the wheezing and required supplemental oxygen since 6 L/min via nasal cannula.    The patient states that when she vomited hard yesterday she did aspirate and then she had some hoarseness for a bit.  She admits to cough with wheezing and shortness of breath but denies any chest pains.  Her nausea and vomiting have improved.  She denies any fever or chills.  Denies any nasal congestion or runny nose she denies abdominal pain diarrhea or constipation.  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 Her smoking use included cigarettes. She has never used smokeless tobacco. She reports that she does not currently use alcohol after a past usage of about 7.0 standard drinks of alcohol per week. She reports that she does not use drugs.    Allergies:  No Known Allergies    Medications:    Current Facility-Administered Medications:     ipratropium-albuterol (Duoneb) 0.5-2.5 (3) MG/3ML inhalation solution 3 mL, 3 mL, Nebulization, Q6H WA    methylPREDNISolone sodium succinate (Solu-MEDROL) injection 60 mg, 60 mg, Intravenous, Q6H    albuterol (Ventolin HFA) 108 (90 Base) MCG/ACT inhaler 2 puff, 2 puff, Inhalation, Q4H PRN    atorvastatin (Lipitor) tab 40 mg, 40 mg, Oral, Nightly    levothyroxine (Synthroid) tab 100 mcg, 100 mcg, Oral, Daily @ 0700    predniSONE (Deltasone) tab 1 mg, 1 mg, Oral, Daily with breakfast    pregabalin (Lyrica) cap 25 mg, 25 mg, Oral, BID    acetaminophen (Tylenol Extra Strength) tab 500 mg, 500 mg, Oral, Q4H PRN    melatonin tab 3 mg, 3 mg, Oral, Nightly PRN    glycerin-hypromellose- (Artificial Tears) 0.2-0.2-1 % ophthalmic solution 1 drop, 1 drop, Both Eyes, QID PRN    sodium chloride (Saline Mist) 0.65 % nasal solution 1 spray, 1 spray, Each Nare, Q3H PRN    enoxaparin (Lovenox) 40 MG/0.4ML SUBQ injection 40 mg, 40 mg, Subcutaneous, Daily    polyethylene glycol (PEG 3350) (Miralax) 17 g oral packet 17 g, 17 g, Oral, Daily PRN    sennosides (Senokot) tab 17.2 mg, 17.2 mg, Oral, Nightly PRN    bisacodyl (Dulcolax) 10 MG rectal suppository 10 mg, 10 mg, Rectal, Daily PRN    fleet enema (Fleet) rectal enema 133 mL, 1 enema, Rectal, Once PRN    ondansetron (Zofran) 4 MG/2ML injection 4 mg, 4 mg, Intravenous, Q6H PRN    metoclopramide (Reglan) 5 mg/mL injection 10 mg, 10 mg, Intravenous, Q8H PRN    amLODIPine (Norvasc) tab 5 mg, 5 mg, Oral, Nightly    metoprolol tartrate (Lopressor)  tab 25 mg, 25 mg, Oral, 2x Daily(Beta Blocker)    Intake/Output:    Intake/Output Summary (Last 24 hours) at 8/27/2024 1555  Last data filed at 8/27/2024 1234  Gross per 24 hour   Intake 1300 ml   Output 350 ml   Net 950 ml      Body mass index is 27.06 kg/m².    Review of Systems  Review of Systems:   A comprehensive 10 point review of systems was completed.  Pertinent positives and negatives noted in the the HPI.         Patient Vitals for the past 24 hrs:   BP Temp Temp src Pulse Resp SpO2 Weight   08/27/24 1214 (!) 162/73 99.9 °F (37.7 °C) Oral 87 20 95 % --   08/27/24 0741 132/84 -- -- 76 18 91 % --   08/27/24 0530 136/50 100.4 °F (38 °C) Oral 90 18 92 % --   08/27/24 0040 -- -- -- -- -- -- 172 lb 12.8 oz (78.4 kg)   08/26/24 2130 158/71 98.7 °F (37.1 °C) Oral 99 22 93 % 172 lb 12.8 oz (78.4 kg)   08/26/24 2100 158/69 -- -- 97 -- 92 % --   08/26/24 2030 (!) 162/57 -- -- 95 -- -- --   08/26/24 2000 (!) 165/67 -- -- 98 -- 91 % --   08/26/24 1830 (!) 177/84 -- -- 97 -- 95 % --   08/26/24 1815 (!) 178/61 -- -- 101 -- 94 % --   08/26/24 1756 (!) 171/72 98.8 °F (37.1 °C) Oral 105 18 95 % --     Vitals:    08/27/24 0040 08/27/24 0530 08/27/24 0741 08/27/24 1214   BP:  136/50 132/84 (!) 162/73   BP Location:  Left arm Left arm Left arm   Pulse:  90 76 87   Resp:  18 18 20   Temp:  100.4 °F (38 °C)  99.9 °F (37.7 °C)   TempSrc:  Oral  Oral   SpO2:  92% 91% 95%   Weight: 172 lb 12.8 oz (78.4 kg)            Physical Exam  Constitutional:       General: She is not in acute distress.     Appearance: Normal appearance. She is obese. She is not ill-appearing or diaphoretic.      Comments: Rattling sound is noted from the throat.   HENT:      Head: Normocephalic and atraumatic.      Nose: Nose normal. No congestion or rhinorrhea.      Mouth/Throat:      Mouth: Mucous membranes are moist.      Pharynx: Oropharynx is clear. No oropharyngeal exudate or posterior oropharyngeal erythema.   Eyes:      Extraocular Movements:  Extraocular movements intact.      Pupils: Pupils are equal, round, and reactive to light.   Cardiovascular:      Rate and Rhythm: Normal rate.      Pulses: Normal pulses.      Heart sounds: Normal heart sounds. No murmur heard.  Pulmonary:      Effort: Pulmonary effort is normal. No respiratory distress.      Breath sounds: Wheezing (Bilateral diffuse) present. No rhonchi.   Chest:      Chest wall: No tenderness.   Abdominal:      General: Abdomen is flat. Bowel sounds are normal.      Palpations: Abdomen is soft.   Musculoskeletal:         General: Normal range of motion.   Skin:     General: Skin is warm.   Neurological:      General: No focal deficit present.      Mental Status: She is alert and oriented to person, place, and time.   Psychiatric:         Mood and Affect: Mood normal.         Behavior: Behavior normal.         Thought Content: Thought content normal.         Judgment: Judgment normal.            Lab Data Review:  Recent Labs   Lab 08/26/24  1803 08/27/24  0743   WBC 13.6* 12.6*   HGB 13.8 12.1   HCT 39.2 34.9*   .0* 87.0*       Recent Labs   Lab 08/26/24  1816 08/27/24  0743    138   K 3.3* 3.2*    107   CO2 29.0 26.0   BUN 10 7*   CREATSERUM 0.59 0.54*   CA 9.0 8.1*   ALB 4.5  --    ALKPHO 98  --    ALT 8*  --    AST 13  --    * 114*       Recent Labs   Lab 08/27/24  0743   MG 1.8       No results found for: \"PHOS\", \"PHOSPHORUS\"     No results for input(s): \"PT\", \"INR\", \"PTT\" in the last 168 hours.    Recent Labs   Lab 08/27/24  1210   ABGPHT 7.48*   XRHAVP8F 36   RPLNA3Y 78*   ABGHCO3 27.5*   ABGBE 3.5*   TEMP 98.6   CARRIE Positive   SITE Right Radial   DEV Nasal cannula   THGB 17.0*       No results for input(s): \"TROP\", \"CKMB\" in the last 168 hours.    Cultures: No results found for this visit on 08/26/24.        Radiology personally reviewed:  XR CHEST AP PORTABLE  (CPT=71045)    Result Date: 8/27/2024  CONCLUSION:  Worsening infiltrates in the left lung could  reflect pneumonia or less likely asymmetric pulmonary edema.   LOCATION:  [facility]      Dictated by (CST): Gabriel Lux MD on 8/27/2024 at 12:37 PM     Finalized by (CST): Gabriel Lux MD on 8/27/2024 at 12:38 PM       CT ABDOMEN+PELVIS(CPT=74176)    Result Date: 8/26/2024  CONCLUSION:   1. No evidence of an acute inflammatory process.  2. Small hiatal hernia.    LOCATION:  [facility]   Dictated by (CST): Benjy Garcia MD on 8/26/2024 at 7:09 PM     Finalized by (CST): Benjy Garcia MD on 8/26/2024 at 7:14 PM       XR CHEST AP PORTABLE  (CPT=71045)    Result Date: 8/26/2024  CONCLUSION:  Cardiomegaly.  Mild pulmonary venous congestion.  Slight bibasilar atelectasis with more focal airspace disease retrocardiac left lung base.   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)    Macular cyst, hole, or pseudohole, right eye    Nonexudative age-related macular degeneration, right eye, intermediate dry stage    PMR (polymyalgia rheumatica) ([facility])    Paving stone degeneration of retina, bilateral    Asymptomatic carotid artery stenosis, bilateral    Weakness    Constipation    Thrombocytopenia (HCC)    Syncope and collapse    Concussion with loss of consciousness    Fall, initial encounter    Laceration of right little finger without foreign body without damage to nail, initial encounter    Neuropathy    Shortness of breath    Respiratory syncytial virus (RSV)    RSV (acute bronchiolitis due to respiratory syncytial  virus)    Intractable vomiting    Hypokalemia       Assessment:  Acute hypoxic respiratory failure: Requiring supplemental oxygen 6 L/min by nasal cannula  Acute bronchitis: With mild pharyngitis, suspect due to vigorous vomiting with aspiration  Bilateral lower lobe mixed interstitial and air space infiltrate worse since yesterday's chest x-ray consistent with possible aspiration pneumonia versus chemical pneumonitis  Bibasilar atelectasis and mild peripheral interstitial scarring is noted  Hypertension  Hyperlipidemia  History of coronary artery disease  Polymyalgia rheumatica  Hypothyroidism  Leukocytosis  Thrombocytopenia  Hypokalemia: Mild      Plan:  Wean FiO2 to keep oxygen saturations between 90% to 92%  DuoNebs every 6 hours  Add Zosyn 4.5 g every 8 hours  Continue but reduced dose of Solu-Medrol 40 mg IV every 8 hours  Correct electrolytes  DVT prophylaxis: Lovenox 40 mg subcutaneous every 24 hours  GI prophylaxis:--  Follow labs, procalcitonin and chest x-ray in a.m.  Will follow for further recommendations    Thank You for allowing me to participate in this patient's care     Luis Murphy MD      Spent 40  minutes of Critical Care time (exclusive of billable procedures) in evaluation, management and complex clinical decision making. This also included extensive discussion with the patient, family, and clinical staff for care co-ordination      Note to the patient: The 21st Century Cures Act makes medical notes like these available to patients in the interest of transparency. However, be advised that this is a medical document. It is intended as peer to peer communication. It is written in medical language and may contain abbreviations or verbiage that are unfamiliar. It may appear blunt or direct. Medical documents are intended to carry relevant information, facts as evident, and clinical opinion of the practitioner.      Disclaimer: Components of this note were documented using voice recognition system  and are subject to errors not corrected at proofreading. Contact the author of this note for any clarifications

## 2024-08-27 NOTE — ED QUICK NOTES
Orders for admission, patient is aware of plan and ready to go upstairs. Any questions, please call ED RN Sandra at extension [phone].     Patient Covid vaccination status: Fully vaccinated     COVID Test Ordered in ED: None    COVID Suspicion at Admission: N/A    Running Infusions:  None    Mental Status/LOC at time of transport: x3/4    Other pertinent information: Poor vision and hearing, does not have her hearing aids with.   CIWA score: N/A   NIH score:  N/A

## 2024-08-27 NOTE — PROGRESS NOTES
[facility]   part of [facility]     Hospitalist Progress Note     Alyssa Montgomery Patient Status:  Inpatient    1932 MRN [id number]   Location [facility] 0SW-A Attending Medhat Dover MD   Hosp Day # 1 PCP Mony Hall DO     Chief Complaint: n/v    Subjective:     Patient denies n/v overnight. Remains weak. Spoke to patient's daughter at length as well.     Objective:    Review of Systems:   A comprehensive review of systems was completed; pertinent positive and negatives stated in subjective.    Vital signs:  Temp:  [98.7 °F (37.1 °C)-100.4 °F (38 °C)] 100.4 °F (38 °C)  Pulse:  [] 90  Resp:  [18-22] 18  BP: (136-178)/(50-84) 136/50  SpO2:  [91 %-95 %] 92 %    Physical Exam:    General: No acute distress  Respiratory: No wheezes, mild rhonchi   Cardiovascular: S1, S2, regular rate and rhythm  Abdomen: Soft, Non-tender, non-distended, positive bowel sounds  Neuro: No new focal deficits.   Extremities: No edema      Diagnostic Data:    Labs:  Recent Labs   Lab 24  1803   WBC 13.6*   HGB 13.8   MCV 89.5   .0*       Recent Labs   Lab 24  1816   *   BUN 10   CREATSERUM 0.59   CA 9.0   ALB 4.5      K 3.3*      CO2 29.0   ALKPHO 98   AST 13   ALT 8*   BILT 1.1*   TP 7.4       Estimated Creatinine Clearance: 59.2 mL/min (based on SCr of 0.59 mg/dL).    No results for input(s): \"TROP\", \"TROPHS\", \"CK\" in the last 168 hours.    No results for input(s): \"PTP\", \"INR\" in the last 168 hours.    Lab Results   Component Value Date    TSH 0.290 2024    T4F 1.4 2024    T3F 1.70 2024             Microbiology    No results found for this visit on 24.      Imaging: Reviewed in Epic.    Medications:    atorvastatin  40 mg Oral Nightly    levothyroxine  100 mcg Oral Daily @ 0700    predniSONE  1 mg Oral Daily with breakfast    pregabalin  25 mg Oral BID    enoxaparin  40 mg Subcutaneous Daily    amLODIPine  5 mg Oral Nightly    metoprolol tartrate   25 mg Oral 2x Daily(Beta Blocker)       Assessment & Plan:      #Intractable N/v   #Dehydration   CT a/p w/o acute findings  U/a clear   S/p IVF's, anti-emetics  CLD, advance as tolerated     #Fever  #Malaise  #Weakness  -check RVP  -PT/OT    #Acute metabolic encephalopathy, resolved  Family reports more back to baseline  TSH slightly low, free t4 okay     #CAD  ASA, statin, BB     #HFpEF, not in exacerbation  Echo 2023 with normal EF, G1DD  CXR with vascular congestion   Currently breathing well on RA  Monitor fluid status while on fluids for dehydration     #Essential HTN - amlodipine  #Hypothyroidism - Synthroid   #PMR - 1mg prednisone daily      #Hypokalemia - repletion protocol      #Leukocytosis - afebrile, monitor off abx    #ACP  - DNR, 16 mins spent face to face w/ patient and patient's daughter. This was a voluntary conversation. We reviewed the meaning of cardiac arrest and the use of acls/cpr. Order updated on epic.       Kayla Strickland,     Supplementary Documentation:     Quality:  DVT Mechanical Prophylaxis:     Early ambuation  DVT Pharmacologic Prophylaxis   Medication    enoxaparin (Lovenox) 40 MG/0.4ML SUBQ injection 40 mg                Code Status: Full Code  Buck: No urinary catheter in place  Buck Duration (in days):   Central line:    XAVI:     Discharge is dependent on: clinical improvement  At this point Ms. Montgomery is expected to be discharge to: tbd    The 21st Century Cures Act makes medical notes like these available to patients in the interest of transparency. Please be advised this is a medical document. Medical documents are intended to carry relevant information, facts as evident, and the clinical opinion of the practitioner. The medical note is intended as peer to peer communication and may appear blunt or direct. It is written in medical language and may contain abbreviations or verbiage that are unfamiliar.              **Certification      PHYSICIAN Certification of Need for  Inpatient Hospitalization - Initial Certification    Patient will require inpatient services that will reasonably be expected to span two midnight's based on the clinical documentation in H+P.   Based on patients current state of illness, I anticipate that, after discharge, patient will require TBD.

## 2024-08-27 NOTE — PLAN OF CARE
Assumed care of patient at 0700  Patient experiencing respiratory distress this AM, requiring 6LNC and wheezing. Hospitalist notified. CXR shows pneumonia vs pulmonary edema. Pulm consulted  Echo ordered, shows 60-65% EF  IV lasix given x 1  ABG normal  IV abx ordered  Attempting to wean off oxygen, patient aspiration risk and needs to sit up in bed. Patient educated on importance, needs reinforcement  Scheduled nebs and repeat CXR tomorrow AM. IV steroids.   K+ and Mg replaced per protocol.   PT/OT will attempt to see tomorrow, on hold today d/t respiratory distress.   Daughter updated on POC  Patient currently resting in bed, call light within reach              Problem: Patient/Family Goals  Goal: Patient/Family Long Term Goal  Description: Patient's Long Term Goal:     Interventions:  -   - See additional Care Plan goals for specific interventions  Outcome: Progressing  Goal: Patient/Family Short Term Goal  Description: Patient's Short Term Goal:     Interventions:   -   - See additional Care Plan goals for specific interventions  Outcome: Progressing     Problem: GASTROINTESTINAL - ADULT  Goal: Minimal or absence of nausea and vomiting  Description: INTERVENTIONS:  - Maintain adequate hydration with IV or PO as ordered and tolerated  - Nasogastric tube to low intermittent suction as ordered  - Evaluate effectiveness of ordered antiemetic medications  - Provide nonpharmacologic comfort measures as appropriate  - Advance diet as tolerated, if ordered  - Obtain nutritional consult as needed  - Evaluate fluid balance  Outcome: Progressing     Problem: METABOLIC/FLUID AND ELECTROLYTES - ADULT  Goal: Electrolytes maintained within normal limits  Description: INTERVENTIONS:  - Monitor labs and rhythm and assess patient for signs and symptoms of electrolyte imbalances  - Administer electrolyte replacement as ordered  - Monitor response to electrolyte replacements, including rhythm and repeat lab results as  appropriate  - Fluid restriction as ordered  - Instruct patient on fluid and nutrition restrictions as appropriate  Outcome: Progressing  Goal: Hemodynamic stability and optimal renal function maintained  Description: INTERVENTIONS:  - Monitor labs and assess for signs and symptoms of volume excess or deficit  - Monitor intake, output and patient weight  - Monitor urine specific gravity, serum osmolarity and serum sodium as indicated or ordered  - Monitor response to interventions for patient's volume status, including labs, urine output, blood pressure (other measures as available)  - Encourage oral intake as appropriate  - Instruct patient on fluid and nutrition restrictions as appropriate  Outcome: Progressing

## 2024-08-27 NOTE — PHYSICAL THERAPY NOTE
PT order received and chart reviewed. Pt is on respiratory distress and is not approp from skilled PT today. Will will f/u tomorrow

## 2024-08-27 NOTE — PLAN OF CARE
Problem: GASTROINTESTINAL - ADULT  Goal: Minimal or absence of nausea and vomiting  Description: INTERVENTIONS:  - Maintain adequate hydration with IV or PO as ordered and tolerated  - Nasogastric tube to low intermittent suction as ordered  - Evaluate effectiveness of ordered antiemetic medications  - Provide nonpharmacologic comfort measures as appropriate  - Advance diet as tolerated, if ordered  - Obtain nutritional consult as needed  - Evaluate fluid balance  Outcome: Progressing     Problem: METABOLIC/FLUID AND ELECTROLYTES - ADULT  Goal: Electrolytes maintained within normal limits  Description: INTERVENTIONS:  - Monitor labs and rhythm and assess patient for signs and symptoms of electrolyte imbalances  - Administer electrolyte replacement as ordered  - Monitor response to electrolyte replacements, including rhythm and repeat lab results as appropriate  - Fluid restriction as ordered  - Instruct patient on fluid and nutrition restrictions as appropriate  Outcome: Progressing  Goal: Hemodynamic stability and optimal renal function maintained  Description: INTERVENTIONS:  - Monitor labs and assess for signs and symptoms of volume excess or deficit  - Monitor intake, output and patient weight  - Monitor urine specific gravity, serum osmolarity and serum sodium as indicated or ordered  - Monitor response to interventions for patient's volume status, including labs, urine output, blood pressure (other measures as available)  - Encourage oral intake as appropriate  - Instruct patient on fluid and nutrition restrictions as appropriate  Outcome: Progressing

## 2024-08-27 NOTE — PROGRESS NOTES
Change in resp status.     I was called back to the bedside to re-examine patient for worsening resp status. I spoke to patient's daughter at the bedside at length to review plan of care. Pt's o2 needs climbed to 6L and pt cont to have aggressive cough w/ associated wheezing. CXR pending. ABG reviewed personally. Pt herself required up to 6L but now improving to 2L. Pt given iv lasix. Pt did receive ivf overnight that have since been dc'ed.     Gen: mod distress  Resp: wheezing  Cards: RRR, no murmur  GI: soft non distended    Assessment  Acute resp failure, up to 6L NC, increased wob  Acute bronchitis    Plan  -abg reviewed  -stat cxr  -iv lasix  -repeat echo  -duoneb + solumedrol   -cont to maintain spo2 > 88%  -low threshold for transfer   -will have pulm evaluate     34 mins of critical care spent on patient

## 2024-08-27 NOTE — CM/SW NOTE
Pt is a 89 yo female admitted for intractable vomiting.  Pt lives at [facility].  Pt usually walks with a walker.  Pt can get HH at [facility] if necessary.  Pt's dtr Radha lives nearby.  Discussed POLST form with dtr - she declined form saying she has probably already filled it out.  PT/OT to see pt.  Dtr says she expects pt would need to go to [facility] following this admission since she has gone to [facility] after each subsequent admission.  Pt has been to both [facility] and hospitals and would prefer hospitals if she need [facility].  SW to f/u with pt's dc plan once PT/OT see pt.     08/27/24 1500   CM/SW Referral Data   Referral Source Social Work (self-referral)   Reason for Referral Discharge planning   Informant Daughter   Patient Info   Patient's Home Environment Independent Living   Discharge Needs   Anticipated D/C needs To be determined

## 2024-08-28 ENCOUNTER — APPOINTMENT (OUTPATIENT)
Dept: GENERAL RADIOLOGY | Facility: HOSPITAL | Age: 89
End: 2024-08-28
Attending: HOSPITALIST
Payer: MEDICARE

## 2024-08-28 ENCOUNTER — APPOINTMENT (OUTPATIENT)
Dept: GENERAL RADIOLOGY | Facility: HOSPITAL | Age: 89
End: 2024-08-28
Attending: INTERNAL MEDICINE
Payer: MEDICARE

## 2024-08-28 LAB
ALBUMIN SERPL-MCNC: 4.2 G/DL (ref 3.2–4.8)
ALBUMIN/GLOB SERPL: 1.5 {RATIO} (ref 1–2)
ALP LIVER SERPL-CCNC: 80 U/L
ALT SERPL-CCNC: 11 U/L
ANION GAP SERPL CALC-SCNC: 7 MMOL/L (ref 0–18)
AST SERPL-CCNC: 16 U/L (ref ?–34)
BASOPHILS # BLD AUTO: 0.03 X10(3) UL (ref 0–0.2)
BASOPHILS NFR BLD AUTO: 0.2 %
BILIRUB SERPL-MCNC: 0.9 MG/DL (ref 0.2–0.9)
BUN BLD-MCNC: 14 MG/DL (ref 9–23)
CALCIUM BLD-MCNC: 9 MG/DL (ref 8.7–10.4)
CHLORIDE SERPL-SCNC: 104 MMOL/L (ref 98–112)
CO2 SERPL-SCNC: 27 MMOL/L (ref 21–32)
CREAT BLD-MCNC: 0.69 MG/DL
EGFRCR SERPLBLD CKD-EPI 2021: 81 ML/MIN/1.73M2 (ref 60–?)
EOSINOPHIL # BLD AUTO: 0 X10(3) UL (ref 0–0.7)
EOSINOPHIL NFR BLD AUTO: 0 %
ERYTHROCYTE [DISTWIDTH] IN BLOOD BY AUTOMATED COUNT: 13.8 %
GLOBULIN PLAS-MCNC: 2.8 G/DL (ref 2–3.5)
GLUCOSE BLD-MCNC: 186 MG/DL (ref 70–99)
HCT VFR BLD AUTO: 41.3 %
HGB BLD-MCNC: 14.1 G/DL
IMM GRANULOCYTES # BLD AUTO: 0.07 X10(3) UL (ref 0–1)
IMM GRANULOCYTES NFR BLD: 0.4 %
LYMPHOCYTES # BLD AUTO: 0.39 X10(3) UL (ref 1–4)
LYMPHOCYTES NFR BLD AUTO: 2.4 %
MAGNESIUM SERPL-MCNC: 2 MG/DL (ref 1.6–2.6)
MCH RBC QN AUTO: 31.2 PG (ref 26–34)
MCHC RBC AUTO-ENTMCNC: 34.1 G/DL (ref 31–37)
MCV RBC AUTO: 91.4 FL
MONOCYTES # BLD AUTO: 0.44 X10(3) UL (ref 0.1–1)
MONOCYTES NFR BLD AUTO: 2.7 %
NEUTROPHILS # BLD AUTO: 15.12 X10 (3) UL (ref 1.5–7.7)
NEUTROPHILS # BLD AUTO: 15.12 X10(3) UL (ref 1.5–7.7)
NEUTROPHILS NFR BLD AUTO: 94.3 %
OSMOLALITY SERPL CALC.SUM OF ELEC: 291 MOSM/KG (ref 275–295)
PLATELET # BLD AUTO: 117 10(3)UL (ref 150–450)
POTASSIUM SERPL-SCNC: 3.4 MMOL/L (ref 3.5–5.1)
POTASSIUM SERPL-SCNC: 3.4 MMOL/L (ref 3.5–5.1)
PROCALCITONIN SERPL-MCNC: 0.23 NG/ML (ref ?–0.05)
PROT SERPL-MCNC: 7 G/DL (ref 5.7–8.2)
RBC # BLD AUTO: 4.52 X10(6)UL
SODIUM SERPL-SCNC: 138 MMOL/L (ref 136–145)
WBC # BLD AUTO: 16.1 X10(3) UL (ref 4–11)

## 2024-08-28 PROCEDURE — 71045 X-RAY EXAM CHEST 1 VIEW: CPT | Performed by: INTERNAL MEDICINE

## 2024-08-28 PROCEDURE — 99233 SBSQ HOSP IP/OBS HIGH 50: CPT | Performed by: INTERNAL MEDICINE

## 2024-08-28 PROCEDURE — 99233 SBSQ HOSP IP/OBS HIGH 50: CPT | Performed by: HOSPITALIST

## 2024-08-28 PROCEDURE — 74230 X-RAY XM SWLNG FUNCJ C+: CPT | Performed by: HOSPITALIST

## 2024-08-28 RX ORDER — PREDNISONE 20 MG/1
40 TABLET ORAL
Status: DISCONTINUED | OUTPATIENT
Start: 2024-08-28 | End: 2024-08-30

## 2024-08-28 RX ORDER — POTASSIUM CHLORIDE 1.5 G/1.58G
40 POWDER, FOR SOLUTION ORAL ONCE
Status: COMPLETED | OUTPATIENT
Start: 2024-08-28 | End: 2024-08-28

## 2024-08-28 NOTE — SLP NOTE
ADULT SWALLOWING EVALUATION    ASSESSMENT    ASSESSMENT/OVERALL IMPRESSION:  Patient is a 89 y/o female admitted with intractable nausea/vomiting and PMHx significant for CAD, HTN, and HLD. SLP order received to evaluate oropharyngeal swallow d/t concern for aspiration. Patient received alert, but confused, in bed with daughter present at bedside. Patient denied history of dysphagia symptoms, but reported increased difficulty breathing and occasional cough since vomiting started the other day.    Patient presented with suspected pharyngeal dysphagia. Bolus acceptance was adequate without evidence of anterior bolus loss. Mastication and AP bolus transit were thorough and efficient without evidence of oral residue. Pharyngeal swallow initiation appeared timely. Suspect some weakness and/or uncoordination with pharyngeal swallow. Wet vocal quality noted following thin liquid trial x1.     Recommend further evaluation via VFSS to r/o oropharyngeal dysphagia. Discussed with patient and her daughter who are agreeable to plan. Further recommendations pending results of VFSS.         RECOMMENDATIONS   Diet Recommendations - Solids:  (pending VFSS)  Diet Recommendations - Liquids:  (pending VFSS)                                 Treatment Plan/Recommendations: Videofluoroscopic swallow study    HISTORY   MEDICAL HISTORY  Reason for Referral: R/O aspiration    Problem List  Principal Problem:    Intractable vomiting  Active Problems:    Hypokalemia      Past Medical History  Past Medical History:    Essential hypertension    Heart attack (HCC)    1994    Hyperlipidemia    Hypothyroidism    Macular degeneration    Bilateral    Retinopathy of both eyes    Diagnosis documented by Dr Serrano in notes from visit on 11/3/14          Diet Prior to Admission: Regular;Thin liquids  Precautions: Aspiration    Patient/Family Goals: none stated    SWALLOWING HISTORY  Current Diet Consistency: NPO  Dysphagia History: as above  Imaging  Results:   CXR 8/28/24  CONCLUSION:  Left perihilar consolidation is mildly more prominent.         LOCATION:  [place] [id number]                  Dictated by (CST): Benjy Garcia MD on 8/28/2024 at 7:53 AM       Finalized by (CST): Benjy Garcia MD on 8/28/2024 at 7:54 AM     SUBJECTIVE       OBJECTIVE   ORAL MOTOR EXAMINATION  Dentition: Functional  Symmetry: Within Functional Limits  Strength: Within Functional Limits     Range of Motion: Within Functional Limits       Voice Quality: Clear  Respiratory Status: Nasal cannula  Consistencies Trialed: Thin liquids;Puree;Hard solid  Method of Presentation: Self presentation  Patient Positioning: Upright;Midline    Oral Phase of Swallow: Within Functional Limits                      Pharyngeal Phase of Swallow: Impaired        Laryngeal Elevation Coordination: Appears impaired  (Please note: Silent aspiration cannot be evaluated clinically. Videofluoroscopic Swallow Study is required to rule-out silent aspiration.)    Esophageal Phase of Swallow: No complaints consistent with possible esophageal involvement  Comments: d/w RN              GOALS  Goal #1 VFSS  In Progress   Goal #2     Goal #3     Goal #4     Goal #5     Goal #6     Goal #7     Goal #8       FOLLOW UP  Treatment Plan/Recommendations: Videofluoroscopic swallow study     Follow Up Needed (Documentation Required): Yes  SLP Follow-up Date: 08/28/24    Thank you for your referral.   If you have any questions, please contact NITZA Su

## 2024-08-28 NOTE — PROGRESS NOTES
[facility]   part of [facility]     Hospitalist Progress Note     Alyssa Montgomery Patient Status:  Inpatient    1932 MRN [id number]   Location [facility] 0SW-A Attending Medhat Dover MD   Hosp Day # 2 PCP Mony Hall DO     Chief Complaint: n/v    Subjective:     Pt cont to feel weak but overall feels better. No cp or sob at rest.     Objective:    Review of Systems:   A comprehensive review of systems was completed; pertinent positive and negatives stated in subjective.    Vital signs:  Temp:  [97.8 °F (36.6 °C)-99.9 °F (37.7 °C)] 97.8 °F (36.6 °C)  Pulse:  [] 87  Resp:  [20] 20  BP: (139-162)/(63-73) 154/65  SpO2:  [92 %-97 %] 97 %    Physical Exam:    General: No acute distress  Respiratory: No wheezes, mild rhonchi   Cardiovascular: S1, S2, regular rate and rhythm  Abdomen: Soft, Non-tender, non-distended, positive bowel sounds  Neuro: No new focal deficits.   Extremities: No edema      Diagnostic Data:    Labs:  Recent Labs   Lab 24  1803 24  0743   WBC 13.6* 12.6*   HGB 13.8 12.1   MCV 89.5 92.1   .0* 87.0*       Recent Labs   Lab 24  1816 24  0743 24  0729   * 114* 186*   BUN 10 7* 14   CREATSERUM 0.59 0.54* 0.69   CA 9.0 8.1* 9.0   ALB 4.5  --  4.2    138 138   K 3.3* 3.2* 3.4*  3.4*    107 104   CO2 29.0 26.0 27.0   ALKPHO 98  --  80   AST 13  --  16   ALT 8*  --  11   BILT 1.1*  --  0.9   TP 7.4  --  7.0       Estimated Creatinine Clearance: 50.6 mL/min (based on SCr of 0.69 mg/dL).    No results for input(s): \"TROP\", \"TROPHS\", \"CK\" in the last 168 hours.    No results for input(s): \"PTP\", \"INR\" in the last 168 hours.                 Microbiology    No results found for this visit on 24.      Imaging: Reviewed in Epic.    Medications:    potassium chloride  40 mEq Oral Once    ipratropium-albuterol  3 mL Nebulization Q6H WA    methylPREDNISolone  40 mg Intravenous Q8H    piperacillin-tazobactam  3.375 g  Intravenous Q8H    atorvastatin  40 mg Oral Nightly    levothyroxine  100 mcg Oral Daily @ 0700    [Held by provider] predniSONE  1 mg Oral Daily with breakfast    pregabalin  25 mg Oral BID    enoxaparin  40 mg Subcutaneous Daily    amLODIPine  5 mg Oral Nightly    metoprolol tartrate  25 mg Oral 2x Daily(Beta Blocker)       Assessment & Plan:      #CAP  #Acute bronchitis  #Fever  #Malaise  -iv zosyn (8/27-)  -Bcx/Sputum culture  -CXR reviewed personally  -therapies to see  -prednisone (8/28-)  s/p iv solumedrol (8/27-8/28)    #Aspiration PNA?  -speech to see    #Acute hypoxemic resp failure  -on RA at baseline, currently 6L NC  -2/2 above  -echo done, shows lvef intact    #Intractable N/v   #Dehydration   CT a/p w/o acute findings  Improving     #Acute metabolic encephalopathy, resolved  Family reports more back to baseline  TSH slightly low, free t4 okay     #CAD  ASA, statin, BB     #HFpEF, not in exacerbation  Echo 2023 with normal EF, G1DD    #Essential HTN - amlodipine  #Hypothyroidism - Synthroid   #PMR - 1mg prednisone daily      #Hypokalemia - repletion protocol     #ACP  - DNR, discussed earlier on admission       Kayla Strickland, DO    Supplementary Documentation:     Quality:  DVT Mechanical Prophylaxis:     Early ambuation  DVT Pharmacologic Prophylaxis   Medication    enoxaparin (Lovenox) 40 MG/0.4ML SUBQ injection 40 mg                Code Status: DNAR/Selective Treatment  Buck: External urinary catheter in place  Buck Duration (in days):   Central line:    XAVI:     Discharge is dependent on: clinical improvement  At this point Ms. Montgomery is expected to be discharge to: tbd    The 21st Century Cures Act makes medical notes like these available to patients in the interest of transparency. Please be advised this is a medical document. Medical documents are intended to carry relevant information, facts as evident, and the clinical opinion of the practitioner. The medical note is intended as peer to peer  communication and may appear blunt or direct. It is written in medical language and may contain abbreviations or verbiage that are unfamiliar.              **Certification      PHYSICIAN Certification of Need for Inpatient Hospitalization - Initial Certification    Patient will require inpatient services that will reasonably be expected to span two midnight's based on the clinical documentation in H+P.   Based on patients current state of illness, I anticipate that, after discharge, patient will require TBD.

## 2024-08-28 NOTE — VIDEO SWALLOW STUDY NOTE
ADULT VIDEOFLUOROSCOPIC SWALLOWING STUDY    Admission Date: 8/26/2024  Evaluation Date: 08/28/24  Radiologist: Valentina SCHILLING   Diet Recommendations - Solids: Regular  Diet Recommendations - Liquids: Thin Liquids    Further Follow-up:  Follow Up Needed (Documentation Required): No  SLP Follow-up Date: 08/28/24  Compensatory Strategies Recommended: Small bites and sips;Alternate consistencies  Aspiration Precautions: Upright position  Medication Administration Recommendations: One pill at a time  Treatment Plan/Recommendations: No further inpatient SLP service warranted    HISTORY   Background/Objective Information: Patient is a 89 y/o female seen this morning for clinical swallow evaluation. VFSS recommended for further evaluation. See prior SLP note for full history.    Problem List  Principal Problem:    Intractable vomiting  Active Problems:    Hypokalemia      Past Medical History  Past Medical History:    Essential hypertension    Heart attack (HCC)    1994    Hyperlipidemia    Hypothyroidism    Macular degeneration    Bilateral    Retinopathy of both eyes    Diagnosis documented by Dr Serrano in notes from visit on 11/3/14       Current Diet Consistency: NPO        History of Recent: Difficulty breathing;Pneumonia  Precautions: Aspiration  Imaging results:   CXR 8/28/24  CONCLUSION:  Left perihilar consolidation is mildly more prominent.         LOCATION:  [place] [id number]                  Dictated by (CST): Benjy Garcia MD on 8/28/2024 at 7:53 AM       Finalized by (CST): Benjy Garcia MD on 8/28/2024 at 7:54 AM     Reason for Referral: R/O aspiration      Family/Patient Goals: none stated     ASSESSMENT   DYSPHAGIA ASSESSMENT  Test completed in conjunction with Radiologist.  Patient Positioned: Upright;Midline.  Patient Viewed: Lateral.  Patient Alertness: Fully alert.  Consistencies Presented: Thin liquids;Puree;Hard solid to assess oropharyngeal swallow function and assess for compensatory  strategies to improve safe swallow function.    THIN LIQUIDS  Method of Presentation: Cup;Straw  Oral Phase of Swallow (VFSS - Thin Liquids): Within Functional Limits  Triggered at: Valleculae  Residue Severity, Location: Mild;Valleculae;Pyriform sinuses  Laryngeal Penetration: Trace  Tracheal Aspiration: None        PUREE  Oral Phase of Swallow (VFSS - Puree): Within Functional Limits  Triggered at: Valleculae  Residue Severity, Location: Mild;Pyriform sinuses  Cleared/Reduced with: Secondary swallow  Laryngeal Penetration: None  Tracheal Aspiration: None     HARD SOLID  Oral Phase of Swallow (VFSS - Hard Solid): Within Functional Limits  Triggered at: Valleculae  Residue Severity, Location: Mild;Pyriform sinuses  Cleared/Reduced with: Liquid assist  Laryngeal Penetration: None  Tracheal Aspiration: None  Penetration Aspiration Scale Score: Score 2: Material enters the airway, remains above the vocal folds, and is ejected from the airway       Overall Impression:   Patient presented with a largely functional oropharyngeal swallow. Bolus acceptance was adequate without evidence of anterior bolus loss. Mastication and AP bolus transit were thorough and efficient without evidence of oral residue. Pharyngeal swallow initiated at the level of the valleculae across consistencies. Base of tongue retraction and hyolaryngeal excursion were minimally reduced resulting in mild vallecular and pyriform sinus residue. This was reduced with cued secondary swallow or liquid wash. Trace penetration, but no aspiration, observed with thin liquid.     Recommend patient continue a regular diet and thin liquids. Bolus size and rate of intake should be limited. Recommend alternating solids and liquids to help clear pharyngeal residue. Suspect aspiration event may have been during episode of emesis. No further SLP services warranted at this time. Met with patient's daughter at bedside following exam. Education provided re: results and  recommendations of exam.              GOALS  Goal #1 VFSS  Met     EDUCATION/INSTRUCTION  Reviewed results and recommendations with patient/family/caregiver.  Agreement/Understanding verbalized and all questions answered to their apparent satisfaction.    INTERDISCIPLINARY COMMUNICATION  Reviewed results with Radiologist; agreement verbalized.    Patient Experiencing Pain: No                FOLLOW UP  Treatment Plan/Recommendations: No further inpatient SLP service warranted         Thank you for your referral.   If you have any questions, please contact Gael Sanchez, SLP

## 2024-08-28 NOTE — PLAN OF CARE
Assumed care of patient at 0700  Patient coughing and gagging after breakfast, NPO in case of aspiration.  Consult to SLP, passed bedside and video swallow. Diet resumed  PT/OT to attempt to see patient tomorrow  IV Protonix started  IV Zofran for nausea  Patient retaining urine. Straight cath x 1  Daughter updated on POC  Patient resting in bed, call light within reach        Problem: Patient/Family Goals  Goal: Patient/Family Long Term Goal  Description: Patient's Long Term Goal:     Interventions:  -   - See additional Care Plan goals for specific interventions  Outcome: Progressing  Goal: Patient/Family Short Term Goal  Description: Patient's Short Term Goal:     Interventions:   -   - See additional Care Plan goals for specific interventions  Outcome: Progressing     Problem: GASTROINTESTINAL - ADULT  Goal: Minimal or absence of nausea and vomiting  Description: INTERVENTIONS:  - Maintain adequate hydration with IV or PO as ordered and tolerated  - Nasogastric tube to low intermittent suction as ordered  - Evaluate effectiveness of ordered antiemetic medications  - Provide nonpharmacologic comfort measures as appropriate  - Advance diet as tolerated, if ordered  - Obtain nutritional consult as needed  - Evaluate fluid balance  Outcome: Progressing     Problem: METABOLIC/FLUID AND ELECTROLYTES - ADULT  Goal: Electrolytes maintained within normal limits  Description: INTERVENTIONS:  - Monitor labs and rhythm and assess patient for signs and symptoms of electrolyte imbalances  - Administer electrolyte replacement as ordered  - Monitor response to electrolyte replacements, including rhythm and repeat lab results as appropriate  - Fluid restriction as ordered  - Instruct patient on fluid and nutrition restrictions as appropriate  Outcome: Progressing  Goal: Hemodynamic stability and optimal renal function maintained  Description: INTERVENTIONS:  - Monitor labs and assess for signs and symptoms of volume excess or  deficit  - Monitor intake, output and patient weight  - Monitor urine specific gravity, serum osmolarity and serum sodium as indicated or ordered  - Monitor response to interventions for patient's volume status, including labs, urine output, blood pressure (other measures as available)  - Encourage oral intake as appropriate  - Instruct patient on fluid and nutrition restrictions as appropriate  Outcome: Progressing

## 2024-08-28 NOTE — PROGRESS NOTES
[facility]  Pulmonary/Critical Care progress note    Alyssa Montgomery Patient Status:  Inpatient    1932 MRN [id number]   Location [facility] 0SW-A Attending Kayla Strickland DO   Hosp Day # 2 PCP Mony Hall DO         History of Present Illness:    Slightly confused today.  Requiring supplemental oxygen 5 L/min by nasal cannula.  Breathing better but reports still with some cough and wheezing.    The patient was noted to cough after eating the breakfast.    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 Her smoking use included cigarettes. She has never used smokeless tobacco. She reports that she does not currently use alcohol after a past usage of about 7.0 standard drinks of alcohol per week. She reports that she does not use drugs.    Allergies:  No Known Allergies    Medications:    Current Facility-Administered Medications:     predniSONE (Deltasone) tab 40 mg, 40 mg, Oral, Daily with breakfast    ipratropium-albuterol (Duoneb) 0.5-2.5 (3) MG/3ML inhalation solution 3 mL, 3 mL, Nebulization, Q6H WA    piperacillin-tazobactam (Zosyn) 3.375 g in dextrose 5% 100 mL IVPB-ADDV, 3.375 g, Intravenous, Q8H    benzonatate (Tessalon) cap 100 mg, 100  mg, Oral, TID PRN    albuterol (Ventolin HFA) 108 (90 Base) MCG/ACT inhaler 2 puff, 2 puff, Inhalation, Q4H PRN    atorvastatin (Lipitor) tab 40 mg, 40 mg, Oral, Nightly    levothyroxine (Synthroid) tab 100 mcg, 100 mcg, Oral, Daily @ 0700    [Held by provider] predniSONE (Deltasone) tab 1 mg, 1 mg, Oral, Daily with breakfast    pregabalin (Lyrica) cap 25 mg, 25 mg, Oral, BID    acetaminophen (Tylenol Extra Strength) tab 500 mg, 500 mg, Oral, Q4H PRN    melatonin tab 3 mg, 3 mg, Oral, Nightly PRN    glycerin-hypromellose- (Artificial Tears) 0.2-0.2-1 % ophthalmic solution 1 drop, 1 drop, Both Eyes, QID PRN    sodium chloride (Saline Mist) 0.65 % nasal solution 1 spray, 1 spray, Each Nare, Q3H PRN    enoxaparin (Lovenox) 40 MG/0.4ML SUBQ injection 40 mg, 40 mg, Subcutaneous, Daily    polyethylene glycol (PEG 3350) (Miralax) 17 g oral packet 17 g, 17 g, Oral, Daily PRN    sennosides (Senokot) tab 17.2 mg, 17.2 mg, Oral, Nightly PRN    bisacodyl (Dulcolax) 10 MG rectal suppository 10 mg, 10 mg, Rectal, Daily PRN    fleet enema (Fleet) rectal enema 133 mL, 1 enema, Rectal, Once PRN    ondansetron (Zofran) 4 MG/2ML injection 4 mg, 4 mg, Intravenous, Q6H PRN    metoclopramide (Reglan) 5 mg/mL injection 10 mg, 10 mg, Intravenous, Q8H PRN    amLODIPine (Norvasc) tab 5 mg, 5 mg, Oral, Nightly    metoprolol tartrate (Lopressor) tab 25 mg, 25 mg, Oral, 2x Daily(Beta Blocker)    Intake/Output:    Intake/Output Summary (Last 24 hours) at 8/28/2024 1029  Last data filed at 8/28/2024 0448  Gross per 24 hour   Intake 120 ml   Output 1500 ml   Net -1380 ml      Body mass index is 27.06 kg/m².    Review of Systems  Review of Systems:   A comprehensive 10 point review of systems was completed.  Pertinent positives and negatives noted in the the HPI.         Patient Vitals for the past 24 hrs:   BP Temp Temp src Pulse Resp SpO2 Weight   08/27/24 1214 (!) 162/73 99.9 °F (37.7 °C) Oral 87 20 95 % --   08/27/24 0741 132/84 -- -- 76  18 91 % --   08/27/24 0530 136/50 100.4 °F (38 °C) Oral 90 18 92 % --   08/27/24 0040 -- -- -- -- -- -- 172 lb 12.8 oz (78.4 kg)   08/26/24 2130 158/71 98.7 °F (37.1 °C) Oral 99 22 93 % 172 lb 12.8 oz (78.4 kg)   08/26/24 2100 158/69 -- -- 97 -- 92 % --   08/26/24 2030 (!) 162/57 -- -- 95 -- -- --   08/26/24 2000 (!) 165/67 -- -- 98 -- 91 % --   08/26/24 1830 (!) 177/84 -- -- 97 -- 95 % --   08/26/24 1815 (!) 178/61 -- -- 101 -- 94 % --   08/26/24 1756 (!) 171/72 98.8 °F (37.1 °C) Oral 105 18 95 % --     Vitals:    08/27/24 2018 08/27/24 2100 08/27/24 2300 08/28/24 0325   BP: 153/63  139/63 154/65   BP Location: Left arm  Left arm Left arm   Pulse: 104 104 89 87   Resp: 20  20 20   Temp: 98.1 °F (36.7 °C)  98.4 °F (36.9 °C) 97.8 °F (36.6 °C)   TempSrc: Oral  Oral Oral   SpO2: 93% 94% 93% 97%   Weight:             Physical Exam  Constitutional:       General: She is not in acute distress.     Appearance: Normal appearance. She is obese. She is not ill-appearing or diaphoretic.   HENT:      Head: Normocephalic and atraumatic.      Nose: Nose normal. No congestion or rhinorrhea.      Mouth/Throat:      Mouth: Mucous membranes are moist.      Pharynx: Oropharynx is clear. No oropharyngeal exudate or posterior oropharyngeal erythema.   Eyes:      Extraocular Movements: Extraocular movements intact.      Pupils: Pupils are equal, round, and reactive to light.   Cardiovascular:      Rate and Rhythm: Normal rate.      Pulses: Normal pulses.      Heart sounds: Normal heart sounds. No murmur heard.  Pulmonary:      Effort: Pulmonary effort is normal. No respiratory distress.      Breath sounds: Wheezing (Bilateral scattered wheezing improved since yesterday) present. No rhonchi.   Chest:      Chest wall: No tenderness.   Abdominal:      General: Abdomen is flat. Bowel sounds are normal.      Palpations: Abdomen is soft.   Musculoskeletal:         General: Normal range of motion.   Skin:     General: Skin is warm.    Neurological:      General: No focal deficit present.      Mental Status: She is alert and oriented to person, place, and time.   Psychiatric:         Mood and Affect: Mood normal.         Behavior: Behavior normal.         Thought Content: Thought content normal.         Judgment: Judgment normal.            Lab Data Review:  Recent Labs   Lab 08/26/24  1803 08/27/24  0743 08/28/24  0729   WBC 13.6* 12.6* 16.1*   HGB 13.8 12.1 14.1   HCT 39.2 34.9* 41.3   .0* 87.0* 117.0*       Recent Labs   Lab 08/26/24  1816 08/27/24  0743 08/28/24  0729    138 138   K 3.3* 3.2* 3.4*  3.4*    107 104   CO2 29.0 26.0 27.0   BUN 10 7* 14   CREATSERUM 0.59 0.54* 0.69   CA 9.0 8.1* 9.0   ALB 4.5  --  4.2   ALKPHO 98  --  80   ALT 8*  --  11   AST 13  --  16   * 114* 186*       Recent Labs   Lab 08/27/24  0743 08/28/24  0729   MG 1.8 2.0       No results found for: \"PHOS\", \"PHOSPHORUS\"     No results for input(s): \"PT\", \"INR\", \"PTT\" in the last 168 hours.    Recent Labs   Lab 08/27/24  1210   ABGPHT 7.48*   CWSFHT3O 36   BCUCO6H 78*   ABGHCO3 27.5*   ABGBE 3.5*   TEMP 98.6   CARRIE Positive   SITE Right Radial   DEV Nasal cannula   THGB 17.0*       No results for input(s): \"TROP\", \"CKMB\" in the last 168 hours.    Cultures: No results found for this visit on 08/26/24.        Component  Ref Range & Units 8/28/24 0729   Procalcitonin  <0.05 ng/mL 0.23 High           Radiology personally reviewed:  XR CHEST AP PORTABLE  (CPT=71045)    Result Date: 8/28/2024  CONCLUSION:  Left perihilar consolidation is mildly more prominent.   LOCATION:  [place] [id number]      Dictated by (CST): Benjy Garcia MD on 8/28/2024 at 7:53 AM     Finalized by (CST): Benjy Garcia MD on 8/28/2024 at 7:54 AM       XR CHEST AP PORTABLE  (CPT=71045)    Result Date: 8/27/2024  CONCLUSION:  Worsening infiltrates in the left lung could reflect pneumonia or less likely asymmetric pulmonary edema.   LOCATION:  [facility]      Dictated by (CST):  Gabriel Lux MD on 8/27/2024 at 12:37 PM     Finalized by (CST): Gabriel Lux MD on 8/27/2024 at 12:38 PM       CT ABDOMEN+PELVIS(CPT=74176)    Result Date: 8/26/2024  CONCLUSION:   1. No evidence of an acute inflammatory process.  2. Small hiatal hernia.    LOCATION:  [facility]   Dictated by (CST): Benjy Garcia MD on 8/26/2024 at 7:09 PM     Finalized by (CST): Benjy Garcia MD on 8/26/2024 at 7:14 PM       XR CHEST AP PORTABLE  (CPT=71045)    Result Date: 8/26/2024  CONCLUSION:  Cardiomegaly.  Mild pulmonary venous congestion.  Slight bibasilar atelectasis with more focal airspace disease retrocardiac left lung base.   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)    Macular cyst, hole, or pseudohole, right eye    Nonexudative age-related macular degeneration, right eye, intermediate dry stage    PMR (polymyalgia rheumatica) ([facility])    Paving stone degeneration of retina, bilateral    Asymptomatic carotid artery stenosis, bilateral    Weakness    Constipation    Thrombocytopenia (HCC)    Syncope and collapse    Concussion with loss of consciousness    Fall, initial encounter    Laceration of right little finger without foreign body without damage to nail, initial encounter    Neuropathy    Shortness of breath    Respiratory syncytial virus (RSV)    RSV (acute bronchiolitis due to respiratory syncytial virus)    Intractable vomiting    Hypokalemia       Assessment:  Acute hypoxic respiratory failure:  Requiring supplemental oxygen 6 L/min by nasal cannula  Acute bronchitis: With mild pharyngitis, suspect due to vigorous vomiting with aspiration, the patient was noted to cough after eating and drinking breakfast this morning: Wheezing and rattling in throat improving on exam: Rule out dysphagia  Bilateral lower lobe mixed interstitial and air space infiltrate worse since yesterday's chest x-ray consistent with  aspiration pneumonia: Procalcitonin elevated  Bibasilar atelectasis and mild peripheral interstitial scarring is noted  GERD as with history of reflux per daughter  Hypertension  Hyperlipidemia  History of coronary artery disease  Polymyalgia rheumatica  Hypothyroidism  Leukocytosis: Slightly increased suspect due to steroids   Thrombocytopenia: Improving  Hypokalemia: Mild  Leukocytosis      Plan:  Wean FiO2 to keep oxygen saturations between 90% to 92%  Speech pathology evaluation and management  Continue DuoNebs every 6 hours  Blood cultures completed  Continue Zosyn 4.5 g every 8 hours  Continue Solu-Medrol 40 mg IV every 8 hours  Monitor and correct electrolytes  DVT prophylaxis: Lovenox 40 mg subcutaneous every 24 hours  GI prophylaxis: Add Protonix 40 mg daily   Follow labs, procalcitonin and chest x-ray in a.m.  Will follow for further recommendations    Thank You for allowing me to participate in this patient's care     Luis Murphy MD        Note to the patient: The 21st Century Cures Act makes medical notes like these available to patients in the interest of transparency. However, be advised that this is a medical document. It is intended as peer to peer communication. It is written in medical language and may contain abbreviations or verbiage that are unfamiliar. It may appear blunt or direct. Medical documents are intended to carry relevant information, facts as evident, and clinical opinion of the practitioner.      Disclaimer: Components of this note were documented using voice recognition  system and are subject to errors not corrected at proofreading. Contact the author of this note for any clarifications

## 2024-08-28 NOTE — PHYSICAL THERAPY NOTE
Following for PT.  D/w RN.  Pt BOUCHRA for video this AM.  Will f/u as schedule permits or at a later date.

## 2024-08-28 NOTE — OCCUPATIONAL THERAPY NOTE
OT orders received, pt chart reviewed. Per discussion with RN, pt off the floor for video this AM. Will re-attempt as able. VZ

## 2024-08-28 NOTE — PLAN OF CARE
Patient A&O x3 with some confusion at times, currently 5 Ls O2, no c/o pain.  Patient has a cough that has worsened through the day.  Received n/o for Tessalon.  As patient started to fall asleep O2 needs increased from 4 to 5 Ls.

## 2024-08-29 LAB
ANION GAP SERPL CALC-SCNC: 5 MMOL/L (ref 0–18)
BASOPHILS # BLD AUTO: 0.03 X10(3) UL (ref 0–0.2)
BASOPHILS NFR BLD AUTO: 0.2 %
BUN BLD-MCNC: 21 MG/DL (ref 9–23)
CALCIUM BLD-MCNC: 9.2 MG/DL (ref 8.7–10.4)
CHLORIDE SERPL-SCNC: 107 MMOL/L (ref 98–112)
CO2 SERPL-SCNC: 30 MMOL/L (ref 21–32)
CREAT BLD-MCNC: 0.71 MG/DL
EGFRCR SERPLBLD CKD-EPI 2021: 80 ML/MIN/1.73M2 (ref 60–?)
EOSINOPHIL # BLD AUTO: 0.02 X10(3) UL (ref 0–0.7)
EOSINOPHIL NFR BLD AUTO: 0.1 %
ERYTHROCYTE [DISTWIDTH] IN BLOOD BY AUTOMATED COUNT: 13.9 %
GLUCOSE BLD-MCNC: 133 MG/DL (ref 70–99)
HCT VFR BLD AUTO: 36.4 %
HGB BLD-MCNC: 13.1 G/DL
IMM GRANULOCYTES # BLD AUTO: 0.13 X10(3) UL (ref 0–1)
IMM GRANULOCYTES NFR BLD: 0.9 %
LYMPHOCYTES # BLD AUTO: 0.59 X10(3) UL (ref 1–4)
LYMPHOCYTES NFR BLD AUTO: 4 %
MAGNESIUM SERPL-MCNC: 2.1 MG/DL (ref 1.6–2.6)
MCH RBC QN AUTO: 32 PG (ref 26–34)
MCHC RBC AUTO-ENTMCNC: 36 G/DL (ref 31–37)
MCV RBC AUTO: 88.8 FL
MONOCYTES # BLD AUTO: 1.42 X10(3) UL (ref 0.1–1)
MONOCYTES NFR BLD AUTO: 9.7 %
NEUTROPHILS # BLD AUTO: 12.4 X10 (3) UL (ref 1.5–7.7)
NEUTROPHILS # BLD AUTO: 12.4 X10(3) UL (ref 1.5–7.7)
NEUTROPHILS NFR BLD AUTO: 85.1 %
OSMOLALITY SERPL CALC.SUM OF ELEC: 299 MOSM/KG (ref 275–295)
PLATELET # BLD AUTO: 133 10(3)UL (ref 150–450)
POTASSIUM SERPL-SCNC: 3.3 MMOL/L (ref 3.5–5.1)
POTASSIUM SERPL-SCNC: 3.3 MMOL/L (ref 3.5–5.1)
RBC # BLD AUTO: 4.1 X10(6)UL
SODIUM SERPL-SCNC: 142 MMOL/L (ref 136–145)
WBC # BLD AUTO: 14.6 X10(3) UL (ref 4–11)

## 2024-08-29 PROCEDURE — 99233 SBSQ HOSP IP/OBS HIGH 50: CPT | Performed by: INTERNAL MEDICINE

## 2024-08-29 PROCEDURE — 99232 SBSQ HOSP IP/OBS MODERATE 35: CPT | Performed by: HOSPITALIST

## 2024-08-29 RX ORDER — POTASSIUM CHLORIDE 1.5 G/1.58G
40 POWDER, FOR SOLUTION ORAL ONCE
Status: COMPLETED | OUTPATIENT
Start: 2024-08-29 | End: 2024-08-29

## 2024-08-29 RX ORDER — METOCLOPRAMIDE HYDROCHLORIDE 5 MG/ML
5 INJECTION INTRAMUSCULAR; INTRAVENOUS EVERY 8 HOURS PRN
Status: DISCONTINUED | OUTPATIENT
Start: 2024-08-29 | End: 2024-09-09

## 2024-08-29 NOTE — PHYSICAL THERAPY NOTE
PHYSICAL THERAPY EVALUATION - INPATIENT     Room Number: 0025/0025-A  Evaluation Date: 8/29/2024  Type of Evaluation: Initial  Physician Order: PT Eval and Treat    Presenting Problem: Intractable vomiting  Co-Morbidities : CAD, HTN, DM, macular degeneration  Reason for Therapy: Mobility Dysfunction and Discharge Planning    PHYSICAL THERAPY ASSESSMENT   Patient is currently functioning below baseline with bed mobility, transfers, gait, maintaining seated position, and standing prolonged periods.  Prior to admission, patient's baseline is mod I with use of r/w.  Patient is requiring moderate assist and maximum assist as a result of the following impairments: decreased functional strength, decreased endurance/aerobic capacity, pain, impaired sitting and standing balance, decreased muscular endurance, and increased O2 needs from baseline.  Physical Therapy will continue to follow for duration of hospitalization.    Patient will benefit from continued skilled PT Services to promote return to prior level of function and safety with continuous assistance and gradual rehabilitative therapy .    PLAN  PT Treatment Plan: Bed mobility;Patient education;Gait training;Energy conservation;Endurance;Range of motion;Strengthening;Transfer training;Balance training  Rehab Potential : Good  Frequency (Obs): 3-5x/week  Number of Visits to Meet Established Goals: 5      CURRENT GOALS    Goal #1 Patient is able to demonstrate supine - sit EOB @ level: supervision     Goal #2 Patient is able to demonstrate transfers EOB to/from Chair/Wheelchair at assistance level: minimum assistance     Goal #3 Patient is able to ambulate 10 feet with assist device: walker - rolling at assistance level: minimum assistance     Goal #4    Goal #5    Goal #6    Goal Comments: Goals established on 8/29/2024      PHYSICAL THERAPY MEDICAL/SOCIAL HISTORY  History related to current admission: Patient is a 89 year old female admitted on 8/26/2024 from  home for nausea and vomiting.  Pt diagnosed with CAP, acute bronchitis, acute respiratory failure. Pt with increased difficulty breathing noted 8/28/24 requiring up to 6 L/min O2, pt currently on 2 L/min O2 during PT eval.      HOME SITUATION  Type of Home: Independent living facility   Home Layout: One level                Lives With: Alone  Drives: No  Patient Owned Equipment: Rolling walker  Patient Regularly Uses: Glasses    Prior Level of [place]: Pt reports she uses walker at baseline, has frequent falls. Pt reports she typically can bath and dress self and ambulates with walker to dining room at [facility].     SUBJECTIVE  \"I'm going to fall\" pt expresses fear of falling frequently throughout session.      OBJECTIVE  Precautions: Bed/chair alarm;Hard of hearing;Low vision  Fall Risk: High fall risk    WEIGHT BEARING RESTRICTION  Weight Bearing Restriction: None                PAIN ASSESSMENT  Rating: 10  Location: abdomen  Management Techniques: Activity promotion;Repositioning;Body mechanics    COGNITION  Orientation Level:  oriented x4  Perseveration: perseverates during conversation    RANGE OF MOTION AND STRENGTH ASSESSMENT  Upper extremity ROM and strength: See OT note    Lower extremity ROM is within functional limits     Lower extremity strength is grossly 4/5 throughout      BALANCE  Static Sitting: Fair +  Dynamic Sitting: Not tested  Static Standing: Poor -  Dynamic Standing: Dependent    ADDITIONAL TESTS                                    ACTIVITY TOLERANCE           BP: (!) 174/74  BP Location: Right arm  BP Method: Automatic  Patient Position: Sitting    O2 WALK  Oxygen Therapy  SPO2% on Oxygen at Rest: 95  At rest oxygen flow (liters per minute): 2    NEUROLOGICAL FINDINGS                        AM-PAC '6-Clicks' INPATIENT SHORT FORM - BASIC MOBILITY  How much difficulty does the patient currently have...  Patient Difficulty: Turning over in bed (including adjusting bedclothes, sheets and  blankets)?: A Lot   Patient Difficulty: Sitting down on and standing up from a chair with arms (e.g., wheelchair, bedside commode, etc.): A Lot   Patient Difficulty: Moving from lying on back to sitting on the side of the bed?: A Lot   How much help from another person does the patient currently need...   Help from Another: Moving to and from a bed to a chair (including a wheelchair)?: A Lot   Help from Another: Need to walk in hospital room?: Total   Help from Another: Climbing 3-5 steps with a railing?: Total       AM-PAC Score:  Raw Score: 10   Approx Degree of Impairment: 76.75%   Standardized Score (AM-PAC Scale): 32.29   CMS Modifier (G-Code): CL    FUNCTIONAL ABILITY STATUS  Gait Assessment   Functional Mobility/Gait Assessment  Gait Assistance: Dependent assistance  Distance (ft): 0  Assistive Device: Rolling walker  Pattern: Shuffle (difficulty with weight shift, unable to clear foot for steps)    Skilled Therapy Provided     Bed Mobility:  Rolling: mod assist  Supine to sit:  max assist of 1 with HOB elevated   Sit to supine: mod/max assist of 2     Transfer Mobility:  Sit to stand: mod assist of 1-2   Stand to sit: mod assist  Gait = attempting gait at EOB, pt presents with difficulty for weight shift in order to clear extremities to take steps    Therapist's Comments: Pt presents semi-reclined in bed, agreeable to PT with encouragement. Pt with c/o abdominal pain throughout. Pt educated on mobility techniques for bed mobility and sit to/from stand. Pt requires frequent reassurance throughout session due to pt fear of falling. Pt expresses fear of falling seated at EOB and in standing. Pt mobility as above. Feet blocked for sit to stand as feet sliding out. Pt with posterior lean in standing requiring max assist of 1-2 to maintain standing position. Assisting with weight shift and pt remains unable to take steps due to fear and posterior lean. Pt returned to sitting at EOB due to inability to take steps  during evaluation. Pt assisted back to semi-reclined position with mod/max assist of 2, positioned with HOB elevated. Pt perseverates throughout session re: dtr's planned party on  and that she can not discharge on that day. Educated pt that care team will assist with dc planning. RN aware of pt position and rec for total lift at this time for nsg transfers. RN verbalizes understanding.    Exercise/Education Provided:  Bed mobility  Body mechanics  Functional activity tolerated  Gait training  Posture  Transfer training    Patient End of Session: In bed;Needs met;Call light within reach;RN aware of session/findings;All patient questions and concerns addressed;Alarm set;Discussed recommendations with /      Patient Evaluation Complexity Level:  History Moderate - 1 or 2 personal factors and/or co-morbidities   Examination of body systems Moderate - addressing a total of 3 or more elements   Clinical Presentation Low - Stable   Clinical Decision Making Low - Stable       PT Session Time: 30 minutes  Gait Trainin minutes  Therapeutic Activity: 15 minutes

## 2024-08-29 NOTE — PLAN OF CARE
Assumed care of patient at 0700  Tylenol for lower back pain  Tessalon and Robitussin for cough  Attempting to wean off oxygen, currently on 2LNC  PT/OT rec JACOB  K+ replaced per protocol  Continue IV abx  Aspiration precautions in place  Family updated on POC  Patient currently resting in bed, call light within reach              Problem: Patient/Family Goals  Goal: Patient/Family Long Term Goal  Description: Patient's Long Term Goal:     Interventions:  -   - See additional Care Plan goals for specific interventions  Outcome: Progressing  Goal: Patient/Family Short Term Goal  Description: Patient's Short Term Goal:     Interventions:   -   - See additional Care Plan goals for specific interventions  Outcome: Progressing     Problem: GASTROINTESTINAL - ADULT  Goal: Minimal or absence of nausea and vomiting  Description: INTERVENTIONS:  - Maintain adequate hydration with IV or PO as ordered and tolerated  - Nasogastric tube to low intermittent suction as ordered  - Evaluate effectiveness of ordered antiemetic medications  - Provide nonpharmacologic comfort measures as appropriate  - Advance diet as tolerated, if ordered  - Obtain nutritional consult as needed  - Evaluate fluid balance  Outcome: Progressing     Problem: METABOLIC/FLUID AND ELECTROLYTES - ADULT  Goal: Electrolytes maintained within normal limits  Description: INTERVENTIONS:  - Monitor labs and rhythm and assess patient for signs and symptoms of electrolyte imbalances  - Administer electrolyte replacement as ordered  - Monitor response to electrolyte replacements, including rhythm and repeat lab results as appropriate  - Fluid restriction as ordered  - Instruct patient on fluid and nutrition restrictions as appropriate  Outcome: Progressing  Goal: Hemodynamic stability and optimal renal function maintained  Description: INTERVENTIONS:  - Monitor labs and assess for signs and symptoms of volume excess or deficit  - Monitor intake, output and patient  weight  - Monitor urine specific gravity, serum osmolarity and serum sodium as indicated or ordered  - Monitor response to interventions for patient's volume status, including labs, urine output, blood pressure (other measures as available)  - Encourage oral intake as appropriate  - Instruct patient on fluid and nutrition restrictions as appropriate  Outcome: Progressing

## 2024-08-29 NOTE — OCCUPATIONAL THERAPY NOTE
OCCUPATIONAL THERAPY EVALUATION - INPATIENT     Room Number: 0025/0025-A  Evaluation Date: 8/29/2024  Type of Evaluation: Initial  Presenting Problem: Nausea, vomiting, acute bronchities, respiratory failure, pneumonia    Physician Order: IP Consult to Occupational Therapy  Reason for Therapy: ADL/IADL Dysfunction and Discharge Planning    OCCUPATIONAL THERAPY ASSESSMENT   Patient is currently functioning below baseline with all ADL. Prior to admission, patient's baseline is modified independent with RW.  Patient is requiring maximum assistance as a result of the following impairments: decreased functional strength, decreased endurance, impaired standing balance, and self-reporting of fear of falling . Occupational Therapy will continue to follow for duration of hospitalization.    Patient will benefit from continued skilled OT Services to promote return to prior level of function and safety with continuous assistance and gradual rehabilitative therapy      History Related to Current Admission: Patient is a 89 year old female admitted on 8/26/2024 with Presenting Problem: Nausea, vomiting, acute bronchities, respiratory failure, pneumonia. Co-Morbidities : CAD, HTN, DM, macular degeneration    WEIGHT BEARING RESTRICTION  Weight Bearing Restriction: None           EVALUATION SESSION:  Patient Start of Session: supine  FUNCTIONAL TRANSFER ASSESSMENT  Sit to Stand: Edge of Bed  Edge of Bed: Maximum Assist (mod A x 2)    BED MOBILITY  Supine to Sit : Maximum Assist (HOB elevated)  Sit to Supine (OT): Dependent    COGNITION  Perseveration: perseverates during conversation    Upper Extremity   ROM: within functional limits   Strength: within functional limits     EDUCATION PROVIDED  Patient: Plan of Care; Role of Occupational Therapy; Functional Transfer Techniques; Fall Prevention; Posture/Positioning  Patient's Response to Education: Requires Further Education    Equipment used: rw       Therapist comments: Upon  entering the room, patient told OT and PT, \"Listen, my daughter is having this home party on , so I am not going to ruin it.  I will pay privately if I need to, so I can stay here.\" Pt proceeded to say that pt is going to rehab, but not on .  OT and PT educated the patient that the care team will be assisting her with the discharge plan. Frequent re-direction provided throughout the session.   2 liters, 97%. Max A x 1 with HOB elevated to almost 70 degrees supine to sit. Seated /74, no dizziness, but nausea.  Pt mentioned,\"I am afraid of falling. Don't let me fall!\"  OT and PT provided encouragement and education about safe transfer sequencing. Blocked both feet from sliding. Mod A x1 to stand, but once standing, pt kept leaning posteriorly against the bed.Max A x 2 provided to facilitation weight shifting. Pt attempted to take a side step, but pt kept leaning back. This transfer was completed in preparation for bedside commode transfer. 95%. Total A back to supine.      Patient End of Session: In bed;Needs met;Call light within reach;RN aware of session/findings;All patient questions and concerns addressed    OCCUPATIONAL PROFILE    HOME SITUATION  Type of Home: Independent living facility  Home Layout: One level  Lives With: Alone    Toilet and Equipment: Comfort height toilet  Shower/Tub and Equipment: Walk-in shower  Other Equipment:  (rw)          Drives: No  Patient Regularly Uses: Glasses    Prior Level of Function: Modified independent with all ADL. Uses RW.    SUBJECTIVE   See above    PAIN ASSESSMENT  Ratin          OBJECTIVE  Precautions: Bed/chair alarm;Hard of hearing;Low vision  Fall Risk: High fall risk      ASSESSMENTS    AM-PAC ‘6-Clicks’ Inpatient Daily Activity Short Form  -   Putting on and taking off regular lower body clothing?: A Lot  -   Bathing (including washing, rinsing, drying)?: A Lot  -   Toileting, which includes using toilet, bedpan or urinal? : Total  -    Putting on and taking off regular upper body clothing?: A Lot  -   Taking care of personal grooming such as brushing teeth?: A Little  -   Eating meals?: A Little    AM-PAC Score:  Score: 13  Approx Degree of Impairment: 63.03%  Standardized Score (AM-PAC Scale): 32.03    ADDITIONAL TESTS     NEUROLOGICAL FINDINGS      COGNITION ASSESSMENTS       PLAN  OT Treatment Plan: Balance activities;ADL training;Functional transfer training;UE strengthening/ROM;Patient/Family education;Cognitive reorientation;Equipment eval/education;Compensatory technique education  Rehab Potential : Fair  Frequency: 3x/week       ADL Goals   Patient will perform upper body dressing:  with setup  Patient will perform lower body dressing:  with min assist  Patient will perform toileting: with mod assist    Functional Transfer Goals  Patient will transfer from supine to sit:  with min assist  Patient will transfer to bedside commode:  with mod assist    UE Exercise Program Goal  Patient will be supervision with bilateral AROM HEP (home exercise program).  Patient Evaluation Complexity Level:   Occupational Profile/Medical History MODERATE - Expanded review of history including review of medical or therapy record   Specific performance deficits impacting engagement in ADL/IADL MODERATE  3 - 5 performance deficits   Client Assessment/Performance Deficits MODERATE - Comorbidities and min to mod modifications of tasks    Clinical Decision Making LOW - Analysis of occupational profile, problem-focused assessments, limited treatment options    Overall Complexity LOW     OT Session Time: 28 minutes  Self-Care Home Management:  minutes  Therapeutic Activity: 12 minutes

## 2024-08-29 NOTE — PLAN OF CARE
Assumed care of pt at 1930, pt A/O x4 but with short term memory loss, pt asks same questions over and over. Pt bladder scanned at 2132 for no urine output since last straight cath pt had 206 ml in. Pt has since been incontinent of urine and large amount of bm. Pt has purewick with 300 cc of bobby urine out this am. Pt on iv antibx. All questions and concerns answered. Bed alarm in place.

## 2024-08-29 NOTE — PROGRESS NOTES
[facility]  Pulmonary/Critical Care progress note    Alyssa Montgomery Patient Status:  Inpatient    1932 MRN [id number]   Location [facility] 0SW-A Attending Kayla Strickland DO   Hosp Day # 3 PCP Mony Hall DO         History of Present Illness:    Supplemental oxygen weaned to 2 L/min by nasal cannula.    The patient states that she is feeling okay still with cough and some shortness of breath.        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 Her smoking use included cigarettes. She has never used smokeless tobacco. She reports that she does not currently use alcohol after a past usage of about 7.0 standard drinks of alcohol per week. She reports that she does not use drugs.    Allergies:  No Known Allergies    Medications:    Current Facility-Administered Medications:     guaiFENesin (Robitussin) 100 MG/5 ML oral liquid 100 mg, 100 mg, Oral, Q4H PRN    metoclopramide (Reglan) 5 mg/mL injection 5 mg, 5 mg, Intravenous, Q8H PRN    predniSONE (Deltasone) tab 40 mg, 40 mg, Oral, Daily with breakfast    pantoprazole (Protonix) 40 mg in sodium chloride 0.9% PF 10 mL IV push, 40 mg, Intravenous, Q12H    ipratropium-albuterol (Duoneb) 0.5-2.5 (3) MG/3ML inhalation solution 3 mL, 3 mL, Nebulization, Q6H WA    piperacillin-tazobactam  (Zosyn) 3.375 g in dextrose 5% 100 mL IVPB-ADDV, 3.375 g, Intravenous, Q8H    benzonatate (Tessalon) cap 100 mg, 100 mg, Oral, TID PRN    albuterol (Ventolin HFA) 108 (90 Base) MCG/ACT inhaler 2 puff, 2 puff, Inhalation, Q4H PRN    atorvastatin (Lipitor) tab 40 mg, 40 mg, Oral, Nightly    levothyroxine (Synthroid) tab 100 mcg, 100 mcg, Oral, Daily @ 0700    [Held by provider] predniSONE (Deltasone) tab 1 mg, 1 mg, Oral, Daily with breakfast    pregabalin (Lyrica) cap 25 mg, 25 mg, Oral, BID    acetaminophen (Tylenol Extra Strength) tab 500 mg, 500 mg, Oral, Q4H PRN    melatonin tab 3 mg, 3 mg, Oral, Nightly PRN    glycerin-hypromellose- (Artificial Tears) 0.2-0.2-1 % ophthalmic solution 1 drop, 1 drop, Both Eyes, QID PRN    sodium chloride (Saline Mist) 0.65 % nasal solution 1 spray, 1 spray, Each Nare, Q3H PRN    enoxaparin (Lovenox) 40 MG/0.4ML SUBQ injection 40 mg, 40 mg, Subcutaneous, Daily    polyethylene glycol (PEG 3350) (Miralax) 17 g oral packet 17 g, 17 g, Oral, Daily PRN    sennosides (Senokot) tab 17.2 mg, 17.2 mg, Oral, Nightly PRN    bisacodyl (Dulcolax) 10 MG rectal suppository 10 mg, 10 mg, Rectal, Daily PRN    fleet enema (Fleet) rectal enema 133 mL, 1 enema, Rectal, Once PRN    ondansetron (Zofran) 4 MG/2ML injection 4 mg, 4 mg, Intravenous, Q6H PRN    amLODIPine (Norvasc) tab 5 mg, 5 mg, Oral, Nightly    metoprolol tartrate (Lopressor) tab 25 mg, 25 mg, Oral, 2x Daily(Beta Blocker)    Intake/Output:    Intake/Output Summary (Last 24 hours) at 8/29/2024 1040  Last data filed at 8/29/2024 0600  Gross per 24 hour   Intake 400 ml   Output 900 ml   Net -500 ml      Body mass index is 27.06 kg/m².    Review of Systems  Review of Systems:   A comprehensive 10 point review of systems was completed.  Pertinent positives and negatives noted in the the HPI.         Patient Vitals for the past 24 hrs:   BP Temp Temp src Pulse Resp SpO2 Weight   08/27/24 1214 (!) 162/73 99.9 °F (37.7 °C) Oral 87 20 95 %  --   08/27/24 0741 132/84 -- -- 76 18 91 % --   08/27/24 0530 136/50 100.4 °F (38 °C) Oral 90 18 92 % --   08/27/24 0040 -- -- -- -- -- -- 172 lb 12.8 oz (78.4 kg)   08/26/24 2130 158/71 98.7 °F (37.1 °C) Oral 99 22 93 % 172 lb 12.8 oz (78.4 kg)   08/26/24 2100 158/69 -- -- 97 -- 92 % --   08/26/24 2030 (!) 162/57 -- -- 95 -- -- --   08/26/24 2000 (!) 165/67 -- -- 98 -- 91 % --   08/26/24 1830 (!) 177/84 -- -- 97 -- 95 % --   08/26/24 1815 (!) 178/61 -- -- 101 -- 94 % --   08/26/24 1756 (!) 171/72 98.8 °F (37.1 °C) Oral 105 18 95 % --     Vitals:    08/28/24 1928 08/29/24 0200 08/29/24 0539 08/29/24 0745   BP: 152/73  153/62    BP Location: Left arm  Left arm    Pulse: 100 101 90    Resp: 18  16    Temp: 98.1 °F (36.7 °C)  98 °F (36.7 °C)    TempSrc: Oral  Oral    SpO2: 96% 95% 95% 90%   Weight:             Physical Exam  Constitutional:       General: She is not in acute distress.     Appearance: Normal appearance. She is obese. She is not ill-appearing or diaphoretic.   HENT:      Head: Normocephalic and atraumatic.      Nose: Nose normal. No congestion or rhinorrhea.      Mouth/Throat:      Mouth: Mucous membranes are moist.      Pharynx: Oropharynx is clear. No oropharyngeal exudate or posterior oropharyngeal erythema.   Eyes:      Extraocular Movements: Extraocular movements intact.      Pupils: Pupils are equal, round, and reactive to light.   Cardiovascular:      Rate and Rhythm: Normal rate.      Pulses: Normal pulses.      Heart sounds: Normal heart sounds. No murmur heard.  Pulmonary:      Effort: Pulmonary effort is normal. No respiratory distress.      Breath sounds: Wheezing (Bilateral scattered wheezing anterolaterally) present. No rhonchi.   Chest:      Chest wall: No tenderness.   Abdominal:      General: Abdomen is flat. Bowel sounds are normal.      Palpations: Abdomen is soft.   Musculoskeletal:         General: Normal range of motion.   Skin:     General: Skin is warm.   Neurological:       General: No focal deficit present.      Mental Status: She is alert and oriented to person, place, and time.   Psychiatric:         Mood and Affect: Mood normal.         Behavior: Behavior normal.         Thought Content: Thought content normal.         Judgment: Judgment normal.            Lab Data Review:  Recent Labs   Lab 08/27/24  0743 08/28/24  0729 08/29/24  0748   WBC 12.6* 16.1* 14.6*   HGB 12.1 14.1 13.1   HCT 34.9* 41.3 36.4   PLT 87.0* 117.0* 133.0*       Recent Labs   Lab 08/26/24  1816 08/27/24  0743 08/28/24  0729 08/29/24  0748    138 138 142   K 3.3* 3.2* 3.4*  3.4* 3.3*  3.3*    107 104 107   CO2 29.0 26.0 27.0 30.0   BUN 10 7* 14 21   CREATSERUM 0.59 0.54* 0.69 0.71   CA 9.0 8.1* 9.0 9.2   ALB 4.5  --  4.2  --    ALKPHO 98  --  80  --    ALT 8*  --  11  --    AST 13  --  16  --    * 114* 186* 133*       Recent Labs   Lab 08/27/24  0743 08/28/24  0729 08/29/24  0748   MG 1.8 2.0 2.1       No results found for: \"PHOS\", \"PHOSPHORUS\"     No results for input(s): \"PT\", \"INR\", \"PTT\" in the last 168 hours.    Recent Labs   Lab 08/27/24  1210   ABGPHT 7.48*   URPFHJ7B 36   GLVQL0T 78*   ABGHCO3 27.5*   ABGBE 3.5*   TEMP 98.6   CARRIE Positive   SITE Right Radial   DEV Nasal cannula   THGB 17.0*       No results for input(s): \"TROP\", \"CKMB\" in the last 168 hours.    Cultures: No results found for this visit on 08/26/24.        Component  Ref Range & Units 8/28/24 0729   Procalcitonin  <0.05 ng/mL 0.23 High           Radiology personally reviewed:  XR VIDEO SWALLOW (CPT=74230)    Result Date: 8/28/2024  PROCEDURE:  XR VIDEO SWALLOW (CPT=74230)  TECHNIQUE:  Video fluoroscopic swallowing study was performed in cooperation with the speech pathologist.  Barium of varying consistencies was administered orally with patient in lateral projection.  COMPARISON:  None.  INDICATIONS:  concern for aspiration pneumonia  PATIENT STATED HISTORY: (As transcribed by Technologist)  Patient offered no  additional history at this time.   CINE CAPTURES:  5 FLUORSCOPY TIME:  38 seconds RADIATION DOSE (AIR KERMA PRODUCT):  41.8 uGy/m2   FINDINGS:  PHARYNGEAL PHASE:  Normal for age. ASPIRATION:  None. PENETRATION:  Normal. OTHER:  Negative.  PLEASE SEE SPEECH PATHOLOGIST REPORT FOR FULL ASSESSMENT OF SWALLOWING MECHANISM.   LOCATION:  [facility]    Dictated by (CST): Iban Montgomery MD on 8/28/2024 at 1:02 PM     Finalized by (CST): Iban Montgomery MD on 8/28/2024 at 1:02 PM       XR CHEST AP PORTABLE  (CPT=71045)    Result Date: 8/28/2024  CONCLUSION:  Left perihilar consolidation is mildly more prominent.   LOCATION:  [place] [id number]      Dictated by (CST): Benjy Garcia MD on 8/28/2024 at 7:53 AM     Finalized by (CST): Benjy Garcia MD on 8/28/2024 at 7:54 AM       XR CHEST AP PORTABLE  (CPT=71045)    Result Date: 8/27/2024  CONCLUSION:  Worsening infiltrates in the left lung could reflect pneumonia or less likely asymmetric pulmonary edema.   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)    Paving stone degeneration of retina, bilateral    Asymptomatic carotid artery stenosis, bilateral    Weakness    Constipation    Thrombocytopenia ([facility])    Syncope and collapse    Concussion with loss of consciousness    Fall, initial encounter    Laceration of right  little finger without foreign body without damage to nail, initial encounter    Neuropathy    Shortness of breath    Respiratory syncytial virus (RSV)    RSV (acute bronchiolitis due to respiratory syncytial virus)    Intractable vomiting    Hypokalemia       Assessment:  Acute hypoxic respiratory failure: Supplemental oxygen weaned to 2 L/min via nasal cannula: Improving oxygenation  Acute bronchitis: With mild pharyngitis, suspect due to vigorous vomiting with aspiration:: No dysphagia noted on video swallow study per speech pathology: Clinically improving  Bilateral lower lobe mixed interstitial and air space infiltrate worse since yesterday's chest x-ray consistent with  aspiration pneumonia: Procalcitonin elevated: Leukocyte count trending down  Bibasilar atelectasis and mild peripheral interstitial scarring is noted  GERD as with history of reflux per daughter  Hypertension  Hyperlipidemia  History of coronary artery disease  Polymyalgia rheumatica  Hypothyroidism  Leukocytosis: Slightly increased suspect due to steroids   Thrombocytopenia: Improving  Hypokalemia: Mild  Leukocytosis      Plan:  Wean FiO2 off to keep oxygen saturations between 90% to 92%  Speech pathology evaluation and management appreciated  Continue DuoNebs every 6 hours  Blood cultures completed  Continue Zosyn 4.5 g every 8 hours  Continue prednisone 40 mg oral daily  Monitor and correct electrolytes  DVT prophylaxis: Lovenox 40 mg subcutaneous every 24 hours  GI prophylaxis: Protonix 40 mg daily   Follow labs, procalcitonin and chest x-ray again in a.m.  Discussed with patient's daughter, patient and her nurse  Will follow for further recommendations    Thank You for allowing me to participate in this patient's care     Luis Murphy MD        Note to the patient: The 21st Century Cures Act makes medical notes like these available to patients in the interest of transparency. However, be advised that this is a medical document. It is  intended as peer to peer communication. It is written in medical language and may contain abbreviations or verbiage that are unfamiliar. It may appear blunt or direct. Medical documents are intended to carry relevant information, facts as evident, and clinical opinion of the practitioner.      Disclaimer: Components of this note were documented using voice recognition system and are subject to errors not corrected at proofreading. Contact the author of this note for any clarifications

## 2024-08-29 NOTE — PROGRESS NOTES
[facility]   part of [facility]     Hospitalist Progress Note     Alyssa Montgomery Patient Status:  Inpatient    1932 MRN [id number]   Location [facility] 0SW-A Attending Medhat Dover MD   Hosp Day # 3 PCP Mony Hall DO     Chief Complaint: n/v    Subjective:     Pt feeling better, remains weak. Cont to cough making her feel uncomfortable     Objective:    Review of Systems:   A comprehensive review of systems was completed; pertinent positive and negatives stated in subjective.    Vital signs:  Temp:  [97.9 °F (36.6 °C)-98.1 °F (36.7 °C)] 98 °F (36.7 °C)  Pulse:  [] 90  Resp:  [16-18] 16  BP: (152-157)/(62-73) 153/62  SpO2:  [95 %-98 %] 95 %    Physical Exam:    General: No acute distress  Respiratory: No wheezes, mild rhonchi   Cardiovascular: S1, S2, regular rate and rhythm  Abdomen: Soft, Non-tender, non-distended, positive bowel sounds  Neuro: No new focal deficits.   Extremities: No edema      Diagnostic Data:    Labs:  Recent Labs   Lab 24  1803 24  0743 24  0729   WBC 13.6* 12.6* 16.1*   HGB 13.8 12.1 14.1   MCV 89.5 92.1 91.4   .0* 87.0* 117.0*       Recent Labs   Lab 24  1816 24  0743 24  0729   * 114* 186*   BUN 10 7* 14   CREATSERUM 0.59 0.54* 0.69   CA 9.0 8.1* 9.0   ALB 4.5  --  4.2    138 138   K 3.3* 3.2* 3.4*  3.4*    107 104   CO2 29.0 26.0 27.0   ALKPHO 98  --  80   AST 13  --  16   ALT 8*  --  11   BILT 1.1*  --  0.9   TP 7.4  --  7.0       Estimated Creatinine Clearance: 50.6 mL/min (based on SCr of 0.69 mg/dL).    No results for input(s): \"TROP\", \"TROPHS\", \"CK\" in the last 168 hours.    No results for input(s): \"PTP\", \"INR\" in the last 168 hours.                 Microbiology    No results found for this visit on 24.      Imaging: Reviewed in Epic.    Medications:    predniSONE  40 mg Oral Daily with breakfast    pantoprazole  40 mg Intravenous Q12H    ipratropium-albuterol  3 mL Nebulization Q6H  WA    piperacillin-tazobactam  3.375 g Intravenous Q8H    atorvastatin  40 mg Oral Nightly    levothyroxine  100 mcg Oral Daily @ 0700    [Held by provider] predniSONE  1 mg Oral Daily with breakfast    pregabalin  25 mg Oral BID    enoxaparin  40 mg Subcutaneous Daily    amLODIPine  5 mg Oral Nightly    metoprolol tartrate  25 mg Oral 2x Daily(Beta Blocker)       Assessment & Plan:      #CAP  #Acute bronchitis  #Fever  #Malaise  -iv zosyn (8/27-)  -Bcx/Sputum culture (8/28) pending  -CXRs reviewed personally  -therapies to see  -prednisone (8/28-)  s/p iv solumedrol (8/27-8/28)    #Aspiration ruled out  -speech eval noted    #Acute hypoxemic resp failure, improving  -on RA at baseline, currently 3L NC  -2/2 above  -echo done, shows lvef intact    #Leukocytosis, worsening  -likely 2/2 steroids, will monitor    #Intractable N/v   #Dehydration   CT a/p w/o acute findings  Improving     #Acute metabolic encephalopathy, resolved  Family reports more back to baseline  TSH slightly low, free t4 okay     #CAD  ASA, statin, BB     #HFpEF, not in exacerbation  Echo 2023 with normal EF, G1DD    #Essential HTN - amlodipine  #Hypothyroidism - Synthroid   #PMR - 1mg prednisone daily      #Hypokalemia - repletion protocol     #ACP  - DNR, discussed earlier on admission       Kayla Strickland DO    Supplementary Documentation:     Quality:  DVT Mechanical Prophylaxis:     Early ambuation  DVT Pharmacologic Prophylaxis   Medication    enoxaparin (Lovenox) 40 MG/0.4ML SUBQ injection 40 mg                Code Status: DNAR/Selective Treatment  Buck: External urinary catheter in place  Buck Duration (in days):   Central line:    XAVI:     Discharge is dependent on: clinical improvement  At this point Ms. Montgomery is expected to be discharge to: tbd    The 21st Century Cures Act makes medical notes like these available to patients in the interest of transparency. Please be advised this is a medical document. Medical documents are intended to  carry relevant information, facts as evident, and the clinical opinion of the practitioner. The medical note is intended as peer to peer communication and may appear blunt or direct. It is written in medical language and may contain abbreviations or verbiage that are unfamiliar.              **Certification      PHYSICIAN Certification of Need for Inpatient Hospitalization - Initial Certification    Patient will require inpatient services that will reasonably be expected to span two midnight's based on the clinical documentation in H+P.   Based on patients current state of illness, I anticipate that, after discharge, patient will require TBD.

## 2024-08-29 NOTE — CM/SW NOTE
CM contacted by therapy post eval and pt is anticipated to need gradual rehab at [facility]. Pt's case sent to [facility] for review. Referrals for [facility] sent via [facility]. PASRR screen completed and uploaded to referral.       Addendum:  1145:CM contacted by pt's RN re: pt's history with rehab at [facility], and request for facility at [facility]. [facility] indicated they are able to accept for admission. CM met w/ pt and dtr to provide choice list, but they confirmed their wish to go to [facility]. Facility reserved.     CM/SW will remain available for [facility] planning and/or support.     CHLOE FlorianN, CMSRN    [id number]

## 2024-08-30 LAB
ANION GAP SERPL CALC-SCNC: 1 MMOL/L (ref 0–18)
BASOPHILS # BLD AUTO: 0.05 X10(3) UL (ref 0–0.2)
BASOPHILS NFR BLD AUTO: 0.4 %
BUN BLD-MCNC: 17 MG/DL (ref 9–23)
CALCIUM BLD-MCNC: 8.7 MG/DL (ref 8.7–10.4)
CHLORIDE SERPL-SCNC: 106 MMOL/L (ref 98–112)
CO2 SERPL-SCNC: 35 MMOL/L (ref 21–32)
CREAT BLD-MCNC: 0.61 MG/DL
EGFRCR SERPLBLD CKD-EPI 2021: 84 ML/MIN/1.73M2 (ref 60–?)
EOSINOPHIL # BLD AUTO: 0.11 X10(3) UL (ref 0–0.7)
EOSINOPHIL NFR BLD AUTO: 0.8 %
ERYTHROCYTE [DISTWIDTH] IN BLOOD BY AUTOMATED COUNT: 14.1 %
GLUCOSE BLD-MCNC: 123 MG/DL (ref 70–99)
HCT VFR BLD AUTO: 38.6 %
HGB BLD-MCNC: 13.2 G/DL
IMM GRANULOCYTES # BLD AUTO: 0.12 X10(3) UL (ref 0–1)
IMM GRANULOCYTES NFR BLD: 0.9 %
LYMPHOCYTES # BLD AUTO: 0.61 X10(3) UL (ref 1–4)
LYMPHOCYTES NFR BLD AUTO: 4.7 %
MAGNESIUM SERPL-MCNC: 1.9 MG/DL (ref 1.6–2.6)
MCH RBC QN AUTO: 31.3 PG (ref 26–34)
MCHC RBC AUTO-ENTMCNC: 34.2 G/DL (ref 31–37)
MCV RBC AUTO: 91.5 FL
MONOCYTES # BLD AUTO: 1.45 X10(3) UL (ref 0.1–1)
MONOCYTES NFR BLD AUTO: 11.1 %
NEUTROPHILS # BLD AUTO: 10.75 X10 (3) UL (ref 1.5–7.7)
NEUTROPHILS # BLD AUTO: 10.75 X10(3) UL (ref 1.5–7.7)
NEUTROPHILS NFR BLD AUTO: 82.1 %
OSMOLALITY SERPL CALC.SUM OF ELEC: 297 MOSM/KG (ref 275–295)
PLATELET # BLD AUTO: 131 10(3)UL (ref 150–450)
POTASSIUM SERPL-SCNC: 3.3 MMOL/L (ref 3.5–5.1)
POTASSIUM SERPL-SCNC: 3.3 MMOL/L (ref 3.5–5.1)
PROCALCITONIN SERPL-MCNC: 0.22 NG/ML (ref ?–0.05)
RBC # BLD AUTO: 4.22 X10(6)UL
SODIUM SERPL-SCNC: 142 MMOL/L (ref 136–145)
WBC # BLD AUTO: 13.1 X10(3) UL (ref 4–11)

## 2024-08-30 PROCEDURE — 99232 SBSQ HOSP IP/OBS MODERATE 35: CPT | Performed by: HOSPITALIST

## 2024-08-30 PROCEDURE — 99233 SBSQ HOSP IP/OBS HIGH 50: CPT | Performed by: INTERNAL MEDICINE

## 2024-08-30 RX ORDER — PANTOPRAZOLE SODIUM 40 MG/1
40 TABLET, DELAYED RELEASE ORAL
Status: DISCONTINUED | OUTPATIENT
Start: 2024-08-30 | End: 2024-09-12

## 2024-08-30 RX ORDER — CARVEDILOL 12.5 MG/1
12.5 TABLET ORAL 2 TIMES DAILY WITH MEALS
Status: DISCONTINUED | OUTPATIENT
Start: 2024-08-30 | End: 2024-09-20

## 2024-08-30 RX ORDER — POTASSIUM CHLORIDE 1.5 G/1.58G
40 POWDER, FOR SOLUTION ORAL ONCE
Status: COMPLETED | OUTPATIENT
Start: 2024-08-30 | End: 2024-08-30

## 2024-08-30 RX ORDER — AMLODIPINE BESYLATE 5 MG/1
10 TABLET ORAL DAILY
Status: DISCONTINUED | OUTPATIENT
Start: 2024-08-30 | End: 2024-09-20

## 2024-08-30 RX ORDER — METHYLPREDNISOLONE SODIUM SUCCINATE 40 MG/ML
40 INJECTION, POWDER, LYOPHILIZED, FOR SOLUTION INTRAMUSCULAR; INTRAVENOUS EVERY 8 HOURS
Status: DISCONTINUED | OUTPATIENT
Start: 2024-08-30 | End: 2024-09-01

## 2024-08-30 RX ORDER — IPRATROPIUM BROMIDE AND ALBUTEROL SULFATE 2.5; .5 MG/3ML; MG/3ML
3 SOLUTION RESPIRATORY (INHALATION) 4 TIMES DAILY
Status: DISCONTINUED | OUTPATIENT
Start: 2024-08-30 | End: 2024-09-01

## 2024-08-30 NOTE — PLAN OF CARE
PT A/O X 3-4, FORGETFUL, Resighini, 94% ON 2L NC, FREQUENT WEAK COUGH, GIVEN TESSALON/ROBITUSSIN, LUNGS COARSE, SCHEDULED NEBS, VOIDING PER PURE WICK, IV ANTIBIOTIC, C/O OF BACK PAIN, GIVEN TYLENOL WITH PARTIAL RELIEF, REPOSITIONED, LABS IN AM, INSTRUCTED PT ON POC  0600 - PT MORE CONFUSED THIS AM, INCONTINENT OF STOOL. REPOSITIONED    Problem: RESPIRATORY - ADULT  Goal: Achieves optimal ventilation and oxygenation  Description: INTERVENTIONS:  - Assess for changes in respiratory status  - Assess for changes in mentation and behavior  - Position to facilitate oxygenation and minimize respiratory effort  - Oxygen supplementation based on oxygen saturation or ABGs  - Provide Smoking Cessation handout, if applicable  - Encourage broncho-pulmonary hygiene including cough, deep breathe, Incentive Spirometry  - Assess the need for suctioning and perform as needed  - Assess and instruct to report SOB or any respiratory difficulty  - Respiratory Therapy support as indicated  - Manage/alleviate anxiety  - Monitor for signs/symptoms of CO2 retention  Outcome: Progressing

## 2024-08-30 NOTE — CONGREGATE LIVING REVIEW
[facility] Living Authorization    The [facility] Review Committee has reviewed this case and the patient IS APPROVED for discharge to a facility for Short Term Skilled once the following procedure is followed:     - The physician discharge instructions (contained within the NICO note for SNF) must inlcude the below appropriate and approved COVID instructions to the facility    For questions regarding CLRC approval process, please contact the CM assigned to the case.  For questions regarding RN discharge workflow, please contact the unit Clinical Leader.

## 2024-08-30 NOTE — CM/SW NOTE
Jose met with pt and daughter.  Discussed plans for [facility]'s at dc.  Per daughter, possible dc tomorrow.  Jose will send updates to [facility]'s.    Sw discussed POLST with pt and her daughter.  Pt wants to be DNR / selective treatment.  She states she does not see well enough to sign the form Pt's other daughter Radha is her HCPOA-she will come by to sign form.  Dr Strickland will need to sign POLST also.    Kita Lara, MELISSAW  /Discharge Planner

## 2024-08-30 NOTE — PROGRESS NOTES
[facility]  Pulmonary/Critical Care progress note    Alyssa Montgomery Patient Status:  Inpatient    1932 MRN [id number]   Location [facility] 0SW-A Attending Kayla Strickland DO   Hosp Day # 4 PCP Mony Hall DO         History of Present Illness:    Remains on supplemental oxygen weaned to 2 L/min by nasal cannula.    Reports worsening cough today with increasing shortness of breath.  Chest feels congested.        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 Her smoking use included cigarettes. She has never used smokeless tobacco. She reports that she does not currently use alcohol after a past usage of about 7.0 standard drinks of alcohol per week. She reports that she does not use drugs.    Allergies:  No Known Allergies    Medications:    Current Facility-Administered Medications:     pantoprazole (Protonix) DR tab 40 mg, 40 mg, Oral, QAM AC    guaiFENesin (Robitussin) 100 MG/5 ML oral liquid 100 mg, 100 mg, Oral, Q4H PRN    metoclopramide (Reglan) 5 mg/mL injection 5 mg, 5 mg, Intravenous, Q8H PRN    predniSONE (Deltasone) tab 40 mg, 40 mg, Oral, Daily with breakfast    ipratropium-albuterol (Duoneb) 0.5-2.5 (3) MG/3ML inhalation solution 3 mL, 3 mL, Nebulization, Q6H WA    piperacillin-tazobactam (Zosyn) 3.375 g in dextrose 5%  100 mL IVPB-ADDV, 3.375 g, Intravenous, Q8H    benzonatate (Tessalon) cap 100 mg, 100 mg, Oral, TID PRN    albuterol (Ventolin HFA) 108 (90 Base) MCG/ACT inhaler 2 puff, 2 puff, Inhalation, Q4H PRN    atorvastatin (Lipitor) tab 40 mg, 40 mg, Oral, Nightly    levothyroxine (Synthroid) tab 100 mcg, 100 mcg, Oral, Daily @ 0700    [Held by provider] predniSONE (Deltasone) tab 1 mg, 1 mg, Oral, Daily with breakfast    pregabalin (Lyrica) cap 25 mg, 25 mg, Oral, BID    acetaminophen (Tylenol Extra Strength) tab 500 mg, 500 mg, Oral, Q4H PRN    melatonin tab 3 mg, 3 mg, Oral, Nightly PRN    glycerin-hypromellose- (Artificial Tears) 0.2-0.2-1 % ophthalmic solution 1 drop, 1 drop, Both Eyes, QID PRN    sodium chloride (Saline Mist) 0.65 % nasal solution 1 spray, 1 spray, Each Nare, Q3H PRN    enoxaparin (Lovenox) 40 MG/0.4ML SUBQ injection 40 mg, 40 mg, Subcutaneous, Daily    polyethylene glycol (PEG 3350) (Miralax) 17 g oral packet 17 g, 17 g, Oral, Daily PRN    sennosides (Senokot) tab 17.2 mg, 17.2 mg, Oral, Nightly PRN    bisacodyl (Dulcolax) 10 MG rectal suppository 10 mg, 10 mg, Rectal, Daily PRN    fleet enema (Fleet) rectal enema 133 mL, 1 enema, Rectal, Once PRN    ondansetron (Zofran) 4 MG/2ML injection 4 mg, 4 mg, Intravenous, Q6H PRN    amLODIPine (Norvasc) tab 5 mg, 5 mg, Oral, Nightly    metoprolol tartrate (Lopressor) tab 25 mg, 25 mg, Oral, 2x Daily(Beta Blocker)    Intake/Output:    Intake/Output Summary (Last 24 hours) at 8/30/2024 1243  Last data filed at 8/30/2024 0500  Gross per 24 hour   Intake --   Output 350 ml   Net -350 ml      Body mass index is 27.06 kg/m².    Review of Systems  Review of Systems:   A comprehensive 10 point review of systems was completed.  Pertinent positives and negatives noted in the the HPI.         Patient Vitals for the past 24 hrs:   BP Temp Temp src Pulse Resp SpO2 Weight   08/27/24 1214 (!) 162/73 99.9 °F (37.7 °C) Oral 87 20 95 % --   08/27/24 0741 132/84 -- -- 76  18 91 % --   08/27/24 0530 136/50 100.4 °F (38 °C) Oral 90 18 92 % --   08/27/24 0040 -- -- -- -- -- -- 172 lb 12.8 oz (78.4 kg)   08/26/24 2130 158/71 98.7 °F (37.1 °C) Oral 99 22 93 % 172 lb 12.8 oz (78.4 kg)   08/26/24 2100 158/69 -- -- 97 -- 92 % --   08/26/24 2030 (!) 162/57 -- -- 95 -- -- --   08/26/24 2000 (!) 165/67 -- -- 98 -- 91 % --   08/26/24 1830 (!) 177/84 -- -- 97 -- 95 % --   08/26/24 1815 (!) 178/61 -- -- 101 -- 94 % --   08/26/24 1756 (!) 171/72 98.8 °F (37.1 °C) Oral 105 18 95 % --     Vitals:    08/29/24 1930 08/30/24 0400 08/30/24 0500 08/30/24 1220   BP: (!) 168/83 159/70  (!) 179/81   BP Location: Right arm Right arm  Left arm   Pulse: 88 104 98 106   Resp: 18 18  18   Temp: 98.7 °F (37.1 °C) 98.6 °F (37 °C)  99.3 °F (37.4 °C)   TempSrc: Oral Oral  Oral   SpO2: 94% 91% 93% 93%   Weight:             Physical Exam  Constitutional:       General: She is not in acute distress.     Appearance: Normal appearance. She is obese. She is not ill-appearing or diaphoretic.   HENT:      Head: Normocephalic and atraumatic.      Nose: Nose normal. No congestion or rhinorrhea.      Mouth/Throat:      Mouth: Mucous membranes are moist.      Pharynx: Oropharynx is clear. No oropharyngeal exudate or posterior oropharyngeal erythema.   Eyes:      Extraocular Movements: Extraocular movements intact.      Pupils: Pupils are equal, round, and reactive to light.   Cardiovascular:      Rate and Rhythm: Normal rate.      Pulses: Normal pulses.      Heart sounds: Normal heart sounds. No murmur heard.  Pulmonary:      Effort: Pulmonary effort is normal. No respiratory distress.      Breath sounds: Wheezing (Diffuse bilateral wheezing) present. No rhonchi.   Chest:      Chest wall: No tenderness.   Abdominal:      General: Abdomen is flat. Bowel sounds are normal.      Palpations: Abdomen is soft.   Musculoskeletal:         General: Normal range of motion.   Skin:     General: Skin is warm.   Neurological:      General:  No focal deficit present.      Mental Status: She is alert and oriented to person, place, and time.   Psychiatric:         Mood and Affect: Mood normal.         Behavior: Behavior normal.         Thought Content: Thought content normal.         Judgment: Judgment normal.            Lab Data Review:  Recent Labs   Lab 08/28/24  0729 08/29/24  0748 08/30/24  0744   WBC 16.1* 14.6* 13.1*   HGB 14.1 13.1 13.2   HCT 41.3 36.4 38.6   .0* 133.0* 131.0*       Recent Labs   Lab 08/26/24  1816 08/27/24  0743 08/28/24  0729 08/29/24  0748 08/30/24  0744      < > 138 142 142   K 3.3*   < > 3.4*  3.4* 3.3*  3.3* 3.3*  3.3*      < > 104 107 106   CO2 29.0   < > 27.0 30.0 35.0*   BUN 10   < > 14 21 17   CREATSERUM 0.59   < > 0.69 0.71 0.61   CA 9.0   < > 9.0 9.2 8.7   ALB 4.5  --  4.2  --   --    ALKPHO 98  --  80  --   --    ALT 8*  --  11  --   --    AST 13  --  16  --   --    *   < > 186* 133* 123*    < > = values in this interval not displayed.       Recent Labs   Lab 08/28/24  0729 08/29/24  0748 08/30/24  0744   MG 2.0 2.1 1.9       No results found for: \"PHOS\", \"PHOSPHORUS\"     No results for input(s): \"PT\", \"INR\", \"PTT\" in the last 168 hours.    Recent Labs   Lab 08/27/24  1210   ABGPHT 7.48*   GRTMUE2W 36   LSYLG2O 78*   ABGHCO3 27.5*   ABGBE 3.5*   TEMP 98.6   CARRIE Positive   SITE Right Radial   DEV Nasal cannula   THGB 17.0*       No results for input(s): \"TROP\", \"CKMB\" in the last 168 hours.    Cultures:   Hospital Encounter on 08/26/24   1. Blood Culture     Status: None (Preliminary result)    Collection Time: 08/28/24 10:48 AM    Specimen: Blood,peripheral   Result Value Ref Range    Blood Culture Result No Growth 1 Day N/A           Component  Ref Range & Units 8/28/24 0729   Procalcitonin  <0.05 ng/mL 0.23 High           Radiology personally reviewed:  No results found.     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)    Macular cyst, hole, or pseudohole, right eye    Nonexudative age-related macular degeneration, right eye, intermediate dry stage    PMR (polymyalgia rheumatica) ([facility])    Paving stone degeneration of retina, bilateral    Asymptomatic carotid artery stenosis, bilateral    Weakness    Constipation    Thrombocytopenia (HCC)    Syncope and collapse    Concussion with loss of consciousness    Fall, initial encounter    Laceration of right little finger without foreign body without damage to nail, initial encounter    Neuropathy    Shortness of breath    Respiratory syncytial virus (RSV)    RSV (acute bronchiolitis due to respiratory syncytial virus)    Intractable vomiting    Hypokalemia       Assessment:  Acute hypoxic respiratory failure: Supplemental oxygen weaned to 2 L/min via nasal cannula: Improving oxygenation: Oxygen saturations 91 to 92%  Acute bronchitis: With mild pharyngitis, suspect due to vigorous vomiting with aspiration:: No dysphagia noted on video swallow study per speech pathology: Worsening cough and wheezing on today's exam after steroids were tapered to prednisone  Bilateral lower lobe mixed interstitial and air space infiltrate,  chest x-ray consistent with  aspiration pneumonia: Procalcitonin remains minimally elevated: Leukocyte count trending down  Bibasilar atelectasis and mild peripheral interstitial scarring is noted  GERD as with history of reflux per daughter  Hypertension  Hyperlipidemia  History of coronary artery disease  Polymyalgia rheumatica  Hypothyroidism  Leukocytosis: Slightly increased suspect due to steroids   Thrombocytopenia: Improving  Hypokalemia: Mild  Leukocytosis      Plan:  Wean FiO2 off to keep  oxygen saturations between 90% to 92%  Speech pathology evaluation and management appreciated  Continue DuoNebs every 6 hours  Blood cultures completed  Continue Zosyn 4.5 g every 8 hours  Resume Solu-Medrol 40 mg IV every 8 hours  Discontinue prednisone 40 mg oral daily  Monitor and correct electrolytes  DVT prophylaxis: Lovenox 40 mg subcutaneous every 24 hours  GI prophylaxis: Protonix 40 mg daily   Follow labs, and chest x-ray again in a.m.  Discussed with patient's daughter, patient and her nurse  Will follow for further recommendations    Thank You for allowing me to participate in this patient's care     Luis Murphy MD        Note to the patient: The 21st Century Cures Act makes medical notes like these available to patients in the interest of transparency. However, be advised that this is a medical document. It is intended as peer to peer communication. It is written in medical language and may contain abbreviations or verbiage that are unfamiliar. It may appear blunt or direct. Medical documents are intended to carry relevant information, facts as evident, and clinical opinion of the practitioner.      Disclaimer: Components of this note were documented using voice recognition system and are subject to errors not corrected at proofreading. Contact the author of this note for any clarifications

## 2024-08-30 NOTE — PLAN OF CARE
Began taking care of patient this am.  VSS.  Patient coughing and wheezing on and off.  Still requiring oxygen today.  Pulm switched patient back to solumedrol, and have been giving patient cough medicine prn.  Patient hypertensive, Dr. Strickland aware and adjusted meds.    Problem: RESPIRATORY - ADULT  Goal: Achieves optimal ventilation and oxygenation  Description: INTERVENTIONS:  - Assess for changes in respiratory status  - Assess for changes in mentation and behavior  - Position to facilitate oxygenation and minimize respiratory effort  - Oxygen supplementation based on oxygen saturation or ABGs  - Provide Smoking Cessation handout, if applicable  - Encourage broncho-pulmonary hygiene including cough, deep breathe, Incentive Spirometry  - Assess the need for suctioning and perform as needed  - Assess and instruct to report SOB or any respiratory difficulty  - Respiratory Therapy support as indicated  - Manage/alleviate anxiety  - Monitor for signs/symptoms of CO2 retention  Outcome: Progressing

## 2024-08-30 NOTE — PROGRESS NOTES
[facility]   part of [facility]     Hospitalist Progress Note     Alyssa Montgomery Patient Status:  Inpatient    1932 MRN [id number]   Location [facility] 0-A Attending Medhat Dover MD   Hosp Day # 4 PCP Mony Hall DO     Chief Complaint: n/v    Subjective:     Pt cont to cough but better. Spoke to daughter at length on plan of care.     Objective:    Review of Systems:   A comprehensive review of systems was completed; pertinent positive and negatives stated in subjective.    Vital signs:  Temp:  [98.6 °F (37 °C)-98.7 °F (37.1 °C)] 98.6 °F (37 °C)  Pulse:  [] 98  Resp:  [16-18] 18  BP: (143-174)/(64-83) 159/70  SpO2:  [91 %-95 %] 93 %    Physical Exam:    General: No acute distress  Respiratory: No wheezes, mild rhonchi   Cardiovascular: S1, S2, regular rate and rhythm  Abdomen: Soft, Non-tender, non-distended, positive bowel sounds  Neuro: No new focal deficits.   Extremities: No edema      Diagnostic Data:    Labs:  Recent Labs   Lab 24  1803 24  0743 24  0729 24  0748 24  0744   WBC 13.6* 12.6* 16.1* 14.6* 13.1*   HGB 13.8 12.1 14.1 13.1 13.2   MCV 89.5 92.1 91.4 88.8 91.5   .0* 87.0* 117.0* 133.0* 131.0*       Recent Labs   Lab 24  1816 24  0743 24  0729 24  0748   * 114* 186* 133*   BUN 10 7* 14 21   CREATSERUM 0.59 0.54* 0.69 0.71   CA 9.0 8.1* 9.0 9.2   ALB 4.5  --  4.2  --     138 138 142   K 3.3* 3.2* 3.4*  3.4* 3.3*  3.3*    107 104 107   CO2 29.0 26.0 27.0 30.0   ALKPHO 98  --  80  --    AST 13  --  16  --    ALT 8*  --  11  --    BILT 1.1*  --  0.9  --    TP 7.4  --  7.0  --        Estimated Creatinine Clearance: 49.2 mL/min (based on SCr of 0.71 mg/dL).    No results for input(s): \"TROP\", \"TROPHS\", \"CK\" in the last 168 hours.    No results for input(s): \"PTP\", \"INR\" in the last 168 hours.                 Microbiology    Hospital Encounter on 24   1. Blood Culture     Status: None  (Preliminary result)    Collection Time: 08/28/24 10:48 AM    Specimen: Blood,peripheral   Result Value Ref Range    Blood Culture Result No Growth 1 Day N/A         Imaging: Reviewed in Epic.    Medications:    predniSONE  40 mg Oral Daily with breakfast    pantoprazole  40 mg Intravenous Q12H    ipratropium-albuterol  3 mL Nebulization Q6H WA    piperacillin-tazobactam  3.375 g Intravenous Q8H    atorvastatin  40 mg Oral Nightly    levothyroxine  100 mcg Oral Daily @ 0700    [Held by provider] predniSONE  1 mg Oral Daily with breakfast    pregabalin  25 mg Oral BID    enoxaparin  40 mg Subcutaneous Daily    amLODIPine  5 mg Oral Nightly    metoprolol tartrate  25 mg Oral 2x Daily(Beta Blocker)       Assessment & Plan:      #CAP  #Acute bronchitis  #Fever  #Malaise  -iv zosyn (8/27-)  -Bcx/Sputum culture (8/28) pending  -CXRs reviewed personally  -therapies to see  -prednisone (8/28-)  s/p iv solumedrol (8/27-8/28)    #Aspiration ruled out  -speech eval noted    #Acute hypoxemic resp failure, improving  -on RA at baseline, currently 2L NC  -2/2 above  -echo done, shows lvef intact    #Leukocytosis, worsening  -likely 2/2 steroids, will monitor    #Intractable N/v   #Dehydration   CT a/p w/o acute findings  Improving   Protonix po, s/p iv protonix bid    #Acute metabolic encephalopathy, resolved  Family reports more back to baseline  TSH slightly low, free t4 okay     #CAD  ASA, statin, BB     #HFpEF, not in exacerbation  Echo 2023 with normal EF, G1DD    #Essential HTN - amlodipine  #Hypothyroidism - Synthroid   #PMR - 1mg prednisone daily      #Hypokalemia - repletion protocol     #ACP  - DNR, discussed earlier on admission       Kayla Strickland DO    Supplementary Documentation:     Quality:  DVT Mechanical Prophylaxis:     Early ambuation  DVT Pharmacologic Prophylaxis   Medication    enoxaparin (Lovenox) 40 MG/0.4ML SUBQ injection 40 mg                Code Status: DNAR/Selective Treatment  Buck: External urinary  catheter in place  Buck Duration (in days):   Central line:    XAVI:     Discharge is dependent on: clinical improvement  At this point Ms. Montgomery is expected to be discharge to: tbd    The 21st Century Cures Act makes medical notes like these available to patients in the interest of transparency. Please be advised this is a medical document. Medical documents are intended to carry relevant information, facts as evident, and the clinical opinion of the practitioner. The medical note is intended as peer to peer communication and may appear blunt or direct. It is written in medical language and may contain abbreviations or verbiage that are unfamiliar.              **Certification      PHYSICIAN Certification of Need for Inpatient Hospitalization - Initial Certification    Patient will require inpatient services that will reasonably be expected to span two midnight's based on the clinical documentation in H+P.   Based on patients current state of illness, I anticipate that, after discharge, patient will require TBD.

## 2024-08-31 ENCOUNTER — APPOINTMENT (OUTPATIENT)
Dept: GENERAL RADIOLOGY | Facility: HOSPITAL | Age: 89
End: 2024-08-31
Attending: INTERNAL MEDICINE
Payer: MEDICARE

## 2024-08-31 LAB
ALBUMIN SERPL-MCNC: 3.7 G/DL (ref 3.2–4.8)
ALBUMIN/GLOB SERPL: 1.3 {RATIO} (ref 1–2)
ALP LIVER SERPL-CCNC: 76 U/L
ALT SERPL-CCNC: 19 U/L
ANION GAP SERPL CALC-SCNC: <0 MMOL/L (ref 0–18)
AST SERPL-CCNC: 18 U/L (ref ?–34)
BASOPHILS # BLD AUTO: 0.04 X10(3) UL (ref 0–0.2)
BASOPHILS NFR BLD AUTO: 0.3 %
BILIRUB SERPL-MCNC: 0.8 MG/DL (ref 0.2–0.9)
BUN BLD-MCNC: 16 MG/DL (ref 9–23)
CALCIUM BLD-MCNC: 8.7 MG/DL (ref 8.7–10.4)
CHLORIDE SERPL-SCNC: 108 MMOL/L (ref 98–112)
CO2 SERPL-SCNC: 37 MMOL/L (ref 21–32)
CREAT BLD-MCNC: 0.61 MG/DL
EGFRCR SERPLBLD CKD-EPI 2021: 84 ML/MIN/1.73M2 (ref 60–?)
EOSINOPHIL # BLD AUTO: 0 X10(3) UL (ref 0–0.7)
EOSINOPHIL NFR BLD AUTO: 0 %
ERYTHROCYTE [DISTWIDTH] IN BLOOD BY AUTOMATED COUNT: 13.7 %
GLOBULIN PLAS-MCNC: 2.8 G/DL (ref 2–3.5)
GLUCOSE BLD-MCNC: 204 MG/DL (ref 70–99)
HCT VFR BLD AUTO: 38.5 %
HGB BLD-MCNC: 13.5 G/DL
IMM GRANULOCYTES # BLD AUTO: 0.16 X10(3) UL (ref 0–1)
IMM GRANULOCYTES NFR BLD: 1.1 %
LYMPHOCYTES # BLD AUTO: 0.49 X10(3) UL (ref 1–4)
LYMPHOCYTES NFR BLD AUTO: 3.4 %
MCH RBC QN AUTO: 31.2 PG (ref 26–34)
MCHC RBC AUTO-ENTMCNC: 35.1 G/DL (ref 31–37)
MCV RBC AUTO: 88.9 FL
MONOCYTES # BLD AUTO: 0.44 X10(3) UL (ref 0.1–1)
MONOCYTES NFR BLD AUTO: 3.1 %
NEUTROPHILS # BLD AUTO: 13.13 X10 (3) UL (ref 1.5–7.7)
NEUTROPHILS # BLD AUTO: 13.13 X10(3) UL (ref 1.5–7.7)
NEUTROPHILS NFR BLD AUTO: 92.1 %
OSMOLALITY SERPL CALC.SUM OF ELEC: 299 MOSM/KG (ref 275–295)
PLATELET # BLD AUTO: 138 10(3)UL (ref 150–450)
POTASSIUM SERPL-SCNC: 3.7 MMOL/L (ref 3.5–5.1)
POTASSIUM SERPL-SCNC: 3.7 MMOL/L (ref 3.5–5.1)
PROT SERPL-MCNC: 6.5 G/DL (ref 5.7–8.2)
RBC # BLD AUTO: 4.33 X10(6)UL
SODIUM SERPL-SCNC: 141 MMOL/L (ref 136–145)
WBC # BLD AUTO: 14.3 X10(3) UL (ref 4–11)

## 2024-08-31 PROCEDURE — 99232 SBSQ HOSP IP/OBS MODERATE 35: CPT | Performed by: HOSPITALIST

## 2024-08-31 PROCEDURE — 99233 SBSQ HOSP IP/OBS HIGH 50: CPT | Performed by: INTERNAL MEDICINE

## 2024-08-31 PROCEDURE — 71045 X-RAY EXAM CHEST 1 VIEW: CPT | Performed by: INTERNAL MEDICINE

## 2024-08-31 RX ORDER — HYDRALAZINE HYDROCHLORIDE 25 MG/1
25 TABLET, FILM COATED ORAL EVERY 8 HOURS SCHEDULED
Status: DISCONTINUED | OUTPATIENT
Start: 2024-08-31 | End: 2024-09-20

## 2024-08-31 RX ORDER — ACETYLCYSTEINE 200 MG/ML
2 SOLUTION ORAL; RESPIRATORY (INHALATION)
Status: DISCONTINUED | OUTPATIENT
Start: 2024-08-31 | End: 2024-09-02

## 2024-08-31 NOTE — PLAN OF CARE
Received patient awake in bed. Had multiple questions, request and complaints. Gets easily frustrated, wants things done right away. Addressed her concerns and was able to get her needs on a timely manner. Explained things before giving meds or care and she was more calm about it.   Denies any pain.   C/o sob, lungs with rhonchi. Non productive cough, sounds very congested. Still needs sputum for c/s.  On 3LNC.  Nebs q 6 hrs.  On IV solumedrol.  On IV antibiotics q 8 hrs.  Non-cardiac electrolyte protocol, K 3.3, replaced with powdered KCL per request.   On Lovenox for VTE.  Has purewick.   Fall precaution. Bed alarm is on.  Takes pills with apple sauce.     Problem: Patient/Family Goals  Goal: Patient/Family Long Term Goal  Description: Patient's Long Term Goal: Back to [facility]    Interventions:  - IV antibiotics  - O2  - Nebs  - fall precautions  - See additional Care Plan goals for specific interventions  Outcome: Progressing  Goal: Patient/Family Short Term Goal  Description: Patient's Short Term Goal: Pain relief    Interventions:   - Tylenol,   - reposition    - See additional Care Plan goals for specific interventions  Outcome: Progressing     Problem: GASTROINTESTINAL - ADULT  Goal: Minimal or absence of nausea and vomiting  Description: INTERVENTIONS:  - Maintain adequate hydration with IV or PO as ordered and tolerated  - Nasogastric tube to low intermittent suction as ordered  - Evaluate effectiveness of ordered antiemetic medications  - Provide nonpharmacologic comfort measures as appropriate  - Advance diet as tolerated, if ordered  - Obtain nutritional consult as needed  - Evaluate fluid balance  Outcome: Progressing     Problem: METABOLIC/FLUID AND ELECTROLYTES - ADULT  Goal: Electrolytes maintained within normal limits  Description: INTERVENTIONS:  - Monitor labs and rhythm and assess patient for signs and symptoms of electrolyte imbalances  - Administer electrolyte replacement as  ordered  - Monitor response to electrolyte replacements, including rhythm and repeat lab results as appropriate  - Fluid restriction as ordered  - Instruct patient on fluid and nutrition restrictions as appropriate  Outcome: Progressing  Goal: Hemodynamic stability and optimal renal function maintained  Description: INTERVENTIONS:  - Monitor labs and assess for signs and symptoms of volume excess or deficit  - Monitor intake, output and patient weight  - Monitor urine specific gravity, serum osmolarity and serum sodium as indicated or ordered  - Monitor response to interventions for patient's volume status, including labs, urine output, blood pressure (other measures as available)  - Encourage oral intake as appropriate  - Instruct patient on fluid and nutrition restrictions as appropriate  Outcome: Progressing     Problem: RESPIRATORY - ADULT  Goal: Achieves optimal ventilation and oxygenation  Description: INTERVENTIONS:  - Assess for changes in respiratory status  - Assess for changes in mentation and behavior  - Position to facilitate oxygenation and minimize respiratory effort  - Oxygen supplementation based on oxygen saturation or ABGs  - Provide Smoking Cessation handout, if applicable  - Encourage broncho-pulmonary hygiene including cough, deep breathe, Incentive Spirometry  - Assess the need for suctioning and perform as needed  - Assess and instruct to report SOB or any respiratory difficulty  - Respiratory Therapy support as indicated  - Manage/alleviate anxiety  - Monitor for signs/symptoms of CO2 retention  Outcome: Progressing

## 2024-08-31 NOTE — PROGRESS NOTES
[facility]   part of [facility]     Hospitalist Progress Note     Alyssa Montgomery Patient Status:  Inpatient    1932 MRN [id number]   Location [facility] 0SW-A Attending Medhat Dover MD   Hosp Day # 5 PCP Mony Hall DO     Chief Complaint: n/v    Subjective:     Pt cont to cough and wheeze. Spoke to daughter at the bedside.     Objective:    Review of Systems:   A comprehensive review of systems was completed; pertinent positive and negatives stated in subjective.    Vital signs:  Temp:  [97.9 °F (36.6 °C)-99.3 °F (37.4 °C)] 97.9 °F (36.6 °C)  Pulse:  [] 92  Resp:  [18] 18  BP: (145-182)/(67-81) 182/79  SpO2:  [93 %-96 %] 96 %    Physical Exam:    General: No acute distress  Respiratory: No wheezes, mild rhonchi   Cardiovascular: S1, S2, regular rate and rhythm  Abdomen: Soft, Non-tender, non-distended, positive bowel sounds  Neuro: No new focal deficits.   Extremities: No edema      Diagnostic Data:    Labs:  Recent Labs   Lab 24  0743 24  0729 24  0748 24  0744 24  0727   WBC 12.6* 16.1* 14.6* 13.1* 14.3*   HGB 12.1 14.1 13.1 13.2 13.5   MCV 92.1 91.4 88.8 91.5 88.9   PLT 87.0* 117.0* 133.0* 131.0* 138.0*       Recent Labs   Lab 24  1816 24  0743 24  0729 24  0748 24  0744 24  0727   *   < > 186* 133* 123* 204*   BUN 10   < > 14 21 17 16   CREATSERUM 0.59   < > 0.69 0.71 0.61 0.61   CA 9.0   < > 9.0 9.2 8.7 8.7   ALB 4.5  --  4.2  --   --  3.7      < > 138 142 142 141   K 3.3*   < > 3.4*  3.4* 3.3*  3.3* 3.3*  3.3* 3.7  3.7      < > 104 107 106 108   CO2 29.0   < > 27.0 30.0 35.0* 37.0*   ALKPHO 98  --  80  --   --  76   AST 13  --  16  --   --  18   ALT 8*  --  11  --   --  19   BILT 1.1*  --  0.9  --   --  0.8   TP 7.4  --  7.0  --   --  6.5    < > = values in this interval not displayed.       Estimated Creatinine Clearance: 57.2 mL/min (based on SCr of 0.61 mg/dL).    No results for  input(s): \"TROP\", \"TROPHS\", \"CK\" in the last 168 hours.    No results for input(s): \"PTP\", \"INR\" in the last 168 hours.                 Microbiology    Hospital Encounter on 08/26/24   1. Blood Culture     Status: None (Preliminary result)    Collection Time: 08/28/24 10:48 AM    Specimen: Blood,peripheral   Result Value Ref Range    Blood Culture Result No Growth 2 Days N/A         Imaging: Reviewed in Epic.    Medications:    pantoprazole  40 mg Oral QAM AC    methylPREDNISolone  40 mg Intravenous Q8H    carvedilol  12.5 mg Oral BID with meals    amLODIPine  10 mg Oral Daily    ipratropium-albuterol  3 mL Nebulization QID    piperacillin-tazobactam  3.375 g Intravenous Q8H    atorvastatin  40 mg Oral Nightly    levothyroxine  100 mcg Oral Daily @ 0700    [Held by provider] predniSONE  1 mg Oral Daily with breakfast    pregabalin  25 mg Oral BID    enoxaparin  40 mg Subcutaneous Daily       Assessment & Plan:      #CAP  #Acute bronchitis  #Fever  #Malaise  -iv zosyn (8/27-), can consider de escalation in next 1-2 days  -Bcx/Sputum culture (8/28) pending  -CXRs reviewed personally, latest 8/31 shows worsening airspace dz  -therapies to see  -iv solumedrol (8/30-)  failed prednisone (8/28-8/30)  s/p iv solumedrol (8/27-8/28)    #Aspiration ruled out  -speech eval noted    #Acute hypoxemic resp failure, improving  -on RA at baseline, currently 3L NC  -2/2 above  -echo done, shows lvef intact    #Leukocytosis, worsening  -likely 2/2 steroids, will monitor    #Intractable N/v   #Dehydration   CT a/p w/o acute findings  Improving   Protonix po, s/p iv protonix bid    #Acute metabolic encephalopathy, resolved  Family reports more back to baseline  TSH slightly low, free t4 okay     #CAD  ASA, statin, BB     #HFpEF, not in exacerbation  Echo 2023 with normal EF, G1DD    #Essential HTN  #HTN urgency  -amlodipine + coreg; add hydralazine, avoiding doubling bb given active wheezing    #Hypothyroidism - Synthroid   #PMR -  1mg prednisone daily      #Hypokalemia - repletion protocol     #ACP  - DNR, discussed earlier on admission       Kayla Strickland DO    Supplementary Documentation:     Quality:  DVT Mechanical Prophylaxis:     Early ambuation  DVT Pharmacologic Prophylaxis   Medication    enoxaparin (Lovenox) 40 MG/0.4ML SUBQ injection 40 mg                Code Status: DNAR/Selective Treatment  Buck: External urinary catheter in place  Buck Duration (in days):   Central line:    XAVI:     Discharge is dependent on: clinical improvement  At this point Ms. Montgomery is expected to be discharge to: tbd    The 21st Century Cures Act makes medical notes like these available to patients in the interest of transparency. Please be advised this is a medical document. Medical documents are intended to carry relevant information, facts as evident, and the clinical opinion of the practitioner. The medical note is intended as peer to peer communication and may appear blunt or direct. It is written in medical language and may contain abbreviations or verbiage that are unfamiliar.              **Certification      PHYSICIAN Certification of Need for Inpatient Hospitalization - Initial Certification    Patient will require inpatient services that will reasonably be expected to span two midnight's based on the clinical documentation in H+P.   Based on patients current state of illness, I anticipate that, after discharge, patient will require TBD.

## 2024-08-31 NOTE — PROGRESS NOTES
[facility]  Pulmonary/Critical Care progress note    Alyssa Montgomery Patient Status:  Inpatient    1932 MRN [id number]   Location [facility] 0SW-A Attending Kayla Strickland DO   Hosp Day # 5 PCP Mony Hall DO         History of Present Illness:    Supplemental oxygen weaned to 2-3 L/min by nasal cannula.    Continues with cough and some shortness of breath.  Reports that she has difficulty coughing up thick secretions.      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 Her smoking use included cigarettes. She has never used smokeless tobacco. She reports that she does not currently use alcohol after a past usage of about 7.0 standard drinks of alcohol per week. She reports that she does not use drugs.    Allergies:  No Known Allergies    Medications:    Current Facility-Administered Medications:     hydrALAZINE (Apresoline) tab 25 mg, 25 mg, Oral, Q8H SHELLEY    pantoprazole (Protonix) DR tab 40 mg, 40 mg, Oral, QAM AC    methylPREDNISolone sodium succinate (Solu-MEDROL) injection 40 mg, 40 mg, Intravenous, Q8H    carvedilol (Coreg) tab 12.5 mg, 12.5 mg, Oral, BID with meals    amLODIPine (Norvasc) tab 10 mg, 10 mg, Oral, Daily    ipratropium-albuterol (Duoneb) 0.5-2.5 (3) MG/3ML inhalation solution 3 mL, 3 mL, Nebulization, QID     guaiFENesin (Robitussin) 100 MG/5 ML oral liquid 100 mg, 100 mg, Oral, Q4H PRN    metoclopramide (Reglan) 5 mg/mL injection 5 mg, 5 mg, Intravenous, Q8H PRN    piperacillin-tazobactam (Zosyn) 3.375 g in dextrose 5% 100 mL IVPB-ADDV, 3.375 g, Intravenous, Q8H    benzonatate (Tessalon) cap 100 mg, 100 mg, Oral, TID PRN    albuterol (Ventolin HFA) 108 (90 Base) MCG/ACT inhaler 2 puff, 2 puff, Inhalation, Q4H PRN    atorvastatin (Lipitor) tab 40 mg, 40 mg, Oral, Nightly    levothyroxine (Synthroid) tab 100 mcg, 100 mcg, Oral, Daily @ 0700    [Held by provider] predniSONE (Deltasone) tab 1 mg, 1 mg, Oral, Daily with breakfast    pregabalin (Lyrica) cap 25 mg, 25 mg, Oral, BID    acetaminophen (Tylenol Extra Strength) tab 500 mg, 500 mg, Oral, Q4H PRN    melatonin tab 3 mg, 3 mg, Oral, Nightly PRN    glycerin-hypromellose- (Artificial Tears) 0.2-0.2-1 % ophthalmic solution 1 drop, 1 drop, Both Eyes, QID PRN    sodium chloride (Saline Mist) 0.65 % nasal solution 1 spray, 1 spray, Each Nare, Q3H PRN    enoxaparin (Lovenox) 40 MG/0.4ML SUBQ injection 40 mg, 40 mg, Subcutaneous, Daily    polyethylene glycol (PEG 3350) (Miralax) 17 g oral packet 17 g, 17 g, Oral, Daily PRN    sennosides (Senokot) tab 17.2 mg, 17.2 mg, Oral, Nightly PRN    bisacodyl (Dulcolax) 10 MG rectal suppository 10 mg, 10 mg, Rectal, Daily PRN    fleet enema (Fleet) rectal enema 133 mL, 1 enema, Rectal, Once PRN    ondansetron (Zofran) 4 MG/2ML injection 4 mg, 4 mg, Intravenous, Q6H PRN    Intake/Output:    Intake/Output Summary (Last 24 hours) at 8/31/2024 0941  Last data filed at 8/31/2024 0526  Gross per 24 hour   Intake --   Output 1350 ml   Net -1350 ml      Body mass index is 27.06 kg/m².    Review of Systems  Review of Systems:   A comprehensive 10 point review of systems was completed.  Pertinent positives and negatives noted in the the HPI.       Vitals:    08/30/24 2306 08/31/24 0526 08/31/24 0738 08/31/24 0917   BP:  152/77 (!) 182/79  122/58   BP Location:  Right arm Right arm    Pulse: 84 96 92 93   Resp:  18 18    Temp:  98 °F (36.7 °C) 97.9 °F (36.6 °C)    TempSrc:  Oral Oral    SpO2: 95% 95% 96% 98%   Weight:             Physical Exam  Constitutional:       General: She is not in acute distress.     Appearance: Normal appearance. She is obese. She is not ill-appearing or diaphoretic.   HENT:      Head: Normocephalic and atraumatic.      Nose: Nose normal. No congestion or rhinorrhea.      Mouth/Throat:      Mouth: Mucous membranes are moist.      Pharynx: Oropharynx is clear. No oropharyngeal exudate or posterior oropharyngeal erythema.   Eyes:      Extraocular Movements: Extraocular movements intact.      Pupils: Pupils are equal, round, and reactive to light.   Cardiovascular:      Rate and Rhythm: Normal rate.      Pulses: Normal pulses.      Heart sounds: Normal heart sounds. No murmur heard.  Pulmonary:      Effort: Pulmonary effort is normal. No respiratory distress.      Breath sounds: Wheezing (Bilateral scattered wheezing anterolaterally) present. No rhonchi.   Chest:      Chest wall: No tenderness.   Abdominal:      General: Abdomen is flat. Bowel sounds are normal.      Palpations: Abdomen is soft.   Musculoskeletal:         General: Normal range of motion.   Skin:     General: Skin is warm.   Neurological:      General: No focal deficit present.      Mental Status: She is alert and oriented to person, place, and time.   Psychiatric:         Mood and Affect: Mood normal.         Behavior: Behavior normal.         Thought Content: Thought content normal.         Judgment: Judgment normal.            Lab Data Review:  Recent Labs   Lab 08/29/24  0748 08/30/24 0744 08/31/24 0727   WBC 14.6* 13.1* 14.3*   HGB 13.1 13.2 13.5   HCT 36.4 38.6 38.5   .0* 131.0* 138.0*       Recent Labs   Lab 08/26/24  1816 08/27/24  0743 08/28/24  0729 08/29/24  0748 08/30/24  0744 08/31/24  0727      < > 138 142 142 141   K 3.3*   < > 3.4*   3.4* 3.3*  3.3* 3.3*  3.3* 3.7  3.7      < > 104 107 106 108   CO2 29.0   < > 27.0 30.0 35.0* 37.0*   BUN 10   < > 14 21 17 16   CREATSERUM 0.59   < > 0.69 0.71 0.61 0.61   CA 9.0   < > 9.0 9.2 8.7 8.7   ALB 4.5  --  4.2  --   --  3.7   ALKPHO 98  --  80  --   --  76   ALT 8*  --  11  --   --  19   AST 13  --  16  --   --  18   *   < > 186* 133* 123* 204*    < > = values in this interval not displayed.       Recent Labs   Lab 08/28/24  0729 08/29/24  0748 08/30/24  0744   MG 2.0 2.1 1.9       No results found for: \"PHOS\", \"PHOSPHORUS\"     No results for input(s): \"PT\", \"INR\", \"PTT\" in the last 168 hours.    Recent Labs   Lab 08/27/24  1210   ABGPHT 7.48*   QIBWTA4H 36   BPKPN2V 78*   ABGHCO3 27.5*   ABGBE 3.5*   TEMP 98.6   CARRIE Positive   SITE Right Radial   DEV Nasal cannula   THGB 17.0*       No results for input(s): \"TROP\", \"CKMB\" in the last 168 hours.    Cultures:   Hospital Encounter on 08/26/24   1. Blood Culture     Status: None (Preliminary result)    Collection Time: 08/28/24 10:48 AM    Specimen: Blood,peripheral   Result Value Ref Range    Blood Culture Result No Growth 2 Days N/A           Component  Ref Range & Units 8/28/24 0729   Procalcitonin  <0.05 ng/mL 0.23 High           Radiology personally reviewed:  XR CHEST AP PORTABLE  (CPT=71045)    Result Date: 8/31/2024  CONCLUSION:  Worsening airspace disease in the left midlung and left lung base.  Shallow inspiration.  Stable heart size and pulmonary vascularity.   LOCATION:  [facility]      Dictated by (CST): Christina Mccormack MD on 8/31/2024 at 8:25 AM     Finalized by (CST): Christina Mccormack MD on 8/31/2024 at 8:25 AM         Patient Active Problem List   Diagnosis    Hypothyroidism    Unspecified cataract    Atherosclerosis of coronary artery    Herpes zoster without mention of complication    Dyslipidemia    Retinopathy of both eyes    Macular degeneration    Benign essential HTN    Hypercholesteremia    Exudative age-related macular  degeneration of left eye with active choroidal neovascularization (HCC)    Macular cyst, hole, or pseudohole, right eye    Nonexudative age-related macular degeneration, right eye, intermediate dry stage    PMR (polymyalgia rheumatica) ([facility])    Paving stone degeneration of retina, bilateral    Asymptomatic carotid artery stenosis, bilateral    Weakness    Constipation    Thrombocytopenia (HCC)    Syncope and collapse    Concussion with loss of consciousness    Fall, initial encounter    Laceration of right little finger without foreign body without damage to nail, initial encounter    Neuropathy    Shortness of breath    Respiratory syncytial virus (RSV)    RSV (acute bronchiolitis due to respiratory syncytial virus)    Intractable vomiting    Hypokalemia       Assessment:  Acute hypoxic respiratory failure: Supplemental oxygen 2 L/min via nasal cannula: Improving oxygenation  Acute bronchitis: With mild pharyngitis, suspect due to vigorous vomiting with aspiration:: No dysphagia noted on video swallow study per speech pathology: Persistent with difficulty coughing up thick secretions  Bilateral lower lobe mixed interstitial and air space infiltrate: Worse chest x-ray infiltrate on left lower lobe and lingular area consistent with  aspiration pneumonia: Mildly elevated leukocyte count  Bibasilar atelectasis and mild peripheral interstitial scarring is noted  GERD as with history of reflux per daughter  Hypertension  Hyperlipidemia  History of coronary artery disease  Polymyalgia rheumatica  Hypothyroidism  Leukocytosis: Slightly increased suspect due to steroids   Thrombocytopenia: Improving  Hypokalemia: Mild  Leukocytosis      Plan:  Wean FiO2 off to keep oxygen saturations between 90% to 92%  Add Mucomyst nebulizers every 6 hours for 2 days  Flutter device use every 4 hours as possible to help cough out secretions  Continue DuoNebs every 6 hours  Blood cultures completed  Continue Zosyn 4.5 g every 8  hours  Solu-Medrol 40 mg IV every 8 hours  Monitor and correct electrolytes  DVT prophylaxis: Lovenox 40 mg subcutaneous every 24 hours  GI prophylaxis: Protonix 40 mg daily   Follow labs, procalcitonin   Discussed with patient's daughter, patient and her nurse  Will follow for further recommendations    Thank You for allowing me to participate in this patient's care     Luis Murphy MD        Note to the patient: The 21st Century Cures Act makes medical notes like these available to patients in the interest of transparency. However, be advised that this is a medical document. It is intended as peer to peer communication. It is written in medical language and may contain abbreviations or verbiage that are unfamiliar. It may appear blunt or direct. Medical documents are intended to carry relevant information, facts as evident, and clinical opinion of the practitioner.      Disclaimer: Components of this note were documented using voice recognition system and are subject to errors not corrected at proofreading. Contact the author of this note for any clarifications

## 2024-08-31 NOTE — PLAN OF CARE
Began taking care of patient this am.  VSS.  B/P better today.  Patient coughing and wheezing on and off.  Still requiring oxygen today, trying to wean.  Oxygen goal 90-92.  Encouraging flutter valve use.  Got patient up in chair this am with assistance, patient weak.  Patient still sitting in chair visiting with daughter.     Problem: RESPIRATORY - ADULT  Goal: Achieves optimal ventilation and oxygenation  Description: INTERVENTIONS:  - Assess for changes in respiratory status  - Assess for changes in mentation and behavior  - Position to facilitate oxygenation and minimize respiratory effort  - Oxygen supplementation based on oxygen saturation or ABGs  - Provide Smoking Cessation handout, if applicable  - Encourage broncho-pulmonary hygiene including cough, deep breathe, Incentive Spirometry  - Assess the need for suctioning and perform as needed  - Assess and instruct to report SOB or any respiratory difficulty  - Respiratory Therapy support as indicated  - Manage/alleviate anxiety  - Monitor for signs/symptoms of CO2 retention  Outcome: Progressing

## 2024-09-01 LAB
ALBUMIN SERPL-MCNC: 3.9 G/DL (ref 3.2–4.8)
ALBUMIN/GLOB SERPL: 1.4 {RATIO} (ref 1–2)
ALP LIVER SERPL-CCNC: 72 U/L
ALT SERPL-CCNC: 21 U/L
ANION GAP SERPL CALC-SCNC: 9 MMOL/L (ref 0–18)
AST SERPL-CCNC: 14 U/L (ref ?–34)
BASOPHILS # BLD AUTO: 0.04 X10(3) UL (ref 0–0.2)
BASOPHILS NFR BLD AUTO: 0.2 %
BILIRUB SERPL-MCNC: 0.6 MG/DL (ref 0.2–0.9)
BUN BLD-MCNC: 23 MG/DL (ref 9–23)
CALCIUM BLD-MCNC: 9 MG/DL (ref 8.7–10.4)
CHLORIDE SERPL-SCNC: 104 MMOL/L (ref 98–112)
CO2 SERPL-SCNC: 28 MMOL/L (ref 21–32)
CREAT BLD-MCNC: 0.68 MG/DL
EGFRCR SERPLBLD CKD-EPI 2021: 82 ML/MIN/1.73M2 (ref 60–?)
EOSINOPHIL # BLD AUTO: 0.01 X10(3) UL (ref 0–0.7)
EOSINOPHIL NFR BLD AUTO: 0.1 %
ERYTHROCYTE [DISTWIDTH] IN BLOOD BY AUTOMATED COUNT: 13.5 %
GLOBULIN PLAS-MCNC: 2.7 G/DL (ref 2–3.5)
GLUCOSE BLD-MCNC: 221 MG/DL (ref 70–99)
HCT VFR BLD AUTO: 39.1 %
HGB BLD-MCNC: 13.5 G/DL
IMM GRANULOCYTES # BLD AUTO: 0.17 X10(3) UL (ref 0–1)
IMM GRANULOCYTES NFR BLD: 0.9 %
LYMPHOCYTES # BLD AUTO: 0.61 X10(3) UL (ref 1–4)
LYMPHOCYTES NFR BLD AUTO: 3.3 %
MAGNESIUM SERPL-MCNC: 2.2 MG/DL (ref 1.6–2.6)
MCH RBC QN AUTO: 31.2 PG (ref 26–34)
MCHC RBC AUTO-ENTMCNC: 34.5 G/DL (ref 31–37)
MCV RBC AUTO: 90.3 FL
MONOCYTES # BLD AUTO: 0.96 X10(3) UL (ref 0.1–1)
MONOCYTES NFR BLD AUTO: 5.2 %
NEUTROPHILS # BLD AUTO: 16.56 X10 (3) UL (ref 1.5–7.7)
NEUTROPHILS # BLD AUTO: 16.56 X10(3) UL (ref 1.5–7.7)
NEUTROPHILS NFR BLD AUTO: 90.3 %
OSMOLALITY SERPL CALC.SUM OF ELEC: 302 MOSM/KG (ref 275–295)
PLATELET # BLD AUTO: 162 10(3)UL (ref 150–450)
POTASSIUM SERPL-SCNC: 3.3 MMOL/L (ref 3.5–5.1)
PROCALCITONIN SERPL-MCNC: 0.11 NG/ML (ref ?–0.05)
PROT SERPL-MCNC: 6.6 G/DL (ref 5.7–8.2)
RBC # BLD AUTO: 4.33 X10(6)UL
SODIUM SERPL-SCNC: 141 MMOL/L (ref 136–145)
WBC # BLD AUTO: 18.4 X10(3) UL (ref 4–11)

## 2024-09-01 PROCEDURE — 99233 SBSQ HOSP IP/OBS HIGH 50: CPT | Performed by: HOSPITALIST

## 2024-09-01 PROCEDURE — 99233 SBSQ HOSP IP/OBS HIGH 50: CPT | Performed by: INTERNAL MEDICINE

## 2024-09-01 RX ORDER — METHYLPREDNISOLONE SODIUM SUCCINATE 40 MG/ML
40 INJECTION, POWDER, LYOPHILIZED, FOR SOLUTION INTRAMUSCULAR; INTRAVENOUS EVERY 12 HOURS
Status: DISCONTINUED | OUTPATIENT
Start: 2024-09-01 | End: 2024-09-02

## 2024-09-01 RX ORDER — POTASSIUM CHLORIDE 1500 MG/1
40 TABLET, EXTENDED RELEASE ORAL ONCE
Status: DISCONTINUED | OUTPATIENT
Start: 2024-09-01 | End: 2024-09-01

## 2024-09-01 RX ORDER — POTASSIUM CHLORIDE 1.5 G/1.58G
40 POWDER, FOR SOLUTION ORAL ONCE
Status: COMPLETED | OUTPATIENT
Start: 2024-09-01 | End: 2024-09-01

## 2024-09-01 RX ORDER — IPRATROPIUM BROMIDE AND ALBUTEROL SULFATE 2.5; .5 MG/3ML; MG/3ML
3 SOLUTION RESPIRATORY (INHALATION)
Status: DISCONTINUED | OUTPATIENT
Start: 2024-09-01 | End: 2024-09-05

## 2024-09-01 NOTE — PROGRESS NOTES
[facility]  Pulmonary/Critical Care progress note    Alyssa Montgomery Patient Status:  Inpatient    1932 MRN [id number]   Location [facility] 0SW-A Attending Kayla Strickland DO   Hosp Day # 6 PCP Mony Hall DO         History of Present Illness:    Supplemental oxygen weaned to 1 L/min by nasal cannula.    Continues with cough and some shortness of breath.  Some better since yesterday.      The patient wishes to take off  box as it is uncomfortable for her.    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 Her smoking use included cigarettes. She has never used smokeless tobacco. She reports that she does not currently use alcohol after a past usage of about 7.0 standard drinks of alcohol per week. She reports that she does not use drugs.    Allergies:  No Known Allergies    Medications:    Current Facility-Administered Medications:     ipratropium-albuterol (Duoneb) 0.5-2.5 (3) MG/3ML inhalation solution 3 mL, 3 mL, Nebulization, Q6H WA    hydrALAZINE (Apresoline) tab 25 mg, 25 mg, Oral, Q8H SHELLEY    acetylcysteine (Mucomyst) 20 % nebulizer solution 2 mL, 2 mL, Nebulization, Q6H WA    pantoprazole (Protonix) DR tab 40 mg, 40 mg, Oral, QAM AC    methylPREDNISolone sodium succinate (Solu-MEDROL) injection 40 mg, 40 mg,  Intravenous, Q8H    carvedilol (Coreg) tab 12.5 mg, 12.5 mg, Oral, BID with meals    amLODIPine (Norvasc) tab 10 mg, 10 mg, Oral, Daily    guaiFENesin (Robitussin) 100 MG/5 ML oral liquid 100 mg, 100 mg, Oral, Q4H PRN    metoclopramide (Reglan) 5 mg/mL injection 5 mg, 5 mg, Intravenous, Q8H PRN    piperacillin-tazobactam (Zosyn) 3.375 g in dextrose 5% 100 mL IVPB-ADDV, 3.375 g, Intravenous, Q8H    benzonatate (Tessalon) cap 100 mg, 100 mg, Oral, TID PRN    albuterol (Ventolin HFA) 108 (90 Base) MCG/ACT inhaler 2 puff, 2 puff, Inhalation, Q4H PRN    atorvastatin (Lipitor) tab 40 mg, 40 mg, Oral, Nightly    levothyroxine (Synthroid) tab 100 mcg, 100 mcg, Oral, Daily @ 0700    [Held by provider] predniSONE (Deltasone) tab 1 mg, 1 mg, Oral, Daily with breakfast    pregabalin (Lyrica) cap 25 mg, 25 mg, Oral, BID    acetaminophen (Tylenol Extra Strength) tab 500 mg, 500 mg, Oral, Q4H PRN    melatonin tab 3 mg, 3 mg, Oral, Nightly PRN    glycerin-hypromellose- (Artificial Tears) 0.2-0.2-1 % ophthalmic solution 1 drop, 1 drop, Both Eyes, QID PRN    sodium chloride (Saline Mist) 0.65 % nasal solution 1 spray, 1 spray, Each Nare, Q3H PRN    enoxaparin (Lovenox) 40 MG/0.4ML SUBQ injection 40 mg, 40 mg, Subcutaneous, Daily    polyethylene glycol (PEG 3350) (Miralax) 17 g oral packet 17 g, 17 g, Oral, Daily PRN    sennosides (Senokot) tab 17.2 mg, 17.2 mg, Oral, Nightly PRN    bisacodyl (Dulcolax) 10 MG rectal suppository 10 mg, 10 mg, Rectal, Daily PRN    fleet enema (Fleet) rectal enema 133 mL, 1 enema, Rectal, Once PRN    ondansetron (Zofran) 4 MG/2ML injection 4 mg, 4 mg, Intravenous, Q6H PRN    Intake/Output:    Intake/Output Summary (Last 24 hours) at 9/1/2024 1250  Last data filed at 8/31/2024 1651  Gross per 24 hour   Intake 100 ml   Output --   Net 100 ml      Body mass index is 26.97 kg/m².    Review of Systems  Review of Systems:   A comprehensive 10 point review of systems was completed.  Pertinent positives  and negatives noted in the the HPI.       Vitals:    09/01/24 0019 09/01/24 0618 09/01/24 0750 09/01/24 0810   BP: 157/60 (!) 166/68     BP Location: Left arm Left arm     Pulse: 89 93     Resp: 18 20  20   Temp: 98.3 °F (36.8 °C) 97.8 °F (36.6 °C)  98.1 °F (36.7 °C)   TempSrc: Oral Oral Oral    SpO2: 92% 93%     Weight:             Physical Exam  Constitutional:       General: She is not in acute distress.     Appearance: Normal appearance. She is obese. She is not ill-appearing or diaphoretic.   HENT:      Head: Normocephalic and atraumatic.      Nose: Nose normal. No congestion or rhinorrhea.      Mouth/Throat:      Mouth: Mucous membranes are moist.      Pharynx: Oropharynx is clear. No oropharyngeal exudate or posterior oropharyngeal erythema.   Eyes:      Extraocular Movements: Extraocular movements intact.      Pupils: Pupils are equal, round, and reactive to light.   Cardiovascular:      Rate and Rhythm: Normal rate.      Pulses: Normal pulses.      Heart sounds: Normal heart sounds. No murmur heard.  Pulmonary:      Effort: Pulmonary effort is normal. No respiratory distress.      Breath sounds: Wheezing (Bilateral scattered wheezing and rhonchi) and rhonchi present.   Chest:      Chest wall: No tenderness.   Abdominal:      General: Abdomen is flat. Bowel sounds are normal.      Palpations: Abdomen is soft.   Musculoskeletal:         General: Normal range of motion.   Skin:     General: Skin is warm.   Neurological:      General: No focal deficit present.      Mental Status: She is alert and oriented to person, place, and time.   Psychiatric:         Mood and Affect: Mood normal.         Behavior: Behavior normal.         Thought Content: Thought content normal.         Judgment: Judgment normal.            Lab Data Review:  Recent Labs   Lab 08/30/24  0744 08/31/24  0727 09/01/24  0733   WBC 13.1* 14.3* 18.4*   HGB 13.2 13.5 13.5   HCT 38.6 38.5 39.1   .0* 138.0* 162.0       Recent Labs   Lab  08/28/24  0729 08/29/24  0748 08/30/24  0744 08/31/24  0727 09/01/24  0733      < > 142 141 141   K 3.4*  3.4*   < > 3.3*  3.3* 3.7  3.7 3.3*      < > 106 108 104   CO2 27.0   < > 35.0* 37.0* 28.0   BUN 14   < > 17 16 23   CREATSERUM 0.69   < > 0.61 0.61 0.68   CA 9.0   < > 8.7 8.7 9.0   ALB 4.2  --   --  3.7 3.9   ALKPHO 80  --   --  76 72   ALT 11  --   --  19 21   AST 16  --   --  18 14   *   < > 123* 204* 221*    < > = values in this interval not displayed.       Recent Labs   Lab 08/29/24  0748 08/30/24  0744 09/01/24  0733   MG 2.1 1.9 2.2       No results found for: \"PHOS\", \"PHOSPHORUS\"     No results for input(s): \"PT\", \"INR\", \"PTT\" in the last 168 hours.    Recent Labs   Lab 08/27/24  1210   ABGPHT 7.48*   CGOBVC4G 36   VERXE1K 78*   ABGHCO3 27.5*   ABGBE 3.5*   TEMP 98.6   CARRIE Positive   SITE Right Radial   DEV Nasal cannula   THGB 17.0*       No results for input(s): \"TROP\", \"CKMB\" in the last 168 hours.    Cultures:   Hospital Encounter on 08/26/24   1. Blood Culture     Status: None (Preliminary result)    Collection Time: 08/28/24 10:48 AM    Specimen: Blood,peripheral   Result Value Ref Range    Blood Culture Result No Growth 3 Days N/A           Component  Ref Range & Units 8/28/24 0729   Procalcitonin  <0.05 ng/mL 0.23 High           Radiology personally reviewed:  XR CHEST AP PORTABLE  (CPT=71045)    Result Date: 8/31/2024  CONCLUSION:  Worsening airspace disease in the left midlung and left lung base.  Shallow inspiration.  Stable heart size and pulmonary vascularity.   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)    Macular cyst, hole, or pseudohole, right eye    Nonexudative age-related macular degeneration, right eye, intermediate dry stage    PMR (polymyalgia rheumatica) (HCC)    Paving stone degeneration of retina, bilateral    Asymptomatic carotid artery stenosis, bilateral    Weakness    Constipation    Thrombocytopenia (HCC)    Syncope and collapse    Concussion with loss of consciousness    Fall, initial encounter    Laceration of right little finger without foreign body without damage to nail, initial encounter    Neuropathy    Shortness of breath    Respiratory syncytial virus (RSV)    RSV (acute bronchiolitis due to respiratory syncytial virus)    Intractable vomiting    Hypokalemia       Assessment:  Acute hypoxic respiratory failure: Supplemental oxygen 1 L/min via nasal cannula: Improving oxygenation  Acute bronchitis: With mild pharyngitis, suspect due to vigorous vomiting with aspiration:: No dysphagia noted on video swallow study per speech pathology: Clinically improving cough and wheezing  Bilateral lower lobe mixed interstitial and air space infiltrate: Worse chest x-ray infiltrate on left lower lobe and lingular area consistent with  aspiration pneumonia  Bibasilar atelectasis and mild peripheral interstitial scarring is noted  GERD as with history of reflux per daughter  Hypertension  Hyperlipidemia  History of coronary artery disease  Polymyalgia rheumatica  Hypothyroidism  Leukocytosis: Slightly increased suspect due to steroids   Thrombocytopenia: Improving  Hypokalemia: Mild  Leukocytosis, again with increase suspect due to steroids though could also be due to pneumonia      Plan:  Wean FiO2 off to keep oxygen saturations between 90% to 92%  Continue Mucomyst nebulizers every 6 hours for 2 days  Flutter device use every 4 hours as possible to help cough out  secretions  Continue DuoNebs every 6 hours  Blood cultures completed  Continue Zosyn 4.5 g every 8 hours will need at least 5-day course so may discontinue tomorrow  Reduce Solu-Medrol 40 mg IV every 12 hours  Monitor and correct electrolytes  DVT prophylaxis: Lovenox 40 mg subcutaneous every 24 hours  GI prophylaxis: Protonix 40 mg daily   Follow labs, procalcitonin and chest x-ray in a.m.  Discussed with patient's daughter, patient and her nurse  Will follow for further recommendations    Thank You for allowing me to participate in this patient's care     Luis Murphy MD        Note to the patient: The 21st Century Cures Act makes medical notes like these available to patients in the interest of transparency. However, be advised that this is a medical document. It is intended as peer to peer communication. It is written in medical language and may contain abbreviations or verbiage that are unfamiliar. It may appear blunt or direct. Medical documents are intended to carry relevant information, facts as evident, and clinical opinion of the practitioner.      Disclaimer: Components of this note were documented using voice recognition system and are subject to errors not corrected at proofreading. Contact the author of this note for any clarifications

## 2024-09-01 NOTE — PROGRESS NOTES
Pt retaining urine, bladder scan at 1710 showed 481. Educated pt on importance of voiding and UTI symptoms. MD made aware, UA ordered

## 2024-09-01 NOTE — PROGRESS NOTES
[facility]   part of [facility]     Hospitalist Progress Note     Alyssa Montgomery Patient Status:  Inpatient    1932 MRN [id number]   Location [facility] 0SW-A Attending Medhat Dover MD   Hosp Day # 6 PCP Mony Hall DO     Chief Complaint: n/v    Subjective:     Patient continues to have cough.  Weaning oxygen.    Pt adamantly wants tele box off. She essentially yelled at me to take it off and accused me of not respecting her wishes to have it removed. I tried to explain that we are monitoring her HR w/ her resp condition. But patient was ad ament     Objective:    Review of Systems:   A comprehensive review of systems was completed; pertinent positive and negatives stated in subjective.    Vital signs:  Temp:  [97.8 °F (36.6 °C)-98.3 °F (36.8 °C)] 98.1 °F (36.7 °C)  Pulse:  [79-93] 93  Resp:  [18-22] 20  BP: (133-166)/(53-68) 166/68  SpO2:  [92 %-96 %] 93 %    Physical Exam:    General: No acute distress  Respiratory: No wheezes, mild rhonchi   Cardiovascular: S1, S2, regular rate and rhythm  Abdomen: Soft, Non-tender, non-distended, positive bowel sounds  Neuro: No new focal deficits.   Extremities: No edema      Diagnostic Data:    Labs:  Recent Labs   Lab 24  0724  0748 24  0744 24  0727 24  0733   WBC 16.1* 14.6* 13.1* 14.3* 18.4*   HGB 14.1 13.1 13.2 13.5 13.5   MCV 91.4 88.8 91.5 88.9 90.3   .0* 133.0* 131.0* 138.0* 162.0       Recent Labs   Lab 24  0724  0748 24  0744 24  0727 24  0733   *   < > 123* 204* 221*   BUN 14   < > 17 16 23   CREATSERUM 0.69   < > 0.61 0.61 0.68   CA 9.0   < > 8.7 8.7 9.0   ALB 4.2  --   --  3.7 3.9      < > 142 141 141   K 3.4*  3.4*   < > 3.3*  3.3* 3.7  3.7 3.3*      < > 106 108 104   CO2 27.0   < > 35.0* 37.0* 28.0   ALKPHO 80  --   --  76 72   AST 16  --   --  18 14   ALT 11  --   --  19 21   BILT 0.9  --   --  0.8 0.6   TP 7.0  --   --  6.5 6.6    < > =  values in this interval not displayed.       Estimated Creatinine Clearance: 51.3 mL/min (based on SCr of 0.68 mg/dL).    No results for input(s): \"TROP\", \"TROPHS\", \"CK\" in the last 168 hours.    No results for input(s): \"PTP\", \"INR\" in the last 168 hours.                 Microbiology    Hospital Encounter on 08/26/24   1. Blood Culture     Status: None (Preliminary result)    Collection Time: 08/28/24 10:48 AM    Specimen: Blood,peripheral   Result Value Ref Range    Blood Culture Result No Growth 3 Days N/A         Imaging: Reviewed in Epic.    Medications:    ipratropium-albuterol  3 mL Nebulization Q6H WA    hydrALAZINE  25 mg Oral Q8H SHELLEY    acetylcysteine  2 mL Nebulization Q6H WA    pantoprazole  40 mg Oral QAM AC    methylPREDNISolone  40 mg Intravenous Q8H    carvedilol  12.5 mg Oral BID with meals    amLODIPine  10 mg Oral Daily    piperacillin-tazobactam  3.375 g Intravenous Q8H    atorvastatin  40 mg Oral Nightly    levothyroxine  100 mcg Oral Daily @ 0700    [Held by provider] predniSONE  1 mg Oral Daily with breakfast    pregabalin  25 mg Oral BID    enoxaparin  40 mg Subcutaneous Daily       Assessment & Plan:      #CAP  #Acute bronchitis  #Fever  #Malaise  -iv zosyn (8/27-), will consider de escalation in next 1-2 days  -Bcx/Sputum culture (8/28) reviewed  -CXRs reviewed personally, latest 8/31 shows worsening airspace dz  -therapies to see  -iv solumedrol (8/30-)  failed prednisone (8/28-8/30)  s/p iv solumedrol (8/27-8/28); discussed w/ pulm, possible de-escalation today    #Leukocytosis  -Worsening, afebrile, likely secondary to Solu-Medrol, will monitor    #Aspiration ruled out  -speech eval noted, cleared to eat, I am still having concerns for microaspiration    #Acute hypoxemic resp failure, improving  -on RA at baseline, currently 1L NC  -2/2 above  -echo done, shows lvef intact    #Intractable N/v   #Dehydration   CT a/p w/o acute findings  Resolved  Protonix po, s/p iv protonix  bid    #Acute metabolic encephalopathy, resolved  Family reports more back to baseline  TSH slightly low, free t4 okay     #CAD  ASA, statin, BB     #HFpEF, not in exacerbation  Echo 2023 with normal EF, G1DD    #Essential HTN  #HTN urgency, improving  -amlodipine + coreg; continue (new) hydralazine, avoiding doubling bb given active wheezing    #Hypothyroidism - Synthroid   #PMR - 1mg prednisone daily      #Hypokalemia - repletion protocol     #ACP  - DNR, discussed earlier on admission       Kayla Strickland,     Supplementary Documentation:     Quality:  DVT Mechanical Prophylaxis:     Early ambuation  DVT Pharmacologic Prophylaxis   Medication    enoxaparin (Lovenox) 40 MG/0.4ML SUBQ injection 40 mg                Code Status: DNAR/Selective Treatment  Buck: External urinary catheter in place  Buck Duration (in days):   Central line:    XAVI:     Discharge is dependent on: clinical improvement  At this point Ms. Montgomery is expected to be discharge to: tbd    The 21st Century Cures Act makes medical notes like these available to patients in the interest of transparency. Please be advised this is a medical document. Medical documents are intended to carry relevant information, facts as evident, and the clinical opinion of the practitioner. The medical note is intended as peer to peer communication and may appear blunt or direct. It is written in medical language and may contain abbreviations or verbiage that are unfamiliar.              **Certification      PHYSICIAN Certification of Need for Inpatient Hospitalization - Initial Certification    Patient will require inpatient services that will reasonably be expected to span two midnight's based on the clinical documentation in H+P.   Based on patients current state of illness, I anticipate that, after discharge, patient will require TBD.

## 2024-09-01 NOTE — PLAN OF CARE
Received pt A&Ox3.  on 1L, RA is baseline. Solumedrol and nebs. IV abx. NSR on tele. Lovenox for VTE prophylaxis. Tolerating diet. Voids via purwick. Up with assistance and walker. Pain management per MAr. Plan of care continues, no further needs at this time.     Problem: Patient/Family Goals  Goal: Patient/Family Long Term Goal  Description: Patient's Long Term Goal: Back to [facility]    Interventions:  - IV antibiotics  - O2  - Nebs  - fall precautions  - See additional Care Plan goals for specific interventions  Outcome: Progressing  Goal: Patient/Family Short Term Goal  Description: Patient's Short Term Goal: Pain relief      Interventions:   - Tylenol,   - reposition    - See additional Care Plan goals for specific interventions  Outcome: Progressing     Problem: GASTROINTESTINAL - ADULT  Goal: Minimal or absence of nausea and vomiting  Description: INTERVENTIONS:  - Maintain adequate hydration with IV or PO as ordered and tolerated  - Nasogastric tube to low intermittent suction as ordered  - Evaluate effectiveness of ordered antiemetic medications  - Provide nonpharmacologic comfort measures as appropriate  - Advance diet as tolerated, if ordered  - Obtain nutritional consult as needed  - Evaluate fluid balance  Outcome: Progressing     Problem: METABOLIC/FLUID AND ELECTROLYTES - ADULT  Goal: Electrolytes maintained within normal limits  Description: INTERVENTIONS:  - Monitor labs and rhythm and assess patient for signs and symptoms of electrolyte imbalances  - Administer electrolyte replacement as ordered  - Monitor response to electrolyte replacements, including rhythm and repeat lab results as appropriate  - Fluid restriction as ordered  - Instruct patient on fluid and nutrition restrictions as appropriate  Outcome: Progressing  Goal: Hemodynamic stability and optimal renal function maintained  Description: INTERVENTIONS:  - Monitor labs and assess for signs and symptoms of volume excess or  deficit  - Monitor intake, output and patient weight  - Monitor urine specific gravity, serum osmolarity and serum sodium as indicated or ordered  - Monitor response to interventions for patient's volume status, including labs, urine output, blood pressure (other measures as available)  - Encourage oral intake as appropriate  - Instruct patient on fluid and nutrition restrictions as appropriate  Outcome: Progressing     Problem: RESPIRATORY - ADULT  Goal: Achieves optimal ventilation and oxygenation  Description: INTERVENTIONS:  - Assess for changes in respiratory status  - Assess for changes in mentation and behavior  - Position to facilitate oxygenation and minimize respiratory effort  - Oxygen supplementation based on oxygen saturation or ABGs  - Provide Smoking Cessation handout, if applicable  - Encourage broncho-pulmonary hygiene including cough, deep breathe, Incentive Spirometry  - Assess the need for suctioning and perform as needed  - Assess and instruct to report SOB or any respiratory difficulty  - Respiratory Therapy support as indicated  - Manage/alleviate anxiety  - Monitor for signs/symptoms of CO2 retention  Outcome: Progressing      59

## 2024-09-02 ENCOUNTER — APPOINTMENT (OUTPATIENT)
Dept: GENERAL RADIOLOGY | Facility: HOSPITAL | Age: 89
End: 2024-09-02
Attending: INTERNAL MEDICINE
Payer: MEDICARE

## 2024-09-02 LAB
ANION GAP SERPL CALC-SCNC: 7 MMOL/L (ref 0–18)
BASOPHILS # BLD AUTO: 0.06 X10(3) UL (ref 0–0.2)
BASOPHILS NFR BLD AUTO: 0.3 %
BILIRUB UR QL STRIP.AUTO: NEGATIVE
BUN BLD-MCNC: 16 MG/DL (ref 9–23)
CALCIUM BLD-MCNC: 8.8 MG/DL (ref 8.7–10.4)
CHLORIDE SERPL-SCNC: 107 MMOL/L (ref 98–112)
CLARITY UR REFRACT.AUTO: CLEAR
CO2 SERPL-SCNC: 24 MMOL/L (ref 21–32)
CREAT BLD-MCNC: 0.66 MG/DL
EGFRCR SERPLBLD CKD-EPI 2021: 82 ML/MIN/1.73M2 (ref 60–?)
EOSINOPHIL # BLD AUTO: 0 X10(3) UL (ref 0–0.7)
EOSINOPHIL NFR BLD AUTO: 0 %
ERYTHROCYTE [DISTWIDTH] IN BLOOD BY AUTOMATED COUNT: 13.8 %
GLUCOSE BLD-MCNC: 202 MG/DL (ref 70–99)
GLUCOSE UR STRIP.AUTO-MCNC: 100 MG/DL
HCT VFR BLD AUTO: 42.5 %
HGB BLD-MCNC: 13.4 G/DL
IMM GRANULOCYTES # BLD AUTO: 0.2 X10(3) UL (ref 0–1)
IMM GRANULOCYTES NFR BLD: 1.1 %
KETONES UR STRIP.AUTO-MCNC: NEGATIVE MG/DL
LEUKOCYTE ESTERASE UR QL STRIP.AUTO: NEGATIVE
LYMPHOCYTES # BLD AUTO: 0.62 X10(3) UL (ref 1–4)
LYMPHOCYTES NFR BLD AUTO: 3.5 %
MAGNESIUM SERPL-MCNC: 2.2 MG/DL (ref 1.6–2.6)
MCH RBC QN AUTO: 30.9 PG (ref 26–34)
MCHC RBC AUTO-ENTMCNC: 31.5 G/DL (ref 31–37)
MCV RBC AUTO: 98.2 FL
MONOCYTES # BLD AUTO: 1.32 X10(3) UL (ref 0.1–1)
MONOCYTES NFR BLD AUTO: 7.4 %
NEUTROPHILS # BLD AUTO: 15.74 X10 (3) UL (ref 1.5–7.7)
NEUTROPHILS # BLD AUTO: 15.74 X10(3) UL (ref 1.5–7.7)
NEUTROPHILS NFR BLD AUTO: 87.7 %
NITRITE UR QL STRIP.AUTO: NEGATIVE
OSMOLALITY SERPL CALC.SUM OF ELEC: 293 MOSM/KG (ref 275–295)
PH UR STRIP.AUTO: 6.5 [PH] (ref 5–8)
PLATELET # BLD AUTO: 168 10(3)UL (ref 150–450)
POTASSIUM SERPL-SCNC: 3.9 MMOL/L (ref 3.5–5.1)
POTASSIUM SERPL-SCNC: 3.9 MMOL/L (ref 3.5–5.1)
PROCALCITONIN SERPL-MCNC: 0.13 NG/ML (ref ?–0.05)
RBC # BLD AUTO: 4.33 X10(6)UL
RBC UR QL AUTO: NEGATIVE
SODIUM SERPL-SCNC: 138 MMOL/L (ref 136–145)
SP GR UR STRIP.AUTO: 1.02 (ref 1–1.03)
UROBILINOGEN UR STRIP.AUTO-MCNC: NORMAL MG/DL
WBC # BLD AUTO: 17.9 X10(3) UL (ref 4–11)

## 2024-09-02 PROCEDURE — 99232 SBSQ HOSP IP/OBS MODERATE 35: CPT | Performed by: HOSPITALIST

## 2024-09-02 PROCEDURE — 99232 SBSQ HOSP IP/OBS MODERATE 35: CPT | Performed by: INTERNAL MEDICINE

## 2024-09-02 PROCEDURE — 71045 X-RAY EXAM CHEST 1 VIEW: CPT | Performed by: INTERNAL MEDICINE

## 2024-09-02 RX ORDER — PREDNISONE 20 MG/1
40 TABLET ORAL
Status: DISCONTINUED | OUTPATIENT
Start: 2024-09-03 | End: 2024-09-05

## 2024-09-02 RX ORDER — BUDESONIDE 0.5 MG/2ML
0.5 INHALANT ORAL
Status: DISCONTINUED | OUTPATIENT
Start: 2024-09-02 | End: 2024-09-20

## 2024-09-02 RX ORDER — FUROSEMIDE 10 MG/ML
20 INJECTION INTRAMUSCULAR; INTRAVENOUS ONCE
Status: COMPLETED | OUTPATIENT
Start: 2024-09-02 | End: 2024-09-02

## 2024-09-02 NOTE — PLAN OF CARE
Patient AAOx3-4, forgetful. Decreased vision. Glasses. Hard of hearing- family states hearing aids at home. 2L NC- weaned to room air. Nebs. Refusing tele. Patient reports dizziness with sitting, MD aware. Lovenox. Incontinent, retaining urine. Straight cath x1. Urine sample sent. Denies pain. Up assist x2/walker sat up in chair today. Regular diet. Pills/applesauce one at a time. Patient rounded on routinely. Patient and family updated on plan of care.      Problem: Patient/Family Goals  Goal: Patient/Family Long Term Goal  Description: Patient's Long Term Goal: Back to [facility]    Interventions:  - IV antibiotics  - O2  - Nebs  - fall precautions  - See additional Care Plan goals for specific interventions  Outcome: Progressing  Goal: Patient/Family Short Term Goal  Description: Patient's Short Term Goal: Pain relief  9/1 noc: wean O2    Interventions:   - Tylenol,   - reposition    - See additional Care Plan goals for specific interventions  Outcome: Progressing     Problem: GASTROINTESTINAL - ADULT  Goal: Minimal or absence of nausea and vomiting  Description: INTERVENTIONS:  - Maintain adequate hydration with IV or PO as ordered and tolerated  - Nasogastric tube to low intermittent suction as ordered  - Evaluate effectiveness of ordered antiemetic medications  - Provide nonpharmacologic comfort measures as appropriate  - Advance diet as tolerated, if ordered  - Obtain nutritional consult as needed  - Evaluate fluid balance  Outcome: Progressing     Problem: METABOLIC/FLUID AND ELECTROLYTES - ADULT  Goal: Electrolytes maintained within normal limits  Description: INTERVENTIONS:  - Monitor labs and rhythm and assess patient for signs and symptoms of electrolyte imbalances  - Administer electrolyte replacement as ordered  - Monitor response to electrolyte replacements, including rhythm and repeat lab results as appropriate  - Fluid restriction as ordered  - Instruct patient on fluid and nutrition  restrictions as appropriate  Outcome: Progressing  Goal: Hemodynamic stability and optimal renal function maintained  Description: INTERVENTIONS:  - Monitor labs and assess for signs and symptoms of volume excess or deficit  - Monitor intake, output and patient weight  - Monitor urine specific gravity, serum osmolarity and serum sodium as indicated or ordered  - Monitor response to interventions for patient's volume status, including labs, urine output, blood pressure (other measures as available)  - Encourage oral intake as appropriate  - Instruct patient on fluid and nutrition restrictions as appropriate  Outcome: Progressing     Problem: RESPIRATORY - ADULT  Goal: Achieves optimal ventilation and oxygenation  Description: INTERVENTIONS:  - Assess for changes in respiratory status  - Assess for changes in mentation and behavior  - Position to facilitate oxygenation and minimize respiratory effort  - Oxygen supplementation based on oxygen saturation or ABGs  - Provide Smoking Cessation handout, if applicable  - Encourage broncho-pulmonary hygiene including cough, deep breathe, Incentive Spirometry  - Assess the need for suctioning and perform as needed  - Assess and instruct to report SOB or any respiratory difficulty  - Respiratory Therapy support as indicated  - Manage/alleviate anxiety  - Monitor for signs/symptoms of CO2 retention  Outcome: Progressing

## 2024-09-02 NOTE — PLAN OF CARE
Pt is A&Ox3, forgetful. VSS, afebrile, pain managed with prn meds see mar. SPO2 maintained on 2L. Continuous pulse ox. Denies any SOB. Dry cough, managed with prn meds see mar. No tele. Lovenox. Cardiac diet. Denies any n/v/d. Incontinent purwick in place. Up with x2 and a walker. IV ABX. Safety precautions in place.  Pt is updated with plan of care.    Problem: Patient/Family Goals  Goal: Patient/Family Long Term Goal  Description: Patient's Long Term Goal: Back to [facility]    Interventions:  - IV antibiotics  - O2  - Nebs  - fall precautions  - See additional Care Plan goals for specific interventions  Outcome: Progressing  Goal: Patient/Family Short Term Goal  Description: Patient's Short Term Goal: Pain relief  9/1 noc: wean O2    Interventions:   - Tylenol,   - reposition    - See additional Care Plan goals for specific interventions  Outcome: Progressing

## 2024-09-02 NOTE — DIETARY NOTE
[facility]   part of [facility]   CLINICAL NUTRITION    Alyssa Montgomery     Admitting diagnosis:  Hypokalemia [E87.6]  Intractable vomiting [R11.10]    Ht:  5'7\"  Wt: 78.1 kg (172 lb 3.2 oz).   Body mass index is 26.97 kg/m².  IBW: 61.4kg    Wt Readings from Last 6 Encounters:   08/31/24 78.1 kg (172 lb 3.2 oz)   04/23/24 80.1 kg (176 lb 9.6 oz)   02/01/24 75.4 kg (166 lb 3.2 oz)   01/31/24 75.5 kg (166 lb 7.2 oz)   11/14/23 75.5 kg (166 lb 6.4 oz)   10/19/23 74.8 kg (165 lb)        Labs/Meds reviewed    Diet:       Procedures    Cardiac diet Cardiac; Is Patient on Accuchecks? No     Percent Meals Eaten (last 3 days)       None          Pt chart reviewed d/t LOS. 89 year old female admitted for n/v.   Nursing notes reports Percent Meals Eaten (%): 100 % intake for last meal. Diet liberalized to general for more choices.   Tolerating po diet without diarrhea, emesis, or constipation. +BM 8/31. No significant weight changes noted.     Patient is at low nutrition risk at this time.    Please consult if patient status changes or nutrition issues arise.    Marybeth Singh RD, LDN  Clinical Nutrition  [id number]

## 2024-09-02 NOTE — PROGRESS NOTES
[facility]   part of [facility]     Hospitalist Progress Note     Alyssa Montgomery Patient Status:  Inpatient    1932 MRN [id number]   Location [facility] 0-A Attending Medhat Dover MD   Hosp Day # 7 PCP Mony Hall DO     Chief Complaint: n/v    Subjective:     Pt cough remains. Not as weak. Pt having signs of urinary retention, she repeatedly is refusing straight cath    Objective:    Review of Systems:   A comprehensive review of systems was completed; pertinent positive and negatives stated in subjective.    Vital signs:  Temp:  [97 °F (36.1 °C)-98.1 °F (36.7 °C)] 97 °F (36.1 °C)  Pulse:  [81-98] 98  Resp:  [18-20] 20  BP: (137-168)/(61-84) 168/74  SpO2:  [92 %-95 %] 93 %    Physical Exam:    General: No acute distress  Respiratory: No wheezes, mild rhonchi   Cardiovascular: S1, S2, regular rate and rhythm  Abdomen: Soft, Non-tender, non-distended, positive bowel sounds  Neuro: No new focal deficits.   Extremities: No edema      Diagnostic Data:    Labs:  Recent Labs   Lab 24  0748 24  0744 24  0724  0733 24  0722   WBC 14.6* 13.1* 14.3* 18.4* 17.9*   HGB 13.1 13.2 13.5 13.5 13.4   MCV 88.8 91.5 88.9 90.3 98.2   .0* 131.0* 138.0* 162.0 168.0       Recent Labs   Lab 24  0729 24  0748 24  0727 24  0733 24  0722   *   < > 204* 221* 202*   BUN 14   < > 16 23 16   CREATSERUM 0.69   < > 0.61 0.68 0.66   CA 9.0   < > 8.7 9.0 8.8   ALB 4.2  --  3.7 3.9  --       < > 141 141 138   K 3.4*  3.4*   < > 3.7  3.7 3.3* 3.9  3.9      < > 108 104 107   CO2 27.0   < > 37.0* 28.0 24.0   ALKPHO 80  --  76 72  --    AST 16  --  18 14  --    ALT 11  --  19 21  --    BILT 0.9  --  0.8 0.6  --    TP 7.0  --  6.5 6.6  --     < > = values in this interval not displayed.       Estimated Creatinine Clearance: 52.9 mL/min (based on SCr of 0.66 mg/dL).    No results for input(s): \"TROP\", \"TROPHS\", \"CK\" in the last 168  hours.    No results for input(s): \"PTP\", \"INR\" in the last 168 hours.                 Microbiology    Hospital Encounter on 08/26/24   1. Blood Culture     Status: None (Preliminary result)    Collection Time: 08/28/24 10:48 AM    Specimen: Blood,peripheral   Result Value Ref Range    Blood Culture Result No Growth 4 Days N/A         Imaging: Reviewed in Epic.    Medications:    ipratropium-albuterol  3 mL Nebulization Q6H WA    methylPREDNISolone  40 mg Intravenous Q12H    hydrALAZINE  25 mg Oral Q8H SHELLEY    acetylcysteine  2 mL Nebulization Q6H WA    pantoprazole  40 mg Oral QAM AC    carvedilol  12.5 mg Oral BID with meals    amLODIPine  10 mg Oral Daily    atorvastatin  40 mg Oral Nightly    levothyroxine  100 mcg Oral Daily @ 0700    [Held by provider] predniSONE  1 mg Oral Daily with breakfast    pregabalin  25 mg Oral BID    enoxaparin  40 mg Subcutaneous Daily       Assessment & Plan:      #CAP  #Acute bronchitis  #Fever  #Malaise  -iv zosyn (8/27-), will consider de escalation in next 24h  -Bcx/Sputum culture (8/28) reviewed  -CXRs reviewed personally, latest 9/2; will give x1 lasix  -therapies to see  -iv solumedrol (dose adjusted) (8/30-)  pt failed prednisone trial (8/28-8/30)  s/p iv solumedrol (8/27-8/28)    #Leukocytosis, improving  -improving, afebrile, likely secondary to Solu-Medrol, will monitor    #Aspiration ruled out  -speech eval noted, cleared to eat, I am still having concerns for microaspiration    #Acute hypoxemic resp failure, improving  -on RA at baseline, currently 1L NC  -2/2 above  -echo done, shows lvef intact    #Intractable N/v   #Dehydration   CT a/p w/o acute findings  Resolved  Protonix po, s/p iv protonix bid    #Acute metabolic encephalopathy, resolved  Family reports more back to baseline  TSH slightly low, free t4 okay     #CAD  ASA, statin, BB     #HFpEF, not in exacerbation  Echo 2023 with normal EF, G1DD    #Essential HTN  #HTN urgency, improving  -amlodipine + coreg;  continue (new) hydralazine, avoiding doubling bb given active wheezing    #Hypothyroidism - Synthroid   #PMR - 1mg prednisone daily      #Hypokalemia - repletion protocol     #ACP  - DNR, discussed earlier on admission       Kayla Strickland DO    Supplementary Documentation:     Quality:  DVT Mechanical Prophylaxis:     Early ambuation  DVT Pharmacologic Prophylaxis   Medication    enoxaparin (Lovenox) 40 MG/0.4ML SUBQ injection 40 mg                Code Status: DNAR/Selective Treatment  Buck: External urinary catheter in place  Buck Duration (in days):   Central line:    XAVI:     Discharge is dependent on: clinical improvement  At this point Ms. Montgomery is expected to be discharge to: tbd    The 21st Century Cures Act makes medical notes like these available to patients in the interest of transparency. Please be advised this is a medical document. Medical documents are intended to carry relevant information, facts as evident, and the clinical opinion of the practitioner. The medical note is intended as peer to peer communication and may appear blunt or direct. It is written in medical language and may contain abbreviations or verbiage that are unfamiliar.              **Certification      PHYSICIAN Certification of Need for Inpatient Hospitalization - Initial Certification    Patient will require inpatient services that will reasonably be expected to span two midnight's based on the clinical documentation in H+P.   Based on patients current state of illness, I anticipate that, after discharge, patient will require TBD.

## 2024-09-02 NOTE — PROGRESS NOTES
[facility] Pulmonary Progress Note    Alyssa Montgomery Patient Status:  Inpatient    1932 MRN [id number]   Location [facility] 5NW-A Attending Kayla Strickland DO   Hosp Day # 7 PCP Mony Hall DO     Subjective:  Alyssa Montgomery is a(n) 89 year old female who is admitted for shortness of breath.    Overnight: Had some diarrhea overnight    Objective:  /53 (BP Location: Left arm)   Pulse 88   Temp 97.4 °F (36.3 °C) (Oral)   Resp 20   Wt 172 lb 3.2 oz (78.1 kg)   SpO2 96%   BMI 26.97 kg/m²       Temp (24hrs), Av.3 °F (36.3 °C), Min:97 °F (36.1 °C), Max:97.9 °F (36.6 °C)      Intake/Output:    Intake/Output Summary (Last 24 hours) at 2024 1421  Last data filed at 2024 0900  Gross per 24 hour   Intake --   Output 600 ml   Net -600 ml       Physical Exam:   General: alert, cooperative, oriented.  No respiratory distress.   Head: Normocephalic, without obvious abnormality, atraumatic.   Throat: Lips, mucosa, and tongue normal.  No thrush noted.   Neck: trachea midline, no adenopathy, no thyromegaly. No JVD.   Lungs: rhonchi bilaterally   Chest wall: No tenderness or deformity.   Heart: regular rate and rhythm, S1, S2 normal, no murmur, click, rub or gallop   Abdomen: soft, non-distended, no masses, no guarding, no     Rebound.   Extremity: No edema or cyanosis   Skin: No rashes or lesions.   Neurological: Alert, interactive, no focal deficits    Lab Data Review:  Recent Labs     24  0733 24  0722   WBC 14.3* 18.4* 17.9*   HGB 13.5 13.5 13.4   .0* 162.0 168.0     Recent Labs     24  0733 24  0722    141 138   K 3.7  3.7 3.3* 3.9  3.9    104 107   CO2 37.0* 28.0 24.0   BUN 16 23 16   CREATSERUM 0.61 0.68 0.66     No results for input(s): \"PTP\", \"INR\", \"PTT\" in the last 168 hours.    Cultures:   Hospital Encounter on 24   1. Blood Culture     Status: None (Preliminary result)    Collection Time: 24 10:48 AM     Specimen: Blood,peripheral   Result Value Ref Range    Blood Culture Result No Growth 4 Days N/A       Radiology:  XR CHEST AP PORTABLE  (CPT=71045)  Narrative: PROCEDURE:  XR CHEST AP PORTABLE  (CPT=71045)     TECHNIQUE:  AP chest radiograph was obtained.     COMPARISON:  [facility] , CT, CT ABDOMEN+PELVIS(CPT=74176), 8/26/2024, 6:58 PM.  [facility] , XR, XR CHEST AP PORTABLE  (CPT=71045), 8/31/2024, 5:45 AM.  [facility] , XR, XR CHEST AP PORTABLE  (CPT=71045), 8/28/2024, 4:38 AM.     INDICATIONS:  SOB     PATIENT STATED HISTORY: (As transcribed by Technologist)  Patient offered no additional history at this time.                        Impression: CONCLUSION:    The heart size upper limits of normal.  There is slight vascular redistribution with some interstitial edema suggesting fluid overload or CHF.  There is a tortuous descending thoracic aorta noted.  There is slight decrease in the lateral consolidation in the left mid to lower lung.  Slight decrease in the left effusion.  Persistent peribronchial thickening.  No definite lymphadenopathy.  No pneumothorax.        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)    Macular cyst, hole, or pseudohole, right eye    Nonexudative age-related macular degeneration, right eye, intermediate dry stage    PMR (polymyalgia rheumatica) (HCC)    Paving stone degeneration of retina, bilateral    Asymptomatic carotid artery  stenosis, bilateral    Weakness    Constipation    Thrombocytopenia (HCC)    Syncope and collapse    Concussion with loss of consciousness    Fall, initial encounter    Laceration of right little finger without foreign body without damage to nail, initial encounter    Neuropathy    Shortness of breath    Respiratory syncytial virus (RSV)    RSV (acute bronchiolitis due to respiratory syncytial virus)    Intractable vomiting    Hypokalemia       Assessment:  Acute hypoxic respiratory failure, improving, now on room air  Bronchitis with significant rhonchorous sounds  Possible aspiration pneumonia  Bibasilar atelectasis  Diarrhea  GERD  Hypertension  Hyperlipidemia  History of coronary artery disease  Polymyalgia rheumatica  Hypothyroidism    Plan:  Close monitoring of oxygenation status, monitor on room air  Stopped Mucomyst  Add budesonide neb  Ongoing bronchial hygiene  Continue bronchodilators  Stop antibiotics today  Management of diarrhea per hospitalist  Continue Solu-Medrol 40 mg every 12, can likely change to p.o. in the near future        Lily Cruz MD  [facility]  Office: [phone]

## 2024-09-02 NOTE — CM/SW NOTE
CM attached clinical updates to JACOB referral in aidin system.  Patient discharge is pending medical clearance.     Ceci Pizano RN Case Manager [id number]        none

## 2024-09-03 ENCOUNTER — APPOINTMENT (OUTPATIENT)
Dept: CT IMAGING | Facility: HOSPITAL | Age: 89
End: 2024-09-03
Attending: INTERNAL MEDICINE
Payer: MEDICARE

## 2024-09-03 LAB
ANION GAP SERPL CALC-SCNC: 5 MMOL/L (ref 0–18)
BASOPHILS # BLD AUTO: 0.04 X10(3) UL (ref 0–0.2)
BASOPHILS NFR BLD AUTO: 0.2 %
BUN BLD-MCNC: 23 MG/DL (ref 9–23)
CALCIUM BLD-MCNC: 8.5 MG/DL (ref 8.7–10.4)
CHLORIDE SERPL-SCNC: 106 MMOL/L (ref 98–112)
CO2 SERPL-SCNC: 31 MMOL/L (ref 21–32)
CREAT BLD-MCNC: 0.62 MG/DL
EGFRCR SERPLBLD CKD-EPI 2021: 83 ML/MIN/1.73M2 (ref 60–?)
EOSINOPHIL # BLD AUTO: 0.06 X10(3) UL (ref 0–0.7)
EOSINOPHIL NFR BLD AUTO: 0.3 %
ERYTHROCYTE [DISTWIDTH] IN BLOOD BY AUTOMATED COUNT: 13.7 %
GLUCOSE BLD-MCNC: 143 MG/DL (ref 70–99)
HCT VFR BLD AUTO: 40 %
HGB BLD-MCNC: 13.8 G/DL
IMM GRANULOCYTES # BLD AUTO: 0.32 X10(3) UL (ref 0–1)
IMM GRANULOCYTES NFR BLD: 1.8 %
LYMPHOCYTES # BLD AUTO: 0.75 X10(3) UL (ref 1–4)
LYMPHOCYTES NFR BLD AUTO: 4.3 %
MAGNESIUM SERPL-MCNC: 2.2 MG/DL (ref 1.6–2.6)
MCH RBC QN AUTO: 31.3 PG (ref 26–34)
MCHC RBC AUTO-ENTMCNC: 34.5 G/DL (ref 31–37)
MCV RBC AUTO: 90.7 FL
MONOCYTES # BLD AUTO: 1.47 X10(3) UL (ref 0.1–1)
MONOCYTES NFR BLD AUTO: 8.4 %
NEUTROPHILS # BLD AUTO: 14.82 X10 (3) UL (ref 1.5–7.7)
NEUTROPHILS # BLD AUTO: 14.82 X10(3) UL (ref 1.5–7.7)
NEUTROPHILS NFR BLD AUTO: 85 %
OSMOLALITY SERPL CALC.SUM OF ELEC: 300 MOSM/KG (ref 275–295)
PLATELET # BLD AUTO: 161 10(3)UL (ref 150–450)
POTASSIUM SERPL-SCNC: 3.4 MMOL/L (ref 3.5–5.1)
RBC # BLD AUTO: 4.41 X10(6)UL
SODIUM SERPL-SCNC: 142 MMOL/L (ref 136–145)
WBC # BLD AUTO: 17.5 X10(3) UL (ref 4–11)

## 2024-09-03 PROCEDURE — 71250 CT THORAX DX C-: CPT | Performed by: INTERNAL MEDICINE

## 2024-09-03 PROCEDURE — 99232 SBSQ HOSP IP/OBS MODERATE 35: CPT | Performed by: INTERNAL MEDICINE

## 2024-09-03 PROCEDURE — 99232 SBSQ HOSP IP/OBS MODERATE 35: CPT | Performed by: HOSPITALIST

## 2024-09-03 RX ORDER — POTASSIUM CHLORIDE 1500 MG/1
40 TABLET, EXTENDED RELEASE ORAL ONCE
Status: COMPLETED | OUTPATIENT
Start: 2024-09-03 | End: 2024-09-03

## 2024-09-03 NOTE — PROGRESS NOTES
[facility] Pulmonary Progress Note    Alyssa Montgomery Patient Status:  Inpatient    1932 MRN [id number]   Location [facility] 5NW-A Attending Kayla Strickland DO   Hosp Day # 8 PCP Mony Hall DO     Subjective:  Alyssa Montgomery is a(n) 89 year old female who is admitted for shortness of breath.    Overnight: Breathing slightly better, but still rhonchorous    Objective:  /48 (BP Location: Left arm)   Pulse 82   Temp 98.2 °F (36.8 °C) (Oral)   Resp 20   Wt 172 lb 3.2 oz (78.1 kg)   SpO2 91%   BMI 26.97 kg/m²       Temp (24hrs), Av.3 °F (36.8 °C), Min:98.2 °F (36.8 °C), Max:98.4 °F (36.9 °C)      Intake/Output:    Intake/Output Summary (Last 24 hours) at 9/3/2024 1639  Last data filed at 9/3/2024 0900  Gross per 24 hour   Intake 880 ml   Output 950 ml   Net -70 ml       Physical Exam:   General: alert, cooperative, oriented.  No respiratory distress.   Head: Normocephalic, without obvious abnormality, atraumatic.   Throat: Lips, mucosa, and tongue normal.  No thrush noted.   Neck: trachea midline, no adenopathy, no thyromegaly. No JVD.   Lungs: rhonchi bilaterally   Chest wall: No tenderness or deformity.   Heart: regular rate and rhythm, S1, S2 normal, no murmur, click, rub or gallop   Abdomen: soft, non-distended, no masses, no guarding, no     Rebound.   Extremity: No edema or cyanosis   Skin: No rashes or lesions.   Neurological: Alert, interactive, no focal deficits    Lab Data Review:  Recent Labs     24  0724  0733 24  0722   WBC 14.3* 18.4* 17.9*   HGB 13.5 13.5 13.4   .0* 162.0 168.0     Recent Labs     24  0724  0733 24  0722    141 138   K 3.7  3.7 3.3* 3.9  3.9    104 107   CO2 37.0* 28.0 24.0   BUN 16 23 16   CREATSERUM 0.61 0.68 0.66     No results for input(s): \"PTP\", \"INR\", \"PTT\" in the last 168 hours.    Cultures:   Hospital Encounter on 24   1. Blood Culture     Status: None    Collection Time: 24 10:48  AM    Specimen: Blood,peripheral   Result Value Ref Range    Blood Culture Result No Growth 5 Days N/A       Radiology:  XR CHEST AP PORTABLE  (CPT=71045)  Narrative: PROCEDURE:  XR CHEST AP PORTABLE  (CPT=71045)     TECHNIQUE:  AP chest radiograph was obtained.     COMPARISON:  [facility] , CT, CT ABDOMEN+PELVIS(CPT=74176), 8/26/2024, 6:58 PM.  [facility] , XR, XR CHEST AP PORTABLE  (CPT=71045), 8/31/2024, 5:45 AM.  [facility] , XR, XR CHEST AP PORTABLE  (CPT=71045), 8/28/2024, 4:38 AM.     INDICATIONS:  SOB     PATIENT STATED HISTORY: (As transcribed by Technologist)  Patient offered no additional history at this time.                        Impression: CONCLUSION:    The heart size upper limits of normal.  There is slight vascular redistribution with some interstitial edema suggesting fluid overload or CHF.  There is a tortuous descending thoracic aorta noted.  There is slight decrease in the lateral consolidation in the left mid to lower lung.  Slight decrease in the left effusion.  Persistent peribronchial thickening.  No definite lymphadenopathy.  No pneumothorax.        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)    Macular cyst, hole, or pseudohole, right eye    Nonexudative age-related macular degeneration, right eye, intermediate dry stage    PMR (polymyalgia rheumatica) (HCC)    Paving stone degeneration of retina, bilateral    Asymptomatic carotid artery  stenosis, bilateral    Weakness    Constipation    Thrombocytopenia (HCC)    Syncope and collapse    Concussion with loss of consciousness    Fall, initial encounter    Laceration of right little finger without foreign body without damage to nail, initial encounter    Neuropathy    Shortness of breath    Respiratory syncytial virus (RSV)    RSV (acute bronchiolitis due to respiratory syncytial virus)    Intractable vomiting    Hypokalemia       Assessment:  Acute hypoxic respiratory failure  Bronchitis with significant rhonchorous sounds  Possible aspiration pneumonia  Bibasilar atelectasis  Diarrhea  GERD  Hypertension  Hyperlipidemia  History of coronary artery disease  Polymyalgia rheumatica  Hypothyroidism    Plan:  Close monitoring of oxygenation status, monitor on room air  Continue budesonide neb  Ongoing bronchial hygiene  Continue bronchodilators  Check CT chest today as still rhonchorous  Continue Solu-Medrol 40 mg every 12, can likely change to p.o. in the near future        Lily Cruz MD  [facility]  Office: [phone]

## 2024-09-03 NOTE — PHYSICAL THERAPY NOTE
PHYSICAL THERAPY TREATMENT NOTE - INPATIENT    Room Number: 531/531-A     Session: 1     Number of Visits to Meet Established Goals: 5    Presenting Problem: Intractable vomiting  Co-Morbidities : CAD, HTN, DM, macular degeneration    ASSESSMENT   Patient demonstrates limited progress this session, goals  remain in progress.    Patient continues to function below baseline with bed mobility, transfers, and gait.  Contributing factors to remaining limitations include decreased endurance/aerobic capacity, impaired dynamic and static balance, decreased muscular endurance, and cognitive deficits (significant fear of fall and poor safety awareness).  Next session anticipate patient to progress transfers and gait.  Physical Therapy will continue to follow patient for duration of hospitalization.    Patient continues to benefit from continued skilled PT services: to promote return to prior level of function and safety with continuous assistance and gradual rehabilitative therapy .    PLAN  PT Treatment Plan: Bed mobility;Patient education;Gait training;Energy conservation;Endurance;Range of motion;Strengthening;Transfer training;Balance training  Rehab Potential : Good  Frequency (Obs): 3-5x/week    CURRENT GOALS     Goal #1 Patient is able to demonstrate supine - sit EOB @ level: supervision     Goal #2 Patient is able to demonstrate transfers EOB to/from Chair/Wheelchair at assistance level: minimum assistance     Goal #3 Patient is able to ambulate 10 feet with assist device: walker - rolling at assistance level: minimum assistance     Goal #4    Goal #5    Goal #6    Goal Comments: Goals established on 8/29/2024  9/3/2024 all goals ongoing.     SUBJECTIVE  \" I don't want to get out of bed.\"  \" First turn down the ac\"  \" No, you are going to make me fall.:    OBJECTIVE  Precautions: Bed/chair alarm;Hard of hearing;Low vision    WEIGHT BEARING RESTRICTION  Weight Bearing Restriction: None                PAIN ASSESSMENT    Rating: Unable to rate  Location: Back upon sitting up in the chair  Management Techniques: Repositioning    BALANCE                                                                                                                       Static Sitting: Fair  Dynamic Sitting: Fair -           Static Standing: Poor +  Dynamic Standing: Poor -    ACTIVITY TOLERANCE                         O2 WALK         AM-PAC '6-Clicks' INPATIENT SHORT FORM - BASIC MOBILITY  How much difficulty does the patient currently have...  Patient Difficulty: Turning over in bed (including adjusting bedclothes, sheets and blankets)?: A Little   Patient Difficulty: Sitting down on and standing up from a chair with arms (e.g., wheelchair, bedside commode, etc.): A Lot   Patient Difficulty: Moving from lying on back to sitting on the side of the bed?: A Lot   How much help from another person does the patient currently need...   Help from Another: Moving to and from a bed to a chair (including a wheelchair)?: A Lot   Help from Another: Need to walk in hospital room?: A Lot   Help from Another: Climbing 3-5 steps with a railing?: Total       AM-PAC Score:  Raw Score: 12   Approx Degree of Impairment: 68.66%   Standardized Score (AM-PAC Scale): 35.33   CMS Modifier (G-Code): CL    FUNCTIONAL ABILITY STATUS  Gait Assessment   Functional Mobility/Gait Assessment  Gait Assistance: Moderate assistance  Distance (ft): 4 steps  Assistive Device: Rolling walker  Pattern: Shuffle    Skilled Therapy Provided    Bed Mobility:  Rolling: partial roll to L with manual guidance to reach the rail. Constant fear of fall.    Supine<>Sit: mod assist from head of bed elevated, assist.   Sit<>Supine: NT  Fear of fall hinders from her learning a proper technique. Significant cueing to reassure of her safety.      Transfer Mobility:  Sit<>Stand: min assist from elevated bed, upon standing post lean till manually guidance to shift wt forward.    Stand<>Sit: min assist  and cues for hand placement.   Gait: 4 steps to R to chair with RW, step by step cues for each limb and assist to move RW. Patient verbalizing her fear of fall constantly and poor attention to cueing.    Therapist's Comments: patient was received in supine, encouragement to participate in therapy. Pt was on 2 L o2 at rest, spo2 in 90s. Patient was able to perform bed mobility, transfers with one person assist. Pt declined to participate in gait training. Patient maintained sitting at EOB with sup for 7 min, vitals assessed and line managed. Patient performed ninfa le and ue exercises once in the chair.       THERAPEUTIC EXERCISES  Lower Extremity Alternating marching  Ankle pumps  Hip AB/AD  Heel raises  Heel slides  LAQ     Upper Extremity Elbow flex/ext and PNF 02     Position Sitting     Repetitions   15 - 20   Sets   1     Patient End of Session: Up in chair;Needs met;Call light within reach;RN aware of session/findings;All patient questions and concerns addressed;Alarm set    PT Session Time: 23 minutes  Gait Trainin minutes  Therapeutic Activity: 23 minutes

## 2024-09-03 NOTE — PROGRESS NOTES
[facility]   part of [facility]     Hospitalist Progress Note     Alyssa Montgomery Patient Status:  Inpatient    1932 MRN [id number]   Location [facility] 0-A Attending Medhat Dover MD   Hosp Day # 8 PCP Mony Hall DO     Chief Complaint: n/v    Subjective:     Pt cont to cough, feels some nausea and epigastric discomfort.    Objective:    Review of Systems:   A comprehensive review of systems was completed; pertinent positive and negatives stated in subjective.    Vital signs:  Temp:  [97.4 °F (36.3 °C)-98.4 °F (36.9 °C)] 98.3 °F (36.8 °C)  Pulse:  [82-94] 90  Resp:  [16-20] 18  BP: (131-143)/(53-60) 134/59  SpO2:  [92 %-96 %] 93 %    Physical Exam:    General: No acute distress  Respiratory: mild wheezes, mild rhonchi   Cardiovascular: S1, S2, regular rate and rhythm  Abdomen: Soft, Non-tender, non-distended, positive bowel sounds  Neuro: No new focal deficits.   Extremities: No edema      Diagnostic Data:    Labs:  Recent Labs   Lab 24  0744 24  0727 24  0733 24  0722 24  0817   WBC 13.1* 14.3* 18.4* 17.9* 17.5*   HGB 13.2 13.5 13.5 13.4 13.8   MCV 91.5 88.9 90.3 98.2 90.7   .0* 138.0* 162.0 168.0 161.0       Recent Labs   Lab 24  0729 24  0748 24  0727 24  0733 24  0722 24  0817   *   < > 204* 221* 202* 143*   BUN 14   < > 16 23 16 23   CREATSERUM 0.69   < > 0.61 0.68 0.66 0.62   CA 9.0   < > 8.7 9.0 8.8 8.5*   ALB 4.2  --  3.7 3.9  --   --       < > 141 141 138 142   K 3.4*  3.4*   < > 3.7  3.7 3.3* 3.9  3.9 3.4*      < > 108 104 107 106   CO2 27.0   < > 37.0* 28.0 24.0 31.0   ALKPHO 80  --  76 72  --   --    AST 16  --  18 14  --   --    ALT 11  --  19 21  --   --    BILT 0.9  --  0.8 0.6  --   --    TP 7.0  --  6.5 6.6  --   --     < > = values in this interval not displayed.       Estimated Creatinine Clearance: 56.3 mL/min (based on SCr of 0.62 mg/dL).    No results for input(s):  \"TROP\", \"TROPHS\", \"CK\" in the last 168 hours.    No results for input(s): \"PTP\", \"INR\" in the last 168 hours.                 Microbiology    Hospital Encounter on 08/26/24   1. Blood Culture     Status: None    Collection Time: 08/28/24 10:48 AM    Specimen: Blood,peripheral   Result Value Ref Range    Blood Culture Result No Growth 5 Days N/A         Imaging: Reviewed in Epic.    Medications:    potassium chloride  40 mEq Oral Once    predniSONE  40 mg Oral Daily with breakfast    budesonide  0.5 mg Nebulization 2 times daily    ipratropium-albuterol  3 mL Nebulization Q6H WA    hydrALAZINE  25 mg Oral Q8H SHELLEY    pantoprazole  40 mg Oral QAM AC    carvedilol  12.5 mg Oral BID with meals    amLODIPine  10 mg Oral Daily    atorvastatin  40 mg Oral Nightly    levothyroxine  100 mcg Oral Daily @ 0700    [Held by provider] predniSONE  1 mg Oral Daily with breakfast    pregabalin  25 mg Oral BID    enoxaparin  40 mg Subcutaneous Daily       Assessment & Plan:      #CAP  #Acute bronchitis  #Fever  #Malaise  -iv zosyn (8/27-9/2), monitor off of iv abx  -Bcx/Sputum culture (8/28) reviewed  -CXRs reviewed personally, latest 9/2; s/p iv lasix 9/2, UO 1550, hold off on further diuretics for now  -therapies to see  -prednisone (9/3-)  iv solumedrol (dose adjusted) (8/30-9/3)  pt failed prednisone trial (8/28-8/30)  s/p iv solumedrol (8/27-8/28)    #Leukocytosis, improving  -improving, afebrile, likely secondary to steroids, will monitor    #Aspiration ruled out  -speech eval noted, cleared to eat, I am still having concerns for microaspiration    #Acute hypoxemic resp failure, improving  -on RA at baseline, currently 1L NC  -2/2 above  -echo done, shows lvef intact    #Intractable N/v   #Dehydration   CT a/p w/o acute findings  Resolved  Cont Protonix po, s/p iv protonix bid    #Acute metabolic encephalopathy, resolved  Family reports more back to baseline  TSH slightly low, free t4 okay     #CAD  ASA, statin, BB      #HFpEF, not in exacerbation  Echo 2023 with normal EF, G1DD    #Essential HTN  #HTN urgency, improving  -amlodipine + coreg; continue (new) hydralazine, avoiding doubling bb given active wheezing    #Hypothyroidism - Synthroid   #PMR - 1mg prednisone daily      #Hypokalemia - repletion protocol     #ACP  - DNR, discussed earlier on admission       Kayla Strickland DO    Supplementary Documentation:     Quality:  DVT Mechanical Prophylaxis:     Early ambuation  DVT Pharmacologic Prophylaxis   Medication    enoxaparin (Lovenox) 40 MG/0.4ML SUBQ injection 40 mg                Code Status: DNAR/Selective Treatment  Buck: External urinary catheter in place  Buck Duration (in days):   Central line:    XAVI:     Discharge is dependent on: clinical improvement  At this point Ms. Montgomery is expected to be discharge to: tbd    The 21st Century Cures Act makes medical notes like these available to patients in the interest of transparency. Please be advised this is a medical document. Medical documents are intended to carry relevant information, facts as evident, and the clinical opinion of the practitioner. The medical note is intended as peer to peer communication and may appear blunt or direct. It is written in medical language and may contain abbreviations or verbiage that are unfamiliar.              **Certification      PHYSICIAN Certification of Need for Inpatient Hospitalization - Initial Certification    Patient will require inpatient services that will reasonably be expected to span two midnight's based on the clinical documentation in H+P.   Based on patients current state of illness, I anticipate that, after discharge, patient will require TBD.

## 2024-09-03 NOTE — PLAN OF CARE
Patient AAOx3-4, forgetful. 1L NC. No tele. Lovenox. Purewick, voids. Bladder scan of 387. Tylenol given for pain. Up assist x2/walker. Worked with physical therapy, sitting up in chair. Regular diet. Pills in applesauce one at a time. CT chest ordered by pulm. Patient rounded on routinely. Patient updated on plan of care.      Problem: Patient/Family Goals  Goal: Patient/Family Long Term Goal  Description: Patient's Long Term Goal: Back to [facility]    Interventions:  - IV antibiotics  - O2  - Nebs  - fall precautions  - See additional Care Plan goals for specific interventions  Outcome: Progressing  Goal: Patient/Family Short Term Goal  Description: Patient's Short Term Goal: Pain relief  9/1 noc: wean O2    Interventions:   - Tylenol,   - reposition    - See additional Care Plan goals for specific interventions  Outcome: Progressing     Problem: GASTROINTESTINAL - ADULT  Goal: Minimal or absence of nausea and vomiting  Description: INTERVENTIONS:  - Maintain adequate hydration with IV or PO as ordered and tolerated  - Nasogastric tube to low intermittent suction as ordered  - Evaluate effectiveness of ordered antiemetic medications  - Provide nonpharmacologic comfort measures as appropriate  - Advance diet as tolerated, if ordered  - Obtain nutritional consult as needed  - Evaluate fluid balance  Outcome: Progressing     Problem: METABOLIC/FLUID AND ELECTROLYTES - ADULT  Goal: Electrolytes maintained within normal limits  Description: INTERVENTIONS:  - Monitor labs and rhythm and assess patient for signs and symptoms of electrolyte imbalances  - Administer electrolyte replacement as ordered  - Monitor response to electrolyte replacements, including rhythm and repeat lab results as appropriate  - Fluid restriction as ordered  - Instruct patient on fluid and nutrition restrictions as appropriate  Outcome: Progressing  Goal: Hemodynamic stability and optimal renal function maintained  Description:  INTERVENTIONS:  - Monitor labs and assess for signs and symptoms of volume excess or deficit  - Monitor intake, output and patient weight  - Monitor urine specific gravity, serum osmolarity and serum sodium as indicated or ordered  - Monitor response to interventions for patient's volume status, including labs, urine output, blood pressure (other measures as available)  - Encourage oral intake as appropriate  - Instruct patient on fluid and nutrition restrictions as appropriate  Outcome: Progressing     Problem: RESPIRATORY - ADULT  Goal: Achieves optimal ventilation and oxygenation  Description: INTERVENTIONS:  - Assess for changes in respiratory status  - Assess for changes in mentation and behavior  - Position to facilitate oxygenation and minimize respiratory effort  - Oxygen supplementation based on oxygen saturation or ABGs  - Provide Smoking Cessation handout, if applicable  - Encourage broncho-pulmonary hygiene including cough, deep breathe, Incentive Spirometry  - Assess the need for suctioning and perform as needed  - Assess and instruct to report SOB or any respiratory difficulty  - Respiratory Therapy support as indicated  - Manage/alleviate anxiety  - Monitor for signs/symptoms of CO2 retention  Outcome: Progressing

## 2024-09-03 NOTE — PLAN OF CARE
Pt from [facility], plan to dc to possibly [facility]  Aox3-4, can be forgetful  VSS on RA  afebrile  No tele, receiving Lovenox  Electrolyte non-cardiac replacement  Incontinent, Purewick in place. Q6h bladder scan d/t retention  Up x2 w/ walker, bed alarm on and call light within reach  Regular diet  Receiving IV Solumedrol, transfer to prednisone in am  Pt updated on current POC, no questions at this time    Problem: Patient/Family Goals  Goal: Patient/Family Long Term Goal  Description: Patient's Long Term Goal: Back to [facility]    Interventions:  - IV antibiotics  - O2  - Nebs  - fall precautions  - See additional Care Plan goals for specific interventions  Outcome: Progressing  Goal: Patient/Family Short Term Goal  Description: Patient's Short Term Goal: Pain relief  9/1 noc: wean O2    Interventions:   - Tylenol,   - reposition    - See additional Care Plan goals for specific interventions  Outcome: Progressing     Problem: GASTROINTESTINAL - ADULT  Goal: Minimal or absence of nausea and vomiting  Description: INTERVENTIONS:  - Maintain adequate hydration with IV or PO as ordered and tolerated  - Nasogastric tube to low intermittent suction as ordered  - Evaluate effectiveness of ordered antiemetic medications  - Provide nonpharmacologic comfort measures as appropriate  - Advance diet as tolerated, if ordered  - Obtain nutritional consult as needed  - Evaluate fluid balance  Outcome: Progressing     Problem: METABOLIC/FLUID AND ELECTROLYTES - ADULT  Goal: Electrolytes maintained within normal limits  Description: INTERVENTIONS:  - Monitor labs and rhythm and assess patient for signs and symptoms of electrolyte imbalances  - Administer electrolyte replacement as ordered  - Monitor response to electrolyte replacements, including rhythm and repeat lab results as appropriate  - Fluid restriction as ordered  - Instruct patient on fluid and nutrition restrictions as appropriate  Outcome:  Progressing  Goal: Hemodynamic stability and optimal renal function maintained  Description: INTERVENTIONS:  - Monitor labs and assess for signs and symptoms of volume excess or deficit  - Monitor intake, output and patient weight  - Monitor urine specific gravity, serum osmolarity and serum sodium as indicated or ordered  - Monitor response to interventions for patient's volume status, including labs, urine output, blood pressure (other measures as available)  - Encourage oral intake as appropriate  - Instruct patient on fluid and nutrition restrictions as appropriate  Outcome: Progressing     Problem: RESPIRATORY - ADULT  Goal: Achieves optimal ventilation and oxygenation  Description: INTERVENTIONS:  - Assess for changes in respiratory status  - Assess for changes in mentation and behavior  - Position to facilitate oxygenation and minimize respiratory effort  - Oxygen supplementation based on oxygen saturation or ABGs  - Provide Smoking Cessation handout, if applicable  - Encourage broncho-pulmonary hygiene including cough, deep breathe, Incentive Spirometry  - Assess the need for suctioning and perform as needed  - Assess and instruct to report SOB or any respiratory difficulty  - Respiratory Therapy support as indicated  - Manage/alleviate anxiety  - Monitor for signs/symptoms of CO2 retention  Outcome: Progressing

## 2024-09-04 PROBLEM — J18.9 COMMUNITY ACQUIRED PNEUMONIA: Status: ACTIVE | Noted: 2024-01-01

## 2024-09-04 PROBLEM — J98.01 BRONCHOSPASM: Status: ACTIVE | Noted: 2024-09-04

## 2024-09-04 PROBLEM — J98.01 BRONCHOSPASM: Status: ACTIVE | Noted: 2024-01-01

## 2024-09-04 PROBLEM — J18.9 COMMUNITY ACQUIRED PNEUMONIA: Status: ACTIVE | Noted: 2024-09-04

## 2024-09-04 LAB
POTASSIUM SERPL-SCNC: 3.7 MMOL/L (ref 3.5–5.1)
PROCALCITONIN SERPL-MCNC: 0.15 NG/ML (ref ?–0.05)

## 2024-09-04 PROCEDURE — 99233 SBSQ HOSP IP/OBS HIGH 50: CPT | Performed by: INTERNAL MEDICINE

## 2024-09-04 PROCEDURE — 99232 SBSQ HOSP IP/OBS MODERATE 35: CPT | Performed by: HOSPITALIST

## 2024-09-04 RX ORDER — SODIUM CHLORIDE FOR INHALATION 3 %
3 VIAL, NEBULIZER (ML) INHALATION
Status: DISCONTINUED | OUTPATIENT
Start: 2024-09-04 | End: 2024-09-09

## 2024-09-04 NOTE — PROGRESS NOTES
[facility] Pulmonary Progress Note    Alyssa Montgomery Patient Status:  Inpatient    1932 MRN [id number]   Location [facility] 5NW-A Attending Kayla Strickland DO   Hosp Day # 9 PCP Mony Hall DO     Subjective:  Alyssa Montgomery is a(n) 89 year old female who is admitted for shortness of breath.    Overnight: upset about her coffee and toast.  Otherwise doing ok    Objective:  /52 (BP Location: Right arm)   Pulse 82   Temp 97.3 °F (36.3 °C) (Oral)   Resp 18   Wt 172 lb 3.2 oz (78.1 kg)   SpO2 90%   BMI 26.97 kg/m²       Temp (24hrs), Av.9 °F (36.6 °C), Min:97.3 °F (36.3 °C), Max:98.2 °F (36.8 °C)      Intake/Output:    Intake/Output Summary (Last 24 hours) at 2024 1407  Last data filed at 9/3/2024 1800  Gross per 24 hour   Intake --   Output 300 ml   Net -300 ml       Physical Exam:   General: alert, cooperative, oriented.  No respiratory distress.   Head: Normocephalic, without obvious abnormality, atraumatic.   Throat: Lips, mucosa, and tongue normal.  No thrush noted.   Neck: trachea midline, no adenopathy, no thyromegaly. No JVD.   Lungs: rhonchi bilaterally   Chest wall: No tenderness or deformity.   Heart: regular rate and rhythm, S1, S2 normal, no murmur, click, rub or gallop   Abdomen: soft, non-distended, no masses, no guarding, no     Rebound.   Extremity: No edema or cyanosis   Skin: No rashes or lesions.   Neurological: Alert, interactive, no focal deficits    Lab Data Review:  Recent Labs     24  0724  0733 24  0722   WBC 14.3* 18.4* 17.9*   HGB 13.5 13.5 13.4   .0* 162.0 168.0     Recent Labs     24  0724  0733 24  0722    141 138   K 3.7  3.7 3.3* 3.9  3.9    104 107   CO2 37.0* 28.0 24.0   BUN 16 23 16   CREATSERUM 0.61 0.68 0.66     No results for input(s): \"PTP\", \"INR\", \"PTT\" in the last 168 hours.    Cultures:   Hospital Encounter on 24   1. Blood Culture     Status: None    Collection Time: 24  10:48 AM    Specimen: Blood,peripheral   Result Value Ref Range    Blood Culture Result No Growth 5 Days N/A       Radiology:  CT CHEST (LYU=48736)  Narrative: PROCEDURE:  CT CHEST (CPT=71250)     COMPARISON:  None.     INDICATIONS:  89-year-old female, Ongoing wheezing and rhonchi, shortness of breath.     TECHNIQUE:  Unenhanced multislice CT scanning is performed through the chest.  Dose reduction techniques were used. Dose information is transmitted to the ACR (American College of Radiology) NRDR (National Radiology Data Registry) which includes the Dose   Index Registry.     PATIENT STATED HISTORY: (As transcribed by Technologist)  Ongoing wheezing and shortness of breath. Patient unable to provide recent history         FINDINGS:       This scan performed without IV contrast, with limitations thereof.     LUNGS/PLLEURA:  Positive for pneumonia with moderate size consolidation in the posterior aspect of the left lower lobe with air bronchograms abutting the interlobar fissure.  More patchy areas of consolidation in the left lower lobe, and there is a small   left pleural effusion and a trace right pleural effusion.  No pneumothorax.  Bronchial wall thickening without bronchiectasis.  In addition to these pulmonary parenchymal changes, there is airway narrowing involving the brad, and right and left main   bronchus.  Bronchial wall thickening more peripherally also creates airway narrowing.  No signs of pneumothorax.     THORACIC AORTA:  No aneurysm or other acute process. There are atherosclerotic changes including calcification involving the aorta, but no evidence for aneurysm involving the aorta.     MEDIASTINUM/CAROLINA:  Unremarkable.     CARDIAC:  Coronary artery calcifications are present.     CHEST WALL:  Unremarkable.     LIMITED ABDOMEN:  No acute process.      BONES:  No acute process.     OTHER:    None.                       Impression: CONCLUSION:       1. Moderate size left upper lobe pneumonia and  more patchy smaller consolidations in the left lower lobe.  Small left effusion.     2. Moderate bronchial wall thickening.     3. There is also narrowed appearance of the more central larger airways including the brad and right and left main bronchus.  This can be seen with airways inflammation.     4. The ongoing wheezing and rhonchi experience by this patient is likely secondary to these airway changes.       LOCATION:  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)    Macular cyst, hole, or pseudohole, right eye    Nonexudative age-related macular degeneration, right eye, intermediate dry stage    PMR (polymyalgia rheumatica) (HCC)    Paving stone degeneration of retina, bilateral    Asymptomatic carotid artery stenosis, bilateral    Weakness    Constipation    Thrombocytopenia (HCC)    Syncope and collapse    Concussion with loss of consciousness    Fall, initial encounter    Laceration of right little finger without foreign body without damage to nail, initial encounter    Neuropathy    Shortness of breath    Respiratory syncytial virus (RSV)    RSV (acute bronchiolitis due to respiratory syncytial virus)    Intractable vomiting    Hypokalemia    Community acquired pneumonia    Bronchospasm       Assessment:  Acute hypoxic respiratory failure likely 2/2 left sided pna, CT on 9/3 with left sided infiltrate  Bronchitis with significant rhonchorous sounds 2/2 above  Bibasilar  atelectasis  Diarrhea  GERD  Hypertension  Hyperlipidemia  History of coronary artery disease  Polymyalgia rheumatica  Hypothyroidism    Plan:  Close monitoring of oxygenation status, wean oxygen as able  Continue budesonide neb  Ongoing bronchial hygiene  Continue bronchodilators  Add hypersal  Changed to prednisone 40 mg  Check noninvasive serologies        Lily Cruz MD  [facility]  Office: [phone]

## 2024-09-04 NOTE — PROGRESS NOTES
09/03/24 2131 09/03/24 2134   Provider Notification   Reason for Communication Review case Review case   Provider Name Haseeb Handy MD Other (comment)  (Shae Flynn)   Method of Communication Page Page   Response Per protocol Per protocol     CT results showed Left upper lobe pna, left lower lobe consolidation, and narrowing of bronchus airways. Hosp and pulm aware. Nebs and prednisone already ordered. Zosyn started.

## 2024-09-04 NOTE — PROGRESS NOTES
[facility]   part of [facility]     Hospitalist Progress Note     Alyssa Montgomery Patient Status:  Inpatient    1932 MRN [id number]   Location [facility] 0-A Attending Medhat Dover MD   Hosp Day # 9 PCP Mony Hall DO     Chief Complaint: n/v    Subjective:     Patient seen and examined. Still with cough.     Objective:    Review of Systems:   A comprehensive review of systems was completed; pertinent positive and negatives stated in subjective.    Vital signs:  Temp:  [97.3 °F (36.3 °C)-98.2 °F (36.8 °C)] 97.3 °F (36.3 °C)  Pulse:  [79-87] 82  Resp:  [18-20] 18  BP: (114-144)/(48-58) 114/52  SpO2:  [90 %-94 %] 90 %    Physical Exam:    General: No acute distress  Respiratory: mild wheezes, mild rhonchi   Cardiovascular: S1, S2, regular rate and rhythm  Abdomen: Soft, Non-tender, non-distended, positive bowel sounds  Neuro: No new focal deficits.   Extremities: No edema      Diagnostic Data:    Labs:  Recent Labs   Lab 24  0744 24  0724  0733 24  0722 24  0817   WBC 13.1* 14.3* 18.4* 17.9* 17.5*   HGB 13.2 13.5 13.5 13.4 13.8   MCV 91.5 88.9 90.3 98.2 90.7   .0* 138.0* 162.0 168.0 161.0       Recent Labs   Lab 24  0724  0733 24  0722 24  0817 24  0714   * 221* 202* 143*  --    BUN 16 23 16 23  --    CREATSERUM 0.61 0.68 0.66 0.62  --    CA 8.7 9.0 8.8 8.5*  --    ALB 3.7 3.9  --   --   --     141 138 142  --    K 3.7  3.7 3.3* 3.9  3.9 3.4* 3.7    104 107 106  --    CO2 37.0* 28.0 24.0 31.0  --    ALKPHO 76 72  --   --   --    AST 18 14  --   --   --    ALT 19 21  --   --   --    BILT 0.8 0.6  --   --   --    TP 6.5 6.6  --   --   --        Estimated Creatinine Clearance: 56.3 mL/min (based on SCr of 0.62 mg/dL).    No results for input(s): \"TROP\", \"TROPHS\", \"CK\" in the last 168 hours.    No results for input(s): \"PTP\", \"INR\" in the last 168 hours.                 [facility]  Encounter on 08/26/24   1. Blood Culture     Status: None    Collection Time: 08/28/24 10:48 AM    Specimen: Blood,peripheral   Result Value Ref Range    Blood Culture Result No Growth 5 Days N/A         Imaging: Reviewed in Epic.    Medications:    piperacillin-tazobactam  3.375 g Intravenous Q8H    predniSONE  40 mg Oral Daily with breakfast    budesonide  0.5 mg Nebulization 2 times daily    ipratropium-albuterol  3 mL Nebulization Q6H WA    hydrALAZINE  25 mg Oral Q8H SHELLEY    pantoprazole  40 mg Oral QAM AC    carvedilol  12.5 mg Oral BID with meals    amLODIPine  10 mg Oral Daily    atorvastatin  40 mg Oral Nightly    levothyroxine  100 mcg Oral Daily @ 0700    [Held by provider] predniSONE  1 mg Oral Daily with breakfast    pregabalin  25 mg Oral BID    enoxaparin  40 mg Subcutaneous Daily       Assessment & Plan:      #CAP  #Acute bronchitis with bronchospasm   -IV zosyn  -Bcx/Sputum culture negative   -Tapering steroids per pulm   -Scheduled nebs     #Leukocytosis, improving  -Likely in part steroid induced     #Acute hypoxemic resp failure  -2/2 above  -Wean oxygen as above   -echo done, shows lvef intact    #Acute metabolic encephalopathy  Resolved      #CAD  ASA, statin, BB     #HFpEF  Compensated   Echo 2023 with normal EF, G1DD    #Essential HTN  #HTN urgency, improving  -BP controlled     #Hypothyroidism - Synthroid   #PMR - 1mg prednisone daily     Buzz Contreras DO    Supplementary Documentation:     Quality:  DVT Mechanical Prophylaxis:     Early ambuation  DVT Pharmacologic Prophylaxis   Medication    enoxaparin (Lovenox) 40 MG/0.4ML SUBQ injection 40 mg                Code Status: DNAR/Selective Treatment  Buck: External urinary catheter in place  Buck Duration (in days):   Central line:    XAVI:     Discharge is dependent on: clinical improvement  At this point Ms. Montgomery is expected to be discharge to: tbd    The 21st Century Cures Act makes medical notes like these available to patients in the  interest of transparency. Please be advised this is a medical document. Medical documents are intended to carry relevant information, facts as evident, and the clinical opinion of the practitioner. The medical note is intended as peer to peer communication and may appear blunt or direct. It is written in medical language and may contain abbreviations or verbiage that are unfamiliar.

## 2024-09-04 NOTE — PLAN OF CARE
Pt from [facility] [place], plan to dc to possibly [facility]  Aox3-4, can be forgetful  VSS on 2L  afebrile  No tele, receiving Lovenox  Electrolyte non-cardiac replacement  Incontinent, Purewick in place. Q6h bladder scan d/t retention. Continent for bowels  Up x2 w/ walker, bed alarm on and call light within reach  Regular diet  Receiving Prednisone, Nebs, and Zosyn  Pt updated on current POC, no questions at this time    Problem: Patient/Family Goals  Goal: Patient/Family Long Term Goal  Description: Patient's Long Term Goal: Back to [facility]    Interventions:  - IV antibiotics  - O2  - Nebs  - fall precautions  - See additional Care Plan goals for specific interventions  Outcome: Progressing  Goal: Patient/Family Short Term Goal  Description: Patient's Short Term Goal: Pain relief  9/1 noc: wean O2    Interventions:   - Tylenol,   - reposition    - See additional Care Plan goals for specific interventions  Outcome: Progressing     Problem: GASTROINTESTINAL - ADULT  Goal: Minimal or absence of nausea and vomiting  Description: INTERVENTIONS:  - Maintain adequate hydration with IV or PO as ordered and tolerated  - Nasogastric tube to low intermittent suction as ordered  - Evaluate effectiveness of ordered antiemetic medications  - Provide nonpharmacologic comfort measures as appropriate  - Advance diet as tolerated, if ordered  - Obtain nutritional consult as needed  - Evaluate fluid balance  Outcome: Progressing     Problem: METABOLIC/FLUID AND ELECTROLYTES - ADULT  Goal: Electrolytes maintained within normal limits  Description: INTERVENTIONS:  - Monitor labs and rhythm and assess patient for signs and symptoms of electrolyte imbalances  - Administer electrolyte replacement as ordered  - Monitor response to electrolyte replacements, including rhythm and repeat lab results as appropriate  - Fluid restriction as ordered  - Instruct patient on fluid and nutrition restrictions as appropriate  Outcome:  Progressing  Goal: Hemodynamic stability and optimal renal function maintained  Description: INTERVENTIONS:  - Monitor labs and assess for signs and symptoms of volume excess or deficit  - Monitor intake, output and patient weight  - Monitor urine specific gravity, serum osmolarity and serum sodium as indicated or ordered  - Monitor response to interventions for patient's volume status, including labs, urine output, blood pressure (other measures as available)  - Encourage oral intake as appropriate  - Instruct patient on fluid and nutrition restrictions as appropriate  Outcome: Progressing     Problem: RESPIRATORY - ADULT  Goal: Achieves optimal ventilation and oxygenation  Description: INTERVENTIONS:  - Assess for changes in respiratory status  - Assess for changes in mentation and behavior  - Position to facilitate oxygenation and minimize respiratory effort  - Oxygen supplementation based on oxygen saturation or ABGs  - Provide Smoking Cessation handout, if applicable  - Encourage broncho-pulmonary hygiene including cough, deep breathe, Incentive Spirometry  - Assess the need for suctioning and perform as needed  - Assess and instruct to report SOB or any respiratory difficulty  - Respiratory Therapy support as indicated  - Manage/alleviate anxiety  - Monitor for signs/symptoms of CO2 retention  Outcome: Progressing

## 2024-09-04 NOTE — PLAN OF CARE
A&Ox3/4, forgetful. 2L NC. Baseline RA.   No tele ordered. Lovenox subcut.  Reg diet tolerated, continent of BM, incontinent of urine. Purewick, needs encouragement to void.   Up x1/2 walker. Pt up in chair for breakfast and lunch.  Updated pts daughter on POC. Wants MD to call upon rounding.  Prednisone, Zosyn, nebs.   Pills one at a time whole in applesauce.  All questions answered, no further needs at this time.    Problem: Patient/Family Goals  Goal: Patient/Family Long Term Goal  Description: Patient's Long Term Goal: Back to [facility]    Interventions:  - IV antibiotics  - O2  - Nebs  - fall precautions  - See additional Care Plan goals for specific interventions  9/4/2024 1847 by Vivian Valadez RN  Outcome: Progressing  9/4/2024 1846 by Vivian Valadez RN  Outcome: Progressing  Goal: Patient/Family Short Term Goal  Description: Patient's Short Term Goal: Pain relief  9/1 noc: wean O2  9/4AM: urinate    Interventions:   - Tylenol,   - reposition    - See additional Care Plan goals for specific interventions  9/4/2024 1847 by Vivian Valadez RN  Outcome: Progressing  9/4/2024 1846 by Vivian Valadez RN  Outcome: Progressing     Problem: GASTROINTESTINAL - ADULT  Goal: Minimal or absence of nausea and vomiting  Description: INTERVENTIONS:  - Maintain adequate hydration with IV or PO as ordered and tolerated  - Nasogastric tube to low intermittent suction as ordered  - Evaluate effectiveness of ordered antiemetic medications  - Provide nonpharmacologic comfort measures as appropriate  - Advance diet as tolerated, if ordered  - Obtain nutritional consult as needed  - Evaluate fluid balance  9/4/2024 1847 by Vivian Valadez RN  Outcome: Progressing  9/4/2024 1846 by Vivian Valadez RN  Outcome: Progressing     Problem: METABOLIC/FLUID AND ELECTROLYTES - ADULT  Goal: Electrolytes maintained within normal limits  Description: INTERVENTIONS:  - Monitor labs and rhythm and assess patient for signs and  symptoms of electrolyte imbalances  - Administer electrolyte replacement as ordered  - Monitor response to electrolyte replacements, including rhythm and repeat lab results as appropriate  - Fluid restriction as ordered  - Instruct patient on fluid and nutrition restrictions as appropriate  9/4/2024 1847 by Vivian Valadez RN  Outcome: Progressing  9/4/2024 1846 by Vivian Valadez RN  Outcome: Progressing  Goal: Hemodynamic stability and optimal renal function maintained  Description: INTERVENTIONS:  - Monitor labs and assess for signs and symptoms of volume excess or deficit  - Monitor intake, output and patient weight  - Monitor urine specific gravity, serum osmolarity and serum sodium as indicated or ordered  - Monitor response to interventions for patient's volume status, including labs, urine output, blood pressure (other measures as available)  - Encourage oral intake as appropriate  - Instruct patient on fluid and nutrition restrictions as appropriate  9/4/2024 1847 by Vivian Valadez RN  Outcome: Progressing  9/4/2024 1846 by Vivian Valadez RN  Outcome: Progressing     Problem: RESPIRATORY - ADULT  Goal: Achieves optimal ventilation and oxygenation  Description: INTERVENTIONS:  - Assess for changes in respiratory status  - Assess for changes in mentation and behavior  - Position to facilitate oxygenation and minimize respiratory effort  - Oxygen supplementation based on oxygen saturation or ABGs  - Provide Smoking Cessation handout, if applicable  - Encourage broncho-pulmonary hygiene including cough, deep breathe, Incentive Spirometry  - Assess the need for suctioning and perform as needed  - Assess and instruct to report SOB or any respiratory difficulty  - Respiratory Therapy support as indicated  - Manage/alleviate anxiety  - Monitor for signs/symptoms of CO2 retention  9/4/2024 1847 by Vivian Valadez RN  Outcome: Progressing  9/4/2024 1846 by Vivian Valadez RN  Outcome: Progressing

## 2024-09-05 LAB
ADENOVIRUS PCR:: NOT DETECTED
B PARAPERT DNA SPEC QL NAA+PROBE: NOT DETECTED
B PERT DNA SPEC QL NAA+PROBE: NOT DETECTED
C PNEUM DNA SPEC QL NAA+PROBE: NOT DETECTED
CORONAVIRUS 229E PCR:: NOT DETECTED
CORONAVIRUS HKU1 PCR:: NOT DETECTED
CORONAVIRUS NL63 PCR:: NOT DETECTED
CORONAVIRUS OC43 PCR:: NOT DETECTED
FLUAV RNA SPEC QL NAA+PROBE: NOT DETECTED
FLUBV RNA SPEC QL NAA+PROBE: NOT DETECTED
L PNEUMO AG UR QL: NEGATIVE
METAPNEUMOVIRUS PCR:: NOT DETECTED
MYCOPLASMA PNEUMONIA PCR:: NOT DETECTED
PARAINFLUENZA 1 PCR:: NOT DETECTED
PARAINFLUENZA 2 PCR:: NOT DETECTED
PARAINFLUENZA 3 PCR:: NOT DETECTED
PARAINFLUENZA 4 PCR:: NOT DETECTED
PROCALCITONIN SERPL-MCNC: 0.06 NG/ML (ref ?–0.05)
RHINOVIRUS/ENTERO PCR:: NOT DETECTED
RSV RNA SPEC QL NAA+PROBE: NOT DETECTED
SARS-COV-2 RNA NPH QL NAA+NON-PROBE: NOT DETECTED
SARS-COV-2 RNA RESP QL NAA+PROBE: NOT DETECTED
STREP PNEUMO ANTIGEN, URINE: NEGATIVE

## 2024-09-05 PROCEDURE — 99232 SBSQ HOSP IP/OBS MODERATE 35: CPT | Performed by: HOSPITALIST

## 2024-09-05 PROCEDURE — 99232 SBSQ HOSP IP/OBS MODERATE 35: CPT | Performed by: INTERNAL MEDICINE

## 2024-09-05 RX ORDER — IPRATROPIUM BROMIDE AND ALBUTEROL SULFATE 2.5; .5 MG/3ML; MG/3ML
3 SOLUTION RESPIRATORY (INHALATION)
Status: DISCONTINUED | OUTPATIENT
Start: 2024-09-05 | End: 2024-09-05

## 2024-09-05 RX ORDER — IPRATROPIUM BROMIDE AND ALBUTEROL SULFATE 2.5; .5 MG/3ML; MG/3ML
3 SOLUTION RESPIRATORY (INHALATION)
Status: DISCONTINUED | OUTPATIENT
Start: 2024-09-05 | End: 2024-09-06

## 2024-09-05 NOTE — CM/SW NOTE
Care Progression Note:  Length of stay: 10  GMLOS: 3.6  Avoidable Delays:   Code Status: DNAR/Select    Acute Medical Issue/Factors:   Intractable vomiting   Dehydration  Acute metabolic encephalopathy        Discharge Barriers: worsening SOB, anxious. Duoneb/pulmicort. Full viral panel ordered. Wean oxygen.     Expected discharge date: pending improvement of above.      Expected next site of care: Sub-acute Rehab.     / available for discharge planning.

## 2024-09-05 NOTE — CM/SW NOTE
09/05/24 1200   CM/SW Referral Data   Reason for Referral Advance Directives/POLST     Obtained completed/signed POLST form from paper chart. Securly sent to medical records to be uploaded into EMR. Uploaded to [facility] in aidin for eventual [facility] admission.    SW/CM to remain available for support and/or discharge planning.    ELICIA Angulo  Discharge Planner  [phone]

## 2024-09-05 NOTE — PLAN OF CARE
Pt from [facility] [place], plan to dc to possibly [facility]  Aox3-4, can be forgetful  VSS on 2L/4L w/ ambulation  afebrile  No tele, receiving Lovenox  Electrolyte non-cardiac replacement  Incontinent, Purewick in place. Q6h bladder scan d/t retention. Continent for bowels  Up x2 w/ walker, bed alarm on and call light within reach  Regular diet  Receiving Prednisone, Nebs, and Zosyn  Pt updated on current POC, no questions at this time    Problem: Patient/Family Goals  Goal: Patient/Family Long Term Goal  Description: Patient's Long Term Goal: Back to [facility]    Interventions:  - IV antibiotics  - O2  - Nebs  - fall precautions  - See additional Care Plan goals for specific interventions  Outcome: Progressing  Goal: Patient/Family Short Term Goal  Description: Patient's Short Term Goal: Pain relief  9/1 noc: wean O2  9/4AM: urinate    Interventions:   - Tylenol,   - reposition    - See additional Care Plan goals for specific interventions  Outcome: Progressing     Problem: GASTROINTESTINAL - ADULT  Goal: Minimal or absence of nausea and vomiting  Description: INTERVENTIONS:  - Maintain adequate hydration with IV or PO as ordered and tolerated  - Nasogastric tube to low intermittent suction as ordered  - Evaluate effectiveness of ordered antiemetic medications  - Provide nonpharmacologic comfort measures as appropriate  - Advance diet as tolerated, if ordered  - Obtain nutritional consult as needed  - Evaluate fluid balance  Outcome: Progressing     Problem: METABOLIC/FLUID AND ELECTROLYTES - ADULT  Goal: Electrolytes maintained within normal limits  Description: INTERVENTIONS:  - Monitor labs and rhythm and assess patient for signs and symptoms of electrolyte imbalances  - Administer electrolyte replacement as ordered  - Monitor response to electrolyte replacements, including rhythm and repeat lab results as appropriate  - Fluid restriction as ordered  - Instruct patient on fluid and nutrition  restrictions as appropriate  Outcome: Progressing  Goal: Hemodynamic stability and optimal renal function maintained  Description: INTERVENTIONS:  - Monitor labs and assess for signs and symptoms of volume excess or deficit  - Monitor intake, output and patient weight  - Monitor urine specific gravity, serum osmolarity and serum sodium as indicated or ordered  - Monitor response to interventions for patient's volume status, including labs, urine output, blood pressure (other measures as available)  - Encourage oral intake as appropriate  - Instruct patient on fluid and nutrition restrictions as appropriate  Outcome: Progressing     Problem: RESPIRATORY - ADULT  Goal: Achieves optimal ventilation and oxygenation  Description: INTERVENTIONS:  - Assess for changes in respiratory status  - Assess for changes in mentation and behavior  - Position to facilitate oxygenation and minimize respiratory effort  - Oxygen supplementation based on oxygen saturation or ABGs  - Provide Smoking Cessation handout, if applicable  - Encourage broncho-pulmonary hygiene including cough, deep breathe, Incentive Spirometry  - Assess the need for suctioning and perform as needed  - Assess and instruct to report SOB or any respiratory difficulty  - Respiratory Therapy support as indicated  - Manage/alleviate anxiety  - Monitor for signs/symptoms of CO2 retention  Outcome: Progressing

## 2024-09-05 NOTE — PLAN OF CARE
Pt. A&O x 3-4; can be forgetful. Pt. On 3L O2; O2 sats mid 90's. Pt. With strong non-productive cough; PRN cough medicine given per MAR. Pt. Encouraged to sit in the chair; pt. Declining several attempts stating she will get up tomorrow when her family is here. Pt. Repositioned for comfort. Call light within reach.    Problem: METABOLIC/FLUID AND ELECTROLYTES - ADULT  Goal: Electrolytes maintained within normal limits  Description: INTERVENTIONS:  - Monitor labs and rhythm and assess patient for signs and symptoms of electrolyte imbalances  - Administer electrolyte replacement as ordered  - Monitor response to electrolyte replacements, including rhythm and repeat lab results as appropriate  - Fluid restriction as ordered  - Instruct patient on fluid and nutrition restrictions as appropriate  Outcome: Progressing     Problem: RESPIRATORY - ADULT  Goal: Achieves optimal ventilation and oxygenation  Description: INTERVENTIONS:  - Assess for changes in respiratory status  - Assess for changes in mentation and behavior  - Position to facilitate oxygenation and minimize respiratory effort  - Oxygen supplementation based on oxygen saturation or ABGs  - Provide Smoking Cessation handout, if applicable  - Encourage broncho-pulmonary hygiene including cough, deep breathe, Incentive Spirometry  - Assess the need for suctioning and perform as needed  - Assess and instruct to report SOB or any respiratory difficulty  - Respiratory Therapy support as indicated  - Manage/alleviate anxiety  - Monitor for signs/symptoms of CO2 retention  Outcome: Progressing

## 2024-09-05 NOTE — PROGRESS NOTES
[facility]   part of [facility]     Hospitalist Progress Note     Alyssa Montgomery Patient Status:  Inpatient    1932 MRN [id number]   Location [facility] 0-A Attending Medhat Dover MD   Hosp Day # 10 PCP Mony Hall DO     Chief Complaint: n/v    Subjective:     Patient seen and examined. Feels more SOB today. Very anxious.     Objective:    Review of Systems:   A comprehensive review of systems was completed; pertinent positive and negatives stated in subjective.    Vital signs:  Temp:  [98 °F (36.7 °C)] 98 °F (36.7 °C)  Pulse:  [87] 87  Resp:  [17-18] 18  BP: (138-163)/(61-62) 138/61  SpO2:  [91 %-93 %] 91 %    Physical Exam:    General: No acute distress  Respiratory: mild wheezes, mild rhonchi   Cardiovascular: S1, S2, regular rate and rhythm  Abdomen: Soft, Non-tender, non-distended, positive bowel sounds  Neuro: No new focal deficits.   Extremities: No edema      Diagnostic Data:    Labs:  Recent Labs   Lab 24  0744 24  0724  0733 24  0722 24  0817   WBC 13.1* 14.3* 18.4* 17.9* 17.5*   HGB 13.2 13.5 13.5 13.4 13.8   MCV 91.5 88.9 90.3 98.2 90.7   .0* 138.0* 162.0 168.0 161.0       Recent Labs   Lab 24  0727 24  0733 24  0722 24  0817 24  0714   * 221* 202* 143*  --    BUN 16 23 16 23  --    CREATSERUM 0.61 0.68 0.66 0.62  --    CA 8.7 9.0 8.8 8.5*  --    ALB 3.7 3.9  --   --   --     141 138 142  --    K 3.7  3.7 3.3* 3.9  3.9 3.4* 3.7    104 107 106  --    CO2 37.0* 28.0 24.0 31.0  --    ALKPHO 76 72  --   --   --    AST 18 14  --   --   --    ALT 19 21  --   --   --    BILT 0.8 0.6  --   --   --    TP 6.5 6.6  --   --   --        Estimated Creatinine Clearance: 56.3 mL/min (based on SCr of 0.62 mg/dL).    No results for input(s): \"TROP\", \"TROPHS\", \"CK\" in the last 168 hours.    No results for input(s): \"PTP\", \"INR\" in the last 168 hours.                 Microbiology    Hospital Encounter on  08/26/24   1. Blood Culture     Status: None    Collection Time: 08/28/24 10:48 AM    Specimen: Blood,peripheral   Result Value Ref Range    Blood Culture Result No Growth 5 Days N/A         Imaging: Reviewed in Epic.    Medications:    ipratropium-albuterol  3 mL Nebulization Q4H WA (5 times daily)    sodium chloride (hypertonic)  3 mL Nebulization QID    piperacillin-tazobactam  3.375 g Intravenous Q8H    predniSONE  40 mg Oral Daily with breakfast    budesonide  0.5 mg Nebulization 2 times daily    hydrALAZINE  25 mg Oral Q8H SHELLEY    pantoprazole  40 mg Oral QAM AC    carvedilol  12.5 mg Oral BID with meals    amLODIPine  10 mg Oral Daily    atorvastatin  40 mg Oral Nightly    levothyroxine  100 mcg Oral Daily @ 0700    [Held by provider] predniSONE  1 mg Oral Daily with breakfast    pregabalin  25 mg Oral BID    enoxaparin  40 mg Subcutaneous Daily       Assessment & Plan:      #CAP  #Acute bronchitis with bronchospasm   -IV zosyn  -Bcx/Sputum culture negative   -Tapering steroids per pulm   -Scheduled nebs - duoneb/pulmicort     #Leukocytosis, improving  -Likely in part steroid induced   -Monitor    #Acute hypoxemic resp failure  -2/2 above  -Wean oxygen as above     #Acute metabolic encephalopathy  Resolved      #CAD  ASA, statin, BB     #HFpEF  Compensated   Echo 2023 with normal EF, G1DD    #Essential HTN  #HTN urgency, improving  -BP controlled     #Hypothyroidism - Synthroid   #PMR - 1mg prednisone daily     #Suspected cognitive impairment  -OP neuropsych ammon Contreras,     Supplementary Documentation:     Quality:  DVT Mechanical Prophylaxis:     Early ambuation  DVT Pharmacologic Prophylaxis   Medication    enoxaparin (Lovenox) 40 MG/0.4ML SUBQ injection 40 mg                Code Status: DNAR/Selective Treatment  Buck: External urinary catheter in place  Buck Duration (in days):   Central line:    XAVI:     Discharge is dependent on: clinical improvement  At this point Ms. Montgomery is expected to  be discharge to: tbd    The 21st Century Cures Act makes medical notes like these available to patients in the interest of transparency. Please be advised this is a medical document. Medical documents are intended to carry relevant information, facts as evident, and the clinical opinion of the practitioner. The medical note is intended as peer to peer communication and may appear blunt or direct. It is written in medical language and may contain abbreviations or verbiage that are unfamiliar.

## 2024-09-05 NOTE — PROGRESS NOTES
[facility] Pulmonary Progress Note    Alyssa Montgomery Patient Status:  Inpatient    1932 MRN [id number]   Location [facility] 5NW-A Attending Kayla Strickland DO   Hosp Day # 10 PCP Mony Hall DO     Subjective:  Alyssa Montgomery is a(n) 89 year old female who is admitted for shortness of breath.    Overnight: feeling very anxious like she is going to die     Objective:  /54 (BP Location: Right leg)   Pulse 87   Temp 98 °F (36.7 °C) (Oral)   Resp 18   Wt 172 lb 3.2 oz (78.1 kg)   SpO2 91%   BMI 26.97 kg/m²       Temp (24hrs), Av °F (36.7 °C), Min:98 °F (36.7 °C), Max:98 °F (36.7 °C)      Intake/Output:  No intake or output data in the 24 hours ending 24 1356      Physical Exam:   General: alert, cooperative, oriented.  No respiratory distress.   Head: Normocephalic, without obvious abnormality, atraumatic.   Throat: Lips, mucosa, and tongue normal.  No thrush noted.   Neck: trachea midline, no adenopathy, no thyromegaly. No JVD.   Lungs: rhonchi bilaterally   Chest wall: No tenderness or deformity.   Heart: regular rate and rhythm, S1, S2 normal, no murmur, click, rub or gallop   Abdomen: soft, non-distended, no masses, no guarding, no     Rebound.   Extremity: No edema or cyanosis   Skin: No rashes or lesions.   Neurological: Alert, interactive, no focal deficits    Lab Data Review:  Recent Labs     24  07   WBC 14.3* 18.4* 17.9*   HGB 13.5 13.5 13.4   .0* 162.0 168.0     Recent Labs     24    141 138   K 3.7  3.7 3.3* 3.9  3.9    104 107   CO2 37.0* 28.0 24.0   BUN 16 23 16   CREATSERUM 0.61 0.68 0.66     No results for input(s): \"PTP\", \"INR\", \"PTT\" in the last 168 hours.    Cultures:   Hospital Encounter on 24   1. Blood Culture     Status: None    Collection Time: 24 10:48 AM    Specimen: Blood,peripheral   Result Value Ref Range    Blood Culture Result No Growth 5 Days N/A        Radiology:  CT CHEST (XHN=30556)  Narrative: PROCEDURE:  CT CHEST (CPT=71250)     COMPARISON:  None.     INDICATIONS:  89-year-old female, Ongoing wheezing and rhonchi, shortness of breath.     TECHNIQUE:  Unenhanced multislice CT scanning is performed through the chest.  Dose reduction techniques were used. Dose information is transmitted to the ACR (American College of Radiology) NRDR (National Radiology Data Registry) which includes the Dose   Index Registry.     PATIENT STATED HISTORY: (As transcribed by Technologist)  Ongoing wheezing and shortness of breath. Patient unable to provide recent history         FINDINGS:       This scan performed without IV contrast, with limitations thereof.     LUNGS/PLLEURA:  Positive for pneumonia with moderate size consolidation in the posterior aspect of the left lower lobe with air bronchograms abutting the interlobar fissure.  More patchy areas of consolidation in the left lower lobe, and there is a small   left pleural effusion and a trace right pleural effusion.  No pneumothorax.  Bronchial wall thickening without bronchiectasis.  In addition to these pulmonary parenchymal changes, there is airway narrowing involving the brad, and right and left main   bronchus.  Bronchial wall thickening more peripherally also creates airway narrowing.  No signs of pneumothorax.     THORACIC AORTA:  No aneurysm or other acute process. There are atherosclerotic changes including calcification involving the aorta, but no evidence for aneurysm involving the aorta.     MEDIASTINUM/CAROLINA:  Unremarkable.     CARDIAC:  Coronary artery calcifications are present.     CHEST WALL:  Unremarkable.     LIMITED ABDOMEN:  No acute process.      BONES:  No acute process.     OTHER:    None.                       Impression: CONCLUSION:       1. Moderate size left upper lobe pneumonia and more patchy smaller consolidations in the left lower lobe.  Small left effusion.     2. Moderate bronchial  wall thickening.     3. There is also narrowed appearance of the more central larger airways including the brad and right and left main bronchus.  This can be seen with airways inflammation.     4. The ongoing wheezing and rhonchi experience by this patient is likely secondary to these airway changes.       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)    Macular cyst, hole, or pseudohole, right eye    Nonexudative age-related macular degeneration, right eye, intermediate dry stage    PMR (polymyalgia rheumatica) ([facility])    Paving stone degeneration of retina, bilateral    Asymptomatic carotid artery stenosis, bilateral    Weakness    Constipation    Thrombocytopenia (HCC)    Syncope and collapse    Concussion with loss of consciousness    Fall, initial encounter    Laceration of right little finger without foreign body without damage to nail, initial encounter    Neuropathy    Shortness of breath    Respiratory syncytial virus (RSV)    RSV (acute bronchiolitis due to respiratory syncytial virus)    Intractable vomiting    Hypokalemia    Community acquired pneumonia    Bronchospasm       Assessment:  Acute hypoxic respiratory failure likely 2/2 left sided pna, CT on 9/3 with left sided infiltrate  Bronchitis with significant rhonchorous sounds 2/2 above  Bibasilar atelectasis  Diarrhea  GERD  Hypertension  Hyperlipidemia  History of coronary artery disease  Polymyalgia  rheumatica  Hypothyroidism    Plan:  Close monitoring of oxygenation status, wean oxygen as able  Continue budesonide neb  Ongoing bronchial hygiene  Continue bronchodilators  Continue hypersal  Cut back to prednisone 30 mg  Covid negative, check full viral panel        Lily Cruz MD  [facility]  Office: [phone]

## 2024-09-06 ENCOUNTER — APPOINTMENT (OUTPATIENT)
Dept: GENERAL RADIOLOGY | Facility: HOSPITAL | Age: 89
End: 2024-09-06
Attending: HOSPITALIST
Payer: MEDICARE

## 2024-09-06 PROCEDURE — 99232 SBSQ HOSP IP/OBS MODERATE 35: CPT | Performed by: HOSPITALIST

## 2024-09-06 PROCEDURE — 71045 X-RAY EXAM CHEST 1 VIEW: CPT | Performed by: HOSPITALIST

## 2024-09-06 PROCEDURE — 99232 SBSQ HOSP IP/OBS MODERATE 35: CPT | Performed by: INTERNAL MEDICINE

## 2024-09-06 RX ORDER — IPRATROPIUM BROMIDE AND ALBUTEROL SULFATE 2.5; .5 MG/3ML; MG/3ML
3 SOLUTION RESPIRATORY (INHALATION)
Status: DISCONTINUED | OUTPATIENT
Start: 2024-09-06 | End: 2024-09-09

## 2024-09-06 RX ORDER — ECHINACEA PURPUREA EXTRACT 125 MG
1 TABLET ORAL
Status: DISCONTINUED | OUTPATIENT
Start: 2024-09-06 | End: 2024-09-20

## 2024-09-06 NOTE — PLAN OF CARE
Problem: Hypoxia   Data: Ax04. Telemetry- sinus rhythm. , on 2L NC. Afebrile. Congested cough. Seizure precautions. Incontinent. Purewick. Saline lock. Max assist. Regular diet. Bed alarm.   Intervention: IV zosyn, hydralazine, Dunoeb  Education: medications, call light.   Response: cooperative with care        Problem: Patient/Family Goals  Goal: Patient/Family Long Term Goal  Description: Patient's Long Term Goal: Back to [facility]    Interventions:  - IV antibiotics  - O2  - Nebs  - fall precautions  - See additional Care Plan goals for specific interventions  Outcome: Progressing  Goal: Patient/Family Short Term Goal  Description: Patient's Short Term Goal: Pain relief  9/1 noc: wean O2  9/4AM: urinate  9/5 noc: wean off 02     Interventions:   - Tylenol,   - reposition    - See additional Care Plan goals for specific interventions  Outcome: Progressing     Problem: GASTROINTESTINAL - ADULT  Goal: Minimal or absence of nausea and vomiting  Description: INTERVENTIONS:  - Maintain adequate hydration with IV or PO as ordered and tolerated  - Nasogastric tube to low intermittent suction as ordered  - Evaluate effectiveness of ordered antiemetic medications  - Provide nonpharmacologic comfort measures as appropriate  - Advance diet as tolerated, if ordered  - Obtain nutritional consult as needed  - Evaluate fluid balance  Outcome: Progressing     Problem: METABOLIC/FLUID AND ELECTROLYTES - ADULT  Goal: Electrolytes maintained within normal limits  Description: INTERVENTIONS:  - Monitor labs and rhythm and assess patient for signs and symptoms of electrolyte imbalances  - Administer electrolyte replacement as ordered  - Monitor response to electrolyte replacements, including rhythm and repeat lab results as appropriate  - Fluid restriction as ordered  - Instruct patient on fluid and nutrition restrictions as appropriate  Outcome: Progressing  Goal: Hemodynamic stability and optimal renal function  maintained  Description: INTERVENTIONS:  - Monitor labs and assess for signs and symptoms of volume excess or deficit  - Monitor intake, output and patient weight  - Monitor urine specific gravity, serum osmolarity and serum sodium as indicated or ordered  - Monitor response to interventions for patient's volume status, including labs, urine output, blood pressure (other measures as available)  - Encourage oral intake as appropriate  - Instruct patient on fluid and nutrition restrictions as appropriate  Outcome: Progressing     Problem: RESPIRATORY - ADULT  Goal: Achieves optimal ventilation and oxygenation  Description: INTERVENTIONS:  - Assess for changes in respiratory status  - Assess for changes in mentation and behavior  - Position to facilitate oxygenation and minimize respiratory effort  - Oxygen supplementation based on oxygen saturation or ABGs  - Provide Smoking Cessation handout, if applicable  - Encourage broncho-pulmonary hygiene including cough, deep breathe, Incentive Spirometry  - Assess the need for suctioning and perform as needed  - Assess and instruct to report SOB or any respiratory difficulty  - Respiratory Therapy support as indicated  - Manage/alleviate anxiety  - Monitor for signs/symptoms of CO2 retention  Outcome: Progressing

## 2024-09-06 NOTE — OCCUPATIONAL THERAPY NOTE
OCCUPATIONAL THERAPY TREATMENT NOTE - INPATIENT     Room Number: 531/531-A  Session: 1        Presenting Problem: Nausea, vomiting, acute bronchities, respiratory failure, pneumonia  HOME SITUATION  Type of Home: Independent living facility  Home Layout: One level  Lives With: Alone     Toilet and Equipment: Comfort height toilet  Shower/Tub and Equipment: Walk-in shower  Other Equipment:  (rw)     Drives: No  Patient Regularly Uses: Glasses     Prior Level of Function: Modified independent with all ADL. Uses RW.    ASSESSMENT   Patient demonstrates fair progress this session, goals remain in progress.    Patient continues to function below baseline with  ADLs and functional transfers .   Contributing factors to remaining limitations include decreased functional strength, decreased functional reach, decreased endurance, impaired standing balance, impaired coordination, impaired motor planning, and decreased muscular endurance.  Next session anticipate patient to progress toileting, lower body dressing, bed mobility, transfers, static standing balance, dynamic standing balance, functional standing tolerance, and energy conservation strategies.  Occupational Therapy will continue to follow patient for duration of hospitalization.    Patient continues to benefit from continued skilled OT services: to promote return to prior level of function and safety with continuous assistance and gradual rehabilitative therapy.               History: Patient is a 89 year old female admitted on 8/26/2024 with Presenting Problem: Nausea, vomiting, acute bronchities, respiratory failure, pneumonia. Co-Morbidities : CAD, HTN, DM, macular degeneration       WEIGHT BEARING RESTRICTION  Weight Bearing Restriction: None                Recommendations for nursing staff:   Transfers: 1 person  Toileting location: bedside commode    TREATMENT SESSION:  Patient Start of Session: semi supine in bed  FUNCTIONAL TRANSFER ASSESSMENT  Sit to Stand:  Edge of Bed  Edge of Bed: Minimal Assist    BED MOBILITY  Supine to Sit : Minimal Assist  Sit to Supine (OT): Dependent  Scooting: Min A    BALANCE ASSESSMENT     FUNCTIONAL ADL ASSESSMENT       ACTIVITY TOLERANCE: fair-WFL                         O2 SATURATIONS       EDUCATION PROVIDED  Patient: Role of Occupational Therapy; Plan of Care; Discharge Recommendations; Functional Transfer Techniques; Fall Prevention; Posture/Positioning; Energy Conservation; Proper Body Mechanics  Patient's Response to Education: Verbalized Understanding; Requires Further Education      Equipment used: RW  Demonstrates functional use, Would benefit from additional trial      Therapist comments: Pt received semi supine in bed, pleasant and cooperative for OT session. Pt educated on role/purpose of OT. Pt with fair understanding. Pt agreeable for OOB activity/mobility in preparation for ADLs and functional transfers. Pt performs bed mobility at min A with HOB elevated. Pt with poor sitting balance and requiring cues to correct posture. Pt performs sit to stand transfer at min A with cues for hand placement. Step transfer to bedside chair with RW performed at min A. Cues provided for RW management and safety. Throughout session, pt requiring constant cues to be redirected to tasks provided as pt perseverated on breakfast and whether the doctor has rounded or not.  Patient End of Session: Up in chair;Needs met;Call light within reach;RN aware of session/findings;All patient questions and concerns addressed;Alarm set    SUBJECTIVE  \"Who are you guys?\" - after introducing myself to pt as her OT/part of the therapy team  \"I haven't had my breakfast yet.\"  \"Woah, help me.\" - as this writer is providing assist during transfer  Pt perseverating on breakfast    PAIN ASSESSMENT  Ratin  Location: denies        OBJECTIVE  Precautions: Bed/chair alarm;Hard of hearing;Low vision    AM-PAC ‘6-Clicks’ Inpatient Daily Activity Short Form  -   Putting  on and taking off regular lower body clothing?: A Lot  -   Bathing (including washing, rinsing, drying)?: A Lot  -   Toileting, which includes using toilet, bedpan or urinal? : Total  -   Putting on and taking off regular upper body clothing?: A Lot  -   Taking care of personal grooming such as brushing teeth?: A Little  -   Eating meals?: A Little    AM-PAC Score:  Score: 13  Approx Degree of Impairment: 63.03%  Standardized Score (AM-PAC Scale): 32.03    PLAN  OT Treatment Plan: Balance activities;ADL training;Functional transfer training;UE strengthening/ROM;Patient/Family education;Cognitive reorientation;Equipment eval/education;Compensatory technique education  Rehab Potential : Fair  Frequency: 3x/week    OT Goals:     All goals ongoing 09/06    ADL Goals   Patient will perform upper body dressing:  with setup  Patient will perform lower body dressing:  with min assist  Patient will perform toileting: with mod assist     Functional Transfer Goals  Patient will transfer from supine to sit:  with min assist- goal met 09/06; UPGRADE to CGA  Patient will transfer to bedside commode:  with mod assist     UE Exercise Program Goal  Patient will be supervision with bilateral AROM HEP (home exercise program).    OT Session Time: 25 minutes  Therapeutic Activity: 25 minutes

## 2024-09-06 NOTE — PROGRESS NOTES
[facility] Pulmonary Progress Note    Alyssa Montgomery Patient Status:  Inpatient    1932 MRN [id number]   Location [facility] 5NW-A Attending Kayla Strickland DO   Hosp Day # 11 PCP Mony Hall DO     Subjective:  Alyssa Montgomery is a(n) 89 year old female who is admitted for shortness of breath.    Overnight: reports still feeling very congested    Objective:  /57 (BP Location: Right arm)   Pulse 80   Temp 98.4 °F (36.9 °C) (Oral)   Resp 18   Wt 172 lb 3.2 oz (78.1 kg)   SpO2 93%   BMI 26.97 kg/m²       Temp (24hrs), Av.2 °F (36.8 °C), Min:98 °F (36.7 °C), Max:98.4 °F (36.9 °C)      Intake/Output:    Intake/Output Summary (Last 24 hours) at 2024 1303  Last data filed at 2024 0658  Gross per 24 hour   Intake 980 ml   Output 1000 ml   Net -20 ml         Physical Exam:   General: alert, cooperative, oriented.  No respiratory distress.   Head: Normocephalic, without obvious abnormality, atraumatic.   Throat: Lips, mucosa, and tongue normal.  No thrush noted.   Neck: trachea midline, no adenopathy, no thyromegaly. No JVD.   Lungs: rhonchi bilaterally   Chest wall: No tenderness or deformity.   Heart: regular rate and rhythm, S1, S2 normal, no murmur, click, rub or gallop   Abdomen: soft, non-distended, no masses, no guarding, no     Rebound.   Extremity: No edema or cyanosis   Skin: No rashes or lesions.   Neurological: Alert, interactive, no focal deficits    Lab Data Review:  Recent Labs     24  0733 24  0722   WBC 14.3* 18.4* 17.9*   HGB 13.5 13.5 13.4   .0* 162.0 168.0     Recent Labs     24  0733 24  0722    141 138   K 3.7  3.7 3.3* 3.9  3.9    104 107   CO2 37.0* 28.0 24.0   BUN 16 23 16   CREATSERUM 0.61 0.68 0.66     No results for input(s): \"PTP\", \"INR\", \"PTT\" in the last 168 hours.    Cultures:   Hospital Encounter on 24   1. Blood Culture     Status: None    Collection Time: 24 10:48 AM     Specimen: Blood,peripheral   Result Value Ref Range    Blood Culture Result No Growth 5 Days N/A       Radiology:  XR CHEST AP PORTABLE  (CPT=71045)  Narrative: PROCEDURE:  XR CHEST AP PORTABLE  (CPT=71045)     TECHNIQUE:  AP chest radiograph was obtained.     COMPARISON:  [facility] , CT, CT CHEST (CPT=71250), 9/03/2024, 8:49 PM.  [facility] , XR, XR CHEST AP PORTABLE  (CPT=71045), 9/02/2024, 5:29 AM.     INDICATIONS:  pneumonia follow-up, persistent hypoxia     PATIENT STATED HISTORY: (As transcribed by Technologist)  Patient offered no additional history at this time.          FINDINGS:  Heart size is magnified and may be mildly enlarged and stable.  Consolidative infiltrates are again identified in the lateral aspect of the left upper, mid lower lung field without significant interval improvement.  Left basilar airspace   infiltrates are also stable.  No new infiltrates are seen in the right lung.  There is a small left pleural effusion again suspected.     Interstitial edema is again identified.     Degenerative changes are seen in the spine and both shoulders.  No pneumothorax is seen.                   Impression: CONCLUSION:    1. No improvement in consolidative infiltrates in the lateral aspect of the left upper lobe including the left lingula.  Stable small left pleural effusion.  2. Stable cardiomegaly and mild interstitial pulmonary edema.           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)    Macular cyst, hole, or pseudohole, right eye    Nonexudative age-related macular degeneration, right eye, intermediate dry stage    PMR (polymyalgia rheumatica) (HCC)    Paving stone degeneration of retina, bilateral    Asymptomatic carotid artery stenosis, bilateral    Weakness    Constipation    Thrombocytopenia (HCC)    Syncope and collapse    Concussion with loss of consciousness    Fall, initial encounter    Laceration of right little finger without foreign body without damage to nail, initial encounter    Neuropathy    Shortness of breath    Respiratory syncytial virus (RSV)    RSV (acute bronchiolitis due to respiratory syncytial virus)    Intractable vomiting    Hypokalemia    Community acquired pneumonia    Bronchospasm       Assessment:  Acute hypoxic respiratory failure likely 2/2 left sided pna, CT on 9/3 with left sided infiltrate  Bronchitis with significant rhonchorous sounds 2/2 above  Bibasilar atelectasis  Diarrhea  GERD  Hypertension  Hyperlipidemia  History of coronary artery disease  Polymyalgia rheumatica  Hypothyroidism    Plan:  Close monitoring of oxygenation status, wean oxygen as able  Continue budesonide neb  Ongoing bronchial hygiene  Continue bronchodilators  Continue hypersal  On pred 30 will plan to cut back Sunday further  Covid negative, check full viral panel          Lily Cruz MD  [facility]  Office: [phone]

## 2024-09-06 NOTE — PROGRESS NOTES
[facility]   part of [facility]     Hospitalist Progress Note     Alyssa Montgomery Patient Status:  Inpatient    1932 MRN [id number]   Location [facility] 0-A Attending Medhat Dover MD   Hosp Day # 11 PCP Mony Hall DO     Chief Complaint: n/v    Subjective:     Patient seen and examined. Ongoing cough/SOB.     Objective:    Review of Systems:   A comprehensive review of systems was completed; pertinent positive and negatives stated in subjective.    Vital signs:  Temp:  [98 °F (36.7 °C)-98.4 °F (36.9 °C)] 98.4 °F (36.9 °C)  Pulse:  [80-87] 80  Resp:  [17-20] 18  BP: (117-147)/(57-62) 117/57  SpO2:  [93 %-95 %] 93 %    Physical Exam:    General: No acute distress  Respiratory: mild wheezes, mild rhonchi   Cardiovascular: S1, S2, regular rate and rhythm  Abdomen: Soft, Non-tender, non-distended, positive bowel sounds  Neuro: No new focal deficits.   Extremities: No edema      Diagnostic Data:    Labs:  Recent Labs   Lab 24  0724  0722 24  0817   WBC 14.3* 18.4* 17.9* 17.5*   HGB 13.5 13.5 13.4 13.8   MCV 88.9 90.3 98.2 90.7   .0* 162.0 168.0 161.0       Recent Labs   Lab 24  0722 24  0817 24  0714   * 221* 202* 143*  --    BUN 16 23 16 23  --    CREATSERUM 0.61 0.68 0.66 0.62  --    CA 8.7 9.0 8.8 8.5*  --    ALB 3.7 3.9  --   --   --     141 138 142  --    K 3.7  3.7 3.3* 3.9  3.9 3.4* 3.7    104 107 106  --    CO2 37.0* 28.0 24.0 31.0  --    ALKPHO 76 72  --   --   --    AST 18 14  --   --   --    ALT 19 21  --   --   --    BILT 0.8 0.6  --   --   --    TP 6.5 6.6  --   --   --        Estimated Creatinine Clearance: 56.3 mL/min (based on SCr of 0.62 mg/dL).    No results for input(s): \"TROP\", \"TROPHS\", \"CK\" in the last 168 hours.    No results for input(s): \"PTP\", \"INR\" in the last 168 hours.                 Microbiology    Hospital Encounter on 24   1. Blood Culture      Status: None    Collection Time: 08/28/24 10:48 AM    Specimen: Blood,peripheral   Result Value Ref Range    Blood Culture Result No Growth 5 Days N/A         Imaging: Reviewed in Epic.    Medications:    ipratropium-albuterol  3 mL Nebulization QID    predniSONE  30 mg Oral Daily with breakfast    sodium chloride (hypertonic)  3 mL Nebulization QID    piperacillin-tazobactam  3.375 g Intravenous Q8H    budesonide  0.5 mg Nebulization 2 times daily    hydrALAZINE  25 mg Oral Q8H SHELLEY    pantoprazole  40 mg Oral QAM AC    carvedilol  12.5 mg Oral BID with meals    amLODIPine  10 mg Oral Daily    atorvastatin  40 mg Oral Nightly    levothyroxine  100 mcg Oral Daily @ 0700    [Held by provider] predniSONE  1 mg Oral Daily with breakfast    pregabalin  25 mg Oral BID    enoxaparin  40 mg Subcutaneous Daily       Assessment & Plan:      #CAP  #Acute bronchitis with bronchospasm   -IV zosyn  -Bcx/Sputum culture negative   -Tapering steroids per pulm   -Scheduled nebs - duoneb/pulmicort     #Leukocytosis, improving  -Likely in part steroid induced   -Monitor    #Acute hypoxemic resp failure  -2/2 above  -Wean oxygen as above     #Acute metabolic encephalopathy  Resolved      #CAD  ASA, statin, BB     #HFpEF  Compensated   Echo 2023 with normal EF, G1DD    #Essential HTN  #HTN urgency, improving  -BP controlled     #Hypothyroidism - Synthroid   #PMR - 1mg prednisone daily PTA    #Suspected cognitive impairment  -OP neuropsych ammon Contreras,     Supplementary Documentation:     Quality:  DVT Mechanical Prophylaxis:     Early ambuation  DVT Pharmacologic Prophylaxis   Medication    enoxaparin (Lovenox) 40 MG/0.4ML SUBQ injection 40 mg                Code Status: DNAR/Selective Treatment  Buck: External urinary catheter in place  Buck Duration (in days):   Central line:    XAVI:     Discharge is dependent on: clinical improvement  At this point Ms. Montgomery is expected to be discharge to: tbd    The 21st Century Cures  Act makes medical notes like these available to patients in the interest of transparency. Please be advised this is a medical document. Medical documents are intended to carry relevant information, facts as evident, and the clinical opinion of the practitioner. The medical note is intended as peer to peer communication and may appear blunt or direct. It is written in medical language and may contain abbreviations or verbiage that are unfamiliar.

## 2024-09-06 NOTE — PLAN OF CARE
Problem: Patient/Family Goals  Goal: Patient/Family Long Term Goal  Description: Patient's Long Term Goal: Back to [facility]    Interventions:  - IV antibiotics  - O2  - Nebs  - fall precautions  - See additional Care Plan goals for specific interventions  Outcome: Progressing  Goal: Patient/Family Short Term Goal  Description: Patient's Short Term Goal: Pain relief  9/1 noc: wean O2  9/4AM: urinate  9/5 noc: wean off 02   9/6 wean off oxygen    Interventions:   - Tylenol,   - reposition    - See additional Care Plan goals for specific interventions  Outcome: Progressing     Problem: GASTROINTESTINAL - ADULT  Goal: Minimal or absence of nausea and vomiting  Description: INTERVENTIONS:  - Maintain adequate hydration with IV or PO as ordered and tolerated  - Nasogastric tube to low intermittent suction as ordered  - Evaluate effectiveness of ordered antiemetic medications  - Provide nonpharmacologic comfort measures as appropriate  - Advance diet as tolerated, if ordered  - Obtain nutritional consult as needed  - Evaluate fluid balance  Outcome: Progressing     Problem: GASTROINTESTINAL - ADULT  Goal: Minimal or absence of nausea and vomiting  Description: INTERVENTIONS:  - Maintain adequate hydration with IV or PO as ordered and tolerated  - Nasogastric tube to low intermittent suction as ordered  - Evaluate effectiveness of ordered antiemetic medications  - Provide nonpharmacologic comfort measures as appropriate  - Advance diet as tolerated, if ordered  - Obtain nutritional consult as needed  - Evaluate fluid balance  Outcome: Progressing     Problem: METABOLIC/FLUID AND ELECTROLYTES - ADULT  Goal: Electrolytes maintained within normal limits  Description: INTERVENTIONS:  - Monitor labs and rhythm and assess patient for signs and symptoms of electrolyte imbalances  - Administer electrolyte replacement as ordered  - Monitor response to electrolyte replacements, including rhythm and repeat lab results as  appropriate  - Fluid restriction as ordered  - Instruct patient on fluid and nutrition restrictions as appropriate  Outcome: Progressing  Goal: Hemodynamic stability and optimal renal function maintained  Description: INTERVENTIONS:  - Monitor labs and assess for signs and symptoms of volume excess or deficit  - Monitor intake, output and patient weight  - Monitor urine specific gravity, serum osmolarity and serum sodium as indicated or ordered  - Monitor response to interventions for patient's volume status, including labs, urine output, blood pressure (other measures as available)  - Encourage oral intake as appropriate  - Instruct patient on fluid and nutrition restrictions as appropriate  Outcome: Progressing     Problem: RESPIRATORY - ADULT  Goal: Achieves optimal ventilation and oxygenation  Description: INTERVENTIONS:  - Assess for changes in respiratory status  - Assess for changes in mentation and behavior  - Position to facilitate oxygenation and minimize respiratory effort  - Oxygen supplementation based on oxygen saturation or ABGs  - Provide Smoking Cessation handout, if applicable  - Encourage broncho-pulmonary hygiene including cough, deep breathe, Incentive Spirometry  - Assess the need for suctioning and perform as needed  - Assess and instruct to report SOB or any respiratory difficulty  - Respiratory Therapy support as indicated  - Manage/alleviate anxiety  - Monitor for signs/symptoms of CO2 retention  Outcome: Progressing

## 2024-09-07 LAB
ANION GAP SERPL CALC-SCNC: 9 MMOL/L (ref 0–18)
BASOPHILS # BLD AUTO: 0.05 X10(3) UL (ref 0–0.2)
BASOPHILS NFR BLD AUTO: 0.3 %
BUN BLD-MCNC: 18 MG/DL (ref 9–23)
CALCIUM BLD-MCNC: 8.5 MG/DL (ref 8.7–10.4)
CHLORIDE SERPL-SCNC: 105 MMOL/L (ref 98–112)
CO2 SERPL-SCNC: 25 MMOL/L (ref 21–32)
CREAT BLD-MCNC: 0.66 MG/DL
EGFRCR SERPLBLD CKD-EPI 2021: 82 ML/MIN/1.73M2 (ref 60–?)
EOSINOPHIL # BLD AUTO: 0 X10(3) UL (ref 0–0.7)
EOSINOPHIL NFR BLD AUTO: 0 %
ERYTHROCYTE [DISTWIDTH] IN BLOOD BY AUTOMATED COUNT: 13.7 %
GLUCOSE BLD-MCNC: 276 MG/DL (ref 70–99)
HCT VFR BLD AUTO: 37 %
HGB BLD-MCNC: 12.5 G/DL
IMM GRANULOCYTES # BLD AUTO: 0.25 X10(3) UL (ref 0–1)
IMM GRANULOCYTES NFR BLD: 1.4 %
LYMPHOCYTES # BLD AUTO: 0.52 X10(3) UL (ref 1–4)
LYMPHOCYTES NFR BLD AUTO: 2.8 %
MCH RBC QN AUTO: 30.9 PG (ref 26–34)
MCHC RBC AUTO-ENTMCNC: 33.8 G/DL (ref 31–37)
MCV RBC AUTO: 91.4 FL
MONOCYTES # BLD AUTO: 0.3 X10(3) UL (ref 0.1–1)
MONOCYTES NFR BLD AUTO: 1.6 %
NEUTROPHILS # BLD AUTO: 17.31 X10 (3) UL (ref 1.5–7.7)
NEUTROPHILS # BLD AUTO: 17.31 X10(3) UL (ref 1.5–7.7)
NEUTROPHILS NFR BLD AUTO: 93.9 %
OSMOLALITY SERPL CALC.SUM OF ELEC: 300 MOSM/KG (ref 275–295)
PLATELET # BLD AUTO: 151 10(3)UL (ref 150–450)
POTASSIUM SERPL-SCNC: 3.9 MMOL/L (ref 3.5–5.1)
RBC # BLD AUTO: 4.05 X10(6)UL
SODIUM SERPL-SCNC: 139 MMOL/L (ref 136–145)
WBC # BLD AUTO: 18.4 X10(3) UL (ref 4–11)

## 2024-09-07 PROCEDURE — 99232 SBSQ HOSP IP/OBS MODERATE 35: CPT | Performed by: INTERNAL MEDICINE

## 2024-09-07 PROCEDURE — 99232 SBSQ HOSP IP/OBS MODERATE 35: CPT | Performed by: HOSPITALIST

## 2024-09-07 RX ORDER — PREDNISONE 20 MG/1
20 TABLET ORAL
Status: DISCONTINUED | OUTPATIENT
Start: 2024-09-08 | End: 2024-09-09

## 2024-09-07 NOTE — PLAN OF CARE
Patient is alert and oriented x3, forgetful. Received pt on 3L, was able to wean down to 1L. Pt on scheduled nebs. Strong cough, prn robitussin administered. On lovenox. On IV zosyn and po steroids. Fall/seizure precautions in place. Pt c/o back pain; prn tylenol administered with reported relief. POC discussed with pt.   Problem: Patient/Family Goals  Goal: Patient/Family Long Term Goal  Description: Patient's Long Term Goal: Back to [facility]    Interventions:  - IV antibiotics  - O2  - Nebs  - fall precautions  - See additional Care Plan goals for specific interventions  Outcome: Progressing  Goal: Patient/Family Short Term Goal  Description: Patient's Short Term Goal: Pain relief  9/1 noc: wean O2  9/4AM: urinate  9/5 noc: wean off 02   9/6 noc: wean O2 to baseline    Interventions:   - Tylenol,   - reposition  -nebs, hob 30,     - See additional Care Plan goals for specific interventions  Outcome: Progressing     Problem: GASTROINTESTINAL - ADULT  Goal: Minimal or absence of nausea and vomiting  Description: INTERVENTIONS:  - Maintain adequate hydration with IV or PO as ordered and tolerated  - Nasogastric tube to low intermittent suction as ordered  - Evaluate effectiveness of ordered antiemetic medications  - Provide nonpharmacologic comfort measures as appropriate  - Advance diet as tolerated, if ordered  - Obtain nutritional consult as needed  - Evaluate fluid balance  Outcome: Progressing     Problem: METABOLIC/FLUID AND ELECTROLYTES - ADULT  Goal: Electrolytes maintained within normal limits  Description: INTERVENTIONS:  - Monitor labs and rhythm and assess patient for signs and symptoms of electrolyte imbalances  - Administer electrolyte replacement as ordered  - Monitor response to electrolyte replacements, including rhythm and repeat lab results as appropriate  - Fluid restriction as ordered  - Instruct patient on fluid and nutrition restrictions as appropriate  Outcome: Progressing  Goal:  Hemodynamic stability and optimal renal function maintained  Description: INTERVENTIONS:  - Monitor labs and assess for signs and symptoms of volume excess or deficit  - Monitor intake, output and patient weight  - Monitor urine specific gravity, serum osmolarity and serum sodium as indicated or ordered  - Monitor response to interventions for patient's volume status, including labs, urine output, blood pressure (other measures as available)  - Encourage oral intake as appropriate  - Instruct patient on fluid and nutrition restrictions as appropriate  Outcome: Progressing     Problem: RESPIRATORY - ADULT  Goal: Achieves optimal ventilation and oxygenation  Description: INTERVENTIONS:  - Assess for changes in respiratory status  - Assess for changes in mentation and behavior  - Position to facilitate oxygenation and minimize respiratory effort  - Oxygen supplementation based on oxygen saturation or ABGs  - Provide Smoking Cessation handout, if applicable  - Encourage broncho-pulmonary hygiene including cough, deep breathe, Incentive Spirometry  - Assess the need for suctioning and perform as needed  - Assess and instruct to report SOB or any respiratory difficulty  - Respiratory Therapy support as indicated  - Manage/alleviate anxiety  - Monitor for signs/symptoms of CO2 retention  Outcome: Progressing

## 2024-09-07 NOTE — PROGRESS NOTES
[facility] Progress Note    Alyssa Montgomery Patient Status:  Inpatient    1932 MRN [id number]   Location [facility] 5NW-A Attending Kayla Strickland DO   Hosp Day # 12 PCP Mony Hall DO     Subjective:  Alyssa Montgomery is a(n) 89 year old female who is admitted for shortness of breath.    Overnight: no acute events overnight still feeling bad    Objective:  /47 (BP Location: Left arm)   Pulse 81   Temp 98.8 °F (37.1 °C) (Oral)   Resp 17   Wt 172 lb 3.2 oz (78.1 kg)   SpO2 98%   BMI 26.97 kg/m²       Temp (24hrs), Av.4 °F (36.9 °C), Min:98 °F (36.7 °C), Max:98.8 °F (37.1 °C)      Intake/Output:    Intake/Output Summary (Last 24 hours) at 2024 1115  Last data filed at 2024 0900  Gross per 24 hour   Intake 580 ml   Output --   Net 580 ml         Physical Exam:   General: alert, cooperative, oriented.  No respiratory distress.   Head: Normocephalic, without obvious abnormality, atraumatic.   Throat: Lips, mucosa, and tongue normal.  No thrush noted.   Neck: trachea midline, no adenopathy, no thyromegaly. No JVD.   Lungs: rhonchi bilaterally   Chest wall: No tenderness or deformity.   Heart: regular rate and rhythm, S1, S2 normal, no murmur, click, rub or gallop   Abdomen: soft, non-distended, no masses, no guarding, no     Rebound.   Extremity: No edema or cyanosis   Skin: No rashes or lesions.   Neurological: Alert, interactive, no focal deficits    Lab Data Review:  Recent Labs     24  0724  0733 24  0722   WBC 14.3* 18.4* 17.9*   HGB 13.5 13.5 13.4   .0* 162.0 168.0     Recent Labs     24  0724  0733 24  0722    141 138   K 3.7  3.7 3.3* 3.9  3.9    104 107   CO2 37.0* 28.0 24.0   BUN 16 23 16   CREATSERUM 0.61 0.68 0.66     No results for input(s): \"PTP\", \"INR\", \"PTT\" in the last 168 hours.    Cultures:   Hospital Encounter on 24   1. Blood Culture     Status: None    Collection Time: 24 10:48 AM     Specimen: Blood,peripheral   Result Value Ref Range    Blood Culture Result No Growth 5 Days N/A       Radiology:  XR CHEST AP PORTABLE  (CPT=71045)  Narrative: PROCEDURE:  XR CHEST AP PORTABLE  (CPT=71045)     TECHNIQUE:  AP chest radiograph was obtained.     COMPARISON:  [facility] , CT, CT CHEST (CPT=71250), 9/03/2024, 8:49 PM.  [facility] , XR, XR CHEST AP PORTABLE  (CPT=71045), 9/02/2024, 5:29 AM.     INDICATIONS:  pneumonia follow-up, persistent hypoxia     PATIENT STATED HISTORY: (As transcribed by Technologist)  Patient offered no additional history at this time.          FINDINGS:  Heart size is magnified and may be mildly enlarged and stable.  Consolidative infiltrates are again identified in the lateral aspect of the left upper, mid lower lung field without significant interval improvement.  Left basilar airspace   infiltrates are also stable.  No new infiltrates are seen in the right lung.  There is a small left pleural effusion again suspected.     Interstitial edema is again identified.     Degenerative changes are seen in the spine and both shoulders.  No pneumothorax is seen.                   Impression: CONCLUSION:    1. No improvement in consolidative infiltrates in the lateral aspect of the left upper lobe including the left lingula.  Stable small left pleural effusion.  2. Stable cardiomegaly and mild interstitial pulmonary edema.           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)    Macular cyst, hole, or pseudohole, right eye    Nonexudative age-related macular degeneration, right eye, intermediate dry stage    PMR (polymyalgia rheumatica) (HCC)    Paving stone degeneration of retina, bilateral    Asymptomatic carotid artery stenosis, bilateral    Weakness    Constipation    Thrombocytopenia (HCC)    Syncope and collapse    Concussion with loss of consciousness    Fall, initial encounter    Laceration of right little finger without foreign body without damage to nail, initial encounter    Neuropathy    Shortness of breath    Respiratory syncytial virus (RSV)    RSV (acute bronchiolitis due to respiratory syncytial virus)    Intractable vomiting    Hypokalemia    Community acquired pneumonia    Bronchospasm       Assessment:  Acute hypoxic respiratory failure likely 2/2 left sided pna, CT on 9/3 with left sided infiltrate, viral panel negative, informatory markers negative  Bronchitis with significant rhonchorous sounds 2/2 above  Bibasilar atelectasis  Diarrhea  GERD  Hypertension  Hyperlipidemia  History of coronary artery disease  Polymyalgia rheumatica  Hypothyroidism    Plan:  Close monitoring of oxygenation status, wean oxygen as able  Continue budesonide neb  Ongoing bronchial hygiene  Continue bronchodilators  Continue hypersal  Cut back to pred 20 today  Infectious workup has been negative thus far  Encouraged to get out of bed and ambulate/move as much as possible      Lily Cruz MD  [facility]  Office: [phone]

## 2024-09-07 NOTE — PLAN OF CARE
Pt AOx3. Seizure precautions. VSS on 3L. Baseline RA. Po steroids. Nebs. Prn antitussives. No tele. Lovenox. E.p. purewick/briefed. C/o back pain, tylenol given. Up x 1 w walker. Iv abx. Reg diet, tolerating well. Pt and dtr updated on poc, no further needs at this time.       Problem: Patient/Family Goals  Goal: Patient/Family Long Term Goal  Description: Patient's Long Term Goal: Back to [facility]    Interventions:  - IV antibiotics  - O2  - Nebs  - fall precautions  - See additional Care Plan goals for specific interventions  9/7/2024 1546 by Yady Mcdonough, RN  Outcome: Progressing  9/7/2024 1546 by Yady Mcdonough, RN  Outcome: Progressing  Goal: Patient/Family Short Term Goal  Description: Patient's Short Term Goal: Pain relief  9/1 noc: wean O2  9/4AM: urinate  9/5 noc: wean off 02   9/6 noc: wean O2 to baseline  9/7 AM: wean o2  Interventions:   - Tylenol,   - reposition  -nebs, hob 30,     - See additional Care Plan goals for specific interventions  9/7/2024 1546 by Yady Mcdonough, RN  Outcome: Progressing  9/7/2024 1546 by Yady Mcdonough, RN  Outcome: Progressing     Problem: GASTROINTESTINAL - ADULT  Goal: Minimal or absence of nausea and vomiting  Description: INTERVENTIONS:  - Maintain adequate hydration with IV or PO as ordered and tolerated  - Nasogastric tube to low intermittent suction as ordered  - Evaluate effectiveness of ordered antiemetic medications  - Provide nonpharmacologic comfort measures as appropriate  - Advance diet as tolerated, if ordered  - Obtain nutritional consult as needed  - Evaluate fluid balance  9/7/2024 1546 by Yady Mcdonough, RN  Outcome: Progressing  9/7/2024 1546 by Yady Mcdonough, RN  Outcome: Progressing     Problem: METABOLIC/FLUID AND ELECTROLYTES - ADULT  Goal: Electrolytes maintained within normal limits  Description: INTERVENTIONS:  - Monitor labs and rhythm and assess patient for signs and symptoms of electrolyte  imbalances  - Administer electrolyte replacement as ordered  - Monitor response to electrolyte replacements, including rhythm and repeat lab results as appropriate  - Fluid restriction as ordered  - Instruct patient on fluid and nutrition restrictions as appropriate  9/7/2024 1546 by Yady Mcdonough RN  Outcome: Progressing  9/7/2024 1546 by Yady Mcdonough RN  Outcome: Progressing  Goal: Hemodynamic stability and optimal renal function maintained  Description: INTERVENTIONS:  - Monitor labs and assess for signs and symptoms of volume excess or deficit  - Monitor intake, output and patient weight  - Monitor urine specific gravity, serum osmolarity and serum sodium as indicated or ordered  - Monitor response to interventions for patient's volume status, including labs, urine output, blood pressure (other measures as available)  - Encourage oral intake as appropriate  - Instruct patient on fluid and nutrition restrictions as appropriate  9/7/2024 1546 by Yady Mcdonough RN  Outcome: Progressing  9/7/2024 1546 by Yady Mcdonough RN  Outcome: Progressing

## 2024-09-07 NOTE — PROGRESS NOTES
[facility]   part of [facility]     Hospitalist Progress Note     Alyssa Montgomery Patient Status:  Inpatient    1932 MRN [id number]   Location [facility] 0-A Attending Medhat Dover MD   Hosp Day # 12 PCP Mony Hall DO     Chief Complaint: n/v    Subjective:     Patient seen and examined. C/o persistent cough.     Objective:    Review of Systems:   A comprehensive review of systems was completed; pertinent positive and negatives stated in subjective.    Vital signs:  Temp:  [98 °F (36.7 °C)-98.8 °F (37.1 °C)] 98.8 °F (37.1 °C)  Pulse:  [80-97] 81  Resp:  [17-22] 17  BP: (117-162)/(47-63) 128/47  SpO2:  [92 %-98 %] 98 %    Physical Exam:    General: No acute distress  Respiratory: mild wheezes, mild rhonchi   Cardiovascular: S1, S2, regular rate and rhythm  Abdomen: Soft, Non-tender, non-distended, positive bowel sounds  Neuro: No new focal deficits.   Extremities: No edema      Diagnostic Data:    Labs:  Recent Labs   Lab 24  0733 24  0722 24  0817   WBC 18.4* 17.9* 17.5*   HGB 13.5 13.4 13.8   MCV 90.3 98.2 90.7   .0 168.0 161.0       Recent Labs   Lab 24  0733 24  0722 24  0817 24  0714   * 202* 143*  --    BUN 23 16 23  --    CREATSERUM 0.68 0.66 0.62  --    CA 9.0 8.8 8.5*  --    ALB 3.9  --   --   --     138 142  --    K 3.3* 3.9  3.9 3.4* 3.7    107 106  --    CO2 28.0 24.0 31.0  --    ALKPHO 72  --   --   --    AST 14  --   --   --    ALT 21  --   --   --    BILT 0.6  --   --   --    TP 6.6  --   --   --        Estimated Creatinine Clearance: 56.3 mL/min (based on SCr of 0.62 mg/dL).    No results for input(s): \"TROP\", \"TROPHS\", \"CK\" in the last 168 hours.    No results for input(s): \"PTP\", \"INR\" in the last 168 hours.                 Microbiology    Hospital Encounter on 24   1. Blood Culture     Status: None    Collection Time: 24 10:48 AM    Specimen: Blood,peripheral   Result Value Ref Range     Blood Culture Result No Growth 5 Days N/A         Imaging: Reviewed in Epic.    Medications:    [START ON 9/8/2024] predniSONE  20 mg Oral Daily with breakfast    ipratropium-albuterol  3 mL Nebulization QID    sodium chloride (hypertonic)  3 mL Nebulization QID    piperacillin-tazobactam  3.375 g Intravenous Q8H    budesonide  0.5 mg Nebulization 2 times daily    hydrALAZINE  25 mg Oral Q8H SHELLEY    pantoprazole  40 mg Oral QAM AC    carvedilol  12.5 mg Oral BID with meals    amLODIPine  10 mg Oral Daily    atorvastatin  40 mg Oral Nightly    levothyroxine  100 mcg Oral Daily @ 0700    [Held by provider] predniSONE  1 mg Oral Daily with breakfast    pregabalin  25 mg Oral BID    enoxaparin  40 mg Subcutaneous Daily       Assessment & Plan:      #CAP  #Acute bronchitis with bronchospasm   -IV zosyn  -Bcx/Sputum culture negative   -Tapering steroids per pulm   -Scheduled nebs - duoneb/pulmicort     #Leukocytosis, improving  -Likely in part steroid induced   -Monitor    #Acute hypoxemic resp failure  -2/2 above  -Wean oxygen as above     #Acute metabolic encephalopathy  Resolved      #CAD  ASA, statin, BB     #HFpEF  Compensated   Echo 2023 with normal EF, G1DD    #Essential HTN  #HTN urgency, improving  -BP controlled     #Hypothyroidism - Synthroid   #PMR - 1mg prednisone daily PTA    #Suspected cognitive impairment  -OP neuropsych evcassidy Contreras,     Supplementary Documentation:     Quality:  DVT Mechanical Prophylaxis:     Early ambuation  DVT Pharmacologic Prophylaxis   Medication    enoxaparin (Lovenox) 40 MG/0.4ML SUBQ injection 40 mg                Code Status: DNAR/Selective Treatment  Buck: External urinary catheter in place  Buck Duration (in days):   Central line:    XAVI:     Discharge is dependent on: clinical improvement  At this point Ms. Montgomery is expected to be discharge to: tbd    The 21st Century Cures Act makes medical notes like these available to patients in the interest of transparency.  Please be advised this is a medical document. Medical documents are intended to carry relevant information, facts as evident, and the clinical opinion of the practitioner. The medical note is intended as peer to peer communication and may appear blunt or direct. It is written in medical language and may contain abbreviations or verbiage that are unfamiliar.

## 2024-09-08 ENCOUNTER — APPOINTMENT (OUTPATIENT)
Dept: GENERAL RADIOLOGY | Facility: HOSPITAL | Age: 89
End: 2024-09-08
Attending: HOSPITALIST
Payer: MEDICARE

## 2024-09-08 LAB
ARTERIAL PATENCY WRIST A: POSITIVE
BASE EXCESS BLDA CALC-SCNC: 5 MMOL/L (ref ?–2)
BODY TEMPERATURE: 98.6 F
COHGB MFR BLD: 1.5 % SAT (ref 0–3)
HCO3 BLDA-SCNC: 28.7 MEQ/L (ref 21–27)
HGB BLD-MCNC: 13.4 G/DL
L/M: 8 L/MIN
METHGB MFR BLD: 0.2 % SAT (ref 0.4–1.5)
OXYHGB MFR BLDA: 92.6 % (ref 92–100)
PCO2 BLDA: 40 MM HG (ref 35–45)
PH BLDA: 7.47 [PH] (ref 7.35–7.45)
PO2 BLDA: 65 MM HG (ref 80–100)

## 2024-09-08 PROCEDURE — 99232 SBSQ HOSP IP/OBS MODERATE 35: CPT | Performed by: HOSPITALIST

## 2024-09-08 PROCEDURE — 74018 RADEX ABDOMEN 1 VIEW: CPT | Performed by: HOSPITALIST

## 2024-09-08 PROCEDURE — 5A0955A ASSISTANCE WITH RESPIRATORY VENTILATION, GREATER THAN 96 CONSECUTIVE HOURS, HIGH NASAL FLOW/VELOCITY: ICD-10-PCS | Performed by: INTERNAL MEDICINE

## 2024-09-08 PROCEDURE — 99291 CRITICAL CARE FIRST HOUR: CPT | Performed by: INTERNAL MEDICINE

## 2024-09-08 PROCEDURE — 71045 X-RAY EXAM CHEST 1 VIEW: CPT | Performed by: HOSPITALIST

## 2024-09-08 NOTE — PROGRESS NOTES
[facility] Pulmonary Progress Note    Alyssa Montgomery Patient Status:  Inpatient    1932 MRN [id number]   Location [facility] 5NW-A Attending Kayla Strickland DO   Hosp Day # 13 PCP Mony Hall DO     Subjective:  Alyssa Montgomery is a(n) 89 year old female who is admitted for shortness of breath.    Overnight: RRT called this am for worsening respiratory distress, she feels like she is going to die    Objective:  /57 (BP Location: Left arm)   Pulse 90   Temp 98.7 °F (37.1 °C) (Oral)   Resp 18   Wt 172 lb 3.2 oz (78.1 kg)   SpO2 97%   BMI 26.97 kg/m²       Temp (24hrs), Av.5 °F (36.9 °C), Min:98.2 °F (36.8 °C), Max:98.7 °F (37.1 °C)      Intake/Output:    Intake/Output Summary (Last 24 hours) at 2024 1155  Last data filed at 2024 0604  Gross per 24 hour   Intake 100 ml   Output 400 ml   Net -300 ml         Physical Exam:   General: alert, cooperative, oriented.  In obvious respiratory distress   Head: Normocephalic, without obvious abnormality, atraumatic.   Throat: Lips, mucosa, and tongue normal.  No thrush noted.   Neck: trachea midline, no adenopathy, no thyromegaly. No JVD.   Lungs: rhonchi bilaterally   Chest wall: No tenderness or deformity.   Heart: regular rate and rhythm, S1, S2 normal, no murmur, click, rub or gallop   Abdomen: soft, non-distended, no masses, no guarding, no     Rebound.   Extremity: No edema or cyanosis   Skin: No rashes or lesions.   Neurological: Alert, interactive, no focal deficits    Lab Data Review:  Recent Labs     24  0733 24  0722   WBC 14.3* 18.4* 17.9*   HGB 13.5 13.5 13.4   .0* 162.0 168.0     Recent Labs     24  0724  0733 24  0722    141 138   K 3.7  3.7 3.3* 3.9  3.9    104 107   CO2 37.0* 28.0 24.0   BUN 16 23 16   CREATSERUM 0.61 0.68 0.66     No results for input(s): \"PTP\", \"INR\", \"PTT\" in the last 168 hours.    Cultures:   Hospital Encounter on 24   1. Blood Culture      Status: None    Collection Time: 08/28/24 10:48 AM    Specimen: Blood,peripheral   Result Value Ref Range    Blood Culture Result No Growth 5 Days N/A       Radiology:  XR CHEST AP PORTABLE  (CPT=71045)  Narrative: PROCEDURE:  XR CHEST AP PORTABLE  (CPT=71045)     TECHNIQUE:  AP chest radiograph was obtained.     COMPARISON:  [facility] , XR, XR CHEST AP PORTABLE  (CPT=71045), 9/06/2024, 11:09 AM.     INDICATIONS:  sob     PATIENT STATED HISTORY: (As transcribed by Technologist)  Patient offered no additional history at this time.          FINDINGS:  Patient is rotated.  Cardiomediastinal silhouette is stable in size and appearance.  Improved inspiratory film with persistent interstitial and airspace opacity predominantly involving the left perihilar and lower lobe.  No new focal   consolidation, large pleural effusion, or pneumothorax.                   Impression: CONCLUSION:    1. Allowing for differences in technique, no significant change when compared to 9/6/2024 chest radiograph.        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)    Macular cyst, hole, or pseudohole, right eye    Nonexudative age-related macular degeneration, right eye, intermediate dry stage    PMR (polymyalgia rheumatica) (HCC)    Paving stone degeneration of retina, bilateral    Asymptomatic carotid artery stenosis, bilateral    Weakness    Constipation    Thrombocytopenia (HCC)    Syncope and  collapse    Concussion with loss of consciousness    Fall, initial encounter    Laceration of right little finger without foreign body without damage to nail, initial encounter    Neuropathy    Shortness of breath    Respiratory syncytial virus (RSV)    RSV (acute bronchiolitis due to respiratory syncytial virus)    Intractable vomiting    Hypokalemia    Community acquired pneumonia    Bronchospasm       Assessment:  Acute hypoxic respiratory failure likely 2/2 left sided pna, CT on 9/3 with left sided infiltrate, viral panel negative, informatory markers negative  Respiratory distress this AM  Bronchitis with significant rhonchorous sounds 2/2 above  Bibasilar atelectasis  CXRs compared today I see worsening gastric distention  Diarrhea, has not bad BM in several days  GERD  Hypertension  Hyperlipidemia  History of coronary artery disease  Polymyalgia rheumatica  Hypothyroidism    Plan:  Close monitoring of oxygenation status, change to vapotherm  Continue budesonide neb  Ongoing bronchial hygiene  Continue bronchodilators  Continue hypersal  Continue pred 20 mg, I dont think increasing dosage will provide any more incremental benefit  Reviewed multiple Xrs I suspect there is some gastric distention, check KUB aggressive bowel regomen      Lily Cruz MD  CCM time: 45 minutes. The patient is critically ill and at high risk for clinical deterioration.        Lily Cruz MD  [facility]  Office: [phone]

## 2024-09-08 NOTE — PROGRESS NOTES
[facility]   part of [facility]     Hospitalist Progress Note     Alyssa Montgomery Patient Status:  Inpatient    1932 MRN [id number]   Location [facility] 0-A Attending Medhat Dover MD   Hosp Day # 13 PCP Mony Hall DO     Chief Complaint: n/v    Subjective:     Patient seen and examined this morning. Reports trouble breathing.     Objective:    Review of Systems:   A comprehensive review of systems was completed; pertinent positive and negatives stated in subjective.    Vital signs:  Temp:  [98.2 °F (36.8 °C)-98.8 °F (37.1 °C)] 98.2 °F (36.8 °C)  Pulse:  [81-93] 90  Resp:  [17-20] 18  BP: (128-155)/(47-62) 145/50  SpO2:  [91 %-98 %] 93 %  FiO2 (%):  [100 %] 100 %    Physical Exam:    General: No acute distress. Anxious.  Respiratory: B/L wheezing.   Cardiovascular: S1, S2, regular rate and rhythm  Abdomen: Soft, Non-tender, moderate distention, positive bowel sounds  Neuro: No new focal deficits.   Extremities: No edema      Diagnostic Data:    Labs:  Recent Labs   Lab 24  0722 24  0817 24  1510   WBC 17.9* 17.5* 18.4*   HGB 13.4 13.8 12.5   MCV 98.2 90.7 91.4   .0 161.0 151.0       Recent Labs   Lab 24  0722 24  0817 24  0714 24  1510   * 143*  --  276*   BUN 16 23  --  18   CREATSERUM 0.66 0.62  --  0.66   CA 8.8 8.5*  --  8.5*    142  --  139   K 3.9  3.9 3.4* 3.7 3.9    106  --  105   CO2 24.0 31.0  --  25.0       Estimated Creatinine Clearance: 52.9 mL/min (based on SCr of 0.66 mg/dL).    No results for input(s): \"TROP\", \"TROPHS\", \"CK\" in the last 168 hours.    No results for input(s): \"PTP\", \"INR\" in the last 168 hours.                 Microbiology    Hospital Encounter on 24   1. Blood Culture     Status: None    Collection Time: 24 10:48 AM    Specimen: Blood,peripheral   Result Value Ref Range    Blood Culture Result No Growth 5 Days N/A         Imaging: Reviewed in Epic.    Medications:     predniSONE  20 mg Oral Daily with breakfast    ipratropium-albuterol  3 mL Nebulization QID    sodium chloride (hypertonic)  3 mL Nebulization QID    piperacillin-tazobactam  3.375 g Intravenous Q8H    budesonide  0.5 mg Nebulization 2 times daily    hydrALAZINE  25 mg Oral Q8H SHELLEY    pantoprazole  40 mg Oral QAM AC    carvedilol  12.5 mg Oral BID with meals    amLODIPine  10 mg Oral Daily    atorvastatin  40 mg Oral Nightly    levothyroxine  100 mcg Oral Daily @ 0700    [Held by provider] predniSONE  1 mg Oral Daily with breakfast    pregabalin  25 mg Oral BID    enoxaparin  40 mg Subcutaneous Daily       Assessment & Plan:      #CAP  #Acute bronchitis with bronchospasm   #Acute hypoxic respiratory failure due to above  -Vapotherm started  -ABG reviewed  -IV zosyn  -Bcx/Sputum culture negative   -Tapering steroids per pulm   -Scheduled nebs - duoneb/pulmicort   -D/w pulmonary at bedside    #Abdominal distention  -KUB pending  -Bowel regimen    #Leukocytosis, improving  -Likely in part steroid induced   -Monitor    #Acute metabolic encephalopathy  Resolved      #CAD  ASA, statin, BB     #HFpEF  Compensated   Echo 2023 with normal EF, G1DD    #Essential HTN  #HTN urgency, improving  -BP controlled     #Hypothyroidism - Synthroid   #PMR - 1mg prednisone daily PTA    #Suspected cognitive impairment  -OP neuropsych evcassidy Contreras,     Supplementary Documentation:     Quality:  DVT Mechanical Prophylaxis:     Early ambuation  DVT Pharmacologic Prophylaxis   Medication    enoxaparin (Lovenox) 40 MG/0.4ML SUBQ injection 40 mg                Code Status: DNAR/Selective Treatment  Buck: External urinary catheter in place  Buck Duration (in days):   Central line:    XAVI:     Discharge is dependent on: clinical improvement  At this point Ms. Montgomery is expected to be discharge to: tbd    The 21st Century Cures Act makes medical notes like these available to patients in the interest of transparency. Please be advised  this is a medical document. Medical documents are intended to carry relevant information, facts as evident, and the clinical opinion of the practitioner. The medical note is intended as peer to peer communication and may appear blunt or direct. It is written in medical language and may contain abbreviations or verbiage that are unfamiliar.

## 2024-09-08 NOTE — PLAN OF CARE
Patient is alert and oriented x2-3. Very forgetful. Received pt on 4L, has required between 2-4L overnight. Currently on 94% on 4L. Strong cough, prn tessalon and robitussin administered. Scheduled duonebs. VSS. On lovenox. On IV zosyn and po prednisone. Safety precautions in place. Poc discussed with pt.     Problem: Patient/Family Goals  Goal: Patient/Family Long Term Goal  Description: Patient's Long Term Goal: Back to [facility]    Interventions:  - IV antibiotics  - O2  - Nebs  - fall precautions  - See additional Care Plan goals for specific interventions  Outcome: Progressing  Goal: Patient/Family Short Term Goal  Description: Patient's Short Term Goal: Pain relief  9/1 noc: wean O2  9/4AM: urinate  9/5 noc: wean off 02   9/6 noc: wean O2 to baseline  9/7 AM: wean o2  9/7 noc: wean O2 as duncan  Interventions:   - Tylenol,   - reposition  -nebs, hob 30,     - See additional Care Plan goals for specific interventions  Outcome: Progressing     Problem: GASTROINTESTINAL - ADULT  Goal: Minimal or absence of nausea and vomiting  Description: INTERVENTIONS:  - Maintain adequate hydration with IV or PO as ordered and tolerated  - Nasogastric tube to low intermittent suction as ordered  - Evaluate effectiveness of ordered antiemetic medications  - Provide nonpharmacologic comfort measures as appropriate  - Advance diet as tolerated, if ordered  - Obtain nutritional consult as needed  - Evaluate fluid balance  Outcome: Progressing     Problem: METABOLIC/FLUID AND ELECTROLYTES - ADULT  Goal: Electrolytes maintained within normal limits  Description: INTERVENTIONS:  - Monitor labs and rhythm and assess patient for signs and symptoms of electrolyte imbalances  - Administer electrolyte replacement as ordered  - Monitor response to electrolyte replacements, including rhythm and repeat lab results as appropriate  - Fluid restriction as ordered  - Instruct patient on fluid and nutrition restrictions as  appropriate  Outcome: Progressing  Goal: Hemodynamic stability and optimal renal function maintained  Description: INTERVENTIONS:  - Monitor labs and assess for signs and symptoms of volume excess or deficit  - Monitor intake, output and patient weight  - Monitor urine specific gravity, serum osmolarity and serum sodium as indicated or ordered  - Monitor response to interventions for patient's volume status, including labs, urine output, blood pressure (other measures as available)  - Encourage oral intake as appropriate  - Instruct patient on fluid and nutrition restrictions as appropriate  Outcome: Progressing     Problem: RESPIRATORY - ADULT  Goal: Achieves optimal ventilation and oxygenation  Description: INTERVENTIONS:  - Assess for changes in respiratory status  - Assess for changes in mentation and behavior  - Position to facilitate oxygenation and minimize respiratory effort  - Oxygen supplementation based on oxygen saturation or ABGs  - Provide Smoking Cessation handout, if applicable  - Encourage broncho-pulmonary hygiene including cough, deep breathe, Incentive Spirometry  - Assess the need for suctioning and perform as needed  - Assess and instruct to report SOB or any respiratory difficulty  - Respiratory Therapy support as indicated  - Manage/alleviate anxiety  - Monitor for signs/symptoms of CO2 retention  Outcome: Not Progressing

## 2024-09-08 NOTE — PLAN OF CARE
No BM for over a week. Abdomen distended and rounded. Passing lots of gas. KUB ordered. Mirlax given  Declines getting out of bed  No wound or breakdown on sacrum. Meplix applied, turn [id number]  Iv zosyn  Po steroids  Was on 3L last night and 5 this morning  Noted increased work of breaking. Resp, pulm, and hosp at bedside.   Cxray and abg done  Patient placed on vapotherm  Patient very restless and anxious

## 2024-09-09 LAB
BLASTOMYCES AG INTERP: NEGATIVE
BLASTOMYCES AG INTERP: NEGATIVE
UR GALACT AG SCR: NEGATIVE

## 2024-09-09 PROCEDURE — 99232 SBSQ HOSP IP/OBS MODERATE 35: CPT | Performed by: HOSPITALIST

## 2024-09-09 PROCEDURE — 99232 SBSQ HOSP IP/OBS MODERATE 35: CPT | Performed by: INTERNAL MEDICINE

## 2024-09-09 RX ORDER — PREDNISONE 20 MG/1
40 TABLET ORAL
Status: DISCONTINUED | OUTPATIENT
Start: 2024-09-10 | End: 2024-09-12

## 2024-09-09 RX ORDER — QUETIAPINE FUMARATE 25 MG/1
25 TABLET, FILM COATED ORAL NIGHTLY
Status: DISCONTINUED | OUTPATIENT
Start: 2024-09-09 | End: 2024-09-20

## 2024-09-09 RX ORDER — PREDNISONE 20 MG/1
20 TABLET ORAL ONCE
Status: COMPLETED | OUTPATIENT
Start: 2024-09-09 | End: 2024-09-09

## 2024-09-09 RX ORDER — MULTIPLE VITAMINS W/ MINERALS TAB 9MG-400MCG
1 TAB ORAL DAILY
Status: DISCONTINUED | OUTPATIENT
Start: 2024-09-10 | End: 2024-09-20

## 2024-09-09 RX ORDER — IPRATROPIUM BROMIDE AND ALBUTEROL SULFATE 2.5; .5 MG/3ML; MG/3ML
3 SOLUTION RESPIRATORY (INHALATION)
Status: DISCONTINUED | OUTPATIENT
Start: 2024-09-09 | End: 2024-09-13

## 2024-09-09 RX ORDER — SODIUM CHLORIDE FOR INHALATION 3 %
3 VIAL, NEBULIZER (ML) INHALATION AS NEEDED
Status: DISCONTINUED | OUTPATIENT
Start: 2024-09-09 | End: 2024-09-20

## 2024-09-09 RX ORDER — HYDROXYZINE HYDROCHLORIDE 25 MG/1
25 TABLET, FILM COATED ORAL 3 TIMES DAILY PRN
Status: DISCONTINUED | OUTPATIENT
Start: 2024-09-09 | End: 2024-09-20

## 2024-09-09 NOTE — PLAN OF CARE
Assumed care of patient at 0700  Patient very anxious at times and more confused. PRN Atarax given and Seroquel starting tonight  Patient weaned to 15L on vapotherm  Patient needing strong encouragement to ambulate, often refusing to move. Patient educated on importance of ambulation during her stay  PRN Tylenol for back pain  Echo ordered  Repeat CXR ordered  Continue Duonebs and IV abx  Patient passing gas but no BM, refusing Miralax  Patient currently resting in chair, call light within reach            Problem: Patient/Family Goals  Goal: Patient/Family Long Term Goal  Description: Patient's Long Term Goal: Back to [facility]    Interventions:  - IV antibiotics  - O2  - Nebs  - fall precautions  - See additional Care Plan goals for specific interventions  Outcome: Progressing  Goal: Patient/Family Short Term Goal  Description: Patient's Short Term Goal: Pain relief  9/1 noc: wean O2  9/4AM: urinate  9/5 noc: wean off 02   9/6 noc: wean O2 to baseline  9/7 AM: wean o2  9/7 noc: wean O2 as duncan  9/8 NOC: \"I want to go to sleep\"  Interventions:   - Tylenol,   - reposition  -nebs, hob 30,     - See additional Care Plan goals for specific interventions  Outcome: Progressing     Problem: GASTROINTESTINAL - ADULT  Goal: Minimal or absence of nausea and vomiting  Description: INTERVENTIONS:  - Maintain adequate hydration with IV or PO as ordered and tolerated  - Nasogastric tube to low intermittent suction as ordered  - Evaluate effectiveness of ordered antiemetic medications  - Provide nonpharmacologic comfort measures as appropriate  - Advance diet as tolerated, if ordered  - Obtain nutritional consult as needed  - Evaluate fluid balance  Outcome: Progressing     Problem: METABOLIC/FLUID AND ELECTROLYTES - ADULT  Goal: Electrolytes maintained within normal limits  Description: INTERVENTIONS:  - Monitor labs and rhythm and assess patient for signs and symptoms of electrolyte imbalances  - Administer  electrolyte replacement as ordered  - Monitor response to electrolyte replacements, including rhythm and repeat lab results as appropriate  - Fluid restriction as ordered  - Instruct patient on fluid and nutrition restrictions as appropriate  Outcome: Progressing  Goal: Hemodynamic stability and optimal renal function maintained  Description: INTERVENTIONS:  - Monitor labs and assess for signs and symptoms of volume excess or deficit  - Monitor intake, output and patient weight  - Monitor urine specific gravity, serum osmolarity and serum sodium as indicated or ordered  - Monitor response to interventions for patient's volume status, including labs, urine output, blood pressure (other measures as available)  - Encourage oral intake as appropriate  - Instruct patient on fluid and nutrition restrictions as appropriate  Outcome: Progressing     Problem: RESPIRATORY - ADULT  Goal: Achieves optimal ventilation and oxygenation  Description: INTERVENTIONS:  - Assess for changes in respiratory status  - Assess for changes in mentation and behavior  - Position to facilitate oxygenation and minimize respiratory effort  - Oxygen supplementation based on oxygen saturation or ABGs  - Provide Smoking Cessation handout, if applicable  - Encourage broncho-pulmonary hygiene including cough, deep breathe, Incentive Spirometry  - Assess the need for suctioning and perform as needed  - Assess and instruct to report SOB or any respiratory difficulty  - Respiratory Therapy support as indicated  - Manage/alleviate anxiety  - Monitor for signs/symptoms of CO2 retention  Outcome: Progressing

## 2024-09-09 NOTE — PHYSICAL THERAPY NOTE
PHYSICAL THERAPY TREATMENT NOTE - INPATIENT    Room Number: 531/531-A     Session: 2    Number of Visits to Meet Established Goals: 5    Presenting Problem: Intractable vomiting  Co-Morbidities : CAD, HTN, DM, macular degeneration    ASSESSMENT   Patient demonstrates limited progress this session, goals  remain in progress.    Patient continues to function below baseline with bed mobility, transfers, and gait.  Contributing factors to remaining limitations include decreased endurance/aerobic capacity, impaired dynamic and static balance, decreased muscular endurance, and cognitive deficits (significant fear of fall and poor safety awareness).  Next session anticipate patient to progress transfers and gait.  Physical Therapy will continue to follow patient for duration of hospitalization.    Patient continues to benefit from continued skilled PT services: to promote return to prior level of function and safety with continuous assistance and gradual rehabilitative therapy .    PLAN  PT Treatment Plan: Bed mobility;Patient education;Gait training;Energy conservation;Endurance;Range of motion;Strengthening;Transfer training;Balance training  Rehab Potential : Good  Frequency (Obs): 3-5x/week    CURRENT GOALS     Goal #1 Patient is able to demonstrate supine - sit EOB @ level: supervision     Goal #2 Patient is able to demonstrate transfers EOB to/from Chair/Wheelchair at assistance level: minimum assistance     Goal #3 Patient is able to ambulate 10 feet with assist device: walker - rolling at assistance level: minimum assistance     Goal #4    Goal #5    Goal #6    Goal Comments: Goals established on 8/29/2024 9/9/2024 all goals ongoing.     SUBJECTIVE  \"I can't walk. I have to get cleaned up. I just ate I will throw up. I need a new pulmonologist\"  Pt requires frequent re-direction   OBJECTIVE  Precautions: Bed/chair alarm;Hard of hearing;Low vision    WEIGHT BEARING RESTRICTION  Weight Bearing Restriction: None                 PAIN ASSESSMENT   Ratin  Location: pt denies pain  Management Techniques: Repositioning    BALANCE                                                                                                                       Static Sitting: Fair +  Dynamic Sitting: Fair           Static Standing: Fair -  Dynamic Standing: Poor +    ACTIVITY TOLERANCE                         O2 WALK         AM-PAC '6-Clicks' INPATIENT SHORT FORM - BASIC MOBILITY  How much difficulty does the patient currently have...  Patient Difficulty: Turning over in bed (including adjusting bedclothes, sheets and blankets)?: A Little   Patient Difficulty: Sitting down on and standing up from a chair with arms (e.g., wheelchair, bedside commode, etc.): A Little   Patient Difficulty: Moving from lying on back to sitting on the side of the bed?: A Little   How much help from another person does the patient currently need...   Help from Another: Moving to and from a bed to a chair (including a wheelchair)?: A Little   Help from Another: Need to walk in hospital room?: A Lot   Help from Another: Climbing 3-5 steps with a railing?: Total       AM-PAC Score:  Raw Score: 15   Approx Degree of Impairment: 57.7%   Standardized Score (AM-PAC Scale): 39.45   CMS Modifier (G-Code): CK    FUNCTIONAL ABILITY STATUS  Gait Assessment   Functional Mobility/Gait Assessment  Gait Assistance: Not tested  Distance (ft): 0  Assistive Device: Rolling walker  Pattern: Shuffle    Skilled Therapy Provided  Pt presents seated in  chair. Pt is now on VT  30L at 45%   Pt's daughter is at BS  Therapist encouraged and educated pt on benefits of mobility to prevent further deconditioning. Pt perseverates on \"not feeling better, getting worse\" . Therapist explained that pt has to increase activity   RN in to also encourage pt  Pt is only agreeable to standing, no marching in place or walking  Pt tolerates stance c RW CGA for 7.5 minutes c no LOB, and RN is able to  decrease O2 from 30L-20L c sats 94-98%  Therapist cued pt for seated therex BLE   Pt left in chair, needs met     Bed Mobility:  Rolling: nt  Supine<>Sit: NT   Sit<>Supine: NT  Min A to scoot       Transfer Mobility:  Sit<>Stand: min assist   Stand<>Sit: min assist and cues for hand placement.   Gait: NT        THERAPEUTIC EXERCISES  Lower Extremity Alternating marching  Ankle pumps  Hip AB/AD  Knee extension  LAQ     Upper Extremity na     Position Sitting     Repetitions   20   Sets   1     Patient End of Session: Up in chair;Call light within reach;Needs met;RN aware of session/findings;All patient questions and concerns addressed;Alarm set;Family present    PT Session Time: 24 minutes  Gait Trainin minutes  Therapeutic Activity: 24 minutes

## 2024-09-09 NOTE — DIETARY NOTE
[facility]   part of [facility]   CLINICAL NUTRITION    Alyssa Montgomery     Admitting diagnosis:  Hypokalemia [E87.6]  Intractable vomiting [R11.10]    Ht:  5'7\"  Wt: 78.1 kg (172 lb 3.2 oz).   Body mass index is 26.97 kg/m².  IBW: 61.4kg    Wt Readings from Last 6 Encounters:   08/31/24 78.1 kg (172 lb 3.2 oz)   04/23/24 80.1 kg (176 lb 9.6 oz)   02/01/24 75.4 kg (166 lb 3.2 oz)   01/31/24 75.5 kg (166 lb 7.2 oz)   11/14/23 75.5 kg (166 lb 6.4 oz)   10/19/23 74.8 kg (165 lb)        Labs/Meds reviewed    Diet:       Procedures    Regular/General diet Is Patient on Accuchecks? No     Percent Meals Eaten (last 3 days)       Date/Time Percent Meals Eaten (%)    09/07/24 0900 100 %    09/08/24 0900 75 %    09/08/24 1412 0 %    09/08/24 1800 100 %            9/9: RRT called yesterday for worsening respiratory distress. Pt also with increased abdominal distention so KUB ordered which showed colonic gaseous prominence. Per report, pt with no BM since 9/2 (7 days) so bowel regimen increased. Visited pt at bedside. She reports her appetite is so-so. Denies N/V and reports getting bowel care for constipation. Offered ONS while appetite is decreased and she is agreeable to vanilla Magic Cup. Continued to encourage PO intake; all questions answered at this time.  9/2: Pt chart reviewed d/t LOS. 89 year old female admitted for n/v. Nursing notes reports Percent Meals Eaten (%): 100 % intake for last meal. Diet liberalized to general for more choices.   Tolerating po diet without diarrhea, emesis, or constipation. +BM 8/31. No significant weight changes noted.     Patient is at low nutrition risk at this time.    Please consult if patient status changes or nutrition issues arise.    Jessica Mosley, RD, LDN, CNSC  Clinical Dietitian  Spectra: [phone]

## 2024-09-09 NOTE — PROGRESS NOTES
[facility]   part of [facility]     Hospitalist Progress Note     Alyssa Montgomery Patient Status:  Inpatient    1932 MRN [id number]   Location [facility] 0-A Attending Medhat Dover MD   Hosp Day # 14 PCP Mony Hall DO     Chief Complaint: n/v    Subjective:     Patient seen and examined this morning. Anxious, finds tele monitoring uncomfortable. Still feeling SOB. Still with cough.     Objective:    Review of Systems:   A comprehensive review of systems was completed; pertinent positive and negatives stated in subjective.    Vital signs:  Temp:  [97.9 °F (36.6 °C)-98 °F (36.7 °C)] 97.9 °F (36.6 °C)  Pulse:  [72-87] 74  Resp:  [16-19] 19  BP: (113-145)/(42-54) 113/42  SpO2:  [95 %-98 %] 95 %  FiO2 (%):  [45 %-55 %] 45 %    Physical Exam:    General: No acute distress. Anxious.  Respiratory: B/L wheezing.   Cardiovascular: S1, S2, regular rate and rhythm  Abdomen: Soft, Non-tender, moderate distention, positive bowel sounds  Neuro: No new focal deficits.   Extremities: No edema      Diagnostic Data:    Labs:  Recent Labs   Lab 24  0817 24  1510   WBC 17.5* 18.4*   HGB 13.8 12.5   MCV 90.7 91.4   .0 151.0       Recent Labs   Lab 24  0817 24  0714 24  1510   *  --  276*   BUN 23  --  18   CREATSERUM 0.62  --  0.66   CA 8.5*  --  8.5*     --  139   K 3.4* 3.7 3.9     --  105   CO2 31.0  --  25.0       Estimated Creatinine Clearance: 52.9 mL/min (based on SCr of 0.66 mg/dL).    No results for input(s): \"TROP\", \"TROPHS\", \"CK\" in the last 168 hours.    No results for input(s): \"PTP\", \"INR\" in the last 168 hours.                 Microbiology    Hospital Encounter on 24   1. Blood Culture     Status: None    Collection Time: 24 10:48 AM    Specimen: Blood,peripheral   Result Value Ref Range    Blood Culture Result No Growth 5 Days N/A         Imaging: Reviewed in Epic.    Medications:    [START ON 9/10/2024] multivitamin with  minerals  1 tablet Oral Daily    predniSONE  20 mg Oral Daily with breakfast    ipratropium-albuterol  3 mL Nebulization QID    sodium chloride (hypertonic)  3 mL Nebulization QID    piperacillin-tazobactam  3.375 g Intravenous Q8H    budesonide  0.5 mg Nebulization 2 times daily    hydrALAZINE  25 mg Oral Q8H SHELLEY    pantoprazole  40 mg Oral QAM AC    carvedilol  12.5 mg Oral BID with meals    amLODIPine  10 mg Oral Daily    atorvastatin  40 mg Oral Nightly    levothyroxine  100 mcg Oral Daily @ 0700    [Held by provider] predniSONE  1 mg Oral Daily with breakfast    pregabalin  25 mg Oral BID    enoxaparin  40 mg Subcutaneous Daily       Assessment & Plan:      #CAP  #Acute bronchitis with bronchospasm   #Acute hypoxic respiratory failure due to above  -Vapotherm started  -ABG reviewed  -IV zosyn  -Bcx/Sputum culture negative   -Tapering steroids per pulm   -Scheduled nebs - duoneb/pulmicort   -D/w Dr. Varner    #Abdominal distention  -KUB without evidence of obstruction/impaction    -Continue bowel regimen    #Leukocytosis  -Likely in part steroid induced   -Monitor    #Acute metabolic encephalopathy   -Likely due to #1 and extended hospital stay  -Frequent re-orientation necessary     #CAD  ASA, statin, BB     #HFpEF  Compensated   Echo 2023 with normal EF, G1DD    #Essential HTN  #HTN urgency  -BP controlled     #Hypothyroidism - Synthroid   #PMR - 1mg prednisone daily PTA    #Suspected cognitive impairment  -OP neuropsych ammon Contreras,     Supplementary Documentation:     Quality:  DVT Mechanical Prophylaxis:     Early ambuation  DVT Pharmacologic Prophylaxis   Medication    enoxaparin (Lovenox) 40 MG/0.4ML SUBQ injection 40 mg                Code Status: DNAR/Selective Treatment  Buck: External urinary catheter in place  Buck Duration (in days):   Central line:    XAVI:     Discharge is dependent on: clinical improvement  At this point Ms. Montgomery is expected to be discharge to: tbd    The 21st  Century Cures Act makes medical notes like these available to patients in the interest of transparency. Please be advised this is a medical document. Medical documents are intended to carry relevant information, facts as evident, and the clinical opinion of the practitioner. The medical note is intended as peer to peer communication and may appear blunt or direct. It is written in medical language and may contain abbreviations or verbiage that are unfamiliar.

## 2024-09-09 NOTE — PROGRESS NOTES
[facility]  Progress Note    Alyssa Montgomery Patient Status:  Inpatient    1932 MRN [id number]   Location [facility] 5NW-A Attending Buzz Contreras DO   Hosp Day # 14 PCP Mony Hall DO     Subjective:  Alyssa Montgomery is a(n) 89 year old female remains afeb   Reports no significant improvement though has been weaned to 20 L on Vapotherm  Remains with disturbing cough mostly nonproductive  Denies any specific chest jeovany remains on Vapotherm  Remains hesitant to move though now sitting in the chair    Objective:  /42 (BP Location: Right arm)   Pulse 74   Temp 97.9 °F (36.6 °C) (Oral)   Resp 19   Ht 5' 7\" (1.702 m)   Wt 172 lb 3.2 oz (78.1 kg)   SpO2 95%   BMI 26.97 kg/m² Vapotherm      Temp (24hrs), Av.9 °F (36.6 °C), Min:97.9 °F (36.6 °C), Max:98 °F (36.7 °C)      Intake/Output:    Intake/Output Summary (Last 24 hours) at 2024 1547  Last data filed at 2024 0615  Gross per 24 hour   Intake 460 ml   Output 650 ml   Net -190 ml       Physical Exam:   General: alert, cooperative, oriented.  No respiratory distress.  Intermittent harsh cough   Head: Normocephalic, without obvious abnormality, atraumatic.   Throat: Lips, mucosa, and tongue normal.  No thrush noted.   Neck: trachea midline, no adenopathy, no thyromegaly. No JVD.  90 degrees   Lungs: Tight expiratory wheeze bilaterally with tight rhonchi basilar rales   Chest wall: No tenderness or deformity.   Heart: Distant tones murmurs no   Abdomen: soft, non-distended, no masses, no guarding, no     Rebound.  Denies any diarrhea   Extremity: Nontender no significant edema   Skin: No rashes or lesions.   Neurological: Alert, interactive, no focal deficits    Lab Data Review:  Recent Labs     24  1510   WBC 18.4*   HGB 12.5   .0     Recent Labs     24  1510      K 3.9      CO2 25.0   BUN 18   CREATSERUM 0.66     No results for input(s): \"PTP\", \"INR\", \"PTT\" in the last 168 hours.    Cultures: bc  remain neg   Sputumn NRF     Radiology:  No results found.  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)    Macular cyst, hole, or pseudohole, right eye    Nonexudative age-related macular degeneration, right eye, intermediate dry stage    PMR (polymyalgia rheumatica) (HCC)    Paving stone degeneration of retina, bilateral    Asymptomatic carotid artery stenosis, bilateral    Weakness    Constipation    Thrombocytopenia (HCC)    Syncope and collapse    Concussion with loss of consciousness    Fall, initial encounter    Laceration of right little finger without foreign body without damage to nail, initial encounter    Neuropathy    Shortness of breath    Respiratory syncytial virus (RSV)    RSV (acute bronchiolitis due to respiratory syncytial virus)    Intractable vomiting    Hypokalemia    Community acquired pneumonia    Bronchospasm       Assessment:  Acute hypoxic respiratory failure likely 2/2 left sided pna, CT on 9/3 with left sided infiltrate, viral panel negative, inflammatory markers negative  Recurrent episodes of dyspnea hypoxia-questionable role of recurrent aspiration  Bronchitis/airways obstruction with significant rhonchorous sounds 2/2 above  GERD/diarrhea now resolved  Hypertension  Hyperlipidemia  History of coronary artery disease  Polymyalgia rheumatica  Hypothyroidism         Plan:  Check COVID/viral panel  Increasing steroids  DC Saline  Reviewed with patient and her daughter at bedside    CC     Sue Varner MD  9/9/2024  3:47 PM

## 2024-09-09 NOTE — PLAN OF CARE
Received pt A&Ox3. Short term memory loss with repetition.  on vapotherm - RT weaning, see flowsheets. NSR on tele. Lovenox for VTE prophylaxis. Tolerating diet. Voids via purwick. Abdomen distended, last BM noted 9/2 - PRN senna provided. + bowel sounds. Pain management per MAR. Plan of care continues, no further needs at this time.     Problem: Patient/Family Goals  Goal: Patient/Family Long Term Goal  Description: Patient's Long Term Goal: Back to [facility]    Interventions:  - IV antibiotics  - O2  - Nebs  - fall precautions  - See additional Care Plan goals for specific interventions  Outcome: Progressing  Goal: Patient/Family Short Term Goal  Description: Patient's Short Term Goal: Pain relief  9/1 noc: wean O2  9/4AM: urinate  9/5 noc: wean off 02   9/6 noc: wean O2 to baseline  9/7 AM: wean o2  9/7 noc: wean O2 as duncan  9/8 NOC: \"I want to go to sleep\"  Interventions:   - Tylenol,   - reposition  -nebs, hob 30,     - See additional Care Plan goals for specific interventions  Outcome: Progressing     Problem: GASTROINTESTINAL - ADULT  Goal: Minimal or absence of nausea and vomiting  Description: INTERVENTIONS:  - Maintain adequate hydration with IV or PO as ordered and tolerated  - Nasogastric tube to low intermittent suction as ordered  - Evaluate effectiveness of ordered antiemetic medications  - Provide nonpharmacologic comfort measures as appropriate  - Advance diet as tolerated, if ordered  - Obtain nutritional consult as needed  - Evaluate fluid balance  Outcome: Progressing     Problem: METABOLIC/FLUID AND ELECTROLYTES - ADULT  Goal: Electrolytes maintained within normal limits  Description: INTERVENTIONS:  - Monitor labs and rhythm and assess patient for signs and symptoms of electrolyte imbalances  - Administer electrolyte replacement as ordered  - Monitor response to electrolyte replacements, including rhythm and repeat lab results as appropriate  - Fluid restriction as ordered  -  Instruct patient on fluid and nutrition restrictions as appropriate  Outcome: Progressing  Goal: Hemodynamic stability and optimal renal function maintained  Description: INTERVENTIONS:  - Monitor labs and assess for signs and symptoms of volume excess or deficit  - Monitor intake, output and patient weight  - Monitor urine specific gravity, serum osmolarity and serum sodium as indicated or ordered  - Monitor response to interventions for patient's volume status, including labs, urine output, blood pressure (other measures as available)  - Encourage oral intake as appropriate  - Instruct patient on fluid and nutrition restrictions as appropriate  Outcome: Progressing     Problem: RESPIRATORY - ADULT  Goal: Achieves optimal ventilation and oxygenation  Description: INTERVENTIONS:  - Assess for changes in respiratory status  - Assess for changes in mentation and behavior  - Position to facilitate oxygenation and minimize respiratory effort  - Oxygen supplementation based on oxygen saturation or ABGs  - Provide Smoking Cessation handout, if applicable  - Encourage broncho-pulmonary hygiene including cough, deep breathe, Incentive Spirometry  - Assess the need for suctioning and perform as needed  - Assess and instruct to report SOB or any respiratory difficulty  - Respiratory Therapy support as indicated  - Manage/alleviate anxiety  - Monitor for signs/symptoms of CO2 retention  Outcome: Progressing

## 2024-09-10 ENCOUNTER — APPOINTMENT (OUTPATIENT)
Dept: GENERAL RADIOLOGY | Facility: HOSPITAL | Age: 89
End: 2024-09-10
Attending: INTERNAL MEDICINE
Payer: MEDICARE

## 2024-09-10 ENCOUNTER — APPOINTMENT (OUTPATIENT)
Dept: CV DIAGNOSTICS | Facility: HOSPITAL | Age: 89
End: 2024-09-10
Attending: HOSPITALIST
Payer: MEDICARE

## 2024-09-10 LAB
ADENOVIRUS PCR:: NOT DETECTED
ALBUMIN SERPL-MCNC: 3.1 G/DL (ref 3.2–4.8)
ALBUMIN/GLOB SERPL: 1.1 {RATIO} (ref 1–2)
ALP LIVER SERPL-CCNC: 57 U/L
ALT SERPL-CCNC: 19 U/L
ANION GAP SERPL CALC-SCNC: 8 MMOL/L (ref 0–18)
AST SERPL-CCNC: 12 U/L (ref ?–34)
B PARAPERT DNA SPEC QL NAA+PROBE: NOT DETECTED
B PERT DNA SPEC QL NAA+PROBE: NOT DETECTED
BASOPHILS # BLD AUTO: 0.02 X10(3) UL (ref 0–0.2)
BASOPHILS NFR BLD AUTO: 0.2 %
BILIRUB SERPL-MCNC: 0.6 MG/DL (ref 0.2–0.9)
BUN BLD-MCNC: 14 MG/DL (ref 9–23)
C PNEUM DNA SPEC QL NAA+PROBE: NOT DETECTED
CALCIUM BLD-MCNC: 8.8 MG/DL (ref 8.7–10.4)
CHLORIDE SERPL-SCNC: 106 MMOL/L (ref 98–112)
CO2 SERPL-SCNC: 24 MMOL/L (ref 21–32)
CORONAVIRUS 229E PCR:: NOT DETECTED
CORONAVIRUS HKU1 PCR:: NOT DETECTED
CORONAVIRUS NL63 PCR:: NOT DETECTED
CORONAVIRUS OC43 PCR:: NOT DETECTED
CREAT BLD-MCNC: 0.61 MG/DL
EGFRCR SERPLBLD CKD-EPI 2021: 84 ML/MIN/1.73M2 (ref 60–?)
EOSINOPHIL # BLD AUTO: 0.01 X10(3) UL (ref 0–0.7)
EOSINOPHIL NFR BLD AUTO: 0.1 %
ERYTHROCYTE [DISTWIDTH] IN BLOOD BY AUTOMATED COUNT: 13.6 %
FLUAV RNA SPEC QL NAA+PROBE: NOT DETECTED
FLUBV RNA SPEC QL NAA+PROBE: NOT DETECTED
GLOBULIN PLAS-MCNC: 2.8 G/DL (ref 2–3.5)
GLUCOSE BLD-MCNC: 187 MG/DL (ref 70–99)
HCT VFR BLD AUTO: 34.4 %
HGB BLD-MCNC: 11.5 G/DL
IMM GRANULOCYTES # BLD AUTO: 0.11 X10(3) UL (ref 0–1)
IMM GRANULOCYTES NFR BLD: 0.9 %
LYMPHOCYTES # BLD AUTO: 0.67 X10(3) UL (ref 1–4)
LYMPHOCYTES NFR BLD AUTO: 5.5 %
MCH RBC QN AUTO: 31.1 PG (ref 26–34)
MCHC RBC AUTO-ENTMCNC: 33.4 G/DL (ref 31–37)
MCV RBC AUTO: 93 FL
METAPNEUMOVIRUS PCR:: NOT DETECTED
MONOCYTES # BLD AUTO: 0.68 X10(3) UL (ref 0.1–1)
MONOCYTES NFR BLD AUTO: 5.6 %
MYCOPLASMA PNEUMONIA PCR:: DETECTED
NEUTROPHILS # BLD AUTO: 10.75 X10 (3) UL (ref 1.5–7.7)
NEUTROPHILS # BLD AUTO: 10.75 X10(3) UL (ref 1.5–7.7)
NEUTROPHILS NFR BLD AUTO: 87.7 %
OSMOLALITY SERPL CALC.SUM OF ELEC: 291 MOSM/KG (ref 275–295)
PARAINFLUENZA 1 PCR:: NOT DETECTED
PARAINFLUENZA 2 PCR:: NOT DETECTED
PARAINFLUENZA 3 PCR:: NOT DETECTED
PARAINFLUENZA 4 PCR:: NOT DETECTED
PLATELET # BLD AUTO: 146 10(3)UL (ref 150–450)
PLATELETS.RETICULATED NFR BLD AUTO: 5.8 % (ref 0–7)
POTASSIUM SERPL-SCNC: 4.1 MMOL/L (ref 3.5–5.1)
PROT SERPL-MCNC: 5.9 G/DL (ref 5.7–8.2)
RBC # BLD AUTO: 3.7 X10(6)UL
RHINOVIRUS/ENTERO PCR:: NOT DETECTED
RSV RNA SPEC QL NAA+PROBE: NOT DETECTED
SARS-COV-2 RNA NPH QL NAA+NON-PROBE: NOT DETECTED
SODIUM SERPL-SCNC: 138 MMOL/L (ref 136–145)
WBC # BLD AUTO: 12.2 X10(3) UL (ref 4–11)

## 2024-09-10 PROCEDURE — 99232 SBSQ HOSP IP/OBS MODERATE 35: CPT | Performed by: INTERNAL MEDICINE

## 2024-09-10 PROCEDURE — 71045 X-RAY EXAM CHEST 1 VIEW: CPT | Performed by: INTERNAL MEDICINE

## 2024-09-10 PROCEDURE — 99232 SBSQ HOSP IP/OBS MODERATE 35: CPT | Performed by: HOSPITALIST

## 2024-09-10 RX ORDER — ACETYLCYSTEINE 200 MG/ML
2 SOLUTION ORAL; RESPIRATORY (INHALATION) 3 TIMES DAILY
Status: DISCONTINUED | OUTPATIENT
Start: 2024-09-10 | End: 2024-09-10

## 2024-09-10 RX ORDER — NYSTATIN 100000 [USP'U]/ML
5 SUSPENSION ORAL 4 TIMES DAILY
Status: DISCONTINUED | OUTPATIENT
Start: 2024-09-10 | End: 2024-09-20

## 2024-09-10 RX ORDER — GUAIFENESIN 600 MG/1
600 TABLET, EXTENDED RELEASE ORAL 2 TIMES DAILY
Status: DISCONTINUED | OUTPATIENT
Start: 2024-09-10 | End: 2024-09-20

## 2024-09-10 NOTE — PLAN OF CARE
Pt is a&ox2-4, forgetful at times. Seizure precautions in place. [place]. U/L partials. Currently on 3L will continue to wean as tolerated. RA is baseline. Scheduled nebs, CPT. Pt refusing tele. PO prednisone. IV abx. Lovenox. Briefed/incontinent utilizing a purewick. Pt c/o pain PRN meds given see MAR. Up x2 max assist w walker. Tolerating a regular diet, order and set up. Takes pills whole/crushed in applesauce. Pt updated on poc. No further needs at this time    Problem: Patient/Family Goals  Goal: Patient/Family Long Term Goal  Description: Patient's Long Term Goal: Back to [facility]    Interventions:  - IV antibiotics  - O2  - Nebs  - fall precautions  - See additional Care Plan goals for specific interventions  Outcome: Progressing  Goal: Patient/Family Short Term Goal  Description: Patient's Short Term Goal: Pain relief  9/1 noc: wean O2  9/4AM: urinate  9/5 noc: wean off 02   9/6 noc: wean O2 to baseline  9/7 AM: wean o2  9/7 noc: wean O2 as duncan  9/8 NOC: \"I want to go to sleep\"  9/9 NOC: wean O2  Interventions:   - Tylenol,   - reposition  -nebs, hob 30,     - See additional Care Plan goals for specific interventions  Outcome: Progressing

## 2024-09-10 NOTE — PROGRESS NOTES
[facility]  Progress Note    Alyssa Montgomery Patient Status:  Inpatient    1932 MRN [id number]   Location [facility] 5NW-A Attending Buzz Contreras DO   Hosp Day # 15 PCP Mony Hall DO     Subjective:  Alyssa Montgomery is a(n) 89 year old female low-grade temp this afternoon 99.7  Clinically better and weaned down to 3 L  However continues to be exhausted with ongoing harsh cough    Objective:  /52 (BP Location: Left arm)   Pulse 74   Temp 99.7 °F (37.6 °C) (Oral)   Resp 18   Ht 5' 7\" (1.702 m)   Wt 172 lb 3.2 oz (78.1 kg)   SpO2 94%   BMI 26.97 kg/m² currently on 3 L      Temp (24hrs), Av.5 °F (36.9 °C), Min:97.8 °F (36.6 °C), Max:99.7 °F (37.6 °C)      Intake/Output:    Intake/Output Summary (Last 24 hours) at 9/10/2024 1451  Last data filed at 9/10/2024 0653  Gross per 24 hour   Intake 100 ml   Output 350 ml   Net -250 ml       Physical Exam:   General: alert, cooperative, oriented.  No respiratory distress.  Remains however with harsh cough   Head: Normocephalic, without obvious abnormality, atraumatic.   Throat: Lips, mucosa, and tongue normal.  Slight thrush noted   Neck: trachea midline, no adenopathy, no thyromegaly. No JVD.   Lungs: Remains with tight expiratory rhonchi wheeze barky cough   Chest wall: No tenderness or deformity.   Heart: Regular rate and rhythm   Abdomen: soft, non-distended, no masses, no guarding, no     Rebound.  No diarrhea   Extremity: Trace edema nontender   Skin: No rashes or lesions.   Neurological: Alert, interactive, no focal deficits    Lab Data Review:  Recent Labs     24  1510 09/10/24  0743   WBC 18.4* 12.2*   HGB 12.5 11.5*   .0 146.0*     Recent Labs     24  1510 09/10/24  0743    138   K 3.9 4.1    106   CO2 25.0 24.0   BUN 18 14   CREATSERUM 0.66 0.61     No results for input(s): \"PTP\", \"INR\", \"PTT\" in the last 168 hours.    Cultures: Cultures remain negative/pending/normal respiratory jason  Repeat viral  panel pending    Radiology:  XR CHEST AP PORTABLE  (CPT=71045)    Result Date: 9/10/2024  CONCLUSION:  Stable left mid and lower lung airspace consolidation concerning for stable pneumonia.   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)    Macular cyst, hole, or pseudohole, right eye    Nonexudative age-related macular degeneration, right eye, intermediate dry stage    PMR (polymyalgia rheumatica) (formerly [facility])    Paving stone degeneration of retina, bilateral    Asymptomatic carotid artery stenosis, bilateral    Weakness    Constipation    Thrombocytopenia (formerly [facility])    Syncope and collapse    Concussion with loss of consciousness    Fall, initial encounter    Laceration of right little finger without foreign body without damage to nail, initial encounter    Neuropathy    Shortness of breath    Respiratory syncytial virus (RSV)    RSV (acute bronchiolitis due to respiratory syncytial virus)    Intractable vomiting    Hypokalemia    Community acquired pneumonia    Bronchospasm       Assessment:  Acute hypoxic respiratory failure likely 2/2 left sided pna, CT on 9/3 with left sided infiltrate, viral panel negative, inflammatory markers negative  Recurrent episode of dyspnea hypoxia with severe cough-questionable role of recurrent aspiration versus secondary viral infection  Bronchitis/airways obstruction  with significant rhonchorous sounds 2/2 above rule out secondary viral infection  GERD/diarrhea now resolved  Hypertension  Hyperlipidemia  History of coronary artery disease  Polymyalgia rheumatica  Hypothyroidism    Plan:  Viral panel has been ordered  Continuing nebulized bronchodilators  Continuing ICS - oral prednisone  Overall seems a bit improved continuing to follow  Reviewed with patient and daughter at bedside    CC     Sue Varner MD  9/10/2024  2:51 PM

## 2024-09-10 NOTE — PROGRESS NOTES
[facility]   part of [facility]     Hospitalist Progress Note     Alyssa Montgomery Patient Status:  Inpatient    1932 MRN [id number]   Location [facility] 0SW-A Attending Medhat Dovre MD   Hosp Day # 15 PCP Mony Hall DO     Chief Complaint: n/v    Subjective:     Patient seen and examined this morning. Ongoing cough/wheezing/SOB.     Objective:    Review of Systems:   A comprehensive review of systems was completed; pertinent positive and negatives stated in subjective.    Vital signs:  Temp:  [97.8 °F (36.6 °C)-99.7 °F (37.6 °C)] 99.7 °F (37.6 °C)  Pulse:  [64-86] 74  Resp:  [17-18] 18  BP: (108-151)/(42-56) 125/52  SpO2:  [90 %-99 %] 94 %  FiO2 (%):  [45 %] 45 %    Physical Exam:    General: No acute distress. Anxious.  Respiratory: B/L wheezing.   Cardiovascular: S1, S2, regular rate and rhythm  Abdomen: Soft, Non-tender, moderate distention, positive bowel sounds  Neuro: No new focal deficits.   Extremities: No edema      Diagnostic Data:    Labs:  Recent Labs   Lab 24  1510 09/10/24  0743   WBC 18.4* 12.2*   HGB 12.5 11.5*   MCV 91.4 93.0   .0 146.0*       Recent Labs   Lab 24  0714 24  1510 09/10/24  0743   GLU  --  276* 187*   BUN  --  18 14   CREATSERUM  --  0.66 0.61   CA  --  8.5* 8.8   ALB  --   --  3.1*   NA  --  139 138   K 3.7 3.9 4.1   CL  --  105 106   CO2  --  25.0 24.0   ALKPHO  --   --  57   AST  --   --  12   ALT  --   --  19   BILT  --   --  0.6   TP  --   --  5.9       Estimated Creatinine Clearance: 57.2 mL/min (based on SCr of 0.61 mg/dL).    No results for input(s): \"TROP\", \"TROPHS\", \"CK\" in the last 168 hours.    No results for input(s): \"PTP\", \"INR\" in the last 168 hours.                 Microbiology    Hospital Encounter on 24   1. Blood Culture     Status: None    Collection Time: 24 10:48 AM    Specimen: Blood,peripheral   Result Value Ref Range    Blood Culture Result No Growth 5 Days N/A         Imaging: Reviewed in  Epic.    Medications:    guaiFENesin ER  600 mg Oral BID    multivitamin with minerals  1 tablet Oral Daily    QUEtiapine  25 mg Oral Nightly    predniSONE  40 mg Oral Daily with breakfast    ipratropium-albuterol  3 mL Nebulization Q4H WA (5 times daily)    budesonide  0.5 mg Nebulization 2 times daily    hydrALAZINE  25 mg Oral Q8H SHELLEY    pantoprazole  40 mg Oral QAM AC    carvedilol  12.5 mg Oral BID with meals    amLODIPine  10 mg Oral Daily    atorvastatin  40 mg Oral Nightly    levothyroxine  100 mcg Oral Daily @ 0700    [Held by provider] predniSONE  1 mg Oral Daily with breakfast    pregabalin  25 mg Oral BID    enoxaparin  40 mg Subcutaneous Daily       Assessment & Plan:      #CAP  #Acute bronchitis with bronchospasm   #Acute hypoxic respiratory failure due to above  -Weaned off vapotherm to HFNC  -IV zosyn  -Bcx/Sputum culture negative   -Tapering steroids per pulm   -Scheduled nebs - duoneb/pulmicort, add mucomyst  -Chest PT  -Pulmonary following    #Abdominal distention  -KUB without evidence of obstruction/impaction    -Continue bowel regimen    #Leukocytosis  -Likely in part steroid induced   -Monitor    #Acute metabolic encephalopathy   -Likely due to #1 and extended hospital stay  -Frequent re-orientation necessary     #CAD  -ASA, statin, BB     #HFpEF  Compensated   Echo 2023 with normal EF, G1DD    #Essential HTN  #HTN urgency  -BP controlled     #Hypothyroidism - Synthroid   #PMR - 1mg prednisone daily PTA    #Suspected cognitive impairment  -OP neuropsych ammon Contreras,     Supplementary Documentation:     Quality:  DVT Mechanical Prophylaxis:     Early ambuation  DVT Pharmacologic Prophylaxis   Medication    enoxaparin (Lovenox) 40 MG/0.4ML SUBQ injection 40 mg                Code Status: DNAR/Selective Treatment  Buck: External urinary catheter in place  Buck Duration (in days):   Central line:    XAVI:     Discharge is dependent on: clinical improvement  At this point Ms. Montgomery is  expected to be discharge to: tbd    The 21st Century Cures Act makes medical notes like these available to patients in the interest of transparency. Please be advised this is a medical document. Medical documents are intended to carry relevant information, facts as evident, and the clinical opinion of the practitioner. The medical note is intended as peer to peer communication and may appear blunt or direct. It is written in medical language and may contain abbreviations or verbiage that are unfamiliar.

## 2024-09-10 NOTE — RESPIRATORY THERAPY NOTE
Nasal swab done. Wore PPE. Sample sent to Lab.   ,[email or web address]

## 2024-09-10 NOTE — PLAN OF CARE
Received pt A&Ox3. Short term memory loss with repetition. , weaned off vapotherm. See flowsheets. Refusing cardiac monitoring. Lovenox for VTE prophylaxis. Tolerating diet. Voids via purwick. Abdomen distended, last BM noted 9/2 - PRN senna provided. + bowel sounds. Pain management per MAR. Plan of care continues, no further needs at this time.     Problem: Patient/Family Goals  Goal: Patient/Family Long Term Goal  Description: Patient's Long Term Goal: Back to [facility]    Interventions:  - IV antibiotics  - O2  - Nebs  - fall precautions  - See additional Care Plan goals for specific interventions  Outcome: Progressing  Goal: Patient/Family Short Term Goal  Description: Patient's Short Term Goal: Pain relief  9/1 noc: wean O2  9/4AM: urinate  9/5 noc: wean off 02   9/6 noc: wean O2 to baseline  9/7 AM: wean o2  9/7 noc: wean O2 as duncan  9/8 NOC: \"I want to go to sleep\"  9/9 NOC: wean O2  Interventions:   - Tylenol,   - reposition  -nebs, hob 30,     - See additional Care Plan goals for specific interventions  Outcome: Progressing     Problem: GASTROINTESTINAL - ADULT  Goal: Minimal or absence of nausea and vomiting  Description: INTERVENTIONS:  - Maintain adequate hydration with IV or PO as ordered and tolerated  - Nasogastric tube to low intermittent suction as ordered  - Evaluate effectiveness of ordered antiemetic medications  - Provide nonpharmacologic comfort measures as appropriate  - Advance diet as tolerated, if ordered  - Obtain nutritional consult as needed  - Evaluate fluid balance  Outcome: Progressing     Problem: METABOLIC/FLUID AND ELECTROLYTES - ADULT  Goal: Electrolytes maintained within normal limits  Description: INTERVENTIONS:  - Monitor labs and rhythm and assess patient for signs and symptoms of electrolyte imbalances  - Administer electrolyte replacement as ordered  - Monitor response to electrolyte replacements, including rhythm and repeat lab results as appropriate  - Fluid  restriction as ordered  - Instruct patient on fluid and nutrition restrictions as appropriate  Outcome: Progressing  Goal: Hemodynamic stability and optimal renal function maintained  Description: INTERVENTIONS:  - Monitor labs and assess for signs and symptoms of volume excess or deficit  - Monitor intake, output and patient weight  - Monitor urine specific gravity, serum osmolarity and serum sodium as indicated or ordered  - Monitor response to interventions for patient's volume status, including labs, urine output, blood pressure (other measures as available)  - Encourage oral intake as appropriate  - Instruct patient on fluid and nutrition restrictions as appropriate  Outcome: Progressing     Problem: RESPIRATORY - ADULT  Goal: Achieves optimal ventilation and oxygenation  Description: INTERVENTIONS:  - Assess for changes in respiratory status  - Assess for changes in mentation and behavior  - Position to facilitate oxygenation and minimize respiratory effort  - Oxygen supplementation based on oxygen saturation or ABGs  - Provide Smoking Cessation handout, if applicable  - Encourage broncho-pulmonary hygiene including cough, deep breathe, Incentive Spirometry  - Assess the need for suctioning and perform as needed  - Assess and instruct to report SOB or any respiratory difficulty  - Respiratory Therapy support as indicated  - Manage/alleviate anxiety  - Monitor for signs/symptoms of CO2 retention  Outcome: Progressing

## 2024-09-11 PROBLEM — J15.7 PNEUMONIA DUE TO MYCOPLASMA PNEUMONIAE: Status: ACTIVE | Noted: 2024-09-11

## 2024-09-11 PROBLEM — J15.7 PNEUMONIA DUE TO MYCOPLASMA PNEUMONIAE: Status: ACTIVE | Noted: 2024-01-01

## 2024-09-11 LAB
ANION GAP SERPL CALC-SCNC: 5 MMOL/L (ref 0–18)
BASOPHILS # BLD AUTO: 0.03 X10(3) UL (ref 0–0.2)
BASOPHILS NFR BLD AUTO: 0.2 %
BUN BLD-MCNC: 19 MG/DL (ref 9–23)
CALCIUM BLD-MCNC: 8.7 MG/DL (ref 8.7–10.4)
CHLORIDE SERPL-SCNC: 107 MMOL/L (ref 98–112)
CO2 SERPL-SCNC: 30 MMOL/L (ref 21–32)
CREAT BLD-MCNC: 0.59 MG/DL
EGFRCR SERPLBLD CKD-EPI 2021: 85 ML/MIN/1.73M2 (ref 60–?)
EOSINOPHIL # BLD AUTO: 0.09 X10(3) UL (ref 0–0.7)
EOSINOPHIL NFR BLD AUTO: 0.6 %
ERYTHROCYTE [DISTWIDTH] IN BLOOD BY AUTOMATED COUNT: 13.7 %
GLUCOSE BLD-MCNC: 176 MG/DL (ref 70–99)
HCT VFR BLD AUTO: 32.1 %
HGB BLD-MCNC: 11.1 G/DL
IMM GRANULOCYTES # BLD AUTO: 0.15 X10(3) UL (ref 0–1)
IMM GRANULOCYTES NFR BLD: 1 %
LYMPHOCYTES # BLD AUTO: 1.07 X10(3) UL (ref 1–4)
LYMPHOCYTES NFR BLD AUTO: 7 %
MCH RBC QN AUTO: 31.2 PG (ref 26–34)
MCHC RBC AUTO-ENTMCNC: 34.6 G/DL (ref 31–37)
MCV RBC AUTO: 90.2 FL
MONOCYTES # BLD AUTO: 0.99 X10(3) UL (ref 0.1–1)
MONOCYTES NFR BLD AUTO: 6.5 %
NEUTROPHILS # BLD AUTO: 12.98 X10 (3) UL (ref 1.5–7.7)
NEUTROPHILS # BLD AUTO: 12.98 X10(3) UL (ref 1.5–7.7)
NEUTROPHILS NFR BLD AUTO: 84.7 %
OSMOLALITY SERPL CALC.SUM OF ELEC: 301 MOSM/KG (ref 275–295)
PLATELET # BLD AUTO: 147 10(3)UL (ref 150–450)
POTASSIUM SERPL-SCNC: 4.1 MMOL/L (ref 3.5–5.1)
RBC # BLD AUTO: 3.56 X10(6)UL
SODIUM SERPL-SCNC: 142 MMOL/L (ref 136–145)
WBC # BLD AUTO: 15.3 X10(3) UL (ref 4–11)

## 2024-09-11 PROCEDURE — 99232 SBSQ HOSP IP/OBS MODERATE 35: CPT | Performed by: HOSPITALIST

## 2024-09-11 PROCEDURE — 99232 SBSQ HOSP IP/OBS MODERATE 35: CPT | Performed by: INTERNAL MEDICINE

## 2024-09-11 RX ORDER — AZITHROMYCIN 250 MG/1
500 TABLET, FILM COATED ORAL
Status: DISCONTINUED | OUTPATIENT
Start: 2024-09-11 | End: 2024-09-17 | Stop reason: ALTCHOICE

## 2024-09-11 NOTE — PLAN OF CARE
Received pt A&Ox3. Needs reorientation at times. Droplet isolation for + mycoplasma pneumoniae, IV azithromycin.  on 5L. CPT. Refusing cardiac monitoring. Lovenox for VTE prophylaxis. Tolerating diet. Voids via purwick. Abdomen distended,+ flatus. Pain management per MAR. Turn Q2H. Plan of care continues, no further needs at this time.     Problem: Patient/Family Goals  Goal: Patient/Family Long Term Goal  Description: Patient's Long Term Goal: Back to [facility]    Interventions:  - IV antibiotics  - O2  - Nebs  - fall precautions  - See additional Care Plan goals for specific interventions  Outcome: Progressing  Goal: Patient/Family Short Term Goal  Description: Patient's Short Term Goal: Pain relief  9/1 noc: wean O2  9/4AM: urinate  9/5 noc: wean off 02   9/6 noc: wean O2 to baseline  9/7 AM: wean o2  9/7 noc: wean O2 as duncan  9/8 NOC: \"I want to go to sleep\"  9/9 NOC: wean O2  9/10 NOC: sleep  Interventions:   - Tylenol,   - reposition  -nebs, hob 30,     - See additional Care Plan goals for specific interventions  Outcome: Progressing     Problem: GASTROINTESTINAL - ADULT  Goal: Minimal or absence of nausea and vomiting  Description: INTERVENTIONS:  - Maintain adequate hydration with IV or PO as ordered and tolerated  - Nasogastric tube to low intermittent suction as ordered  - Evaluate effectiveness of ordered antiemetic medications  - Provide nonpharmacologic comfort measures as appropriate  - Advance diet as tolerated, if ordered  - Obtain nutritional consult as needed  - Evaluate fluid balance  Outcome: Progressing     Problem: METABOLIC/FLUID AND ELECTROLYTES - ADULT  Goal: Electrolytes maintained within normal limits  Description: INTERVENTIONS:  - Monitor labs and rhythm and assess patient for signs and symptoms of electrolyte imbalances  - Administer electrolyte replacement as ordered  - Monitor response to electrolyte replacements, including rhythm and repeat lab results as  appropriate  - Fluid restriction as ordered  - Instruct patient on fluid and nutrition restrictions as appropriate  Outcome: Progressing  Goal: Hemodynamic stability and optimal renal function maintained  Description: INTERVENTIONS:  - Monitor labs and assess for signs and symptoms of volume excess or deficit  - Monitor intake, output and patient weight  - Monitor urine specific gravity, serum osmolarity and serum sodium as indicated or ordered  - Monitor response to interventions for patient's volume status, including labs, urine output, blood pressure (other measures as available)  - Encourage oral intake as appropriate  - Instruct patient on fluid and nutrition restrictions as appropriate  Outcome: Progressing     Problem: RESPIRATORY - ADULT  Goal: Achieves optimal ventilation and oxygenation  Description: INTERVENTIONS:  - Assess for changes in respiratory status  - Assess for changes in mentation and behavior  - Position to facilitate oxygenation and minimize respiratory effort  - Oxygen supplementation based on oxygen saturation or ABGs  - Provide Smoking Cessation handout, if applicable  - Encourage broncho-pulmonary hygiene including cough, deep breathe, Incentive Spirometry  - Assess the need for suctioning and perform as needed  - Assess and instruct to report SOB or any respiratory difficulty  - Respiratory Therapy support as indicated  - Manage/alleviate anxiety  - Monitor for signs/symptoms of CO2 retention  Outcome: Progressing

## 2024-09-11 NOTE — PROGRESS NOTES
[place]  Progress Note    Alyssa Montgomery Patient Status:  Inpatient    1932 MRN [id number]   Location [facility] 5NW-A Attending Debby Cheek MD   Hosp Day # 16 PCP Mony Hall DO     Subjective:  Alyssa Montgomery is a(n) 89 year old female feels better   Less cough occasionally  productive   More energy     Objective:  /60 (BP Location: Right arm)   Pulse 91   Temp 97.4 °F (36.3 °C) (Oral)   Resp 20   Ht 5' 7\" (1.702 m)   Wt 172 lb 3.2 oz (78.1 kg)   SpO2 94%   BMI 26.97 kg/m² remains on 3 l      Temp (24hrs), Av.9 °F (36.6 °C), Min:97.4 °F (36.3 °C), Max:98.2 °F (36.8 °C)      Intake/Output:    Intake/Output Summary (Last 24 hours) at 2024 1627  Last data filed at 2024 0539  Gross per 24 hour   Intake --   Output 650 ml   Net -650 ml       Physical Exam:   General: alert, cooperative, oriented.  Remains with ahrsh cough    Head: Normocephalic, without obvious abnormality, atraumatic.   Throat: Lips, mucosa, and tongue normal.  No thrush noted.   Neck: trachea midline, no adenopathy, no thyromegaly. No JVD.   Lungs: remains with ninfa wheeze and tight rhonchi-- better air entry   Chest wall: No tenderness or deformity.   Heart: Regular rate and rhythm with murmur noted   Abdomen: soft, non-distended, no masses, no guarding, no     Rebound.  No further diarrhea   Extremity: Slight increase in edema   Skin: No rashes or lesions.   Neurological: Alert, interactive, no focal deficits    Lab Data Review:  Recent Labs     09/10/24  0743 24  0706   WBC 12.2* 15.3*   HGB 11.5* 11.1*   .0* 147.0*     Recent Labs     09/10/24  0743 24  0706    142   K 4.1 4.1    107   CO2 24.0 30.0   BUN 14 19   CREATSERUM 0.61 0.59     No results for input(s): \"PTP\", \"INR\", \"PTT\" in the last 168 hours.    Cultures: Viral panel positive for mycoplasma    Radiology:  No results found.  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)    Macular cyst, hole, or pseudohole, right eye    Nonexudative age-related macular degeneration, right eye, intermediate dry stage    PMR (polymyalgia rheumatica) (HCC)    Paving stone degeneration of retina, bilateral    Asymptomatic carotid artery stenosis, bilateral    Weakness    Constipation    Thrombocytopenia (HCC)    Syncope and collapse    Concussion with loss of consciousness    Fall, initial encounter    Laceration of right little finger without foreign body without damage to nail, initial encounter    Neuropathy    Shortness of breath    Respiratory syncytial virus (RSV)    RSV (acute bronchiolitis due to respiratory syncytial virus)    Intractable vomiting    Hypokalemia    Community acquired pneumonia    Bronchospasm    Pneumonia due to Mycoplasma pneumoniae       Assessment:  Acute hypoxic respiratory failure likely 2/2 left sided pna, CT on 9/3 with left sided infiltrate, viral panel negative, inflammatory markers negative-now positive for mycoplasma  Recurrent episode of dyspnea hypoxia with severe cough-suspect related to mycoplasma pneumonia  Bronchitis/airways obstruction with significant rhonchorous  GERD/diarrhea now resolved  Hypertension  Hyperlipidemia  History of coronary artery disease  Polymyalgia rheumatica  Hypothyroidism    Plan:  Plan to complete 5 days of Zithromax  Continue nebulized bronchodilators nebulized ICS oral prednisone  Completed Zosyn  Reviewed with patient and daughter at bedside    CC     Sue Varner MD  9/11/2024  4:27 PM

## 2024-09-11 NOTE — PLAN OF CARE
Pt is a&ox3, forgetful at times. Droplet precautions for mycoplasma pna. Seizure precautions in place. [place]. U/L partials. Currently on 3L will continue to wean as tolerated. RA is baseline. Scheduled nebs, CPT. Pt refusing tele. PO prednisone. IV abx. Lovenox. Briefed/incontinent utilizing a purewick. Up x1-2 max assist w walker. Tolerating a regular diet, order and set up. Takes pills whole/crushed in applesauce. Pt updated on poc. No further needs at this time     Problem: Patient/Family Goals  Goal: Patient/Family Long Term Goal  Description: Patient's Long Term Goal: Back to [facility]    Interventions:  - IV antibiotics  - O2  - Nebs  - fall precautions  - See additional Care Plan goals for specific interventions  Outcome: Progressing  Goal: Patient/Family Short Term Goal  Description: Patient's Short Term Goal: Pain relief  9/1 noc: wean O2  9/4AM: urinate  9/5 noc: wean off 02   9/6 noc: wean O2 to baseline  9/7 AM: wean o2  9/7 noc: wean O2 as duncan  9/8 NOC: \"I want to go to sleep\"  9/9 NOC: wean O2  9/10 NOC: sleep  Interventions:   - Tylenol,   - reposition  -nebs, hob 30,     - See additional Care Plan goals for specific interventions  Outcome: Progressing

## 2024-09-11 NOTE — PROGRESS NOTES
[facility]   part of [facility]     Hospitalist Progress Note     Alyssa Montgomery Patient Status:  Inpatient    1932 MRN [id number]   Location [facility] 0-A Attending Medhat Dover MD   Hosp Day # 16 PCP Mony Hall DO     Chief Complaint: n/v    Subjective:     No new complaints, continues to have a cough and shortness of breath    Objective:    Review of Systems:   A comprehensive review of systems was completed; pertinent positive and negatives stated in subjective.    Vital signs:  Temp:  [97.9 °F (36.6 °C)-99.7 °F (37.6 °C)] 98 °F (36.7 °C)  Pulse:  [66-87] 82  Resp:  [16-18] 17  BP: (125-142)/(49-70) 141/70  SpO2:  [94 %-97 %] 97 %    Physical Exam:    General: No acute distress. Anxious.  Respiratory: B/L wheezing.   Cardiovascular: S1, S2, regular rate and rhythm  Abdomen: Soft, Non-tender, moderate distention, positive bowel sounds  Neuro: No new focal deficits.   Extremities: No edema      Diagnostic Data:    Labs:  Recent Labs   Lab 24  1510 09/10/24  0743 24  0706   WBC 18.4* 12.2* 15.3*   HGB 12.5 11.5* 11.1*   MCV 91.4 93.0 90.2   .0 146.0* 147.0*       Recent Labs   Lab 24  1510 09/10/24  0743 24  0706   * 187* 176*   BUN 18 14 19   CREATSERUM 0.66 0.61 0.59   CA 8.5* 8.8 8.7   ALB  --  3.1*  --     138 142   K 3.9 4.1 4.1    106 107   CO2 25.0 24.0 30.0   ALKPHO  --  57  --    AST  --  12  --    ALT  --  19  --    BILT  --  0.6  --    TP  --  5.9  --        Estimated Creatinine Clearance: 59.2 mL/min (based on SCr of 0.59 mg/dL).    No results for input(s): \"TROP\", \"TROPHS\", \"CK\" in the last 168 hours.    No results for input(s): \"PTP\", \"INR\" in the last 168 hours.                 Microbiology    Hospital Encounter on 24   1. Blood Culture     Status: None    Collection Time: 24 10:48 AM    Specimen: Blood,peripheral   Result Value Ref Range    Blood Culture Result No Growth 5 Days N/A         Imaging: Reviewed  in Epic.    Medications:    azithromycin  500 mg Oral Daily    guaiFENesin ER  600 mg Oral BID    nystatin  5 mL Oral QID    multivitamin with minerals  1 tablet Oral Daily    QUEtiapine  25 mg Oral Nightly    predniSONE  40 mg Oral Daily with breakfast    ipratropium-albuterol  3 mL Nebulization Q4H WA (5 times daily)    budesonide  0.5 mg Nebulization 2 times daily    hydrALAZINE  25 mg Oral Q8H SHELLEY    pantoprazole  40 mg Oral QAM AC    carvedilol  12.5 mg Oral BID with meals    amLODIPine  10 mg Oral Daily    atorvastatin  40 mg Oral Nightly    levothyroxine  100 mcg Oral Daily @ 0700    [Held by provider] predniSONE  1 mg Oral Daily with breakfast    pregabalin  25 mg Oral BID    enoxaparin  40 mg Subcutaneous Daily       Assessment & Plan:      #CAP  #Acute bronchitis with bronchospasm   #Acute hypoxic respiratory failure due to above  -+ mycoplasma pneumoniae  -Weaned off vapotherm to HFNC- on 5 L  -IV Abx  -steroids  -Bcx/Sputum culture negative   -Scheduled nebs - duoneb/pulmicort, add mucomyst  -Chest PT  -Pulmonary following    #Abdominal distention  -KUB without evidence of obstruction/impaction    -Continue bowel regimen    #Leukocytosis  -Likely in part steroid induced   -Monitor    #Acute metabolic encephalopathy   -Likely due to #1 and extended hospital stay  -Frequent re-orientation necessary     #CAD  -ASA, statin, BB     #HFpEF  -Compensated   -Echo 2023 with normal EF, G1DD    #Essential HTN  #HTN urgency  -BP controlled     #Hypothyroidism - Synthroid   #PMR - 1mg prednisone daily PTA    #Suspected cognitive impairment  -OP neuropsych ammon Cheek M.D.  [place] Hospitalist      Supplementary Documentation:     Quality:  DVT Mechanical Prophylaxis:     Early ambuation  DVT Pharmacologic Prophylaxis   Medication    enoxaparin (Lovenox) 40 MG/0.4ML SUBQ injection 40 mg                Code Status: DNAR/Selective Treatment  Buck: External urinary catheter in place  Buck Duration (in days):    Central line:    XAVI:     Discharge is dependent on: clinical improvement  At this point Ms. Montgomery is expected to be discharge to: tbd    The 21st Century Cures Act makes medical notes like these available to patients in the interest of transparency. Please be advised this is a medical document. Medical documents are intended to carry relevant information, facts as evident, and the clinical opinion of the practitioner. The medical note is intended as peer to peer communication and may appear blunt or direct. It is written in medical language and may contain abbreviations or verbiage that are unfamiliar.

## 2024-09-12 LAB
BASOPHILS # BLD AUTO: 0.02 X10(3) UL (ref 0–0.2)
BASOPHILS NFR BLD AUTO: 0.2 %
EOSINOPHIL # BLD AUTO: 0.07 X10(3) UL (ref 0–0.7)
EOSINOPHIL NFR BLD AUTO: 0.6 %
ERYTHROCYTE [DISTWIDTH] IN BLOOD BY AUTOMATED COUNT: 13.8 %
HCT VFR BLD AUTO: 33.1 %
HGB BLD-MCNC: 10.9 G/DL
IMM GRANULOCYTES # BLD AUTO: 0.1 X10(3) UL (ref 0–1)
IMM GRANULOCYTES NFR BLD: 0.9 %
LYMPHOCYTES # BLD AUTO: 1.23 X10(3) UL (ref 1–4)
LYMPHOCYTES NFR BLD AUTO: 11 %
MCH RBC QN AUTO: 30.6 PG (ref 26–34)
MCHC RBC AUTO-ENTMCNC: 32.9 G/DL (ref 31–37)
MCV RBC AUTO: 93 FL
MONOCYTES # BLD AUTO: 0.95 X10(3) UL (ref 0.1–1)
MONOCYTES NFR BLD AUTO: 8.5 %
NEUTROPHILS # BLD AUTO: 8.78 X10 (3) UL (ref 1.5–7.7)
NEUTROPHILS # BLD AUTO: 8.78 X10(3) UL (ref 1.5–7.7)
NEUTROPHILS NFR BLD AUTO: 78.8 %
PLATELET # BLD AUTO: 157 10(3)UL (ref 150–450)
RBC # BLD AUTO: 3.56 X10(6)UL
WBC # BLD AUTO: 11.2 X10(3) UL (ref 4–11)

## 2024-09-12 PROCEDURE — 99232 SBSQ HOSP IP/OBS MODERATE 35: CPT | Performed by: INTERNAL MEDICINE

## 2024-09-12 PROCEDURE — 99232 SBSQ HOSP IP/OBS MODERATE 35: CPT | Performed by: HOSPITALIST

## 2024-09-12 RX ORDER — METHYLPREDNISOLONE SODIUM SUCCINATE 40 MG/ML
40 INJECTION, POWDER, LYOPHILIZED, FOR SOLUTION INTRAMUSCULAR; INTRAVENOUS EVERY 8 HOURS
Status: DISCONTINUED | OUTPATIENT
Start: 2024-09-12 | End: 2024-09-14

## 2024-09-12 RX ORDER — PANTOPRAZOLE SODIUM 40 MG/1
40 TABLET, DELAYED RELEASE ORAL
Status: DISCONTINUED | OUTPATIENT
Start: 2024-09-12 | End: 2024-09-20

## 2024-09-12 NOTE — PROGRESS NOTES
[facility]  Progress Note    Alyssa Montgomery Patient Status:  Inpatient    1932 MRN [id number]   Location [facility] 5NW-A Attending Debby Cheek MD   Hosp Day # 17 PCP Mony Hall DO     Subjective:  Alyssa Montgomery is a(n) 89 year old female remains afebrile  Continues to not feel well ongoing cough ongoing congestion feels tired and weak    Objective:  /57 (BP Location: Left arm)   Pulse 81   Temp 97.5 °F (36.4 °C) (Oral)   Resp 19   Ht 5' 7\" (1.702 m)   Wt 172 lb 3.2 oz (78.1 kg)   SpO2 97%   BMI 26.97 kg/m² remains on 3 L      Temp (24hrs), Av °F (36.7 °C), Min:97.4 °F (36.3 °C), Max:98.4 °F (36.9 °C)      Intake/Output:  No intake or output data in the 24 hours ending 24 1020    Physical Exam:   General: alert, cooperative, oriented.  No respiratory distress.   Head: Normocephalic, without obvious abnormality, atraumatic.   Throat: Lips, mucosa, and tongue normal.  No thrush noted.   Neck: trachea midline, no adenopathy, no thyromegaly. No JVD.   Lungs: Remains with tight expiratory wheeze and rhonchi bilaterally basilar rales   Chest wall: No tenderness or deformity.   Heart: Regular rate and rhythm   Abdomen: soft, non-distended, no masses, no guarding, no     Rebound.  Protuberant seems nontender   Extremity: Trace edema at most bilaterally   Skin: No rashes or lesions.   Neurological: Alert, interactive, no focal deficits    Lab Data Review:  Recent Labs     09/10/24  0743 24  0706 24  0725   WBC 12.2* 15.3* 11.2*   HGB 11.5* 11.1* 10.9*   .0* 147.0* 157.0     Recent Labs     09/10/24  0743 24  0706    142   K 4.1 4.1    107   CO2 24.0 30.0   BUN 14 19   CREATSERUM 0.61 0.59     No results for input(s): \"PTP\", \"INR\", \"PTT\" in the last 168 hours.    Cultures: Mycoplasma positive    Radiology:  No results found.  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)    Macular cyst, hole, or pseudohole, right eye    Nonexudative age-related macular degeneration, right eye, intermediate dry stage    PMR (polymyalgia rheumatica) (HCC)    Paving stone degeneration of retina, bilateral    Asymptomatic carotid artery stenosis, bilateral    Weakness    Constipation    Thrombocytopenia (HCC)    Syncope and collapse    Concussion with loss of consciousness    Fall, initial encounter    Laceration of right little finger without foreign body without damage to nail, initial encounter    Neuropathy    Shortness of breath    Respiratory syncytial virus (RSV)    RSV (acute bronchiolitis due to respiratory syncytial virus)    Intractable vomiting    Hypokalemia    Community acquired pneumonia    Bronchospasm    Pneumonia due to Mycoplasma pneumoniae       Assessment:  Acute hypoxic respiratory failure likely 2/2 left sided pna, CT on 9/3 with left sided infiltrate, viral panel negative, inflammatory markers negative-now positive for mycoplasma  Recurrent episode of dyspnea hypoxia with severe cough-suspect related to mycoplasma pneumonia  Bronchitis/airways obstruction/bronchospasm significant   GERD/diarrhea now resolved  Hypertension  Hyperlipidemia  History of coronary artery disease  Polymyalgia rheumatica  Hypothyroidism    Plan:  Remains with significant airways obstruction on exam  Return to IV steroids  Continue aggressive bronco pulmonary toilet as able  Continue nebulized bronchodilators nebulized ICS  Following fluid status carefully      CC     Sue Varner MD  9/12/2024  10:20 AM

## 2024-09-12 NOTE — CM/SW NOTE
Sent updates to [facility] in [facility] for eventual admission.    SW/CM to remain available for support and/or discharge planning.    ELICIA Angulo  Discharge Planner  [phone]

## 2024-09-12 NOTE — PLAN OF CARE
Patient received drowsy but easily rousable, but patient endorses feeling very fatigued. Lung sounds bilateral rhonchi, CXR ordered for AM, IV steroids restarted. 3L O2. Refusing tele. Poor appetite for meals. +BS, incontinent of b/b. Patient with poor short term memory, per family noted an increase in anxiety today, psych consult placed. Patient & family at bedside updated on POC, rounded on routinely.     Problem: Patient/Family Goals  Goal: Patient/Family Long Term Goal  Description: Patient's Long Term Goal: Back to [facility]    Interventions:  - IV antibiotics  - O2  - Nebs  - fall precautions  - See additional Care Plan goals for specific interventions  Outcome: Progressing  Goal: Patient/Family Short Term Goal  Description: Patient's Short Term Goal: Pain relief  9/1 noc: wean O2  9/4AM: urinate  9/5 noc: wean off 02   9/6 noc: wean O2 to baseline  9/7 AM: wean o2  9/7 noc: wean O2 as duncan  9/8 NOC: \"I want to go to sleep\"  9/9 NOC: wean O2  9/10 NOC: sleep  Interventions:   - Tylenol,   - reposition  -nebs, hob 30,     - See additional Care Plan goals for specific interventions  Outcome: Progressing     Problem: GASTROINTESTINAL - ADULT  Goal: Minimal or absence of nausea and vomiting  Description: INTERVENTIONS:  - Maintain adequate hydration with IV or PO as ordered and tolerated  - Nasogastric tube to low intermittent suction as ordered  - Evaluate effectiveness of ordered antiemetic medications  - Provide nonpharmacologic comfort measures as appropriate  - Advance diet as tolerated, if ordered  - Obtain nutritional consult as needed  - Evaluate fluid balance  Outcome: Progressing     Problem: METABOLIC/FLUID AND ELECTROLYTES - ADULT  Goal: Electrolytes maintained within normal limits  Description: INTERVENTIONS:  - Monitor labs and rhythm and assess patient for signs and symptoms of electrolyte imbalances  - Administer electrolyte replacement as ordered  - Monitor response to electrolyte  replacements, including rhythm and repeat lab results as appropriate  - Fluid restriction as ordered  - Instruct patient on fluid and nutrition restrictions as appropriate  Outcome: Progressing  Goal: Hemodynamic stability and optimal renal function maintained  Description: INTERVENTIONS:  - Monitor labs and assess for signs and symptoms of volume excess or deficit  - Monitor intake, output and patient weight  - Monitor urine specific gravity, serum osmolarity and serum sodium as indicated or ordered  - Monitor response to interventions for patient's volume status, including labs, urine output, blood pressure (other measures as available)  - Encourage oral intake as appropriate  - Instruct patient on fluid and nutrition restrictions as appropriate  Outcome: Progressing     Problem: RESPIRATORY - ADULT  Goal: Achieves optimal ventilation and oxygenation  Description: INTERVENTIONS:  - Assess for changes in respiratory status  - Assess for changes in mentation and behavior  - Position to facilitate oxygenation and minimize respiratory effort  - Oxygen supplementation based on oxygen saturation or ABGs  - Provide Smoking Cessation handout, if applicable  - Encourage broncho-pulmonary hygiene including cough, deep breathe, Incentive Spirometry  - Assess the need for suctioning and perform as needed  - Assess and instruct to report SOB or any respiratory difficulty  - Respiratory Therapy support as indicated  - Manage/alleviate anxiety  - Monitor for signs/symptoms of CO2 retention  Outcome: Progressing

## 2024-09-12 NOTE — PROGRESS NOTES
[facility]   part of [facility]     Hospitalist Progress Note     Alyssa Montgomery Patient Status:  Inpatient    1932 MRN [id number]   Location [facility] 0-A Attending Medhat Dover MD   Hosp Day # 17 PCP Mony Hall DO     Chief Complaint: n/v    Subjective:     No new complaints    Objective:    Review of Systems:   A comprehensive review of systems was completed; pertinent positive and negatives stated in subjective.    Vital signs:  Temp:  [97.4 °F (36.3 °C)-98.4 °F (36.9 °C)] 97.5 °F (36.4 °C)  Pulse:  [82-93] 84  Resp:  [18-20] 20  BP: (125-152)/(52-60) 150/57  SpO2:  [94 %-95 %] 95 %    Physical Exam:    General: No acute distress. Anxious.  Respiratory: B/L wheezing.   Cardiovascular: S1, S2, regular rate and rhythm  Abdomen: Soft, Non-tender, moderate distention, positive bowel sounds  Neuro: No new focal deficits.   Extremities: No edema      Diagnostic Data:    Labs:  Recent Labs   Lab 24  1510 09/10/24  0743 24  0706   WBC 18.4* 12.2* 15.3*   HGB 12.5 11.5* 11.1*   MCV 91.4 93.0 90.2   .0 146.0* 147.0*       Recent Labs   Lab 24  1510 09/10/24  0743 24  0706   * 187* 176*   BUN 18 14 19   CREATSERUM 0.66 0.61 0.59   CA 8.5* 8.8 8.7   ALB  --  3.1*  --     138 142   K 3.9 4.1 4.1    106 107   CO2 25.0 24.0 30.0   ALKPHO  --  57  --    AST  --  12  --    ALT  --  19  --    BILT  --  0.6  --    TP  --  5.9  --        Estimated Creatinine Clearance: 59.2 mL/min (based on SCr of 0.59 mg/dL).    No results for input(s): \"TROP\", \"TROPHS\", \"CK\" in the last 168 hours.    No results for input(s): \"PTP\", \"INR\" in the last 168 hours.                 Microbiology    Hospital Encounter on 24   1. Blood Culture     Status: None    Collection Time: 24 10:48 AM    Specimen: Blood,peripheral   Result Value Ref Range    Blood Culture Result No Growth 5 Days N/A         Imaging: Reviewed in Epic.    Medications:    azithromycin  500 mg  Oral Daily    guaiFENesin ER  600 mg Oral BID    nystatin  5 mL Oral QID    multivitamin with minerals  1 tablet Oral Daily    QUEtiapine  25 mg Oral Nightly    predniSONE  40 mg Oral Daily with breakfast    ipratropium-albuterol  3 mL Nebulization Q4H WA (5 times daily)    budesonide  0.5 mg Nebulization 2 times daily    hydrALAZINE  25 mg Oral Q8H SHELLEY    pantoprazole  40 mg Oral QAM AC    carvedilol  12.5 mg Oral BID with meals    amLODIPine  10 mg Oral Daily    atorvastatin  40 mg Oral Nightly    levothyroxine  100 mcg Oral Daily @ 0700    [Held by provider] predniSONE  1 mg Oral Daily with breakfast    pregabalin  25 mg Oral BID    enoxaparin  40 mg Subcutaneous Daily       Assessment & Plan:      #CAP  #Acute bronchitis with bronchospasm   #Acute hypoxic respiratory failure due to above  -+ mycoplasma pneumoniae  -Weaned off vapotherm to HFNC- on 2 L  -IV Abx  -steroids  -Bcx/Sputum culture negative   -Scheduled nebs - duoneb/pulmicort, add mucomyst  -Chest PT  -Pulmonary following    #Abdominal distention  -KUB without evidence of obstruction/impaction    -Continue bowel regimen    #Leukocytosis  -Likely in part steroid induced   -Monitor    #Acute metabolic encephalopathy   -Likely due to #1 and extended hospital stay  -Frequent re-orientation necessary     #CAD  -ASA, statin, BB     #HFpEF  -Compensated   -Echo 2023 with normal EF, G1DD    #Essential HTN  #HTN urgency  -BP controlled     #Hypothyroidism - Synthroid   #PMR - 1mg prednisone daily PTA    #Suspected cognitive impairment  -OP neuropsych ammon Cheek M.D.  [place] Hospitalist      Supplementary Documentation:     Quality:  DVT Mechanical Prophylaxis:     Early ambuation  DVT Pharmacologic Prophylaxis   Medication    enoxaparin (Lovenox) 40 MG/0.4ML SUBQ injection 40 mg                Code Status: DNAR/Selective Treatment  Buck: External urinary catheter in place  Buck Duration (in days):   Central line:    XAVI:     Discharge is  dependent on: clinical improvement  At this point Ms. Montgomery is expected to be discharge to: tbd    The 21st Century Cures Act makes medical notes like these available to patients in the interest of transparency. Please be advised this is a medical document. Medical documents are intended to carry relevant information, facts as evident, and the clinical opinion of the practitioner. The medical note is intended as peer to peer communication and may appear blunt or direct. It is written in medical language and may contain abbreviations or verbiage that are unfamiliar.

## 2024-09-12 NOTE — PHYSICAL THERAPY NOTE
Attempted to see pt for PT. Patient adamantly refusing out of bed mobility. Patient declined to participate in PT treatment session.

## 2024-09-12 NOTE — PLAN OF CARE
Pt from [facility] [place], plan to dc to [facility]  Aox3  VSS on 2L, baseline RA  afebrile  Refusing tele, receiving Lovenox  Electrolyte non-cardiac replacement  Incontinent, purewick in place  Up x1-2, bed alarm on and call light within reach  Regular diet  Receiving PO Zithro  Pt updated on current POC, no questions at this time    Problem: Patient/Family Goals  Goal: Patient/Family Long Term Goal  Description: Patient's Long Term Goal: Back to [facility]    Interventions:  - IV antibiotics  - O2  - Nebs  - fall precautions  - See additional Care Plan goals for specific interventions  Outcome: Progressing  Goal: Patient/Family Short Term Goal  Description: Patient's Short Term Goal: Pain relief  9/1 noc: wean O2  9/4AM: urinate  9/5 noc: wean off 02   9/6 noc: wean O2 to baseline  9/7 AM: wean o2  9/7 noc: wean O2 as duncan  9/8 NOC: \"I want to go to sleep\"  9/9 NOC: wean O2  9/10 NOC: sleep  Interventions:   - Tylenol,   - reposition  -nebs, hob 30,     - See additional Care Plan goals for specific interventions  Outcome: Progressing     Problem: GASTROINTESTINAL - ADULT  Goal: Minimal or absence of nausea and vomiting  Description: INTERVENTIONS:  - Maintain adequate hydration with IV or PO as ordered and tolerated  - Nasogastric tube to low intermittent suction as ordered  - Evaluate effectiveness of ordered antiemetic medications  - Provide nonpharmacologic comfort measures as appropriate  - Advance diet as tolerated, if ordered  - Obtain nutritional consult as needed  - Evaluate fluid balance  Outcome: Progressing     Problem: METABOLIC/FLUID AND ELECTROLYTES - ADULT  Goal: Electrolytes maintained within normal limits  Description: INTERVENTIONS:  - Monitor labs and rhythm and assess patient for signs and symptoms of electrolyte imbalances  - Administer electrolyte replacement as ordered  - Monitor response to electrolyte replacements, including rhythm and repeat lab results as appropriate  -  Fluid restriction as ordered  - Instruct patient on fluid and nutrition restrictions as appropriate  Outcome: Progressing  Goal: Hemodynamic stability and optimal renal function maintained  Description: INTERVENTIONS:  - Monitor labs and assess for signs and symptoms of volume excess or deficit  - Monitor intake, output and patient weight  - Monitor urine specific gravity, serum osmolarity and serum sodium as indicated or ordered  - Monitor response to interventions for patient's volume status, including labs, urine output, blood pressure (other measures as available)  - Encourage oral intake as appropriate  - Instruct patient on fluid and nutrition restrictions as appropriate  Outcome: Progressing     Problem: RESPIRATORY - ADULT  Goal: Achieves optimal ventilation and oxygenation  Description: INTERVENTIONS:  - Assess for changes in respiratory status  - Assess for changes in mentation and behavior  - Position to facilitate oxygenation and minimize respiratory effort  - Oxygen supplementation based on oxygen saturation or ABGs  - Provide Smoking Cessation handout, if applicable  - Encourage broncho-pulmonary hygiene including cough, deep breathe, Incentive Spirometry  - Assess the need for suctioning and perform as needed  - Assess and instruct to report SOB or any respiratory difficulty  - Respiratory Therapy support as indicated  - Manage/alleviate anxiety  - Monitor for signs/symptoms of CO2 retention  Outcome: Progressing

## 2024-09-13 ENCOUNTER — APPOINTMENT (OUTPATIENT)
Dept: GENERAL RADIOLOGY | Facility: HOSPITAL | Age: 89
End: 2024-09-13
Attending: INTERNAL MEDICINE
Payer: MEDICARE

## 2024-09-13 LAB
ALBUMIN SERPL-MCNC: 3.3 G/DL (ref 3.2–4.8)
ALBUMIN/GLOB SERPL: 1.1 {RATIO} (ref 1–2)
ALP LIVER SERPL-CCNC: 71 U/L
ALT SERPL-CCNC: 24 U/L
ANION GAP SERPL CALC-SCNC: 6 MMOL/L (ref 0–18)
AST SERPL-CCNC: 13 U/L (ref ?–34)
BASOPHILS # BLD AUTO: 0.02 X10(3) UL (ref 0–0.2)
BASOPHILS NFR BLD AUTO: 0.2 %
BILIRUB SERPL-MCNC: 0.8 MG/DL (ref 0.2–0.9)
BUN BLD-MCNC: 17 MG/DL (ref 9–23)
CALCIUM BLD-MCNC: 8.6 MG/DL (ref 8.7–10.4)
CHLORIDE SERPL-SCNC: 104 MMOL/L (ref 98–112)
CO2 SERPL-SCNC: 28 MMOL/L (ref 21–32)
CREAT BLD-MCNC: 0.64 MG/DL
EGFRCR SERPLBLD CKD-EPI 2021: 83 ML/MIN/1.73M2 (ref 60–?)
EOSINOPHIL # BLD AUTO: 0 X10(3) UL (ref 0–0.7)
EOSINOPHIL NFR BLD AUTO: 0 %
ERYTHROCYTE [DISTWIDTH] IN BLOOD BY AUTOMATED COUNT: 13.7 %
GLOBULIN PLAS-MCNC: 2.9 G/DL (ref 2–3.5)
GLUCOSE BLD-MCNC: 266 MG/DL (ref 70–99)
HCT VFR BLD AUTO: 35.4 %
HGB BLD-MCNC: 12.3 G/DL
IMM GRANULOCYTES # BLD AUTO: 0.09 X10(3) UL (ref 0–1)
IMM GRANULOCYTES NFR BLD: 0.8 %
LYMPHOCYTES # BLD AUTO: 0.5 X10(3) UL (ref 1–4)
LYMPHOCYTES NFR BLD AUTO: 4.3 %
MCH RBC QN AUTO: 31.9 PG (ref 26–34)
MCHC RBC AUTO-ENTMCNC: 34.7 G/DL (ref 31–37)
MCV RBC AUTO: 91.7 FL
MONOCYTES # BLD AUTO: 0.11 X10(3) UL (ref 0.1–1)
MONOCYTES NFR BLD AUTO: 0.9 %
NEUTROPHILS # BLD AUTO: 10.95 X10 (3) UL (ref 1.5–7.7)
NEUTROPHILS # BLD AUTO: 10.95 X10(3) UL (ref 1.5–7.7)
NEUTROPHILS NFR BLD AUTO: 93.8 %
OSMOLALITY SERPL CALC.SUM OF ELEC: 297 MOSM/KG (ref 275–295)
PLATELET # BLD AUTO: 155 10(3)UL (ref 150–450)
POTASSIUM SERPL-SCNC: 4.5 MMOL/L (ref 3.5–5.1)
PROT SERPL-MCNC: 6.2 G/DL (ref 5.7–8.2)
RBC # BLD AUTO: 3.86 X10(6)UL
SODIUM SERPL-SCNC: 138 MMOL/L (ref 136–145)
WBC # BLD AUTO: 11.7 X10(3) UL (ref 4–11)

## 2024-09-13 PROCEDURE — 99232 SBSQ HOSP IP/OBS MODERATE 35: CPT | Performed by: INTERNAL MEDICINE

## 2024-09-13 PROCEDURE — 99232 SBSQ HOSP IP/OBS MODERATE 35: CPT | Performed by: HOSPITALIST

## 2024-09-13 PROCEDURE — 71045 X-RAY EXAM CHEST 1 VIEW: CPT | Performed by: INTERNAL MEDICINE

## 2024-09-13 RX ORDER — IPRATROPIUM BROMIDE AND ALBUTEROL SULFATE 2.5; .5 MG/3ML; MG/3ML
3 SOLUTION RESPIRATORY (INHALATION)
Status: DISCONTINUED | OUTPATIENT
Start: 2024-09-13 | End: 2024-09-20

## 2024-09-13 NOTE — PROGRESS NOTES
Pt is A&Ox2-3, forgetful. Wears glasses, [place]. VSS, afebrile. SPO2 maintained on 3L NC, RA-BL. IV steroids. RF tele. Last BM 9/2. General diet, poor tapan. Voids. Up max. Denies pain. PIV, SL. No further needs at this time, continue POC. Safety precautions in place.

## 2024-09-13 NOTE — PHYSICAL THERAPY NOTE
PHYSICAL THERAPY TREATMENT NOTE - INPATIENT    Room Number: 531/531-A     Session: 3    Number of Visits to Meet Established Goals: 5    Presenting Problem: Intractable vomiting  Co-Morbidities : CAD, HTN, DM, macular degeneration    ASSESSMENT   Patient demonstrates fair progress this session, goals  remain in progress.    Patient continues to function below baseline with bed mobility, transfers, and gait.  Contributing factors to remaining limitations include decreased endurance/aerobic capacity, impaired dynamic and static balance, decreased muscular endurance, and cognitive deficits (significant fear of fall and poor safety awareness).  Next session anticipate patient to progress transfers and gait.  Physical Therapy will continue to follow patient for duration of hospitalization.    Patient continues to benefit from continued skilled PT services: to promote return to prior level of function and safety with continuous assistance and gradual rehabilitative therapy .    PLAN  PT Treatment Plan: Bed mobility;Patient education;Gait training;Energy conservation;Endurance;Range of motion;Strengthening;Transfer training;Balance training  Rehab Potential : Good  Frequency (Obs): 3-5x/week    CURRENT GOALS     Goal #1 Patient is able to demonstrate supine - sit EOB @ level: supervision     Goal #2 Patient is able to demonstrate transfers EOB to/from Chair/Wheelchair at assistance level: minimum assistance     Goal #3 Patient is able to ambulate 10 feet with assist device: walker - rolling at assistance level: minimum assistance     Goal #4    Goal #5    Goal #6    Goal Comments: Goals established on 8/29/2024 9/13/2024 all goals ongoing.     SUBJECTIVE  \"I don't want to fall, I know you are going to drop me.\" Significant fear of fall.         OBJECTIVE  Precautions: Bed/chair alarm;Hard of hearing;Low vision    WEIGHT BEARING RESTRICTION  Weight Bearing Restriction: None                PAIN ASSESSMENT   Rating:  0  Location: pt denies pain  Management Techniques: Repositioning    BALANCE                                                                                                                       Static Sitting: Fair +  Dynamic Sitting: Fair           Static Standing: Poor +  Dynamic Standing: Poor +    ACTIVITY TOLERANCE               5 L  high flow            O2 WALK         AM-PAC '6-Clicks' INPATIENT SHORT FORM - BASIC MOBILITY  How much difficulty does the patient currently have...  Patient Difficulty: Turning over in bed (including adjusting bedclothes, sheets and blankets)?: A Little   Patient Difficulty: Sitting down on and standing up from a chair with arms (e.g., wheelchair, bedside commode, etc.): A Lot   Patient Difficulty: Moving from lying on back to sitting on the side of the bed?: A Lot   How much help from another person does the patient currently need...   Help from Another: Moving to and from a bed to a chair (including a wheelchair)?: A Lot   Help from Another: Need to walk in hospital room?: A Little   Help from Another: Climbing 3-5 steps with a railing?: Total       AM-PAC Score:  Raw Score: 13   Approx Degree of Impairment: 64.91%   Standardized Score (AM-PAC Scale): 36.74   CMS Modifier (G-Code): CL    FUNCTIONAL ABILITY STATUS  Gait Assessment   Functional Mobility/Gait Assessment  Gait Assistance: Minimum assistance  Distance (ft): 3'  Assistive Device: Rolling walker  Pattern: Shuffle    Skilled Therapy Provided  Pt was received in supine.   Max cues and education on importance of mobility.   Bed Mobility:  Rolling: min A  Supine<>Sit: mod A  Sit<>Supine: NT  Mod A to scoot and reassurance.     Transfer Mobility:  Sit<>Stand: mod assist of one to stand on first attempt. STS repeated four times. Bed raised and min assist to stand.   Stand<>Sit: min assist and cues for hand placement.   Gait: 10 steps with RW and min assist         THERAPEUTIC EXERCISES  Lower Extremity Ankle pumps  LAQ    Upper Extremity na     Position Sitting     Repetitions   10   Sets   1     Patient End of Session: Up in chair;Needs met;Call light within reach;RN aware of session/findings;All patient questions and concerns addressed;Alarm set    PT Session Time: 23 minutes  Gait Trainin minutes  Therapeutic Activity: 23 minutes

## 2024-09-13 NOTE — PROGRESS NOTES
[facility]   part of [facility]     Hospitalist Progress Note     Alyssa Montgomery Patient Status:  Inpatient    1932 MRN [id number]   Location [facility] 0-A Attending Medhat Dover MD   Hosp Day # 18 PCP Mony Hall DO     Chief Complaint: n/v    Subjective:     No new overnight events, confused    Objective:    Review of Systems:   A comprehensive review of systems was completed; pertinent positive and negatives stated in subjective.    Vital signs:  Temp:  [98.3 °F (36.8 °C)-99 °F (37.2 °C)] 98.5 °F (36.9 °C)  Pulse:  [] 93  Resp:  [15-20] 18  BP: (140-167)/(54-80) 140/80  SpO2:  [92 %-97 %] 94 %    Physical Exam:    General: No acute distress. Anxious.  Respiratory: B/L wheezing.   Cardiovascular: S1, S2, regular rate and rhythm  Abdomen: Soft, Non-tender, moderate distention, positive bowel sounds  Neuro: No new focal deficits.   Extremities: No edema      Diagnostic Data:    Labs:  Recent Labs   Lab 24  1510 09/10/24  0743 24  0706 24  0725 24  07   WBC 18.4* 12.2* 15.3* 11.2* 11.7*   HGB 12.5 11.5* 11.1* 10.9* 12.3   MCV 91.4 93.0 90.2 93.0 91.7   .0 146.0* 147.0* 157.0 155.0       Recent Labs   Lab 09/10/24  0743 24  0706 24  0724   * 176* 266*   BUN 14 19 17   CREATSERUM 0.61 0.59 0.64   CA 8.8 8.7 8.6*   ALB 3.1*  --  3.3    142 138   K 4.1 4.1 4.5    107 104   CO2 24.0 30.0 28.0   ALKPHO 57  --  71   AST 12  --  13   ALT 19  --  24   BILT 0.6  --  0.8   TP 5.9  --  6.2       Estimated Creatinine Clearance: 54.5 mL/min (based on SCr of 0.64 mg/dL).    No results for input(s): \"TROP\", \"TROPHS\", \"CK\" in the last 168 hours.    No results for input(s): \"PTP\", \"INR\" in the last 168 hours.                 Microbiology    Hospital Encounter on 24   1. Blood Culture     Status: None    Collection Time: 24 10:48 AM    Specimen: Blood,peripheral   Result Value Ref Range    Blood Culture Result No Growth 5  Days N/A         Imaging: Reviewed in Epic.    Medications:    ipratropium-albuterol  3 mL Nebulization QID    methylPREDNISolone  40 mg Intravenous Q8H    pantoprazole  40 mg Oral BID AC    azithromycin  500 mg Oral Daily    guaiFENesin ER  600 mg Oral BID    nystatin  5 mL Oral QID    multivitamin with minerals  1 tablet Oral Daily    QUEtiapine  25 mg Oral Nightly    budesonide  0.5 mg Nebulization 2 times daily    hydrALAZINE  25 mg Oral Q8H SHELLEY    carvedilol  12.5 mg Oral BID with meals    amLODIPine  10 mg Oral Daily    atorvastatin  40 mg Oral Nightly    levothyroxine  100 mcg Oral Daily @ 0700    [Held by provider] predniSONE  1 mg Oral Daily with breakfast    pregabalin  25 mg Oral BID    enoxaparin  40 mg Subcutaneous Daily       Assessment & Plan:      #CAP  #Acute bronchitis with bronchospasm   #Acute hypoxic respiratory failure due to above  -+ mycoplasma pneumoniae  -Weaned off vapotherm to HFNC- on increased o2 needs, 7-8 L  -IV Abx  -steroids  -Bcx/Sputum culture negative   -Scheduled nebs - duoneb/pulmicort, add mucomyst  -Chest PT  -Pulmonary following    #Abdominal distention  -KUB without evidence of obstruction/impaction    -Continue bowel regimen    #Leukocytosis  -Likely in part steroid induced   -Monitor    #Acute metabolic encephalopathy   -Likely due to #1 and extended hospital stay  -Frequent re-orientation necessary     #CAD  -ASA, statin, BB     #HFpEF  -Compensated   -Echo 2023 with normal EF, G1DD    #Essential HTN  #HTN urgency  -BP controlled     #Hypothyroidism - Synthroid   #PMR - 1mg prednisone daily PTA    #Suspected cognitive impairment  -OP neuropsych ammon Cheek M.D.  [place] Hospitalist      Supplementary Documentation:     Quality:  DVT Mechanical Prophylaxis:     Early ambuation  DVT Pharmacologic Prophylaxis   Medication    enoxaparin (Lovenox) 40 MG/0.4ML SUBQ injection 40 mg                Code Status: DNAR/Selective Treatment  Buck: External urinary catheter  in place  [place] Duration (in days):   Central line:    XAVI:     Discharge is dependent on: clinical improvement  At this point Ms. Montgomery is expected to be discharge to: tbd    The 21st Century Cures Act makes medical notes like these available to patients in the interest of transparency. Please be advised this is a medical document. Medical documents are intended to carry relevant information, facts as evident, and the clinical opinion of the practitioner. The medical note is intended as peer to peer communication and may appear blunt or direct. It is written in medical language and may contain abbreviations or verbiage that are unfamiliar.

## 2024-09-13 NOTE — DISCHARGE INSTRUCTIONS
Medicare Referrals for Psychiatry and Counseling   [facility]  [address]  [place], [place] [address] [phone]    [facility]  Multiple: [address], [address], [place], [place] -or- [address], [address], [place], [place] -or - [address], [place], [place] -or- [address], [place], [place]  [phone]    [facility]  [address], [address], [place], [place]  [phone]    [facility]  [address], [address], [place], [place]  [phone]    [facility]  [address], [address], [place], [place]  [phone]    [facility] Locations: [facility] and [facility] Locations: [facility], [place] and [place]   [phone]    [facility]   [address], [address], [place], [place]  [phone]

## 2024-09-13 NOTE — PLAN OF CARE
Problem: Patient/Family Goals  Goal: Patient/Family Long Term Goal  Description: Patient's Long Term Goal: Back to [facility]    Interventions:  - IV antibiotics  - O2  - Nebs  - fall precautions  - See additional Care Plan goals for specific interventions  Outcome: Progressing  Goal: Patient/Family Short Term Goal  Description: Patient's Short Term Goal: Pain relief  9/1 noc: wean O2  9/4AM: urinate  9/5 noc: wean off 02   9/6 noc: wean O2 to baseline  9/7 AM: wean o2  9/7 noc: wean O2 as duncan  9/8 NOC: \"I want to go to sleep\"  9/9 NOC: wean O2  9/10 NOC: sleep  Interventions:   - Tylenol,   - reposition  -nebs, hob 30,     - See additional Care Plan goals for specific interventions  Outcome: Progressing     Problem: GASTROINTESTINAL - ADULT  Goal: Minimal or absence of nausea and vomiting  Description: INTERVENTIONS:  - Maintain adequate hydration with IV or PO as ordered and tolerated  - Nasogastric tube to low intermittent suction as ordered  - Evaluate effectiveness of ordered antiemetic medications  - Provide nonpharmacologic comfort measures as appropriate  - Advance diet as tolerated, if ordered  - Obtain nutritional consult as needed  - Evaluate fluid balance  Outcome: Progressing     Problem: METABOLIC/FLUID AND ELECTROLYTES - ADULT  Goal: Electrolytes maintained within normal limits  Description: INTERVENTIONS:  - Monitor labs and rhythm and assess patient for signs and symptoms of electrolyte imbalances  - Administer electrolyte replacement as ordered  - Monitor response to electrolyte replacements, including rhythm and repeat lab results as appropriate  - Fluid restriction as ordered  - Instruct patient on fluid and nutrition restrictions as appropriate  Outcome: Progressing  Goal: Hemodynamic stability and optimal renal function maintained  Description: INTERVENTIONS:  - Monitor labs and assess for signs and symptoms of volume excess or deficit  - Monitor intake, output and patient  weight  - Monitor urine specific gravity, serum osmolarity and serum sodium as indicated or ordered  - Monitor response to interventions for patient's volume status, including labs, urine output, blood pressure (other measures as available)  - Encourage oral intake as appropriate  - Instruct patient on fluid and nutrition restrictions as appropriate  Outcome: Progressing     Problem: RESPIRATORY - ADULT  Goal: Achieves optimal ventilation and oxygenation  Description: INTERVENTIONS:  - Assess for changes in respiratory status  - Assess for changes in mentation and behavior  - Position to facilitate oxygenation and minimize respiratory effort  - Oxygen supplementation based on oxygen saturation or ABGs  - Provide Smoking Cessation handout, if applicable  - Encourage broncho-pulmonary hygiene including cough, deep breathe, Incentive Spirometry  - Assess the need for suctioning and perform as needed  - Assess and instruct to report SOB or any respiratory difficulty  - Respiratory Therapy support as indicated  - Manage/alleviate anxiety  - Monitor for signs/symptoms of CO2 retention  Outcome: Not Progressing

## 2024-09-13 NOTE — PROGRESS NOTES
[facility]  Progress Note    Alyssa Montgomery Patient Status:  Inpatient    1932 MRN [id number]   Location [facility] 5NW-A Attending Debby Cheke MD   Hosp Day # 18 PCP Mony Hall DO     Subjective:  Alyssa Montgomery is a(n) 89 year old female remains afebrile sat up in the chair all day now is feeling exhausted  Cough remains nonproductive overall improved  Remains at 3 L    Objective:  /82 (BP Location: Left arm)   Pulse 108   Temp 99.4 °F (37.4 °C) (Oral)   Resp 22   Ht 5' 7\" (1.702 m)   Wt 172 lb 3.2 oz (78.1 kg)   SpO2 95%   BMI 26.97 kg/m² 3 L much less coughing      Temp (24hrs), Av.7 °F (37.1 °C), Min:98.3 °F (36.8 °C), Max:99.4 °F (37.4 °C)      Intake/Output:    Intake/Output Summary (Last 24 hours) at 2024 1609  Last data filed at 2024 0815  Gross per 24 hour   Intake --   Output 1400 ml   Net -1400 ml       Physical Exam:   General: alert, cooperative, oriented.  No respiratory distress.   Head: Normocephalic, without obvious abnormality, atraumatic.   Throat: Lips, mucosa, and tongue normal.  No thrush noted.   Neck: trachea midline, no adenopathy, no thyromegaly. No JVD.   Lungs: Much better air entry with decreased rhonchi remains with bilateral rales   Chest wall: No tenderness or deformity.   Heart: Regular rate and rhythm   Abdomen: soft, non-distended, no masses, no guarding, no     Rebound.  P.o. intake slightly better   Extremity: Trace edema bilaterally   Skin: No rashes or lesions.   Neurological: Alert, interactive, no focal deficits    Lab Data Review:  Recent Labs     24  0724  0725 24  0724   WBC 15.3* 11.2* 11.7*   HGB 11.1* 10.9* 12.3   .0* 157.0 155.0     Recent Labs     24  0724  0724    138   K 4.1 4.5    104   CO2 30.0 28.0   BUN 19 17   CREATSERUM 0.59 0.64     No results for input(s): \"PTP\", \"INR\", \"PTT\" in the last 168 hours.    Cultures: Mycoplasma    Radiology:  XR CHEST AP  PORTABLE  (CPT=71045)    Result Date: 9/13/2024  CONCLUSION:  1. Cardiomegaly. 2. Worsening interstitial and alveolar opacities bilaterally likely representing worsening edema. 3. Opacities within the left mid lung zone and bilateral basilar opacities, left greater than right , suggestive of possible pneumonia   LOCATION:  [facility]      Dictated by (CST): Yulissa Posada MD on 9/13/2024 at 7:25 AM     Finalized by (CST): Yulissa Posada MD on 9/13/2024 at 7:27 AM      Chest x-ray reviewed with bilateral increased interstitial markings      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)    Macular cyst, hole, or pseudohole, right eye    Nonexudative age-related macular degeneration, right eye, intermediate dry stage    PMR (polymyalgia rheumatica) (HCC)    Paving stone degeneration of retina, bilateral    Asymptomatic carotid artery stenosis, bilateral    Weakness    Constipation    Thrombocytopenia (HCC)    Syncope and collapse    Concussion with loss of consciousness    Fall, initial encounter    Laceration of right little finger without foreign body without damage to nail, initial encounter    Neuropathy    Shortness of breath    Respiratory syncytial virus (RSV)    RSV (acute bronchiolitis due to respiratory syncytial virus)    Intractable vomiting    Hypokalemia    Community acquired pneumonia    Bronchospasm    Pneumonia due to Mycoplasma pneumoniae       Assessment:  Acute hypoxic respiratory failure likely 2/2 left sided pna, CT on 9/3 with left sided infiltrate, viral panel negative, inflammatory markers negative-now positive  for mycoplasma  Recurrent episode of dyspnea hypoxia with severe cough-suspect related to mycoplasma pneumonia  Bronchitis/airways obstruction/bronchospasm significant   GERD/diarrhea now resolved  Hypertension  Hyperlipidemia  History of coronary artery disease  Polymyalgia rheumatica  Hypothyroidism    Plan:  Seems improved at higher doses of steroids  Continues on mycoplasma treatment  Continues on bronchodilators  Continuing to follow carefully with you    CC     Sue Varner MD  9/13/2024  4:09 PM

## 2024-09-13 NOTE — OCCUPATIONAL THERAPY NOTE
OCCUPATIONAL THERAPY TREATMENT NOTE - INPATIENT     Room Number: 531/531-A  Session: 2       Presenting Problem: Nausea, vomiting, acute bronchities, respiratory failure, pneumonia  HOME SITUATION  Type of Home: Independent living facility  Home Layout: One level  Lives With: Alone     Toilet and Equipment: Comfort height toilet  Shower/Tub and Equipment: Walk-in shower  Other Equipment:  (rw)     Drives: No  Patient Regularly Uses: Glasses     Prior Level of Function: Modified independent with all ADL. Uses RW.    ASSESSMENT   Patient demonstrates fair progress this session, goals remain in progress.    Patient continues to function below baseline with  ADLs and functional transfers .   Contributing factors to remaining limitations include decreased functional strength, decreased functional reach, decreased endurance, impaired standing balance, impaired coordination, impaired motor planning, and decreased muscular endurance.  Next session anticipate patient to progress toileting, lower body dressing, bed mobility, transfers, static standing balance, dynamic standing balance, functional standing tolerance, and energy conservation strategies.  Occupational Therapy will continue to follow patient for duration of hospitalization.    Patient continues to benefit from continued skilled OT services: to promote return to prior level of function and safety with continuous assistance and gradual rehabilitative therapy.               History: Patient is a 89 year old female admitted on 8/26/2024 with Presenting Problem: Nausea, vomiting, acute bronchities, respiratory failure, pneumonia. Co-Morbidities : CAD, HTN, DM, macular degeneration       WEIGHT BEARING RESTRICTION  Weight Bearing Restriction: None                Recommendations for nursing staff:   Transfers: 1 person  Toileting location: bedside commode    TREATMENT SESSION:  Patient Start of Session: semi supine in bed  FUNCTIONAL TRANSFER ASSESSMENT  Sit to Stand:  Edge of Bed  Edge of Bed: Moderate Assist    BED MOBILITY  Supine to Sit : Moderate Assist  Sit to Supine (OT): Not Tested  Scooting: Mod A    BALANCE ASSESSMENT     FUNCTIONAL ADL ASSESSMENT       ACTIVITY TOLERANCE: fair-WFL                         O2 SATURATIONS       EDUCATION PROVIDED  Patient: Role of Occupational Therapy; Plan of Care; Discharge Recommendations; Functional Transfer Techniques; Fall Prevention; Posture/Positioning; Energy Conservation; Proper Body Mechanics  Patient's Response to Education: Requires Further Education; Demonstrates Poor Carry Over to Information      Equipment used: RW  Demonstrates functional use, Would benefit from additional trial      Therapist comments: Pt received semi supine in bed, pleasant and cooperative for OT session. Pt educated on role/purpose of OT. Pt continued with fair understanding. Pt agreeable for OOB activity/mobility in preparation for ADLs and functional transfers. Pt performs bed mobility at mod A with HOB elevated. Pt with substantial fear of falling. Pt educated and reassured that she won't fall and that there are 2 people here in the room to help. Pt performs 5 trials of sit to stand transfers fluctuating between mod-min A. Pt transfers to bedside chair requiring min A.  Patient End of Session: Up in chair;Needs met;Call light within reach;RN aware of session/findings;All patient questions and concerns addressed;Alarm set    SUBJECTIVE  \"Don't make me fall. Don't make me fall. You're going to drop me.\"    PAIN ASSESSMENT  Ratin  Location: denies        OBJECTIVE  Precautions: Bed/chair alarm;Hard of hearing;Low vision    AM-PAC ‘6-Clicks’ Inpatient Daily Activity Short Form  -   Putting on and taking off regular lower body clothing?: A Lot  -   Bathing (including washing, rinsing, drying)?: A Lot  -   Toileting, which includes using toilet, bedpan or urinal? : Total  -   Putting on and taking off regular upper body clothing?: A Lot  -   Taking  care of personal grooming such as brushing teeth?: A Little  -   Eating meals?: A Little    AM-PAC Score:  Score: 13  Approx Degree of Impairment: 63.03%  Standardized Score (AM-PAC Scale): 32.03    PLAN  OT Treatment Plan: Balance activities;ADL training;Functional transfer training;UE strengthening/ROM;Patient/Family education;Cognitive reorientation;Equipment eval/education;Compensatory technique education  Rehab Potential : Fair  Frequency: 3x/week    OT Goals:     All goals ongoing 09/13    ADL Goals   Patient will perform upper body dressing:  with setup  Patient will perform lower body dressing:  with min assist  Patient will perform toileting: with mod assist     Functional Transfer Goals  Patient will transfer from supine to sit:  with min assist- goal met 09/06; UPGRADE to CGA  Patient will transfer to bedside commode:  with mod assist     UE Exercise Program Goal  Patient will be supervision with bilateral AROM HEP (home exercise program).    OT Session Time: 25 minutes  Therapeutic Activity: 25 minutes

## 2024-09-14 LAB
ALBUMIN SERPL-MCNC: 3.4 G/DL (ref 3.2–4.8)
ALBUMIN/GLOB SERPL: 1.3 {RATIO} (ref 1–2)
ALP LIVER SERPL-CCNC: 66 U/L
ALT SERPL-CCNC: 23 U/L
ANION GAP SERPL CALC-SCNC: 7 MMOL/L (ref 0–18)
AST SERPL-CCNC: 9 U/L (ref ?–34)
BASOPHILS # BLD AUTO: 0.02 X10(3) UL (ref 0–0.2)
BASOPHILS NFR BLD AUTO: 0.2 %
BILIRUB SERPL-MCNC: 0.7 MG/DL (ref 0.2–0.9)
BUN BLD-MCNC: 27 MG/DL (ref 9–23)
CALCIUM BLD-MCNC: 8.7 MG/DL (ref 8.7–10.4)
CHLORIDE SERPL-SCNC: 105 MMOL/L (ref 98–112)
CO2 SERPL-SCNC: 29 MMOL/L (ref 21–32)
CREAT BLD-MCNC: 0.65 MG/DL
EGFRCR SERPLBLD CKD-EPI 2021: 83 ML/MIN/1.73M2 (ref 60–?)
EOSINOPHIL # BLD AUTO: 0.01 X10(3) UL (ref 0–0.7)
EOSINOPHIL NFR BLD AUTO: 0.1 %
ERYTHROCYTE [DISTWIDTH] IN BLOOD BY AUTOMATED COUNT: 13.5 %
GLOBULIN PLAS-MCNC: 2.7 G/DL (ref 2–3.5)
GLUCOSE BLD-MCNC: 252 MG/DL (ref 70–99)
HCT VFR BLD AUTO: 33.4 %
HGB BLD-MCNC: 11.3 G/DL
IMM GRANULOCYTES # BLD AUTO: 0.06 X10(3) UL (ref 0–1)
IMM GRANULOCYTES NFR BLD: 0.5 %
LYMPHOCYTES # BLD AUTO: 0.53 X10(3) UL (ref 1–4)
LYMPHOCYTES NFR BLD AUTO: 4.7 %
MCH RBC QN AUTO: 31.1 PG (ref 26–34)
MCHC RBC AUTO-ENTMCNC: 33.8 G/DL (ref 31–37)
MCV RBC AUTO: 92 FL
MONOCYTES # BLD AUTO: 0.3 X10(3) UL (ref 0.1–1)
MONOCYTES NFR BLD AUTO: 2.6 %
NEUTROPHILS # BLD AUTO: 10.46 X10 (3) UL (ref 1.5–7.7)
NEUTROPHILS # BLD AUTO: 10.46 X10(3) UL (ref 1.5–7.7)
NEUTROPHILS NFR BLD AUTO: 91.9 %
OSMOLALITY SERPL CALC.SUM OF ELEC: 306 MOSM/KG (ref 275–295)
PLATELET # BLD AUTO: 161 10(3)UL (ref 150–450)
POTASSIUM SERPL-SCNC: 4 MMOL/L (ref 3.5–5.1)
PROT SERPL-MCNC: 6.1 G/DL (ref 5.7–8.2)
RBC # BLD AUTO: 3.63 X10(6)UL
SODIUM SERPL-SCNC: 141 MMOL/L (ref 136–145)
WBC # BLD AUTO: 11.4 X10(3) UL (ref 4–11)

## 2024-09-14 PROCEDURE — 99232 SBSQ HOSP IP/OBS MODERATE 35: CPT | Performed by: HOSPITALIST

## 2024-09-14 PROCEDURE — 99232 SBSQ HOSP IP/OBS MODERATE 35: CPT | Performed by: INTERNAL MEDICINE

## 2024-09-14 RX ORDER — METHYLPREDNISOLONE SODIUM SUCCINATE 40 MG/ML
40 INJECTION, POWDER, LYOPHILIZED, FOR SOLUTION INTRAMUSCULAR; INTRAVENOUS EVERY 12 HOURS
Status: DISCONTINUED | OUTPATIENT
Start: 2024-09-15 | End: 2024-09-15

## 2024-09-14 NOTE — PROGRESS NOTES
[facility]   part of [facility]     Hospitalist Progress Note     Alyssa Montgomery Patient Status:  Inpatient    1932 MRN [id number]   Location [facility] 0-A Attending Medhat Dover MD   Hosp Day # 19 PCP Mony Hall DO     Chief Complaint: n/v    Subjective:     On 4L nc, no cough, confused,  dose not remember if she ate breakfast    Objective:    Review of Systems:   A comprehensive review of systems was completed; pertinent positive and negatives stated in subjective.    Vital signs:  Temp:  [97.6 °F (36.4 °C)-99.4 °F (37.4 °C)] 97.6 °F (36.4 °C)  Pulse:  [] 71  Resp:  [16-22] 16  BP: (134-157)/(56-82) 134/56  SpO2:  [94 %-97 %] 97 %    Physical Exam:    General: No acute distress. Anxious.  Respiratory: B/L wheezing.   Cardiovascular: S1, S2, regular rate and rhythm  Abdomen: Soft, Non-tender, moderate distention, positive bowel sounds  Neuro: No new focal deficits.   Extremities: No edema      Diagnostic Data:    Labs:  Recent Labs   Lab 09/10/24  0743 24  0706 24  0725 24  0724  09   WBC 12.2* 15.3* 11.2* 11.7* 11.4*   HGB 11.5* 11.1* 10.9* 12.3 11.3*   MCV 93.0 90.2 93.0 91.7 92.0   .0* 147.0* 157.0 155.0 161.0       Recent Labs   Lab 09/10/24  0743 24  0706 24  07   * 176* 266*   BUN 14 19 17   CREATSERUM 0.61 0.59 0.64   CA 8.8 8.7 8.6*   ALB 3.1*  --  3.3    142 138   K 4.1 4.1 4.5    107 104   CO2 24.0 30.0 28.0   ALKPHO 57  --  71   AST 12  --  13   ALT 19  --  24   BILT 0.6  --  0.8   TP 5.9  --  6.2       Estimated Creatinine Clearance: 54.5 mL/min (based on SCr of 0.64 mg/dL).    No results for input(s): \"TROP\", \"TROPHS\", \"CK\" in the last 168 hours.    No results for input(s): \"PTP\", \"INR\" in the last 168 hours.                 Microbiology    Hospital Encounter on 24   1. Blood Culture     Status: None    Collection Time: 24 10:48 AM    Specimen: Blood,peripheral   Result Value Ref Range     Blood Culture Result No Growth 5 Days N/A         Imaging: Reviewed in Epic.    Medications:    ipratropium-albuterol  3 mL Nebulization QID    methylPREDNISolone  40 mg Intravenous Q8H    pantoprazole  40 mg Oral BID AC    azithromycin  500 mg Oral Daily    guaiFENesin ER  600 mg Oral BID    nystatin  5 mL Oral QID    multivitamin with minerals  1 tablet Oral Daily    QUEtiapine  25 mg Oral Nightly    budesonide  0.5 mg Nebulization 2 times daily    hydrALAZINE  25 mg Oral Q8H SHELLEY    carvedilol  12.5 mg Oral BID with meals    amLODIPine  10 mg Oral Daily    atorvastatin  40 mg Oral Nightly    levothyroxine  100 mcg Oral Daily @ 0700    [Held by provider] predniSONE  1 mg Oral Daily with breakfast    pregabalin  25 mg Oral BID    enoxaparin  40 mg Subcutaneous Daily       Assessment & Plan:      #CAP  #Acute bronchitis with bronchospasm   #Acute hypoxic respiratory failure due to above  -+ mycoplasma pneumoniae  -Weaned off vapotherm to HFNC- on 4 L NC  -IV Abx  -steroids  -Bcx/Sputum culture negative   -Scheduled nebs - duoneb/pulmicort, add mucomyst  -Chest PT  -Pulmonary following    #Abdominal distention  -KUB without evidence of obstruction/impaction    -Continue bowel regimen    #Leukocytosis  -Likely in part steroid induced   -Monitor    #Acute metabolic encephalopathy   -Likely due to #1 and extended hospital stay  -Frequent re-orientation necessary     #CAD  -ASA, statin, BB     #HFpEF  -Compensated   -Echo 2023 with normal EF, G1DD    #Essential HTN  #HTN urgency  -BP controlled     #Hypothyroidism - Synthroid   #PMR - 1mg prednisone daily PTA    #Suspected cognitive impairment  -OP neuropsych ammon Cheek M.D.  [facility] Hospitalist      Supplementary Documentation:     Quality:  DVT Mechanical Prophylaxis:     Early ambuation  DVT Pharmacologic Prophylaxis   Medication    enoxaparin (Lovenox) 40 MG/0.4ML SUBQ injection 40 mg                Code Status: DNAR/Selective Treatment  Buck: External  urinary catheter in place  Buck Duration (in days):   Central line:    XAVI:     Discharge is dependent on: clinical improvement  At this point Ms. Montgomery is expected to be discharge to: tbd    The 21st Century Cures Act makes medical notes like these available to patients in the interest of transparency. Please be advised this is a medical document. Medical documents are intended to carry relevant information, facts as evident, and the clinical opinion of the practitioner. The medical note is intended as peer to peer communication and may appear blunt or direct. It is written in medical language and may contain abbreviations or verbiage that are unfamiliar.

## 2024-09-14 NOTE — PLAN OF CARE
Patient is alert and oriented x3. On 3-4L. Strong non-productive cough. VSS. C/o gen pain; prn tylenol provided. Incontinent. Pt unable to recall date of last BM. PT/OT. On IV steroids. Safety precautions in place.   Problem: Patient/Family Goals  Goal: Patient/Family Long Term Goal  Description: Patient's Long Term Goal: Back to Independent [facility]    Interventions:  - IV antibiotics  - O2  - Nebs  - fall precautions  - See additional Care Plan goals for specific interventions  Outcome: Progressing  Goal: Patient/Family Short Term Goal  Description: Patient's Short Term Goal: Pain relief  9/1 noc: wean O2  9/4AM: urinate  9/5 noc: wean off 02   9/6 noc: wean O2 to baseline  9/7 AM: wean o2  9/7 noc: wean O2 as duncan  9/8 NOC: \"I want to go to sleep\"  9/9 NOC: wean O2  9/10 NOC: sleep  9/13 noc: manage cough  Interventions:   - Tylenol,   - reposition  -nebs, hob 30,   -antitussives prn    - See additional Care Plan goals for specific interventions  Outcome: Progressing     Problem: GASTROINTESTINAL - ADULT  Goal: Minimal or absence of nausea and vomiting  Description: INTERVENTIONS:  - Maintain adequate hydration with IV or PO as ordered and tolerated  - Nasogastric tube to low intermittent suction as ordered  - Evaluate effectiveness of ordered antiemetic medications  - Provide nonpharmacologic comfort measures as appropriate  - Advance diet as tolerated, if ordered  - Obtain nutritional consult as needed  - Evaluate fluid balance  Outcome: Progressing     Problem: METABOLIC/FLUID AND ELECTROLYTES - ADULT  Goal: Electrolytes maintained within normal limits  Description: INTERVENTIONS:  - Monitor labs and rhythm and assess patient for signs and symptoms of electrolyte imbalances  - Administer electrolyte replacement as ordered  - Monitor response to electrolyte replacements, including rhythm and repeat lab results as appropriate  - Fluid restriction as ordered  - Instruct patient on fluid and nutrition  restrictions as appropriate  Outcome: Progressing  Goal: Hemodynamic stability and optimal renal function maintained  Description: INTERVENTIONS:  - Monitor labs and assess for signs and symptoms of volume excess or deficit  - Monitor intake, output and patient weight  - Monitor urine specific gravity, serum osmolarity and serum sodium as indicated or ordered  - Monitor response to interventions for patient's volume status, including labs, urine output, blood pressure (other measures as available)  - Encourage oral intake as appropriate  - Instruct patient on fluid and nutrition restrictions as appropriate  Outcome: Progressing     Problem: RESPIRATORY - ADULT  Goal: Achieves optimal ventilation and oxygenation  Description: INTERVENTIONS:  - Assess for changes in respiratory status  - Assess for changes in mentation and behavior  - Position to facilitate oxygenation and minimize respiratory effort  - Oxygen supplementation based on oxygen saturation or ABGs  - Provide Smoking Cessation handout, if applicable  - Encourage broncho-pulmonary hygiene including cough, deep breathe, Incentive Spirometry  - Assess the need for suctioning and perform as needed  - Assess and instruct to report SOB or any respiratory difficulty  - Respiratory Therapy support as indicated  - Manage/alleviate anxiety  - Monitor for signs/symptoms of CO2 retention  Outcome: Progressing

## 2024-09-14 NOTE — PROGRESS NOTES
[facility]  Progress Note    Alyssa Montgomery Patient Status:  Inpatient    1932 MRN [id number]   Location [facility] 5NW-A Attending Debby Cheek MD   Hosp Day # 19 PCP Mony Hall DO     Subjective:  Alyssa Montgomery is a(n) 89 year old female slight low-grade temp yesterday resolved today  Patient continues to note shortness of breath while coughing with vocal cord spasm suspected  Cough remains nonproductive  Denies any specific chest pain  Continues to complain about fatigue-continues to be hesitant about movement    Objective:  /54 (BP Location: Left arm)   Pulse 82   Temp 97.8 °F (36.6 °C) (Oral)   Resp 18   Ht 5' 7\" (1.702 m)   Wt 172 lb 3.2 oz (78.1 kg)   SpO2 96%   BMI 26.97 kg/m² remains on 3 L      Temp (24hrs), Av.6 °F (37 °C), Min:97.6 °F (36.4 °C), Max:99.4 °F (37.4 °C)      Intake/Output:    Intake/Output Summary (Last 24 hours) at 2024 1314  Last data filed at 2024 0609  Gross per 24 hour   Intake --   Output 900 ml   Net -900 ml       Physical Exam:   General: alert, cooperative, oriented.  No respiratory distress.  Much less coughing   Head: Normocephalic, without obvious abnormality, atraumatic.   Throat: Lips, mucosa, and tongue normal.  No thrush noted seems better.   Neck: trachea midline, no adenopathy, no thyromegaly. No JVD.   Lungs: Remains with bilateral expiratory rhonchi Rales at both bases   Chest wall: No tenderness or deformity.   Heart: Regular rate and rhythm   Abdomen: soft, non-distended, no masses, no guarding, no     Rebound.  Very protuberant no diarrhea   Extremity: No significant edema trace to +1 bilaterally   Skin: No rashes or lesions.   Neurological: Alert, interactive, no focal deficits    Lab Data Review:  Recent Labs     24  0725 24  0724 24  0924   WBC 11.2* 11.7* 11.4*   HGB 10.9* 12.3 11.3*   .0 155.0 161.0     Recent Labs     24  0724 24  0924    141   K 4.5 4.0    105    CO2 28.0 29.0   BUN 17 27*   CREATSERUM 0.64 0.65     No results for input(s): \"PTP\", \"INR\", \"PTT\" in the last 168 hours.    Cultures: Mycoplasma positive    Radiology:  No results found.  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)    Macular cyst, hole, or pseudohole, right eye    Nonexudative age-related macular degeneration, right eye, intermediate dry stage    PMR (polymyalgia rheumatica) ([facility])    Paving stone degeneration of retina, bilateral    Asymptomatic carotid artery stenosis, bilateral    Weakness    Constipation    Thrombocytopenia ([facility])    Syncope and collapse    Concussion with loss of consciousness    Fall, initial encounter    Laceration of right little finger without foreign body without damage to nail, initial encounter    Neuropathy    Shortness of breath    Respiratory syncytial virus (RSV)    RSV (acute bronchiolitis due to respiratory syncytial virus)    Intractable vomiting    Hypokalemia    Community acquired pneumonia    Bronchospasm    Pneumonia due to Mycoplasma pneumoniae       Assessment:  Acute hypoxic respiratory failure likely 2/2 left sided pna, CT on 9/3 with left sided infiltrate, viral panel negative, inflammatory markers negative-now positive for mycoplasma  Recurrent episode of dyspnea hypoxia with severe cough-suspect related to mycoplasma pneumonia  Bronchitis/airways obstruction/bronchospasm significant   GERD/diarrhea now resolved  Hypertension  Hyperlipidemia  History of coronary artery disease  Polymyalgia rheumatica  Hypothyroidism    Plan:  Continuing higher doses of  steroids with hopes to wean soon  Continuing to encourage movement/walking-patient remains hesitant  Poor p.o. intake--add nutritional supplements  Discussed with daughter at bedside at length-to encourage her mom to perform more activities on her own    CC     Sue Varner MD  9/14/2024  1:14 PM

## 2024-09-14 NOTE — PLAN OF CARE
Problem: Patient/Family Goals  Goal: Patient/Family Long Term Goal  Description: Patient's Long Term Goal: Back to [facility]    Interventions:  - IV antibiotics  - O2  - Nebs  - fall precautions  - See additional Care Plan goals for specific interventions  Outcome: Progressing  Goal: Patient/Family Short Term Goal  Description: Patient's Short Term Goal: Pain relief  9/1 noc: wean O2  9/4AM: urinate  9/5 noc: wean off 02   9/6 noc: wean O2 to baseline  9/7 AM: wean o2  9/7 noc: wean O2 as duncan  9/8 NOC: \"I want to go to sleep\"  9/9 NOC: wean O2  9/10 NOC: sleep  9/13 noc: manage cough  Interventions:   - Tylenol,   - reposition  -nebs, hob 30,   -antitussives prn    - See additional Care Plan goals for specific interventions  Outcome: Progressing     Problem: GASTROINTESTINAL - ADULT  Goal: Minimal or absence of nausea and vomiting  Description: INTERVENTIONS:  - Maintain adequate hydration with IV or PO as ordered and tolerated  - Nasogastric tube to low intermittent suction as ordered  - Evaluate effectiveness of ordered antiemetic medications  - Provide nonpharmacologic comfort measures as appropriate  - Advance diet as tolerated, if ordered  - Obtain nutritional consult as needed  - Evaluate fluid balance  Outcome: Progressing     Problem: METABOLIC/FLUID AND ELECTROLYTES - ADULT  Goal: Electrolytes maintained within normal limits  Description: INTERVENTIONS:  - Monitor labs and rhythm and assess patient for signs and symptoms of electrolyte imbalances  - Administer electrolyte replacement as ordered  - Monitor response to electrolyte replacements, including rhythm and repeat lab results as appropriate  - Fluid restriction as ordered  - Instruct patient on fluid and nutrition restrictions as appropriate  Outcome: Progressing  Goal: Hemodynamic stability and optimal renal function maintained  Description: INTERVENTIONS:  - Monitor labs and assess for signs and symptoms of volume excess or deficit  -  Monitor intake, output and patient weight  - Monitor urine specific gravity, serum osmolarity and serum sodium as indicated or ordered  - Monitor response to interventions for patient's volume status, including labs, urine output, blood pressure (other measures as available)  - Encourage oral intake as appropriate  - Instruct patient on fluid and nutrition restrictions as appropriate  Outcome: Progressing     Problem: RESPIRATORY - ADULT  Goal: Achieves optimal ventilation and oxygenation  Description: INTERVENTIONS:  - Assess for changes in respiratory status  - Assess for changes in mentation and behavior  - Position to facilitate oxygenation and minimize respiratory effort  - Oxygen supplementation based on oxygen saturation or ABGs  - Provide Smoking Cessation handout, if applicable  - Encourage broncho-pulmonary hygiene including cough, deep breathe, Incentive Spirometry  - Assess the need for suctioning and perform as needed  - Assess and instruct to report SOB or any respiratory difficulty  - Respiratory Therapy support as indicated  - Manage/alleviate anxiety  - Monitor for signs/symptoms of CO2 retention  Outcome: Progressing

## 2024-09-15 LAB
ANION GAP SERPL CALC-SCNC: 7 MMOL/L (ref 0–18)
BASOPHILS # BLD AUTO: 0.02 X10(3) UL (ref 0–0.2)
BASOPHILS NFR BLD AUTO: 0.2 %
BUN BLD-MCNC: 30 MG/DL (ref 9–23)
CALCIUM BLD-MCNC: 9.1 MG/DL (ref 8.7–10.4)
CHLORIDE SERPL-SCNC: 104 MMOL/L (ref 98–112)
CO2 SERPL-SCNC: 28 MMOL/L (ref 21–32)
CREAT BLD-MCNC: 0.78 MG/DL
EGFRCR SERPLBLD CKD-EPI 2021: 71 ML/MIN/1.73M2 (ref 60–?)
EOSINOPHIL # BLD AUTO: 0 X10(3) UL (ref 0–0.7)
EOSINOPHIL NFR BLD AUTO: 0 %
ERYTHROCYTE [DISTWIDTH] IN BLOOD BY AUTOMATED COUNT: 13.5 %
GLUCOSE BLD-MCNC: 366 MG/DL (ref 70–99)
HCT VFR BLD AUTO: 35.3 %
HGB BLD-MCNC: 11.5 G/DL
IMM GRANULOCYTES # BLD AUTO: 0.07 X10(3) UL (ref 0–1)
IMM GRANULOCYTES NFR BLD: 0.7 %
LYMPHOCYTES # BLD AUTO: 0.42 X10(3) UL (ref 1–4)
LYMPHOCYTES NFR BLD AUTO: 4.2 %
MAGNESIUM SERPL-MCNC: 2.4 MG/DL (ref 1.6–2.6)
MCH RBC QN AUTO: 31 PG (ref 26–34)
MCHC RBC AUTO-ENTMCNC: 32.6 G/DL (ref 31–37)
MCV RBC AUTO: 95.1 FL
MONOCYTES # BLD AUTO: 0.18 X10(3) UL (ref 0.1–1)
MONOCYTES NFR BLD AUTO: 1.8 %
NEUTROPHILS # BLD AUTO: 9.41 X10 (3) UL (ref 1.5–7.7)
NEUTROPHILS # BLD AUTO: 9.41 X10(3) UL (ref 1.5–7.7)
NEUTROPHILS NFR BLD AUTO: 93.1 %
OSMOLALITY SERPL CALC.SUM OF ELEC: 309 MOSM/KG (ref 275–295)
PHOSPHATE SERPL-MCNC: 3.3 MG/DL (ref 2.4–5.1)
PLATELET # BLD AUTO: 149 10(3)UL (ref 150–450)
POTASSIUM SERPL-SCNC: 4.5 MMOL/L (ref 3.5–5.1)
RBC # BLD AUTO: 3.71 X10(6)UL
SODIUM SERPL-SCNC: 139 MMOL/L (ref 136–145)
WBC # BLD AUTO: 10.1 X10(3) UL (ref 4–11)

## 2024-09-15 PROCEDURE — 99232 SBSQ HOSP IP/OBS MODERATE 35: CPT | Performed by: HOSPITALIST

## 2024-09-15 PROCEDURE — 99232 SBSQ HOSP IP/OBS MODERATE 35: CPT | Performed by: INTERNAL MEDICINE

## 2024-09-15 RX ORDER — METHYLPREDNISOLONE SODIUM SUCCINATE 40 MG/ML
40 INJECTION, POWDER, LYOPHILIZED, FOR SOLUTION INTRAMUSCULAR; INTRAVENOUS EVERY 12 HOURS
Status: COMPLETED | OUTPATIENT
Start: 2024-09-16 | End: 2024-09-16

## 2024-09-15 RX ORDER — PREDNISONE 20 MG/1
40 TABLET ORAL
Status: DISCONTINUED | OUTPATIENT
Start: 2024-09-16 | End: 2024-09-17

## 2024-09-15 NOTE — PROGRESS NOTES
SP02 % ON ROOM AIR AT REST 89%, sats 90% on 3LNC, 92% on 4lnc  SP02 % AMBULATION ON ROOM AIR 85%  SPO2% AMBULATION ON 02 94% ON  6 LITERS PER MINUTE,  ambulated from bed to chair in room.

## 2024-09-15 NOTE — PROGRESS NOTES
[facility]   part of [facility]     Hospitalist Progress Note     lAyssa Montgomery Patient Status:  Inpatient    1932 MRN [id number]   Location [facility] 0-A Attending Medhat Dover MD   Hosp Day # 20 PCP Mony Hall DO     Chief Complaint: n/v    Subjective:     Up in chair, o2 walking completed    Objective:    Review of Systems:   A comprehensive review of systems was completed; pertinent positive and negatives stated in subjective.    Vital signs:  Temp:  [97.8 °F (36.6 °C)-99.5 °F (37.5 °C)] 99.3 °F (37.4 °C)  Pulse:  [75-90] 75  Resp:  [17-18] 18  BP: (121-146)/(54-65) 136/65  SpO2:  [91 %-97 %] 97 %    Physical Exam:    General: No acute distress. Anxious.  Respiratory: B/L wheezing.   Cardiovascular: S1, S2, regular rate and rhythm  Abdomen: Soft, Non-tender, moderate distention, positive bowel sounds  Neuro: No new focal deficits.   Extremities: No edema      Diagnostic Data:    Labs:  Recent Labs   Lab 24  0706 24  0725 24  0724  0924 09/15/24  0756   WBC 15.3* 11.2* 11.7* 11.4* 10.1   HGB 11.1* 10.9* 12.3 11.3* 11.5*   MCV 90.2 93.0 91.7 92.0 95.1   .0* 157.0 155.0 161.0 149.0*       Recent Labs   Lab 09/10/24  0743 24  0706 24  0724  0924 09/15/24  0756   *   < > 266* 252* 366*   BUN 14   < > 17 27* 30*   CREATSERUM 0.61   < > 0.64 0.65 0.78   CA 8.8   < > 8.6* 8.7 9.1   ALB 3.1*  --  3.3 3.4  --       < > 138 141 139   K 4.1   < > 4.5 4.0 4.5      < > 104 105 104   CO2 24.0   < > 28.0 29.0 28.0   ALKPHO 57  --  71 66  --    AST 12  --  13 9  --    ALT 19  --  24 23  --    BILT 0.6  --  0.8 0.7  --    TP 5.9  --  6.2 6.1  --     < > = values in this interval not displayed.       Estimated Creatinine Clearance: 44.8 mL/min (based on SCr of 0.78 mg/dL).    No results for input(s): \"TROP\", \"TROPHS\", \"CK\" in the last 168 hours.    No results for input(s): \"PTP\", \"INR\" in the last 168 hours.                  Microbiology    Hospital Encounter on 08/26/24   1. Blood Culture     Status: None    Collection Time: 08/28/24 10:48 AM    Specimen: Blood,peripheral   Result Value Ref Range    Blood Culture Result No Growth 5 Days N/A         Imaging: Reviewed in Epic.    Medications:    methylPREDNISolone  40 mg Intravenous Q12H    ipratropium-albuterol  3 mL Nebulization QID    pantoprazole  40 mg Oral BID AC    azithromycin  500 mg Oral Daily    guaiFENesin ER  600 mg Oral BID    nystatin  5 mL Oral QID    multivitamin with minerals  1 tablet Oral Daily    QUEtiapine  25 mg Oral Nightly    budesonide  0.5 mg Nebulization 2 times daily    hydrALAZINE  25 mg Oral Q8H SHELLEY    carvedilol  12.5 mg Oral BID with meals    amLODIPine  10 mg Oral Daily    atorvastatin  40 mg Oral Nightly    levothyroxine  100 mcg Oral Daily @ 0700    [Held by provider] predniSONE  1 mg Oral Daily with breakfast    pregabalin  25 mg Oral BID    enoxaparin  40 mg Subcutaneous Daily       Assessment & Plan:      #CAP  #Acute bronchitis with bronchospasm   #Acute hypoxic respiratory failure due to above  -+ mycoplasma pneumoniae  -Weaned off vapotherm to HFNC- on NC- wean as able  -steroids  -Bcx/Sputum culture negative   -Scheduled nebs - duoneb/pulmicort, add mucomyst  -Chest PT  -Pulmonary following  -azithromycin    #Abdominal distention  -KUB without evidence of obstruction/impaction    -Continue bowel regimen    #Leukocytosis  -Likely in part steroid induced   -Monitor    #Acute metabolic encephalopathy   -Likely due to #1 and extended hospital stay  -Frequent re-orientation necessary     #CAD  -ASA, statin, BB     #HFpEF  -Compensated   -Echo 2023 with normal EF, G1DD    #Essential HTN  #HTN urgency  -BP controlled     #Hypothyroidism - Synthroid   #PMR - 1mg prednisone daily PTA    #Suspected cognitive impairment  -OP neuropsych eval     Continue to wean o2 as able  Slowly wean steroids  Increase activity    Debby Cheek M.D.  [facility]  Hospitalist      Supplementary Documentation:     Quality:  DVT Mechanical Prophylaxis:     Early ambuation  DVT Pharmacologic Prophylaxis   Medication    enoxaparin (Lovenox) 40 MG/0.4ML SUBQ injection 40 mg                Code Status: DNAR/Selective Treatment  Buck: External urinary catheter in place  Buck Duration (in days):   Central line:    XAVI:     Discharge is dependent on: clinical improvement  At this point Ms. Montgomery is expected to be discharge to: tbd    The 21st Century Cures Act makes medical notes like these available to patients in the interest of transparency. Please be advised this is a medical document. Medical documents are intended to carry relevant information, facts as evident, and the clinical opinion of the practitioner. The medical note is intended as peer to peer communication and may appear blunt or direct. It is written in medical language and may contain abbreviations or verbiage that are unfamiliar.

## 2024-09-15 NOTE — PLAN OF CARE
Patient is alert and oriented x3, some confusion noted at times. Received on 3L nc. Strong non-prod cough. Prn cough meds administered. On IV steroids. PT/OT. Fall/seizure precautions in place. Pt last BM 9/14 was white/clear mucous in appearance- no fecal matter present. MD aware. Pt c/o back pain, prn tylenol provided. POC discussed with pt.   Problem: Patient/Family Goals  Goal: Patient/Family Long Term Goal  Description: Patient's Long Term Goal: Back to [facility]    Interventions:  - IV antibiotics  - O2  - Nebs  - fall precautions  - See additional Care Plan goals for specific interventions  9/14/2024 1945 by Carline Ceron, RN  Outcome: Progressing  9/14/2024 1945 by Carline Ceron RN  Outcome: Progressing  Goal: Patient/Family Short Term Goal  Description: Patient's Short Term Goal: Pain relief  9/1 noc: wean O2  9/4AM: urinate  9/5 noc: wean off 02   9/6 noc: wean O2 to baseline  9/7 AM: wean o2  9/7 noc: wean O2 as duncan  9/8 NOC: \"I want to go to sleep\"  9/9 NOC: wean O2  9/10 NOC: sleep  9/13 noc: manage cough  9/14: wean O2 as duncan  Interventions:   - Tylenol,   - reposition  -nebs, hob 30,   -antitussives prn    - See additional Care Plan goals for specific interventions  9/14/2024 1945 by Carline Ceron, RN  Outcome: Progressing  9/14/2024 1945 by Carline Ceron, RN  Outcome: Progressing     Problem: GASTROINTESTINAL - ADULT  Goal: Minimal or absence of nausea and vomiting  Description: INTERVENTIONS:  - Maintain adequate hydration with IV or PO as ordered and tolerated  - Nasogastric tube to low intermittent suction as ordered  - Evaluate effectiveness of ordered antiemetic medications  - Provide nonpharmacologic comfort measures as appropriate  - Advance diet as tolerated, if ordered  - Obtain nutritional consult as needed  - Evaluate fluid balance  9/14/2024 1945 by Carline Ceron, RN  Outcome: Progressing  9/14/2024 1945 by Carline Ceron, RN  Outcome:  Progressing     Problem: METABOLIC/FLUID AND ELECTROLYTES - ADULT  Goal: Electrolytes maintained within normal limits  Description: INTERVENTIONS:  - Monitor labs and rhythm and assess patient for signs and symptoms of electrolyte imbalances  - Administer electrolyte replacement as ordered  - Monitor response to electrolyte replacements, including rhythm and repeat lab results as appropriate  - Fluid restriction as ordered  - Instruct patient on fluid and nutrition restrictions as appropriate  9/14/2024 1945 by Carline Ceron, RN  Outcome: Progressing  9/14/2024 1945 by Carline Ceron, RN  Outcome: Progressing  Goal: Hemodynamic stability and optimal renal function maintained  Description: INTERVENTIONS:  - Monitor labs and assess for signs and symptoms of volume excess or deficit  - Monitor intake, output and patient weight  - Monitor urine specific gravity, serum osmolarity and serum sodium as indicated or ordered  - Monitor response to interventions for patient's volume status, including labs, urine output, blood pressure (other measures as available)  - Encourage oral intake as appropriate  - Instruct patient on fluid and nutrition restrictions as appropriate  9/14/2024 1945 by Carline Ceron, RN  Outcome: Progressing  9/14/2024 1945 by Carline Ceron, RN  Outcome: Progressing     Problem: RESPIRATORY - ADULT  Goal: Achieves optimal ventilation and oxygenation  Description: INTERVENTIONS:  - Assess for changes in respiratory status  - Assess for changes in mentation and behavior  - Position to facilitate oxygenation and minimize respiratory effort  - Oxygen supplementation based on oxygen saturation or ABGs  - Provide Smoking Cessation handout, if applicable  - Encourage broncho-pulmonary hygiene including cough, deep breathe, Incentive Spirometry  - Assess the need for suctioning and perform as needed  - Assess and instruct to report SOB or any respiratory difficulty  - Respiratory Therapy  support as indicated  - Manage/alleviate anxiety  - Monitor for signs/symptoms of CO2 retention  9/14/2024 1945 by Carline Ceron, RN  Outcome: Progressing  9/14/2024 1945 by Carline Ceron, RN  Outcome: Progressing     Problem: MUSCULOSKELETAL - ADULT  Goal: Return mobility to safest level of function  Description: INTERVENTIONS:  - Assess patient stability and activity tolerance for standing, transferring and ambulating w/ or w/o assistive devices  - Assist with transfers and ambulation using safe patient handling equipment as needed  - Ensure adequate protection for wounds/incisions during mobilization  - Obtain PT/OT consults as needed  - Advance activity as appropriate  - Communicate ordered activity level and limitations with patient/family  Outcome: Progressing     Problem: SAFETY ADULT - FALL  Goal: Free from fall injury  Description: INTERVENTIONS:  - Assess pt frequently for physical needs  - Identify cognitive and physical deficits and behaviors that affect risk of falls.  - [place] fall precautions as indicated by assessment.  - Educate pt/family on patient safety including physical limitations  - Instruct pt to call for assistance with activity based on assessment  - Modify environment to reduce risk of injury  - Provide assistive devices as appropriate  - Consider OT/PT consult to assist with strengthening/mobility  - Encourage toileting schedule  Outcome: Progressing

## 2024-09-15 NOTE — PROGRESS NOTES
[facility]  Progress Note    Alyssa Montgomery Patient Status:  Inpatient    1932 MRN [id number]   Location [facility] 5NW-A Attending Debby Cheek MD   Hosp Day # 20 PCP Mony Hall DO     Subjective:  Alyssa Montgomery is a(n) 89 year old female occasional low-grade temps  Overall seems a bit stronger-seems more interactive  Her daughter patient describing inability to breathe while coughing has been an issue her entire adult life-    Objective:  /76 (BP Location: Left arm)   Pulse 72   Temp 97.8 °F (36.6 °C) (Oral)   Resp 16   Ht 5' 7\" (1.702 m)   Wt 172 lb 3.2 oz (78.1 kg)   SpO2 95%   BMI 26.97 kg/m² remains on 3 L      Temp (24hrs), Av °F (37.2 °C), Min:97.8 °F (36.6 °C), Max:99.5 °F (37.5 °C)      Intake/Output:    Intake/Output Summary (Last 24 hours) at 9/15/2024 1419  Last data filed at 9/15/2024 0432  Gross per 24 hour   Intake --   Output 800 ml   Net -800 ml       Physical Exam:   General: alert, cooperative, oriented.  No respiratory distress.   Head: Normocephalic, without obvious abnormality, atraumatic.   Throat: Lips, mucosa, and tongue normal.  No thrush noted.   Neck: trachea midline, no adenopathy, no thyromegaly. No JVD.   Lungs: Overall better air entry remains with slight rales and expiratory rhonchi   Chest wall: No tenderness or deformity.   Heart: Regular rate and rhythm   Abdomen: soft, non-distended, no masses, no guarding, no     Rebound.  Remains protuberant poor p.o. intake   Extremity: Edema nontender   Skin: No rashes or lesions.   Neurological: Alert, interactive, no focal deficits    Lab Data Review:  Recent Labs     24  0724 09/14/24  0924 09/15/24  0756   WBC 11.7* 11.4* 10.1   HGB 12.3 11.3* 11.5*   .0 161.0 149.0*     Recent Labs     24  0724 24  0924 09/15/24  0756    141 139   K 4.5 4.0 4.5    105 104   CO2 28.0 29.0 28.0   BUN 17 27* 30*   CREATSERUM 0.64 0.65 0.78     No results for input(s): \"PTP\",  \"INR\", \"PTT\" in the last 168 hours.    Cultures: Mycoplasma reviewed plans for repeat    Radiology:  No results found.        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)    Macular cyst, hole, or pseudohole, right eye    Nonexudative age-related macular degeneration, right eye, intermediate dry stage    PMR (polymyalgia rheumatica) ([facility])    Paving stone degeneration of retina, bilateral    Asymptomatic carotid artery stenosis, bilateral    Weakness    Constipation    Thrombocytopenia ([facility])    Syncope and collapse    Concussion with loss of consciousness    Fall, initial encounter    Laceration of right little finger without foreign body without damage to nail, initial encounter    Neuropathy    Shortness of breath    Respiratory syncytial virus (RSV)    RSV (acute bronchiolitis due to respiratory syncytial virus)    Intractable vomiting    Hypokalemia    Community acquired pneumonia    Bronchospasm    Pneumonia due to Mycoplasma pneumoniae       Assessment:  Acute hypoxic respiratory failure likely 2/2 left sided pna, CT on 9/3 with left sided infiltrate, viral panel negative, inflammatory markers negative-now positive for mycoplasma  Recurrent episode of dyspnea hypoxia with severe cough-suspect related to mycoplasma pneumonia/cognitive spasm--daughter reports some component of chronicity  Bronchitis/airways obstruction/bronchospasm significant   GERD/diarrhea now resolved  Hypertension  Hyperlipidemia  History of coronary artery disease  Polymyalgia rheumatica  Hypothyroidism    Plan:  Repeat chest x-ray,  Plan to ask social  worker consult for rehab placement-transfer 1 to 2 days  Continuing to wean FiO2  To p.o. prednisone in a.m.    CC     Sue Varner MD  9/15/2024  2:19 PM

## 2024-09-16 ENCOUNTER — APPOINTMENT (OUTPATIENT)
Dept: GENERAL RADIOLOGY | Facility: HOSPITAL | Age: 89
End: 2024-09-16
Attending: INTERNAL MEDICINE
Payer: MEDICARE

## 2024-09-16 PROBLEM — E11.9 TYPE 2 DIABETES MELLITUS WITHOUT COMPLICATION, WITHOUT LONG-TERM CURRENT USE OF INSULIN (HCC): Status: ACTIVE | Noted: 2024-01-01

## 2024-09-16 PROBLEM — E11.9 TYPE 2 DIABETES MELLITUS WITHOUT COMPLICATION, WITHOUT LONG-TERM CURRENT USE OF INSULIN (HCC): Status: ACTIVE | Noted: 2024-09-16

## 2024-09-16 LAB
EST. AVERAGE GLUCOSE BLD GHB EST-MCNC: 169 MG/DL (ref 68–126)
GLUCOSE BLD-MCNC: 287 MG/DL (ref 70–99)
GLUCOSE BLD-MCNC: 349 MG/DL (ref 70–99)
GLUCOSE BLD-MCNC: 384 MG/DL (ref 70–99)
GLUCOSE BLD-MCNC: 400 MG/DL (ref 70–99)
GLUCOSE BLD-MCNC: 480 MG/DL (ref 70–99)
GLUCOSE BLD-MCNC: 507 MG/DL (ref 70–99)
HBA1C MFR BLD: 7.5 % (ref ?–5.7)

## 2024-09-16 PROCEDURE — 99232 SBSQ HOSP IP/OBS MODERATE 35: CPT | Performed by: HOSPITALIST

## 2024-09-16 PROCEDURE — 71045 X-RAY EXAM CHEST 1 VIEW: CPT | Performed by: INTERNAL MEDICINE

## 2024-09-16 PROCEDURE — 99233 SBSQ HOSP IP/OBS HIGH 50: CPT | Performed by: INTERNAL MEDICINE

## 2024-09-16 RX ORDER — DEXTROSE MONOHYDRATE 25 G/50ML
50 INJECTION, SOLUTION INTRAVENOUS
Status: DISCONTINUED | OUTPATIENT
Start: 2024-09-16 | End: 2024-09-20

## 2024-09-16 RX ORDER — NICOTINE POLACRILEX 4 MG
15 LOZENGE BUCCAL
Status: DISCONTINUED | OUTPATIENT
Start: 2024-09-16 | End: 2024-09-20

## 2024-09-16 RX ORDER — SENNOSIDES 8.6 MG
8.6 TABLET ORAL DAILY
Status: DISCONTINUED | OUTPATIENT
Start: 2024-09-16 | End: 2024-09-20

## 2024-09-16 RX ORDER — NICOTINE POLACRILEX 4 MG
30 LOZENGE BUCCAL
Status: DISCONTINUED | OUTPATIENT
Start: 2024-09-16 | End: 2024-09-20

## 2024-09-16 RX ORDER — INSULIN DEGLUDEC 100 U/ML
10 INJECTION, SOLUTION SUBCUTANEOUS DAILY
Status: DISCONTINUED | OUTPATIENT
Start: 2024-09-16 | End: 2024-09-18

## 2024-09-16 NOTE — PROGRESS NOTES
[facility]  Pulmonary/Critical Care/Sleep Medicine Progress note    Alyssa Montgomery Patient Status:  Inpatient    1932 MRN [id number]   Location [facility] 5NW-A Attending Debby Cheek MD   Hosp Day # 21 PCP Mony Hall DO     Chief Complaint   Patient presents with    Nausea/Vomiting/Diarrhea        History of Present Illness:     Confused.  Sitting up in chair.  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 Her smoking use included cigarettes. She has never used smokeless tobacco. She reports that she does not currently use alcohol after a past usage of about 7.0 standard drinks of alcohol per week. She reports that she does not use drugs.    Allergies:  No Known Allergies    Medications:    Current Facility-Administered Medications:     glucose (Dex4) 15 GM/59ML oral liquid 15 g, 15 g, Oral, Q15 Min PRN **OR** glucose (Glutose) 40% oral gel 15 g, 15 g, Oral, Q15 Min PRN **OR** glucose-vitamin C (Dex-4) chewable tab 4 tablet, 4 tablet, Oral, Q15 Min PRN **OR** dextrose 50% injection 50 mL, 50 mL, Intravenous, Q15 Min PRN **OR** glucose (Dex4) 15 GM/59ML oral liquid 30 g, 30 g, Oral, Q15 Min PRN **OR** glucose (Glutose) 40% oral gel 30 g, 30 g, Oral, Q15 Min PRN **OR**  glucose-vitamin C (Dex-4) chewable tab 8 tablet, 8 tablet, Oral, Q15 Min PRN    insulin aspart (NovoLOG) 100 Units/mL FlexPen 1-10 Units, 1-10 Units, Subcutaneous, TID AC and HS    insulin degludec (Tresiba) 100 units/mL flextouch 10 Units, 10 Units, Subcutaneous, Daily    insulin aspart (NovoLOG) 100 Units/mL FlexPen 1-20 Units, 1-20 Units, Subcutaneous, TID CC and HS    predniSONE (Deltasone) tab 40 mg, 40 mg, Oral, Daily with breakfast    ipratropium-albuterol (Duoneb) 0.5-2.5 (3) MG/3ML inhalation solution 3 mL, 3 mL, Nebulization, QID    pantoprazole (Protonix) DR tab 40 mg, 40 mg, Oral, BID AC    azithromycin (Zithromax) tab 500 mg, 500 mg, Oral, Daily    guaiFENesin ER (Mucinex) 12 hr tab 600 mg, 600 mg, Oral, BID    nystatin (Mycostatin) 133174 UNIT/ML oral suspension 500,000 Units, 5 mL, Oral, QID    hydrOXYzine (Atarax) tab 25 mg, 25 mg, Oral, TID PRN    multivitamin with minerals (Thera M Plus) tab 1 tablet, 1 tablet, Oral, Daily    QUEtiapine (SEROquel) tab 25 mg, 25 mg, Oral, Nightly    sodium chloride (hypertonic) 3 % nebulizer solution 3 mL, 3 mL, Nebulization, PRN    sodium chloride (Saline Mist) 0.65 % nasal solution 1 spray, 1 spray, Each Nare, Q3H PRN    budesonide (Pulmicort) 0.5 MG/2ML nebulizer suspension 0.5 mg, 0.5 mg, Nebulization, 2 times daily    hydrALAZINE (Apresoline) tab 25 mg, 25 mg, Oral, Q8H SHELLEY    carvedilol (Coreg) tab 12.5 mg, 12.5 mg, Oral, BID with meals    amLODIPine (Norvasc) tab 10 mg, 10 mg, Oral, Daily    guaiFENesin (Robitussin) 100 MG/5 ML oral liquid 100 mg, 100 mg, Oral, Q4H PRN    benzonatate (Tessalon) cap 100 mg, 100 mg, Oral, TID PRN    albuterol (Ventolin HFA) 108 (90 Base) MCG/ACT inhaler 2 puff, 2 puff, Inhalation, Q4H PRN    atorvastatin (Lipitor) tab 40 mg, 40 mg, Oral, Nightly    levothyroxine (Synthroid) tab 100 mcg, 100 mcg, Oral, Daily @ 0700    [Held by provider] predniSONE (Deltasone) tab 1 mg, 1 mg, Oral, Daily with breakfast    pregabalin (Lyrica) cap  25 mg, 25 mg, Oral, BID    acetaminophen (Tylenol Extra Strength) tab 500 mg, 500 mg, Oral, Q4H PRN    melatonin tab 3 mg, 3 mg, Oral, Nightly PRN    glycerin-hypromellose- (Artificial Tears) 0.2-0.2-1 % ophthalmic solution 1 drop, 1 drop, Both Eyes, QID PRN    enoxaparin (Lovenox) 40 MG/0.4ML SUBQ injection 40 mg, 40 mg, Subcutaneous, Daily    polyethylene glycol (PEG 3350) (Miralax) 17 g oral packet 17 g, 17 g, Oral, Daily PRN    sennosides (Senokot) tab 17.2 mg, 17.2 mg, Oral, Nightly PRN    bisacodyl (Dulcolax) 10 MG rectal suppository 10 mg, 10 mg, Rectal, Daily PRN    fleet enema (Fleet) rectal enema 133 mL, 1 enema, Rectal, Once PRN    ondansetron (Zofran) 4 MG/2ML injection 4 mg, 4 mg, Intravenous, Q6H PRN    Intake/Output:    Intake/Output Summary (Last 24 hours) at 9/16/2024 1155  Last data filed at 9/16/2024 0427  Gross per 24 hour   Intake --   Output 1800 ml   Net -1800 ml          Review of Systems    Review of Systems: A comprehensive 10 point review of systems was completed.  Pertinent positives and negatives noted in the the HPI.         Patient Vitals for the past 24 hrs:   BP Temp Temp src Pulse Resp SpO2   09/16/24 1000 (!) 172/71 -- -- 85 24 97 %   09/16/24 0427 151/76 98.4 °F (36.9 °C) Oral 69 16 96 %   09/16/24 0200 -- -- -- 75 -- 97 %   09/15/24 1930 (!) 162/56 97.7 °F (36.5 °C) Oral 95 16 95 %   09/15/24 1315 125/76 97.8 °F (36.6 °C) Oral 72 16 95 %     Vitals:    09/15/24 1930 09/16/24 0200 09/16/24 0427 09/16/24 1000   BP: (!) 162/56  151/76 (!) 172/71   BP Location: Left arm  Left arm Left arm   Pulse: 95 75 69 85   Resp: 16  16 24   Temp: 97.7 °F (36.5 °C)  98.4 °F (36.9 °C)    TempSrc: Oral  Oral    SpO2: 95% 97% 96% 97%   Weight:       Height:          Body mass index is 26.97 kg/m².     Physical Exam    Physical Exam:   General: alert, cooperative, oriented.  No respiratory distress.  Pleasantly confused   Head: Normocephalic, without obvious abnormality, atraumatic.  Hard of  hearing   Eyes: Conjunctivae/corneas clear.  No scleral icterus.  No conjunctival     hemorrhage.   Nose: Nares normal.   Throat: Lips, mucosa, and tongue normal.  No thrush noted.   Neck: Soft, supple neck; trachea midline, no adenopathy, no thyromegaly.   Lungs: Diminished Breath sounds bilaterally, bilateral lower lobe rales posteriorly   Chest wall: No tenderness or deformity.   Heart: Regular rate and rhythm, normal S1S2, no murmur.   Abdomen: soft, non-tender, non-distended, no masses, no guarding, no     rebound, positive BS.   Extremity: no edema, no cyanosis   Skin: No rashes or lesions.   Neurological: Alert, interactive, no focal deficits    Lab Data Review:    Recent Labs   Lab 09/13/24  0724 09/14/24  0924 09/15/24  0756   WBC 11.7* 11.4* 10.1   HGB 12.3 11.3* 11.5*   HCT 35.4 33.4* 35.3   .0 161.0 149.0*       Recent Labs   Lab 09/10/24  0743 09/11/24  0706 09/13/24  0724 09/14/24  0924 09/15/24  0756      < > 138 141 139   K 4.1   < > 4.5 4.0 4.5      < > 104 105 104   CO2 24.0   < > 28.0 29.0 28.0   BUN 14   < > 17 27* 30*   CREATSERUM 0.61   < > 0.64 0.65 0.78   CA 8.8   < > 8.6* 8.7 9.1   ALB 3.1*  --  3.3 3.4  --    ALKPHO 57  --  71 66  --    ALT 19  --  24 23  --    AST 12  --  13 9  --    *   < > 266* 252* 366*    < > = values in this interval not displayed.       Recent Labs   Lab 09/15/24  0756   MG 2.4       Lab Results   Component Value Date    PHOS 3.3 09/15/2024        No results for input(s): \"PT\", \"INR\", \"PTT\" in the last 168 hours.    No results for input(s): \"ABGPHT\", \"OAECEL2K\", \"HGUAZ8P\", \"ABGHCO3\", \"ABGBE\", \"TEMP\", \"CARRIE\", \"SITE\", \"DEV\", \"THGB\" in the last 168 hours.    No results for input(s): \"TROP\", \"CKMB\" in the last 168 hours.      Cultures:   Hospital Encounter on 08/26/24   1. Blood Culture     Status: None    Collection Time: 08/28/24 10:48 AM    Specimen: Blood,peripheral   Result Value Ref Range    Blood Culture Result No Growth 5 Days N/A             Radiology personally reviewed:  XR CHEST AP PORTABLE  (CPT=71045)    Result Date: 9/16/2024  CONCLUSION:  No significant change since prior exam.   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)    Macular cyst, hole, or pseudohole, right eye    Nonexudative age-related macular degeneration, right eye, intermediate dry stage    PMR (polymyalgia rheumatica) (HCC)    Paving stone degeneration of retina, bilateral    Asymptomatic carotid artery stenosis, bilateral    Weakness    Constipation    Thrombocytopenia (HCC)    Syncope and collapse    Concussion with loss of consciousness    Fall, initial encounter    Laceration of right little finger without foreign body without damage to nail, initial encounter    Neuropathy    Shortness of breath    Respiratory syncytial virus (RSV)    RSV (acute bronchiolitis due to respiratory syncytial virus)    Intractable vomiting    Hypokalemia    Community acquired pneumonia    Bronchospasm    Pneumonia due to Mycoplasma pneumoniae     Assessment:  Acute hypoxic respiratory failure:: Weaned to supplemental oxygen 1 L/min by nasal cannula  Left lower lobe pneumonia/lung infiltrate on CT chest 9/3/2024 Mycoplasma pneumonia positive  Shortness of breath  Acute bronchitis  GERD  Hypertension  Hyperlipidemia  History of coronary artery disease  History of HFpEF  Polymyalgia rheumatica  Hypothyroidism  Leukocytosis: Improving  Acute metabolic encephalopathy:  Improving      Plan:  Wean off FiO2 to keep oxygen saturation between 90% to 92%  DuoNebs every 6 hours  Budesonide 0.5 mg nebulizers every 12 hours  Discontinue Solu-Medrol start prednisone 40 mg oral daily  DVT prophylaxis:  Lovenox 40 mg subcutaneous every 24 hours  GI prophylaxis: Protonix 40 mg daily  Will follow for further recommendations    Thank You for allowing me to participate in this patient's care     Luis Murphy MD      Note to the patient: The 21st Century Cures Act makes medical notes like these available to patients in the interest of transparency. However, be advised that this is a medical document. It is intended as peer to peer communication. It is written in medical language and may contain abbreviations or verbiage that are unfamiliar. It may appear blunt or direct. Medical documents are intended to carry relevant information, facts as evident, and clinical opinion of the practitioner.      Disclaimer: Components of this note were documented using voice recognition system and are subject to errors not corrected at proofreading. Contact the author of this note for any clarifications

## 2024-09-16 NOTE — CM/SW NOTE
SW acknowledged order from Dr. Varner, \"Assessment for rehab-will need O2\". Patient is already set to discharge to [facility] for [facility] once medically cleared for discharge. [facility] is able to accommodate oxygen, patient is noted to be on 3Lo2.    Sent updates to [facility] in aidin.    SW/FRANKIE to remain available for support and/or discharge planning.    Alexia Vivas, BHANU  Discharge Planner  [phone]

## 2024-09-16 NOTE — PLAN OF CARE
Problem: Patient/Family Goals  Goal: Patient/Family Long Term Goal  Description: Patient's Long Term Goal: Back to [facility]    Interventions:  - IV antibiotics  - O2  - Nebs  - fall precautions  - See additional Care Plan goals for specific interventions  Outcome: Progressing  Goal: Patient/Family Short Term Goal  Description: Patient's Short Term Goal: Pain relief  9/1 noc: wean O2  9/4AM: urinate  9/5 noc: wean off 02   9/6 noc: wean O2 to baseline  9/7 AM: wean o2  9/7 noc: wean O2 as duncan  9/8 NOC: \"I want to go to sleep\"  9/9 NOC: wean O2  9/10 NOC: sleep  9/13 noc: manage cough  9/14: wean O2 as duncan  9/14 noc: sleep/ manage cough  Interventions:   - Tylenol,   - reposition  -nebs, hob 30,   -antitussives prn    - See additional Care Plan goals for specific interventions  Outcome: Progressing     Problem: GASTROINTESTINAL - ADULT  Goal: Minimal or absence of nausea and vomiting  Description: INTERVENTIONS:  - Maintain adequate hydration with IV or PO as ordered and tolerated  - Nasogastric tube to low intermittent suction as ordered  - Evaluate effectiveness of ordered antiemetic medications  - Provide nonpharmacologic comfort measures as appropriate  - Advance diet as tolerated, if ordered  - Obtain nutritional consult as needed  - Evaluate fluid balance  Outcome: Progressing     Problem: METABOLIC/FLUID AND ELECTROLYTES - ADULT  Goal: Electrolytes maintained within normal limits  Description: INTERVENTIONS:  - Monitor labs and rhythm and assess patient for signs and symptoms of electrolyte imbalances  - Administer electrolyte replacement as ordered  - Monitor response to electrolyte replacements, including rhythm and repeat lab results as appropriate  - Fluid restriction as ordered  - Instruct patient on fluid and nutrition restrictions as appropriate  Outcome: Progressing  Goal: Hemodynamic stability and optimal renal function maintained  Description: INTERVENTIONS:  - Monitor labs and assess  for signs and symptoms of volume excess or deficit  - Monitor intake, output and patient weight  - Monitor urine specific gravity, serum osmolarity and serum sodium as indicated or ordered  - Monitor response to interventions for patient's volume status, including labs, urine output, blood pressure (other measures as available)  - Encourage oral intake as appropriate  - Instruct patient on fluid and nutrition restrictions as appropriate  Outcome: Progressing     Problem: RESPIRATORY - ADULT  Goal: Achieves optimal ventilation and oxygenation  Description: INTERVENTIONS:  - Assess for changes in respiratory status  - Assess for changes in mentation and behavior  - Position to facilitate oxygenation and minimize respiratory effort  - Oxygen supplementation based on oxygen saturation or ABGs  - Provide Smoking Cessation handout, if applicable  - Encourage broncho-pulmonary hygiene including cough, deep breathe, Incentive Spirometry  - Assess the need for suctioning and perform as needed  - Assess and instruct to report SOB or any respiratory difficulty  - Respiratory Therapy support as indicated  - Manage/alleviate anxiety  - Monitor for signs/symptoms of CO2 retention  Outcome: Progressing     Problem: MUSCULOSKELETAL - ADULT  Goal: Return mobility to safest level of function  Description: INTERVENTIONS:  - Assess patient stability and activity tolerance for standing, transferring and ambulating w/ or w/o assistive devices  - Assist with transfers and ambulation using safe patient handling equipment as needed  - Ensure adequate protection for wounds/incisions during mobilization  - Obtain PT/OT consults as needed  - Advance activity as appropriate  - Communicate ordered activity level and limitations with patient/family  Outcome: Progressing     Problem: SAFETY ADULT - FALL  Goal: Free from fall injury  Description: INTERVENTIONS:  - Assess pt frequently for physical needs  - Identify cognitive and physical deficits  and behaviors that affect risk of falls.  - [place] fall precautions as indicated by assessment.  - Educate pt/family on patient safety including physical limitations  - Instruct pt to call for assistance with activity based on assessment  - Modify environment to reduce risk of injury  - Provide assistive devices as appropriate  - Consider OT/PT consult to assist with strengthening/mobility  - Encourage toileting schedule  Outcome: Progressing   Dx: mycoplasma pneumoniae  Plan of care: IV steroids switch to po prednisone in am, PO zithro, nebs, CXR in am  Patient communicates partial understanding, confused. Currently on 3 liters NC, having gelatinous bowel movements. Per MD notes she should be on aggressive bowel regimen, however, everything ordered is prn. Abdomen distended with active bowel sounds and passing gas. Plan to DC to [facility]

## 2024-09-16 NOTE — PROGRESS NOTES
[facility]   part of [facility]     Hospitalist Progress Note     Alyssa Montgomery Patient Status:  Inpatient    1932 MRN [id number]   Location [facility] 0-A Attending Medhat Dover MD   Hosp Day # 21 PCP Mony Hall DO     Chief Complaint: n/v    Subjective:       Patient confused,  BS have been high, patient reports she is not diabetic    Objective:    Review of Systems:   A comprehensive review of systems was completed; pertinent positive and negatives stated in subjective.    Vital signs:  Temp:  [97.7 °F (36.5 °C)-98.4 °F (36.9 °C)] 97.9 °F (36.6 °C)  Pulse:  [67-95] 67  Resp:  [16-24] 18  BP: (108-172)/(50-76) 108/50  SpO2:  [95 %-97 %] 97 %    Physical Exam:    General: No acute distress. Anxious.  Respiratory: B/L wheezing.   Cardiovascular: S1, S2, regular rate and rhythm  Abdomen: Soft, Non-tender, moderate distention, positive bowel sounds  Neuro: No new focal deficits.   Extremities: No edema      Diagnostic Data:    Labs:  Recent Labs   Lab 24  0706 24  0725 24  0724 24  0924 09/15/24  0756   WBC 15.3* 11.2* 11.7* 11.4* 10.1   HGB 11.1* 10.9* 12.3 11.3* 11.5*   MCV 90.2 93.0 91.7 92.0 95.1   .0* 157.0 155.0 161.0 149.0*       Recent Labs   Lab 09/10/24  0743 24  0706 24  0724 24  0924 09/15/24  0756   *   < > 266* 252* 366*   BUN 14   < > 17 27* 30*   CREATSERUM 0.61   < > 0.64 0.65 0.78   CA 8.8   < > 8.6* 8.7 9.1   ALB 3.1*  --  3.3 3.4  --       < > 138 141 139   K 4.1   < > 4.5 4.0 4.5      < > 104 105 104   CO2 24.0   < > 28.0 29.0 28.0   ALKPHO 57  --  71 66  --    AST 12  --  13 9  --    ALT 19  --  24 23  --    BILT 0.6  --  0.8 0.7  --    TP 5.9  --  6.2 6.1  --     < > = values in this interval not displayed.       Estimated Creatinine Clearance: 44.8 mL/min (based on SCr of 0.78 mg/dL).    No results for input(s): \"TROP\", \"TROPHS\", \"CK\" in the last 168 hours.    No results for input(s): \"PTP\", \"INR\"  in the last 168 hours.                 Microbiology    Hospital Encounter on 08/26/24   1. Blood Culture     Status: None    Collection Time: 08/28/24 10:48 AM    Specimen: Blood,peripheral   Result Value Ref Range    Blood Culture Result No Growth 5 Days N/A         Imaging: Reviewed in Epic.    Medications:    insulin aspart  1-10 Units Subcutaneous TID AC and HS    insulin degludec  10 Units Subcutaneous Daily    insulin aspart  1-20 Units Subcutaneous TID CC and HS    predniSONE  40 mg Oral Daily with breakfast    ipratropium-albuterol  3 mL Nebulization QID    pantoprazole  40 mg Oral BID AC    azithromycin  500 mg Oral Daily    guaiFENesin ER  600 mg Oral BID    nystatin  5 mL Oral QID    multivitamin with minerals  1 tablet Oral Daily    QUEtiapine  25 mg Oral Nightly    budesonide  0.5 mg Nebulization 2 times daily    hydrALAZINE  25 mg Oral Q8H SHELLEY    carvedilol  12.5 mg Oral BID with meals    amLODIPine  10 mg Oral Daily    atorvastatin  40 mg Oral Nightly    levothyroxine  100 mcg Oral Daily @ 0700    [Held by provider] predniSONE  1 mg Oral Daily with breakfast    pregabalin  25 mg Oral BID    enoxaparin  40 mg Subcutaneous Daily       Assessment & Plan:      #CAP  #Acute bronchitis with bronchospasm   #Acute hypoxic respiratory failure due to above  -+ mycoplasma pneumoniae  -Weaned off vapotherm to HFNC- on NC- wean as able  -steroids- on oral prednisone  -Bcx/Sputum culture negative   -Scheduled nebs - duoneb/pulmicort, add mucomyst  -Chest PT  -Pulmonary following  -azithromycin    #New DM - suspect steroid induced  -Hba1c 7.5  -hyperglycemia protocol started    #Abdominal distention  -KUB without evidence of obstruction/impaction    -Continue bowel regimen    #Leukocytosis  -Likely in part steroid induced   -Monitor    #Acute metabolic encephalopathy   -Likely due to #1 and extended hospital stay  -Frequent re-orientation necessary     #CAD  -ASA, statin, BB     #HFpEF  -Compensated   -Echo 2023  with normal EF, G1DD    #Essential HTN  #HTN urgency  -BP controlled     #Hypothyroidism - Synthroid   #PMR - 1mg prednisone daily PTA    #Suspected cognitive impairment  -OP neuropsych ammon Cheek M.D.  [place] Hospitalist      Supplementary Documentation:     Quality:  DVT Mechanical Prophylaxis:     Early ambuation  DVT Pharmacologic Prophylaxis   Medication    enoxaparin (Lovenox) 40 MG/0.4ML SUBQ injection 40 mg                Code Status: DNAR/Selective Treatment  Buck: External urinary catheter in place  Buck Duration (in days):   Central line:    XAVI: 9/16/2024    Discharge is dependent on: clinical improvement  At this point Ms. Montgomery is expected to be discharge to: tbd    The 21st Century Cures Act makes medical notes like these available to patients in the interest of transparency. Please be advised this is a medical document. Medical documents are intended to carry relevant information, facts as evident, and the clinical opinion of the practitioner. The medical note is intended as peer to peer communication and may appear blunt or direct. It is written in medical language and may contain abbreviations or verbiage that are unfamiliar.

## 2024-09-16 NOTE — DIETARY NOTE
[facility]   part of [facility]   CLINICAL NUTRITION    Alyssa Montgomery     Admitting diagnosis:  Hypokalemia [E87.6]  Intractable vomiting [R11.10]    Ht:  5'7\"  Wt: 78.1 kg (172 lb 3.2 oz).   Body mass index is 26.97 kg/m².  IBW: 61.4kg    Wt Readings from Last 6 Encounters:   08/31/24 78.1 kg (172 lb 3.2 oz)   04/23/24 80.1 kg (176 lb 9.6 oz)   02/01/24 75.4 kg (166 lb 3.2 oz)   01/31/24 75.5 kg (166 lb 7.2 oz)   11/14/23 75.5 kg (166 lb 6.4 oz)   10/19/23 74.8 kg (165 lb)        Labs/Meds reviewed    Diet:       Procedures    Regular/General diet Calorie Restriction/Carb Controlled: 1800 kcal/60 grams; Is Patient on Accuchecks? No     Percent Meals Eaten (last 3 days)       Date/Time Percent Meals Eaten (%)    09/14/24 2017 100 %          9/16: Visited pt at bedside with daughter present. Pt hyperfixated on new diabetes diagnosis and repeatedly wanted writer to tell her what she can eat. She continues to have poor appetite and not eating much at all. Discussed that DM likely steroid-induced and that she is not eating enough CHO for BG to be this high. She is also 89 years old and again discussed that restricting foods is not recommended. Will change ONS to Glucerna vanilla as she did not like Magic Cup and Glucerna lower in CHO than Ensure. Discussed with daughter that once steroids are weaned off, it is likely her BG will improve and that diet at this point is likely not a big contributing factor. Dtr appreciative of information and reports she will explain this to the patient as patient seems upset and also [place] which is limiting her understanding. Will continue to monitor and follow up as appropriate.  9/9: RRT called yesterday for worsening respiratory distress. Pt also with increased abdominal distention so KUB ordered which showed colonic gaseous prominence. Per report, pt with no BM since 9/2 (7 days) so bowel regimen increased. Visited pt at bedside. She reports her appetite is so-so. Denies N/V  and reports getting bowel care for constipation. Offered ONS while appetite is decreased and she is agreeable to vanilla Magic Cup. Continued to encourage PO intake; all questions answered at this time.  9/2: Pt chart reviewed d/t LOS. 89 year old female admitted for n/v. Nursing notes reports Percent Meals Eaten (%): 100 % intake for last meal. Diet liberalized to general for more choices.   Tolerating po diet without diarrhea, emesis, or constipation. +BM 8/31. No significant weight changes noted.     Patient is at low nutrition risk at this time.    Please consult if patient status changes or nutrition issues arise.    Jessica Mosley, RD, LDN, [facility]  Clinical Dietitian  Spectra: [phone]

## 2024-09-16 NOTE — PHYSICAL THERAPY NOTE
PHYSICAL THERAPY TREATMENT NOTE - INPATIENT    Room Number: 531/531-A     Session: 4    Number of Visits to Meet Established Goals: 5    Presenting Problem: Intractable vomiting  Co-Morbidities : CAD, HTN, DM, macular degeneration    ASSESSMENT   Patient demonstrates limited progress this session due to patient's poor focus on task, increased fear of fall with poor insight in safety. Goals  remain in progress.    Patient continues to function below baseline with bed mobility, transfers, and gait.  Contributing factors to remaining limitations include decreased endurance/aerobic capacity, impaired dynamic and static balance, decreased muscular endurance, and cognitive deficits (significant fear of fall and poor safety awareness).  Next session anticipate patient to progress transfers and gait.  Physical Therapy will continue to follow patient for duration of hospitalization.    Patient continues to benefit from continued skilled PT services: to promote return to prior level of function and safety with continuous assistance and gradual rehabilitative therapy .    PLAN  PT Treatment Plan: Bed mobility;Patient education;Gait training;Energy conservation;Endurance;Range of motion;Strengthening;Transfer training;Balance training  Rehab Potential : Fair  Frequency (Obs): 3-5x/week    CURRENT GOALS     Goal #1 Patient is able to demonstrate supine - sit EOB @ level: supervision     Goal #2 Patient is able to demonstrate transfers EOB to/from Chair/Wheelchair at assistance level: minimum assistance     Goal #3 Patient is able to ambulate 10 feet with assist device: walker - rolling at assistance level: minimum assistance     Goal #4    Goal #5    Goal #6    Goal Comments: Goals established on 8/29/2024 9/16/2024 all goals ongoing.     SUBJECTIVE  \"Just get me to chair,bring the chair closer, I don't want to fall, I know you are going to drop me.\" Despite reassurance, pt repeated above every second, RN was present and  witness all relaxation strategies.   Significant fear of fall.         OBJECTIVE  Precautions: Bed/chair alarm;Hard of hearing;Low vision    WEIGHT BEARING RESTRICTION  Weight Bearing Restriction: None                PAIN ASSESSMENT   Ratin  Location: Denied pain  Management Techniques: Repositioning    BALANCE                                                                                                                       Static Sitting: Poor +  Dynamic Sitting: Poor           Static Standing: Poor -  Dynamic Standing: Poor    ACTIVITY TOLERANCE               4 L  high flow  172/71 BP in sitting. Pt unable to relax her arm and posture  Spo2 91% with activity            O2 WALK         AM-PAC '6-Clicks' INPATIENT SHORT FORM - BASIC MOBILITY  How much difficulty does the patient currently have...  Patient Difficulty: Turning over in bed (including adjusting bedclothes, sheets and blankets)?: A Little   Patient Difficulty: Sitting down on and standing up from a chair with arms (e.g., wheelchair, bedside commode, etc.): A Lot   Patient Difficulty: Moving from lying on back to sitting on the side of the bed?: A Little   How much help from another person does the patient currently need...   Help from Another: Moving to and from a bed to a chair (including a wheelchair)?: A Lot   Help from Another: Need to walk in hospital room?: A Lot   Help from Another: Climbing 3-5 steps with a railing?: Total       AM-PAC Score:  Raw Score: 13   Approx Degree of Impairment: 64.91%   Standardized Score (AM-PAC Scale): 36.74   CMS Modifier (G-Code): CL    FUNCTIONAL ABILITY STATUS  Gait Assessment   Functional Mobility/Gait Assessment  Gait Assistance: Moderate assistance  Distance (ft): 4 steps  Assistive Device: Rolling walker  Pattern:  (post lean, L knee slightly flexed)    Skilled Therapy Provided  Pt was received in supine, perseverating on getting to the chair to eat breakfast but not focusing on the education or  training to complete bed mobility, transfers and gait. Patient becomes agitated when her demands are not met. Pt states \" I want my shoes on first before I get out of bed\". Upon instructing pt to sit up to get shoes on, pt became agitated and began screaming. Patient demonstrated post lean during bed mobility and sitting at EOB, unable to focus on the instructions to lean forward to correct trunk position.   Pt was on 4 L. States she is out of breath, spo2 91%. Patient was cued for relaxation breathing with dec processing and acceptance.   Bed Mobility:  Rolling: min A to manually reach the bed rail on the opposite side.   Supine<>Sit: mod A to sit up,   Sit<>Supine: NT  Min assist for posture stability to prevent post lob , cues to scoot, completed with increased time.     Transfer Mobility:  Sit<>Stand: max effort to stand, pt didn't generate force with le's but perseverating on falling. max assist of one to stand on first attempt with significant post lean and L knee flexed, didn't comply to commands for knee ext.  STS repeated five times. Second person arived to stand by, pt immediately exhibits improvement in ability to stand with min assist of two.    Stand<>Sit: mod  assist and cues for hand placement.   Gait: Refused.       THERAPEUTIC EXERCISES  Lower Extremity Ankle pumps  LAQ   Upper Extremity na     Position Sitting     Repetitions   10   Sets   1     Patient End of Session: Up in chair;Needs met;Call light within reach;RN aware of session/findings;All patient questions and concerns addressed;Alarm set;With  staff    PT Session Time: 25 minutes  Gait Trainin minutes  Therapeutic Activity: 25 minutes

## 2024-09-16 NOTE — SPIRITUAL CARE NOTE
Spiritual Care Visit Note    Patient Name: Alyssa Montgomery Date of Spiritual Care Visit: 24   : 1932 Primary Dx: Intractable vomiting       Referred By:      Spiritual Care Taxonomy:    Intended Effects: Demonstrate caring and concern    Methods: Offer spiritual/Anglican support;Offer emotional support;Explore presence of God;Collaborate with care team member;Assist with spiritual/Anglican practices;Encouraging spiritual/Anglican practices    Interventions: [place];Acknowledge current situation;Active listening;Ask guided questions about darío;Discuss coping mechanism with someone    Visit Type/Summary:     - Spiritual Care: Consulted with RN prior to visit. Offered empathic listening and emotional support.  Explored coping resources as patient describes a lengthy hospital stay.  Patient states that prayer is a significant source of coping and comfort for her.  Prayed with patient.     Spiritual Care support can be requested via an Epic consult. For urgent/immediate needs, please contact the On Call  at: [place]: ext [phone]    Rev Samina Scott MDiv, [facility]

## 2024-09-17 LAB
GLUCOSE BLD-MCNC: 153 MG/DL (ref 70–99)
GLUCOSE BLD-MCNC: 241 MG/DL (ref 70–99)
GLUCOSE BLD-MCNC: 280 MG/DL (ref 70–99)
GLUCOSE BLD-MCNC: 286 MG/DL (ref 70–99)
GLUCOSE BLD-MCNC: 315 MG/DL (ref 70–99)

## 2024-09-17 PROCEDURE — 99232 SBSQ HOSP IP/OBS MODERATE 35: CPT | Performed by: HOSPITALIST

## 2024-09-17 PROCEDURE — 99233 SBSQ HOSP IP/OBS HIGH 50: CPT | Performed by: INTERNAL MEDICINE

## 2024-09-17 PROCEDURE — 99222 1ST HOSP IP/OBS MODERATE 55: CPT | Performed by: CLINICAL NURSE SPECIALIST

## 2024-09-17 NOTE — PROGRESS NOTES
[facility]   part of [facility]     Hospitalist Progress Note     Alyssa Montgomery Patient Status:  Inpatient    1932 MRN [id number]   Location [facility] 0-A Attending Medhat Dover MD   Hosp Day # 22 PCP Mony Hall DO     Chief Complaint: n/v    Subjective:     BS better this morning, no complaints  On 1 L NC    Objective:    Review of Systems:   A comprehensive review of systems was completed; pertinent positive and negatives stated in subjective.    Vital signs:  Temp:  [97.9 °F (36.6 °C)-98 °F (36.7 °C)] 98 °F (36.7 °C)  Pulse:  [76-84] 84  Resp:  [18-24] 22  BP: (121-133)/(51-64) 121/64  SpO2:  [94 %-97 %] 97 %    Physical Exam:    General: No acute distress. Anxious.  Respiratory: B/L wheezing.   Cardiovascular: S1, S2, regular rate and rhythm  Abdomen: Soft, Non-tender, moderate distention, positive bowel sounds  Neuro: No new focal deficits.   Extremities: No edema      Diagnostic Data:    Labs:  Recent Labs   Lab 24  0706 24  0724  0724  0924 09/15/24  0756   WBC 15.3* 11.2* 11.7* 11.4* 10.1   HGB 11.1* 10.9* 12.3 11.3* 11.5*   MCV 90.2 93.0 91.7 92.0 95.1   .0* 157.0 155.0 161.0 149.0*       Recent Labs   Lab 24  0724  0924 09/15/24  0756   * 252* 366*   BUN 17 27* 30*   CREATSERUM 0.64 0.65 0.78   CA 8.6* 8.7 9.1   ALB 3.3 3.4  --     141 139   K 4.5 4.0 4.5    105 104   CO2 28.0 29.0 28.0   ALKPHO 71 66  --    AST 13 9  --    ALT 24 23  --    BILT 0.8 0.7  --    TP 6.2 6.1  --        Estimated Creatinine Clearance: 44.8 mL/min (based on SCr of 0.78 mg/dL).    No results for input(s): \"TROP\", \"TROPHS\", \"CK\" in the last 168 hours.    No results for input(s): \"PTP\", \"INR\" in the last 168 hours.                 Microbiology    Hospital Encounter on 24   1. Blood Culture     Status: None    Collection Time: 24 10:48 AM    Specimen: Blood,peripheral   Result Value Ref Range    Blood Culture Result No  Growth 5 Days N/A         Imaging: Reviewed in Epic.    Medications:    insulin aspart  1-10 Units Subcutaneous TID AC and HS    insulin degludec  10 Units Subcutaneous Daily    insulin aspart  1-20 Units Subcutaneous TID CC and HS    sennosides  8.6 mg Oral Daily    predniSONE  40 mg Oral Daily with breakfast    ipratropium-albuterol  3 mL Nebulization QID    pantoprazole  40 mg Oral BID AC    azithromycin  500 mg Oral Daily    guaiFENesin ER  600 mg Oral BID    nystatin  5 mL Oral QID    multivitamin with minerals  1 tablet Oral Daily    QUEtiapine  25 mg Oral Nightly    budesonide  0.5 mg Nebulization 2 times daily    hydrALAZINE  25 mg Oral Q8H SHELLEY    carvedilol  12.5 mg Oral BID with meals    amLODIPine  10 mg Oral Daily    atorvastatin  40 mg Oral Nightly    levothyroxine  100 mcg Oral Daily @ 0700    [Held by provider] predniSONE  1 mg Oral Daily with breakfast    pregabalin  25 mg Oral BID    enoxaparin  40 mg Subcutaneous Daily       Assessment & Plan:      #CAP  #Acute bronchitis with bronchospasm   #Acute hypoxic respiratory failure due to above  -+ mycoplasma pneumoniae  -Weaned off vapotherm to HFNC- on 1 NC- wean as able  -steroids- on oral prednisone  -Bcx/Sputum culture negative   -Scheduled nebs - duoneb/pulmicort, add mucomyst  -Chest PT  -Pulmonary following  -azithromycin    #New DM - suspect steroid induced  -Hba1c 7.5  -hyperglycemia protocol started    #Abdominal distention  -KUB without evidence of obstruction/impaction    -Continue bowel regimen    #Leukocytosis  -Likely in part steroid induced   -Monitor    #Acute metabolic encephalopathy   -Likely due to #1 and extended hospital stay  -Frequent re-orientation necessary     #CAD  -ASA, statin, BB     #HFpEF  -Compensated   -Echo 2023 with normal EF, G1DD    #Essential HTN  #HTN urgency  -BP controlled     #Hypothyroidism - Synthroid   #PMR - 1mg prednisone daily PTA    #Suspected cognitive impairment  -OP neuropsych eval     Anticipate dc  tomorrow if remains stable    Debby Cheek M.D.  [place] [facility]      Supplementary Documentation:     Quality:  DVT Mechanical Prophylaxis:     Early ambuation  DVT Pharmacologic Prophylaxis   Medication    enoxaparin (Lovenox) 40 MG/0.4ML SUBQ injection 40 mg                Code Status: DNAR/Selective Treatment  Buck: External urinary catheter in place  Buck Duration (in days):   Central line:    XAVI:     Discharge is dependent on: clinical improvement  At this point Ms. Montgomery is expected to be discharge to: d    The 21st Century Cures Act makes medical notes like these available to patients in the interest of transparency. Please be advised this is a medical document. Medical documents are intended to carry relevant information, facts as evident, and the clinical opinion of the practitioner. The medical note is intended as peer to peer communication and may appear blunt or direct. It is written in medical language and may contain abbreviations or verbiage that are unfamiliar.

## 2024-09-17 NOTE — PLAN OF CARE
Pt is A&Ox2-3, forgetful. Wears glasses, [place]. VSS, afebrile. SPO2 maintained on 3L NC, RA-BL. IV steroids. RF tele. Still having mucous poops. General diet, poor tapan. Voids. Up max. Denies pain. PIV, SL. No further needs at this time, continue POC. Safety precautions in place.     Problem: Patient/Family Goals  Goal: Patient/Family Long Term Goal  Description: Patient's Long Term Goal: Back to [facility]    Interventions:  - IV antibiotics  - O2  - Nebs  - fall precautions  - See additional Care Plan goals for specific interventions  Outcome: Progressing  Goal: Patient/Family Short Term Goal  Description: Patient's Short Term Goal: Pain relief  9/1 noc: wean O2  9/4AM: urinate  9/5 noc: wean off 02   9/6 noc: wean O2 to baseline  9/7 AM: wean o2  9/7 noc: wean O2 as [place]  9/8 NOC: \"I want to go to sleep\"  9/9 NOC: wean O2  9/10 NOC: sleep  9/13 noc: manage cough  9/14: wean O2 as [place]  9/14 noc: sleep/ manage cough  9/15 NOC: sleep    Interventions:   - Tylenol,   - reposition  -nebs, hob 30,   -antitussives prn    - See additional Care Plan goals for specific interventions  Outcome: Progressing

## 2024-09-17 NOTE — PROGRESS NOTES
[facility]  Pulmonary/Critical Care/Sleep Medicine Progress note    Alyssa Montgomery Patient Status:  Inpatient    1932 MRN [id number]   Location [facility] 5NW-A Attending Debby Cheek MD   Hosp Day # 22 PCP Mony Hall DO     Chief Complaint   Patient presents with    Nausea/Vomiting/Diarrhea        History of Present Illness:     Breathing okay.    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 Her smoking use included cigarettes. She has never used smokeless tobacco. She reports that she does not currently use alcohol after a past usage of about 7.0 standard drinks of alcohol per week. She reports that she does not use drugs.    Allergies:  No Known Allergies    Medications:    Current Facility-Administered Medications:     glucose (Dex4) 15 GM/59ML oral liquid 15 g, 15 g, Oral, Q15 Min PRN **OR** glucose (Glutose) 40% oral gel 15 g, 15 g, Oral, Q15 Min PRN **OR** glucose-vitamin C (Dex-4) chewable tab 4 tablet, 4 tablet, Oral, Q15 Min PRN **OR** dextrose 50% injection 50 mL, 50 mL, Intravenous, Q15 Min PRN **OR** glucose (Dex4) 15 GM/59ML oral liquid 30 g, 30 g, Oral, Q15 Min PRN **OR** glucose (Glutose) 40% oral gel 30 g, 30 g, Oral, Q15 Min PRN **OR** glucose-vitamin C (Dex-4) chewable tab 8 tablet, 8 tablet, Oral, Q15  Min PRN    insulin aspart (NovoLOG) 100 Units/mL FlexPen 1-10 Units, 1-10 Units, Subcutaneous, TID AC and HS    insulin degludec (Tresiba) 100 units/mL flextouch 10 Units, 10 Units, Subcutaneous, Daily    insulin aspart (NovoLOG) 100 Units/mL FlexPen 1-20 Units, 1-20 Units, Subcutaneous, TID CC and HS    sennosides (Senokot) tab 8.6 mg, 8.6 mg, Oral, Daily    predniSONE (Deltasone) tab 40 mg, 40 mg, Oral, Daily with breakfast    ipratropium-albuterol (Duoneb) 0.5-2.5 (3) MG/3ML inhalation solution 3 mL, 3 mL, Nebulization, QID    pantoprazole (Protonix) DR tab 40 mg, 40 mg, Oral, BID AC    azithromycin (Zithromax) tab 500 mg, 500 mg, Oral, Daily    guaiFENesin ER (Mucinex) 12 hr tab 600 mg, 600 mg, Oral, BID    nystatin (Mycostatin) 216310 UNIT/ML oral suspension 500,000 Units, 5 mL, Oral, QID    hydrOXYzine (Atarax) tab 25 mg, 25 mg, Oral, TID PRN    multivitamin with minerals (Thera M Plus) tab 1 tablet, 1 tablet, Oral, Daily    QUEtiapine (SEROquel) tab 25 mg, 25 mg, Oral, Nightly    sodium chloride (hypertonic) 3 % nebulizer solution 3 mL, 3 mL, Nebulization, PRN    sodium chloride (Saline Mist) 0.65 % nasal solution 1 spray, 1 spray, Each Nare, Q3H PRN    budesonide (Pulmicort) 0.5 MG/2ML nebulizer suspension 0.5 mg, 0.5 mg, Nebulization, 2 times daily    hydrALAZINE (Apresoline) tab 25 mg, 25 mg, Oral, Q8H SHELLEY    carvedilol (Coreg) tab 12.5 mg, 12.5 mg, Oral, BID with meals    amLODIPine (Norvasc) tab 10 mg, 10 mg, Oral, Daily    guaiFENesin (Robitussin) 100 MG/5 ML oral liquid 100 mg, 100 mg, Oral, Q4H PRN    benzonatate (Tessalon) cap 100 mg, 100 mg, Oral, TID PRN    albuterol (Ventolin HFA) 108 (90 Base) MCG/ACT inhaler 2 puff, 2 puff, Inhalation, Q4H PRN    atorvastatin (Lipitor) tab 40 mg, 40 mg, Oral, Nightly    levothyroxine (Synthroid) tab 100 mcg, 100 mcg, Oral, Daily @ 0700    [Held by provider] predniSONE (Deltasone) tab 1 mg, 1 mg, Oral, Daily with breakfast    pregabalin (Lyrica) cap 25 mg, 25 mg,  Oral, BID    acetaminophen (Tylenol Extra Strength) tab 500 mg, 500 mg, Oral, Q4H PRN    melatonin tab 3 mg, 3 mg, Oral, Nightly PRN    glycerin-hypromellose- (Artificial Tears) 0.2-0.2-1 % ophthalmic solution 1 drop, 1 drop, Both Eyes, QID PRN    enoxaparin (Lovenox) 40 MG/0.4ML SUBQ injection 40 mg, 40 mg, Subcutaneous, Daily    polyethylene glycol (PEG 3350) (Miralax) 17 g oral packet 17 g, 17 g, Oral, Daily PRN    sennosides (Senokot) tab 17.2 mg, 17.2 mg, Oral, Nightly PRN    bisacodyl (Dulcolax) 10 MG rectal suppository 10 mg, 10 mg, Rectal, Daily PRN    fleet enema (Fleet) rectal enema 133 mL, 1 enema, Rectal, Once PRN    ondansetron (Zofran) 4 MG/2ML injection 4 mg, 4 mg, Intravenous, Q6H PRN    Intake/Output:    Intake/Output Summary (Last 24 hours) at 9/17/2024 1201  Last data filed at 9/16/2024 1840  Gross per 24 hour   Intake 477 ml   Output 300 ml   Net 177 ml          Review of Systems    Review of Systems: A comprehensive 10 point review of systems was completed.  Pertinent positives and negatives noted in the the HPI.         Patient Vitals for the past 24 hrs:   BP Temp Temp src Pulse Resp SpO2   09/16/24 1000 (!) 172/71 -- -- 85 24 97 %   09/16/24 0427 151/76 98.4 °F (36.9 °C) Oral 69 16 96 %   09/16/24 0200 -- -- -- 75 -- 97 %   09/15/24 1930 (!) 162/56 97.7 °F (36.5 °C) Oral 95 16 95 %   09/15/24 1315 125/76 97.8 °F (36.6 °C) Oral 72 16 95 %     Vitals:    09/16/24 1415 09/16/24 1545 09/16/24 1746 09/16/24 2115   BP: 122/51 126/55 133/58 121/64   BP Location: Right arm Left arm Right arm Right arm   Pulse: 76 78 84    Resp: 24 18 22 22   Temp: 98 °F (36.7 °C) 97.9 °F (36.6 °C)  98 °F (36.7 °C)   TempSrc: Oral Oral  Oral   SpO2: 94% 96% 97% 97%   Weight:       Height:          Body mass index is 26.97 kg/m².     Physical Exam    Physical Exam:   General: alert, cooperative, oriented.  No respiratory distress.  Pleasantly confused   Head: Normocephalic, without obvious abnormality,  atraumatic.  Hard of hearing   Eyes: Conjunctivae/corneas clear.  No scleral icterus.  No conjunctival     hemorrhage.   Nose: Nares normal.   Throat: Lips, mucosa, and tongue normal.  No thrush noted.   Neck: Soft, supple neck; trachea midline, no adenopathy, no thyromegaly.   Lungs: Diminished Breath sounds bilaterally, bilateral lower lobe rales posteriorly   Chest wall: No tenderness or deformity.   Heart: Regular rate and rhythm, normal S1S2, no murmur.   Abdomen: soft, non-tender, non-distended, no masses, no guarding, no     rebound, positive BS.   Extremity: no edema, no cyanosis   Skin: No rashes or lesions.   Neurological: Alert, interactive, no focal deficits    Lab Data Review:    Recent Labs   Lab 09/13/24  0724 09/14/24  0924 09/15/24  0756   WBC 11.7* 11.4* 10.1   HGB 12.3 11.3* 11.5*   HCT 35.4 33.4* 35.3   .0 161.0 149.0*       Recent Labs   Lab 09/13/24  0724 09/14/24  0924 09/15/24  0756    141 139   K 4.5 4.0 4.5    105 104   CO2 28.0 29.0 28.0   BUN 17 27* 30*   CREATSERUM 0.64 0.65 0.78   CA 8.6* 8.7 9.1   ALB 3.3 3.4  --    ALKPHO 71 66  --    ALT 24 23  --    AST 13 9  --    * 252* 366*       Recent Labs   Lab 09/15/24  0756   MG 2.4       Lab Results   Component Value Date    PHOS 3.3 09/15/2024        No results for input(s): \"PT\", \"INR\", \"PTT\" in the last 168 hours.    No results for input(s): \"ABGPHT\", \"ZVFWHM6Y\", \"MOUKC5U\", \"ABGHCO3\", \"ABGBE\", \"TEMP\", \"CARRIE\", \"SITE\", \"DEV\", \"THGB\" in the last 168 hours.    No results for input(s): \"TROP\", \"CKMB\" in the last 168 hours.      Cultures:   Hospital Encounter on 08/26/24   1. Blood Culture     Status: None    Collection Time: 08/28/24 10:48 AM    Specimen: Blood,peripheral   Result Value Ref Range    Blood Culture Result No Growth 5 Days N/A            Radiology personally reviewed:  XR CHEST AP PORTABLE  (CPT=71045)    Result Date: 9/16/2024  CONCLUSION:  No significant change since prior exam.   LOCATION:  [facility]       Dictated by (CST): Benjy Garcia MD on 9/16/2024 at 6:34 AM     Finalized by (CST): Benjy Garcia MD on 9/16/2024 at 6:34 AM         Patient Active Problem List   Diagnosis    Hypothyroidism    Unspecified cataract    Atherosclerosis of coronary artery    Herpes zoster without mention of complication    Dyslipidemia    Retinopathy of both eyes    Macular degeneration    Benign essential HTN    Hypercholesteremia    Exudative age-related macular degeneration of left eye with active choroidal neovascularization ([facility])    Macular cyst, hole, or pseudohole, right eye    Nonexudative age-related macular degeneration, right eye, intermediate dry stage    PMR (polymyalgia rheumatica) ([facility])    Paving stone degeneration of retina, bilateral    Asymptomatic carotid artery stenosis, bilateral    Weakness    Constipation    Thrombocytopenia ([facility])    Syncope and collapse    Concussion with loss of consciousness    Fall, initial encounter    Laceration of right little finger without foreign body without damage to nail, initial encounter    Neuropathy    Shortness of breath    Respiratory syncytial virus (RSV)    RSV (acute bronchiolitis due to respiratory syncytial virus)    Intractable vomiting    Hypokalemia    Community acquired pneumonia    Bronchospasm    Pneumonia due to Mycoplasma pneumoniae    Type 2 diabetes mellitus without complication, without long-term current use of insulin ([facility])     Assessment:  Acute hypoxic respiratory failure:: Weaned to room air this morning  Left lower lobe pneumonia/lung infiltrate on CT chest 9/3/2024 Mycoplasma pneumonia positive  Shortness of breath: Improving   Acute bronchitis: Improving  Type 2 diabetes mellitus:/Hyperglycemia further steroid-induced  GERD  Hypertension  Hyperlipidemia  History of coronary artery disease  History of HFpEF  Polymyalgia rheumatica  Hypothyroidism  Leukocytosis: Improved  Acute metabolic encephalopathy: Improving  Steroid induced  hyperglycemia      Plan:  Wean off FiO2 to keep oxygen saturation between 90% to 92%  DuoNebs every 6 hours  Budesonide 0.5 mg nebulizers every 12 hours  Discontinue prednisone and monitor symptoms  DVT prophylaxis:  Lovenox 40 mg subcutaneous every 24 hours  GI prophylaxis: Protonix 40 mg daily  Continue diabetic medications with sliding scale insulin    Thank You for allowing me to participate in this patient's care     Luis Murphy MD      Note to the patient: The 21st Century Cures Act makes medical notes like these available to patients in the interest of transparency. However, be advised that this is a medical document. It is intended as peer to peer communication. It is written in medical language and may contain abbreviations or verbiage that are unfamiliar. It may appear blunt or direct. Medical documents are intended to carry relevant information, facts as evident, and clinical opinion of the practitioner.      Disclaimer: Components of this note were documented using voice recognition system and are subject to errors not corrected at proofreading. Contact the author of this note for any clarifications

## 2024-09-17 NOTE — CONSULTS
[facility]  Diabetes Consult Note    Alyssa Montgomery Patient Status:  Inpatient    1932 MRN [id number]   Location [facility] 5NW-A Attending Debby Cheek MD   Hosp Day # 22 PCP Mony Hall DO     Reason for Consult:   Insulin management recommendations in setting of new T2DM diagnosis      Diagnosis:  Patient Active Problem List   Diagnosis    Hypothyroidism    Unspecified cataract    Atherosclerosis of coronary artery    Herpes zoster without mention of complication    Dyslipidemia    Retinopathy of both eyes    Macular degeneration    Benign essential HTN    Hypercholesteremia    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)    Macular cyst, hole, or pseudohole, right eye    Nonexudative age-related macular degeneration, right eye, intermediate dry stage    PMR (polymyalgia rheumatica) ([facility])    Paving stone degeneration of retina, bilateral    Asymptomatic carotid artery stenosis, bilateral    Weakness    Constipation    Thrombocytopenia ([facility])    Syncope and collapse    Concussion with loss of consciousness    Fall, initial encounter    Laceration of right little finger without foreign body without damage to nail, initial encounter    Neuropathy    Shortness of breath    Respiratory syncytial virus (RSV)    RSV (acute bronchiolitis due to respiratory syncytial virus)    Intractable vomiting    Hypokalemia    Community acquired pneumonia    Bronchospasm    Pneumonia due to Mycoplasma pneumoniae    Type 2 diabetes mellitus without complication, without long-term current use of insulin ([facility])       Medical History:  Past Medical History:    Essential hypertension    Heart attack (HCC)        Hyperlipidemia    Hypothyroidism    Macular degeneration    Bilateral    Retinopathy of both eyes    Diagnosis documented by Dr Serrano in notes from visit on 11/3/14     Past Surgical History:   Procedure Laterality Date    Cataract surgery, complex       D & c  1/1/1968    Hysterectomy  1/1/1972    Oophorectomy  1/1/1965    Skin surgery  06/11/2019    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 Active diabetes medications include degludec every day & aspart AC/HS.      Labs:  Recent Labs   Lab 09/16/24  1604 09/16/24  2156 09/17/24  0637 09/17/24  1144 09/17/24  1228   PGLU 349* 287* 153* 315* 280*     Recent Labs     09/15/24  0756 09/16/24  0752 09/17/24  1228     --   --      --   --    CO2 28.0  --   --    BUN 30*  --   --    CREATSERUM 0.78  --   --    A1C 7.5*  --   --    PGLU  --    < > 280*   CA 9.1  --   --     < > = values in this interval not displayed.                History of Present Illness: Alyssa Montgomery is a 89 year old female with a PMH of HTN, HFpEf, hypothyroidism, CAD and HLD admitted 8/26/2024 with complaints of intractable vomiting and nausea with probable aspiration. On 8/27/2024, she went into acute hypoxic respiratory failure and subsequently found to have a CAP positive for mycoplasma pneumoniae on 8/31/2024. She was treated with 3 days of IV solumedrol and is now receiving 40mg prednisone every day. Since beginning of steroids, her glucoses have been increasingly elevated and insulin administration was started 9/16/2024.         Assessment/Recommendations:  Upon interview, patient confused and agitated. Repeatedly asked about what she can and can not eat with new DM diagnosis. Explained to patient what foods on her tray contain carbohydrates and alternate choices to better control glucose levels. Patient nodded understanding, but asked if I could update her daughter, Radha.      During phone call with daughter, explained nature of mother's T2DM likely being caused by high-dose steroid administration to assist in treating current pulmonology issues. Explained plan to discharge the patient on long-acting basal insulin as well as initiating metformin. Daughter states she believes this is safer than prandial insulin administration given patient's erratic eating patterns and inability to see well d/t macular degeneration. Explained to the patient's daughter that her basal insulin and metformin needs may change as prednisone wean is initiated by our pulmonology team; however, explained it is hard to predict the change in her glucose during that wean. Explained to daughter need for the patient to check her glucose at least fasted in the AM as well as prior to dinner to help the rehab team and her PCP understand her anti-diabetic regimen needs. Finally, suggested to daughter the use of a voice glucometer in order to aid the patient in checking her own glucose once discharged from the rehab facility. Patient's daughter endorses understanding and acceptance of plan of care.       Plan:  Inpatient insulin recommendations -   Degludec - given fasted AM glucose of 153, continue 10u every day  Aspart - given new insulin coverage, continued 1:10gm CHO & 1:30>140  May need to titrate tomorrow if patient needs more prandial control  Discharge plan recommendations - continue degludec every day and start metformin  Would recommended starting metformin at 500mg ER with evening meal  May titrate in increments of 500mg ER Q1 week based on glycemic control up to maximum of 2000mg ER QD    Nursing Recommendations:  RN to teach blood glucose monitoring with bedside glucometer  RN to teach insulin administration with an insulin pen - Reinforce the need to remove 2 caps from the home insulin pen needle   Patient to administer subcutaneously insulin injection with insulin pen under nursing supervision once return  demonstration has verified patient's skill  RN to teach survival skills:  Provide \"Understanding Diabetes: Survival Skills and More!\" booklet and have patient/family view video on TV Education channel/Diabetes  Assist the patient/family with access to view the following videos:  \"Everyone Can Count Carbohydrates\" - to view prior to dietitian consult  \"Taking Insulin\" - to view prior to teaching administering a subcutaneous insulin injection      Prescription Recommendations:  Commercial Medicaid - BCBS Medicare  Insulin: Humalog, Levemir, Lantus   Supplies:  BD pen needles ([place])  Glucometer:  One Touch Ultra ([employer])      Provider F/U Recommendations:  PCP - Dr. Hall ([place]), recommend f/u within 1 week post rehab discharge      A total of 60 minutes were spent with the patient, 100% was spent counseling and coordinating care for T2 diabetes self-management including nutrition, exercise, blood glucose monitoring, insulin administration, medications, treatment options, follow-up coordination and resources.    Lexy Silveira, APRN  9/17/2024  1:16 PM

## 2024-09-17 NOTE — PROGRESS NOTES
[facility]   part of [facility]     Hospitalist Progress Note     Alyssa Montgomery Patient Status:  Inpatient    1932 MRN [id number]   Location [facility] 0-A Attending Medhat Dover MD   Hosp Day # 22 PCP Mony Hall DO     Chief Complaint: n/v    Subjective:     BS better this morning, no complaints  On 1 L NC    Objective:    Review of Systems:   A comprehensive review of systems was completed; pertinent positive and negatives stated in subjective.    Vital signs:  Temp:  [97.9 °F (36.6 °C)-98 °F (36.7 °C)] 98 °F (36.7 °C)  Pulse:  [67-84] 84  Resp:  [18-24] 22  BP: (108-133)/(50-64) 121/64  SpO2:  [94 %-97 %] 97 %    Physical Exam:    General: No acute distress. Anxious.  Respiratory: B/L wheezing.   Cardiovascular: S1, S2, regular rate and rhythm  Abdomen: Soft, Non-tender, moderate distention, positive bowel sounds  Neuro: No new focal deficits.   Extremities: No edema      Diagnostic Data:    Labs:  Recent Labs   Lab 24  0706 24  0724  0724  0924 09/15/24  0756   WBC 15.3* 11.2* 11.7* 11.4* 10.1   HGB 11.1* 10.9* 12.3 11.3* 11.5*   MCV 90.2 93.0 91.7 92.0 95.1   .0* 157.0 155.0 161.0 149.0*       Recent Labs   Lab 24  0724  0924 09/15/24  0756   * 252* 366*   BUN 17 27* 30*   CREATSERUM 0.64 0.65 0.78   CA 8.6* 8.7 9.1   ALB 3.3 3.4  --     141 139   K 4.5 4.0 4.5    105 104   CO2 28.0 29.0 28.0   ALKPHO 71 66  --    AST 13 9  --    ALT 24 23  --    BILT 0.8 0.7  --    TP 6.2 6.1  --        Estimated Creatinine Clearance: 44.8 mL/min (based on SCr of 0.78 mg/dL).    No results for input(s): \"TROP\", \"TROPHS\", \"CK\" in the last 168 hours.    No results for input(s): \"PTP\", \"INR\" in the last 168 hours.                 Microbiology    Hospital Encounter on 24   1. Blood Culture     Status: None    Collection Time: 24 10:48 AM    Specimen: Blood,peripheral   Result Value Ref Range    Blood Culture Result No  Growth 5 Days N/A         Imaging: Reviewed in Epic.    Medications:    insulin aspart  1-10 Units Subcutaneous TID AC and HS    insulin degludec  10 Units Subcutaneous Daily    insulin aspart  1-20 Units Subcutaneous TID CC and HS    sennosides  8.6 mg Oral Daily    predniSONE  40 mg Oral Daily with breakfast    ipratropium-albuterol  3 mL Nebulization QID    pantoprazole  40 mg Oral BID AC    azithromycin  500 mg Oral Daily    guaiFENesin ER  600 mg Oral BID    nystatin  5 mL Oral QID    multivitamin with minerals  1 tablet Oral Daily    QUEtiapine  25 mg Oral Nightly    budesonide  0.5 mg Nebulization 2 times daily    hydrALAZINE  25 mg Oral Q8H SHELLEY    carvedilol  12.5 mg Oral BID with meals    amLODIPine  10 mg Oral Daily    atorvastatin  40 mg Oral Nightly    levothyroxine  100 mcg Oral Daily @ 0700    [Held by provider] predniSONE  1 mg Oral Daily with breakfast    pregabalin  25 mg Oral BID    enoxaparin  40 mg Subcutaneous Daily       Assessment & Plan:      #CAP  #Acute bronchitis with bronchospasm   #Acute hypoxic respiratory failure due to above  -+ mycoplasma pneumoniae  -Weaned off vapotherm to HFNC- on 1 NC- wean as able  -steroids- on oral prednisone  -Bcx/Sputum culture negative   -Scheduled nebs - duoneb/pulmicort, add mucomyst  -Chest PT  -Pulmonary following  -azithromycin    #New DM - suspect steroid induced  -Hba1c 7.5  -hyperglycemia protocol started    #Abdominal distention  -KUB without evidence of obstruction/impaction    -Continue bowel regimen    #Leukocytosis  -Likely in part steroid induced   -Monitor    #Acute metabolic encephalopathy   -Likely due to #1 and extended hospital stay  -Frequent re-orientation necessary     #CAD  -ASA, statin, BB     #HFpEF  -Compensated   -Echo 2023 with normal EF, G1DD    #Essential HTN  #HTN urgency  -BP controlled     #Hypothyroidism - Synthroid   #PMR - 1mg prednisone daily PTA    #Suspected cognitive impairment  -OP neuropsych eval     Anticipate dc  tomorrow if remains stable    Debby Cheek M.D.  [facility] Hospitalist      Supplementary Documentation:     Quality:  DVT Mechanical Prophylaxis:     Early ambuation  DVT Pharmacologic Prophylaxis   Medication    enoxaparin (Lovenox) 40 MG/0.4ML SUBQ injection 40 mg                Code Status: DNAR/Selective Treatment  Buck: External urinary catheter in place  Buck Duration (in days):   Central line:    XAVI:     Discharge is dependent on: clinical improvement  At this point Ms. Montgomery is expected to be discharge to: d    The 21st Century Cures Act makes medical notes like these available to patients in the interest of transparency. Please be advised this is a medical document. Medical documents are intended to carry relevant information, facts as evident, and the clinical opinion of the practitioner. The medical note is intended as peer to peer communication and may appear blunt or direct. It is written in medical language and may contain abbreviations or verbiage that are unfamiliar.

## 2024-09-17 NOTE — OCCUPATIONAL THERAPY NOTE
OCCUPATIONAL THERAPY TREATMENT NOTE - INPATIENT     Room Number: 531/531-A  Session: 3       Presenting Problem: Nausea, vomiting, acute bronchities, respiratory failure, pneumonia  HOME SITUATION  Type of Home: Independent living facility  Home Layout: One level  Lives With: Alone     Toilet and Equipment: Comfort height toilet  Shower/Tub and Equipment: Walk-in shower  Other Equipment:  (rw)     Drives: No  Patient Regularly Uses: Glasses     Prior Level of Function: Modified independent with all ADL. Uses RW.    ASSESSMENT   Patient demonstrates fair progress this session, goals remain in progress.    Patient continues to function below baseline with  ADLs and functional transfers .   Contributing factors to remaining limitations include decreased functional strength, decreased functional reach, decreased endurance, impaired standing balance, impaired coordination, impaired motor planning, and decreased muscular endurance.  Next session anticipate patient to progress toileting, lower body dressing, bed mobility, transfers, static standing balance, dynamic standing balance, functional standing tolerance, and energy conservation strategies.  Occupational Therapy will continue to follow patient for duration of hospitalization.    Patient continues to benefit from continued skilled OT services: to promote return to prior level of function and safety with continuous assistance and gradual rehabilitative therapy.               History: Patient is a 89 year old female admitted on 8/26/2024 with Presenting Problem: Nausea, vomiting, acute bronchities, respiratory failure, pneumonia. Co-Morbidities : CAD, HTN, DM, macular degeneration       WEIGHT BEARING RESTRICTION  Weight Bearing Restriction: None                Recommendations for nursing staff:   Transfers: 1 person  Toileting location: bedside commode    TREATMENT SESSION:  Patient Start of Session: semi supine in bed  FUNCTIONAL TRANSFER ASSESSMENT  Sit to Stand:  Edge of Bed  Edge of Bed: Not Tested    BED MOBILITY  Supine to Sit : Not tested  Sit to Supine (OT): Not Tested  Scooting: NT    BALANCE ASSESSMENT     FUNCTIONAL ADL ASSESSMENT       ACTIVITY TOLERANCE: fair-WFL                         O2 SATURATIONS       EDUCATION PROVIDED  Patient: Role of Occupational Therapy; Plan of Care; Discharge Recommendations; Functional Transfer Techniques; Fall Prevention; Posture/Positioning; Energy Conservation; Proper Body Mechanics  Patient's Response to Education: Requires Further Education    THERAPEUTIC EXERCISES  Lower Extremity Ankle pumps  Supine heel slide  SLR  Hip abd/add     Upper Extremity Shoulder raises  Forward punches  Elbow flexion/extension  Hand pumps     Position Semi supine in bed     Repetitions 10-15   Sets 2          Therapist comments: Pt received semi supine in bed cooperative for OT session. Pt declining OOB activity as pt reports just getting back into bed. Pt agreeable for bed level exercises. Pt provided with B UE/LE exercises as listed above to increase strength, ROM, and activity tolerance required for increased ADL performance, functional transfers, and UE/LE function.   Patient End of Session: In bed;Needs met;Call light within reach;RN aware of session/findings;All patient questions and concerns addressed;Alarm set    SUBJECTIVE  \"I'll do exercise.\"    PAIN ASSESSMENT  Ratin  Location: denies        OBJECTIVE  Precautions: Bed/chair alarm;Hard of hearing;Low vision    AM-PAC ‘6-Clicks’ Inpatient Daily Activity Short Form  -   Putting on and taking off regular lower body clothing?: A Lot  -   Bathing (including washing, rinsing, drying)?: A Lot  -   Toileting, which includes using toilet, bedpan or urinal? : Total  -   Putting on and taking off regular upper body clothing?: A Lot  -   Taking care of personal grooming such as brushing teeth?: A Little  -   Eating meals?: A Little    AM-PAC Score:  Score: 13  Approx Degree of Impairment:  63.03%  Standardized Score (AM-PAC Scale): 32.03    PLAN  OT Treatment Plan: Balance activities;ADL training;Functional transfer training;UE strengthening/ROM;Patient/Family education;Cognitive reorientation;Equipment eval/education;Compensatory technique education  Rehab Potential : Fair  Frequency: 3x/week    OT Goals:     All goals ongoing 09/17    ADL Goals   Patient will perform upper body dressing:  with setup  Patient will perform lower body dressing:  with min assist  Patient will perform toileting: with mod assist     Functional Transfer Goals  Patient will transfer from supine to sit:  with min assist- goal met 09/06; UPGRADE to CGA  Patient will transfer to bedside commode:  with mod assist     UE Exercise Program Goal  Patient will be supervision with bilateral AROM HEP (home exercise program).    OT Session Time: 25 minutes  Therapeutic Exercise: 25 minutes

## 2024-09-17 NOTE — PLAN OF CARE
Pt A&Ox2-3 forgetful. Refusing tele.  Glasses, [place]. Last BM 9/17/2024. On 1L will wean as tolerated. RA is baseline. Seizure precautions in place. On droplet isolation. Utilizing purewick- incontinent. Up x2 max assist with walker. Patient tolerating diet, order setup  with QID accuchecks. Pt and family updated on POC. No further needs at this time.     Problem: Patient/Family Goals  Goal: Patient/Family Long Term Goal  Description: Patient's Long Term Goal: Back to [facility]    Interventions:  - IV antibiotics  - O2  - Nebs  - fall precautions  - See additional Care Plan goals for specific interventions  Outcome: Progressing  Goal: Patient/Family Short Term Goal  Description: Patient's Short Term Goal: Pain relief  9/1 noc: wean O2  9/4AM: urinate  9/5 noc: wean off 02   9/6 noc: wean O2 to baseline  9/7 AM: wean o2  9/7 noc: wean O2 as [place]  9/8 NOC: \"I want to go to sleep\"  9/9 NOC: wean O2  9/10 NOC: sleep  9/13 noc: manage cough  9/14: wean O2 as [place]  9/14 noc: sleep/ manage cough  9/15 NOC: sleep    Interventions:   - Tylenol,   - reposition  -nebs, hob 30,   -antitussives prn    - See additional Care Plan goals for specific interventions  Outcome: Progressing     Problem: GASTROINTESTINAL - ADULT  Goal: Minimal or absence of nausea and vomiting  Description: INTERVENTIONS:  - Maintain adequate hydration with IV or PO as ordered and tolerated  - Nasogastric tube to low intermittent suction as ordered  - Evaluate effectiveness of ordered antiemetic medications  - Provide nonpharmacologic comfort measures as appropriate  - Advance diet as tolerated, if ordered  - Obtain nutritional consult as needed  - Evaluate fluid balance  Outcome: Progressing     Problem: METABOLIC/FLUID AND ELECTROLYTES - ADULT  Goal: Electrolytes maintained within normal limits  Description: INTERVENTIONS:  - Monitor labs and rhythm and assess patient for signs and symptoms of electrolyte imbalances  - Administer electrolyte  replacement as ordered  - Monitor response to electrolyte replacements, including rhythm and repeat lab results as appropriate  - Fluid restriction as ordered  - Instruct patient on fluid and nutrition restrictions as appropriate  Outcome: Progressing  Goal: Hemodynamic stability and optimal renal function maintained  Description: INTERVENTIONS:  - Monitor labs and assess for signs and symptoms of volume excess or deficit  - Monitor intake, output and patient weight  - Monitor urine specific gravity, serum osmolarity and serum sodium as indicated or ordered  - Monitor response to interventions for patient's volume status, including labs, urine output, blood pressure (other measures as available)  - Encourage oral intake as appropriate  - Instruct patient on fluid and nutrition restrictions as appropriate  Outcome: Progressing     Problem: RESPIRATORY - ADULT  Goal: Achieves optimal ventilation and oxygenation  Description: INTERVENTIONS:  - Assess for changes in respiratory status  - Assess for changes in mentation and behavior  - Position to facilitate oxygenation and minimize respiratory effort  - Oxygen supplementation based on oxygen saturation or ABGs  - Provide Smoking Cessation handout, if applicable  - Encourage broncho-pulmonary hygiene including cough, deep breathe, Incentive Spirometry  - Assess the need for suctioning and perform as needed  - Assess and instruct to report SOB or any respiratory difficulty  - Respiratory Therapy support as indicated  - Manage/alleviate anxiety  - Monitor for signs/symptoms of CO2 retention  Outcome: Progressing     Problem: MUSCULOSKELETAL - ADULT  Goal: Return mobility to safest level of function  Description: INTERVENTIONS:  - Assess patient stability and activity tolerance for standing, transferring and ambulating w/ or w/o assistive devices  - Assist with transfers and ambulation using safe patient handling equipment as needed  - Ensure adequate protection for  wounds/incisions during mobilization  - Obtain PT/OT consults as needed  - Advance activity as appropriate  - Communicate ordered activity level and limitations with patient/family  Outcome: Progressing     Problem: SAFETY ADULT - FALL  Goal: Free from fall injury  Description: INTERVENTIONS:  - Assess pt frequently for physical needs  - Identify cognitive and physical deficits and behaviors that affect risk of falls.  - [place] fall precautions as indicated by assessment.  - Educate pt/family on patient safety including physical limitations  - Instruct pt to call for assistance with activity based on assessment  - Modify environment to reduce risk of injury  - Provide assistive devices as appropriate  - Consider OT/PT consult to assist with strengthening/mobility  - Encourage toileting schedule  Outcome: Progressing     Problem: Diabetes/Glucose Control  Goal: Glucose maintained within prescribed range  Description: INTERVENTIONS:  - Monitor Blood Glucose as ordered  - Assess for signs and symptoms of hyperglycemia and hypoglycemia  - Administer ordered medications to maintain glucose within target range  - Assess barriers to adequate nutritional intake and initiate nutrition consult as needed  - Instruct patient on self management of diabetes  Outcome: Progressing

## 2024-09-17 NOTE — CM/SW NOTE
Spoke with Rosalia from [facility] to provide update on anticipated discharge tomorrow.    Spoke with patient's daughter (Radha) to provide update. She also anticipates discharge tomorrow.    RYANN/FRANKIE to remain available for support and/or discharge planning.    Alexia Vivas, [facility]  Discharge Planner  [phone]

## 2024-09-17 NOTE — PLAN OF CARE
Patient AAOx2, forgetful, anxious at times. Patient received on 3L O2, weaned to 1L O2, saturating WNL. Room air at baseline. Pt reports some dyspnea  w exertion, pt on scheduled nebs, PO steroids. Tele-NSR. Denies pain. Patient max assist x 2, pivot to chair w walker, seen by PT. Patient incontinent x2, reports pain w initiation of urine stream. Abdomen distended, mucous BMs per chart review.  Dr. Cheek made aware, scheduled senna given per MAR. Pt refused to transfer back to bed until bedtime for PRN suppository and repeat UA collection, endorsed to night shift RN. Elevated blood sugars, Dr. Cheek made aware, QID accucheck,  insulin coverage. Diabetic educator to see as daughter requesting, new diabetes. Safety precautions in place. Patient and daughter at bedside updated on POC, questions answered, patient and daughter verbalized understanding.    Problem: Patient/Family Goals  Goal: Patient/Family Long Term Goal  Description: Patient's Long Term Goal: discharge w adequate resources    Interventions:  - see consults  -wean o2 as tolerated  -control BG levels  - See additional Care Plan goals for specific interventions  Outcome: Progressing  Goal: Patient/Family Short Term Goal  Description: Patient's Short Term Goal: Pain relief  9/16: manage blood sugar levels    Interventions:   -QID acccuheck  -insulin coverage  -carb control diet  -dietician, DM educator to see  - See additional Care Plan goals for specific interventions  Outcome: Progressing     Problem: GASTROINTESTINAL - ADULT  Goal: Minimal or absence of nausea and vomiting  Description: INTERVENTIONS:  - Maintain adequate hydration with IV or PO as ordered and tolerated  - Nasogastric tube to low intermittent suction as ordered  - Evaluate effectiveness of ordered antiemetic medications  - Provide nonpharmacologic comfort measures as appropriate  - Advance diet as tolerated, if ordered  - Obtain nutritional consult as needed  - Evaluate fluid  balance  Outcome: Progressing     Problem: RESPIRATORY - ADULT  Goal: Achieves optimal ventilation and oxygenation  Description: INTERVENTIONS:  - Assess for changes in respiratory status  - Assess for changes in mentation and behavior  - Position to facilitate oxygenation and minimize respiratory effort  - Oxygen supplementation based on oxygen saturation or ABGs  - Provide Smoking Cessation handout, if applicable  - Encourage broncho-pulmonary hygiene including cough, deep breathe, Incentive Spirometry  - Assess the need for suctioning and perform as needed  - Assess and instruct to report SOB or any respiratory difficulty  - Respiratory Therapy support as indicated  - Manage/alleviate anxiety  - Monitor for signs/symptoms of CO2 retention  Outcome: Progressing     Problem: MUSCULOSKELETAL - ADULT  Goal: Return mobility to safest level of function  Description: INTERVENTIONS:  - Assess patient stability and activity tolerance for standing, transferring and ambulating w/ or w/o assistive devices  - Assist with transfers and ambulation using safe patient handling equipment as needed  - Ensure adequate protection for wounds/incisions during mobilization  - Obtain PT/OT consults as needed  - Advance activity as appropriate  - Communicate ordered activity level and limitations with patient/family  Outcome: Progressing     Problem: Diabetes/Glucose Control  Goal: Glucose maintained within prescribed range  Description: INTERVENTIONS:  - Monitor Blood Glucose as ordered  - Assess for signs and symptoms of hyperglycemia and hypoglycemia  - Administer ordered medications to maintain glucose within target range  - Assess barriers to adequate nutritional intake and initiate nutrition consult as needed  - Instruct patient on self management of diabetes  Outcome: Progressing

## 2024-09-18 LAB
ANION GAP SERPL CALC-SCNC: 5 MMOL/L (ref 0–18)
BILIRUB UR QL STRIP.AUTO: NEGATIVE
BUN BLD-MCNC: 35 MG/DL (ref 9–23)
CALCIUM BLD-MCNC: 9.1 MG/DL (ref 8.7–10.4)
CHLORIDE SERPL-SCNC: 108 MMOL/L (ref 98–112)
CLARITY UR REFRACT.AUTO: CLEAR
CO2 SERPL-SCNC: 29 MMOL/L (ref 21–32)
COLOR UR AUTO: YELLOW
CREAT BLD-MCNC: 0.73 MG/DL
EGFRCR SERPLBLD CKD-EPI 2021: 77 ML/MIN/1.73M2 (ref 60–?)
GLUCOSE BLD-MCNC: 118 MG/DL (ref 70–99)
GLUCOSE BLD-MCNC: 119 MG/DL (ref 70–99)
GLUCOSE BLD-MCNC: 128 MG/DL (ref 70–99)
GLUCOSE BLD-MCNC: 198 MG/DL (ref 70–99)
GLUCOSE BLD-MCNC: 218 MG/DL (ref 70–99)
GLUCOSE BLD-MCNC: 319 MG/DL (ref 70–99)
GLUCOSE BLD-MCNC: 61 MG/DL (ref 70–99)
GLUCOSE UR STRIP.AUTO-MCNC: 500 MG/DL
KETONES UR STRIP.AUTO-MCNC: NEGATIVE MG/DL
LEUKOCYTE ESTERASE UR QL STRIP.AUTO: 500
NITRITE UR QL STRIP.AUTO: NEGATIVE
OSMOLALITY SERPL CALC.SUM OF ELEC: 303 MOSM/KG (ref 275–295)
PH UR STRIP.AUTO: 5.5 [PH] (ref 5–8)
POTASSIUM SERPL-SCNC: 4.2 MMOL/L (ref 3.5–5.1)
PROT UR STRIP.AUTO-MCNC: NEGATIVE MG/DL
RBC #/AREA URNS AUTO: >10 /HPF
RBC UR QL AUTO: NEGATIVE
SODIUM SERPL-SCNC: 142 MMOL/L (ref 136–145)
SP GR UR STRIP.AUTO: 1.03 (ref 1–1.03)
UROBILINOGEN UR STRIP.AUTO-MCNC: NORMAL MG/DL
YEAST UR QL: PRESENT /HPF

## 2024-09-18 PROCEDURE — 99231 SBSQ HOSP IP/OBS SF/LOW 25: CPT | Performed by: CLINICAL NURSE SPECIALIST

## 2024-09-18 PROCEDURE — 99232 SBSQ HOSP IP/OBS MODERATE 35: CPT | Performed by: HOSPITALIST

## 2024-09-18 PROCEDURE — 99233 SBSQ HOSP IP/OBS HIGH 50: CPT | Performed by: INTERNAL MEDICINE

## 2024-09-18 RX ORDER — PHENAZOPYRIDINE HYDROCHLORIDE 100 MG/1
100 TABLET, FILM COATED ORAL
Status: COMPLETED | OUTPATIENT
Start: 2024-09-18 | End: 2024-09-19

## 2024-09-18 RX ORDER — INSULIN DEGLUDEC 100 U/ML
8 INJECTION, SOLUTION SUBCUTANEOUS DAILY
Status: DISCONTINUED | OUTPATIENT
Start: 2024-09-19 | End: 2024-09-19

## 2024-09-18 NOTE — SPIRITUAL CARE NOTE
Spiritual Care Visit Note    Patient Name: Alyssa Montgomery Date of Spiritual Care Visit: 24   : 1932 Primary Dx: Intractable vomiting       Referred By: Referral From: Nurse    Spiritual Care Taxonomy:    Intended Effects: Build relationship of care and support    Methods: Assist with spiritual/Orthodox practices;Collaborate with care team member    Interventions: [place];Active listening;Ask guided questions about cultural and Orthodox values    Visit Type/Summary:     - Spiritual Care: Consulted with RN prior to visit. Provided information regarding how to contact Spiritual Care and left a Spiritual Care information card.    Spiritual Care support can be requested via an Epic consult. For urgent/immediate needs, please contact the On Call  at: [place]: ext [phone]       Rachel Turcios

## 2024-09-18 NOTE — CM/SW NOTE
Updates sent to [facility]. Possible dc 9/29 according to hospitalist note from this am    Kita Lara LCSW  /Discharge Planner

## 2024-09-18 NOTE — PROGRESS NOTES
[facility]  Pulmonary/Critical Care/Sleep Medicine Progress note    Alyssa Montgomery Patient Status:  Inpatient    1932 MRN [id number]   Location [facility] 5NW-A Attending Debby Cheek MD   Hosp Day # 23 PCP Mony Hall DO     Chief Complaint   Patient presents with    Nausea/Vomiting/Diarrhea        History of Present Illness:     Weaned to room air      History:  Past Medical History:    Essential hypertension    Heart attack (HCC)        Hyperlipidemia    Hypothyroidism    Macular degeneration    Bilateral    Retinopathy of both eyes    Diagnosis documented by Dr Serrano in notes from visit on 11/3/14     Past Surgical History:   Procedure Laterality Date    Cataract surgery, complex      D & c  1968    Hysterectomy  1972    Oophorectomy  1965    Skin surgery  2019    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 Her smoking use included cigarettes. She has never used smokeless tobacco. She reports that she does not currently use alcohol after a past usage of about 7.0 standard drinks of alcohol per week. She reports that she does not use drugs.    Allergies:  No Known Allergies    Medications:    Current Facility-Administered Medications:     cefTRIAXone (Rocephin) 1 g in sodium chloride 0.9% 100 mL IVPB-ADDV, 1 g, Intravenous, Q24H    phenazopyridine (Pyridium) tab 100 mg, 100 mg, Oral, TID CC    glucose (Dex4) 15 GM/59ML oral liquid 15 g, 15 g, Oral, Q15 Min PRN **OR** glucose (Glutose) 40% oral gel 15 g, 15 g, Oral, Q15 Min PRN **OR** glucose-vitamin C (Dex-4) chewable tab 4 tablet, 4 tablet, Oral, Q15 Min PRN **OR** dextrose 50% injection 50 mL, 50 mL, Intravenous, Q15 Min PRN **OR** glucose (Dex4) 15 GM/59ML oral liquid 30 g, 30 g,  Oral, Q15 Min PRN **OR** glucose (Glutose) 40% oral gel 30 g, 30 g, Oral, Q15 Min PRN **OR** glucose-vitamin C (Dex-4) chewable tab 8 tablet, 8 tablet, Oral, Q15 Min PRN    insulin aspart (NovoLOG) 100 Units/mL FlexPen 1-10 Units, 1-10 Units, Subcutaneous, TID AC and HS    insulin degludec (Tresiba) 100 units/mL flextouch 10 Units, 10 Units, Subcutaneous, Daily    insulin aspart (NovoLOG) 100 Units/mL FlexPen 1-20 Units, 1-20 Units, Subcutaneous, TID CC and HS    sennosides (Senokot) tab 8.6 mg, 8.6 mg, Oral, Daily    ipratropium-albuterol (Duoneb) 0.5-2.5 (3) MG/3ML inhalation solution 3 mL, 3 mL, Nebulization, QID    pantoprazole (Protonix) DR tab 40 mg, 40 mg, Oral, BID AC    guaiFENesin ER (Mucinex) 12 hr tab 600 mg, 600 mg, Oral, BID    nystatin (Mycostatin) 998814 UNIT/ML oral suspension 500,000 Units, 5 mL, Oral, QID    hydrOXYzine (Atarax) tab 25 mg, 25 mg, Oral, TID PRN    multivitamin with minerals (Thera M Plus) tab 1 tablet, 1 tablet, Oral, Daily    QUEtiapine (SEROquel) tab 25 mg, 25 mg, Oral, Nightly    sodium chloride (hypertonic) 3 % nebulizer solution 3 mL, 3 mL, Nebulization, PRN    sodium chloride (Saline Mist) 0.65 % nasal solution 1 spray, 1 spray, Each Nare, Q3H PRN    budesonide (Pulmicort) 0.5 MG/2ML nebulizer suspension 0.5 mg, 0.5 mg, Nebulization, 2 times daily    hydrALAZINE (Apresoline) tab 25 mg, 25 mg, Oral, Q8H SHELLEY    carvedilol (Coreg) tab 12.5 mg, 12.5 mg, Oral, BID with meals    amLODIPine (Norvasc) tab 10 mg, 10 mg, Oral, Daily    guaiFENesin (Robitussin) 100 MG/5 ML oral liquid 100 mg, 100 mg, Oral, Q4H PRN    benzonatate (Tessalon) cap 100 mg, 100 mg, Oral, TID PRN    albuterol (Ventolin HFA) 108 (90 Base) MCG/ACT inhaler 2 puff, 2 puff, Inhalation, Q4H PRN    atorvastatin (Lipitor) tab 40 mg, 40 mg, Oral, Nightly    levothyroxine (Synthroid) tab 100 mcg, 100 mcg, Oral, Daily @ 0700    [Held by provider] predniSONE (Deltasone) tab 1 mg, 1 mg, Oral, Daily with breakfast     pregabalin (Lyrica) cap 25 mg, 25 mg, Oral, BID    acetaminophen (Tylenol Extra Strength) tab 500 mg, 500 mg, Oral, Q4H PRN    melatonin tab 3 mg, 3 mg, Oral, Nightly PRN    glycerin-hypromellose- (Artificial Tears) 0.2-0.2-1 % ophthalmic solution 1 drop, 1 drop, Both Eyes, QID PRN    enoxaparin (Lovenox) 40 MG/0.4ML SUBQ injection 40 mg, 40 mg, Subcutaneous, Daily    polyethylene glycol (PEG 3350) (Miralax) 17 g oral packet 17 g, 17 g, Oral, Daily PRN    sennosides (Senokot) tab 17.2 mg, 17.2 mg, Oral, Nightly PRN    bisacodyl (Dulcolax) 10 MG rectal suppository 10 mg, 10 mg, Rectal, Daily PRN    fleet enema (Fleet) rectal enema 133 mL, 1 enema, Rectal, Once PRN    ondansetron (Zofran) 4 MG/2ML injection 4 mg, 4 mg, Intravenous, Q6H PRN    Intake/Output:    Intake/Output Summary (Last 24 hours) at 9/18/2024 1346  Last data filed at 9/18/2024 0616  Gross per 24 hour   Intake --   Output 1350 ml   Net -1350 ml          Review of Systems    Review of Systems: A comprehensive 10 point review of systems was completed.  Pertinent positives and negatives noted in the the HPI.         Patient Vitals for the past 24 hrs:   BP Temp Temp src Pulse Resp SpO2   09/16/24 1000 (!) 172/71 -- -- 85 24 97 %   09/16/24 0427 151/76 98.4 °F (36.9 °C) Oral 69 16 96 %   09/16/24 0200 -- -- -- 75 -- 97 %   09/15/24 1930 (!) 162/56 97.7 °F (36.5 °C) Oral 95 16 95 %   09/15/24 1315 125/76 97.8 °F (36.6 °C) Oral 72 16 95 %     Vitals:    09/17/24 1453 09/17/24 2024 09/18/24 0003 09/18/24 0616   BP:  111/46 123/59 134/58   BP Location:  Right arm Right arm Right arm   Pulse: 86 64 87 78   Resp:  17 19 18   Temp:  98 °F (36.7 °C) 97.9 °F (36.6 °C) 99 °F (37.2 °C)   TempSrc:  Oral Oral Oral   SpO2: 96% 95% 93% 95%   Weight:       Height:          Body mass index is 26.97 kg/m².     Physical Exam    Physical Exam:   General: alert, cooperative, oriented.  No respiratory distress.  Pleasantly confused   Head: Normocephalic, without obvious  abnormality, atraumatic.  Hard of hearing   Eyes: Conjunctivae/corneas clear.  No scleral icterus.  No conjunctival     hemorrhage.   Nose: Nares normal.   Throat: Lips, mucosa, and tongue normal.  No thrush noted.   Neck: Soft, supple neck; trachea midline, no adenopathy, no thyromegaly.   Lungs: Diminished Breath sounds bilaterally, bilateral lower lobe rales posteriorly   Chest wall: No tenderness or deformity.   Heart: Regular rate and rhythm, normal S1S2, no murmur.   Abdomen: soft, non-tender, non-distended, no masses, no guarding, no     rebound, positive BS.   Extremity: no edema, no cyanosis   Skin: No rashes or lesions.   Neurological: Alert, interactive, no focal deficits    Lab Data Review:    Recent Labs   Lab 09/13/24  0724 09/14/24  0924 09/15/24  0756   WBC 11.7* 11.4* 10.1   HGB 12.3 11.3* 11.5*   HCT 35.4 33.4* 35.3   .0 161.0 149.0*       Recent Labs   Lab 09/13/24  0724 09/14/24  0924 09/15/24  0756 09/18/24  0707    141 139 142   K 4.5 4.0 4.5 4.2    105 104 108   CO2 28.0 29.0 28.0 29.0   BUN 17 27* 30* 35*   CREATSERUM 0.64 0.65 0.78 0.73   CA 8.6* 8.7 9.1 9.1   ALB 3.3 3.4  --   --    ALKPHO 71 66  --   --    ALT 24 23  --   --    AST 13 9  --   --    * 252* 366* 119*       Recent Labs   Lab 09/15/24  0756   MG 2.4       Lab Results   Component Value Date    PHOS 3.3 09/15/2024        No results for input(s): \"PT\", \"INR\", \"PTT\" in the last 168 hours.    No results for input(s): \"ABGPHT\", \"UNAAKN7Z\", \"IRBMV9H\", \"ABGHCO3\", \"ABGBE\", \"TEMP\", \"CARRIE\", \"SITE\", \"DEV\", \"THGB\" in the last 168 hours.    No results for input(s): \"TROP\", \"CKMB\" in the last 168 hours.      Cultures:   Hospital Encounter on 08/26/24   1. Blood Culture     Status: None    Collection Time: 08/28/24 10:48 AM    Specimen: Blood,peripheral   Result Value Ref Range    Blood Culture Result No Growth 5 Days N/A            Radiology personally reviewed:  No results found.     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)    Macular cyst, hole, or pseudohole, right eye    Nonexudative age-related macular degeneration, right eye, intermediate dry stage    PMR (polymyalgia rheumatica) ([facility])    Paving stone degeneration of retina, bilateral    Asymptomatic carotid artery stenosis, bilateral    Weakness    Constipation    Thrombocytopenia ([facility])    Syncope and collapse    Concussion with loss of consciousness    Fall, initial encounter    Laceration of right little finger without foreign body without damage to nail, initial encounter    Neuropathy    Shortness of breath    Respiratory syncytial virus (RSV)    RSV (acute bronchiolitis due to respiratory syncytial virus)    Intractable vomiting    Hypokalemia    Community acquired pneumonia    Bronchospasm    Pneumonia due to Mycoplasma pneumoniae    Type 2 diabetes mellitus without complication, without long-term current use of insulin ([facility])     Assessment:  Acute hypoxic respiratory failure:: Weaned to room air  Left lower lobe pneumonia/lung infiltrate on CT chest 9/3/2024 Mycoplasma pneumonia positive  Shortness of breath: Improving   Acute bronchitis: Improving  Type 2 diabetes mellitus:/Hyperglycemia  steroid-induced: Much improved  GERD  Hypertension  Hyperlipidemia  History of coronary artery disease  History of HFpEF  Polymyalgia rheumatica  Hypothyroidism  Leukocytosis: Improved  Acute metabolic encephalopathy: Improving  Steroid induced hyperglycemia      Plan:  Wean off FiO2 to keep oxygen saturation between 90% to 92%  DuoNebs every 6 hours  Budesonide 0.5 mg nebulizers every 12 hours  Discontinued prednisone and monitor  symptoms  DVT prophylaxis:  Lovenox 40 mg subcutaneous every 24 hours  GI prophylaxis: Protonix 40 mg daily  Continue diabetic medications with sliding scale insulin  Discharge planning    Thank You for allowing me to participate in this patient's care     Luis Murphy MD      Note to the patient: The 21st Century Cures Act makes medical notes like these available to patients in the interest of transparency. However, be advised that this is a medical document. It is intended as peer to peer communication. It is written in medical language and may contain abbreviations or verbiage that are unfamiliar. It may appear blunt or direct. Medical documents are intended to carry relevant information, facts as evident, and clinical opinion of the practitioner.      Disclaimer: Components of this note were documented using voice recognition system and are subject to errors not corrected at proofreading. Contact the author of this note for any clarifications

## 2024-09-18 NOTE — CM/SW NOTE
Sw spoke to daughter Radha. She expressed concern that her mother may be giving up.  We discussed her lengthy hospital stay.  Offered Palliative Care consult to discuss goals of care and she is not sure they are at that point yet.  Radha would like to talk to hospitalist- sent message.    Kita Lara, DOUGLAS  /Discharge Planner

## 2024-09-18 NOTE — PROGRESS NOTES
[facility]   part of [facility]     Hospitalist Progress Note     Alyssa Montgomery Patient Status:  Inpatient    1932 MRN [id number]   Location [facility] 0-A Attending Medhat Dover MD   Hosp Day # 23 PCP Mony Hall DO     Chief Complaint: n/v    Subjective:     Overall better, having dysuria    Objective:    Review of Systems:   A comprehensive review of systems was completed; pertinent positive and negatives stated in subjective.    Vital signs:  Temp:  [97.8 °F (36.6 °C)-99 °F (37.2 °C)] 99 °F (37.2 °C)  Pulse:  [64-87] 78  Resp:  [17-22] 18  BP: (111-134)/(46-59) 134/58  SpO2:  [93 %-96 %] 95 %    Physical Exam:    General: No acute distress. Anxious.  Respiratory: B/L wheezing.   Cardiovascular: S1, S2, regular rate and rhythm  Abdomen: Soft, Non-tender, moderate distention, positive bowel sounds  Neuro: No new focal deficits.   Extremities: No edema      Diagnostic Data:    Labs:  Recent Labs   Lab 24  0725 24  0724 24  0924 09/15/24  0756   WBC 11.2* 11.7* 11.4* 10.1   HGB 10.9* 12.3 11.3* 11.5*   MCV 93.0 91.7 92.0 95.1   .0 155.0 161.0 149.0*       Recent Labs   Lab 24  0724 24  0924 09/15/24  0756 24  0707   * 252* 366* 119*   BUN 17 27* 30* 35*   CREATSERUM 0.64 0.65 0.78 0.73   CA 8.6* 8.7 9.1 9.1   ALB 3.3 3.4  --   --     141 139 142   K 4.5 4.0 4.5 4.2    105 104 108   CO2 28.0 29.0 28.0 29.0   ALKPHO 71 66  --   --    AST 13 9  --   --    ALT 24 23  --   --    BILT 0.8 0.7  --   --    TP 6.2 6.1  --   --        Estimated Creatinine Clearance: 47.8 mL/min (based on SCr of 0.73 mg/dL).    No results for input(s): \"TROP\", \"TROPHS\", \"CK\" in the last 168 hours.    No results for input(s): \"PTP\", \"INR\" in the last 168 hours.                 Microbiology    Hospital Encounter on 24   1. Blood Culture     Status: None    Collection Time: 24 10:48 AM    Specimen: Blood,peripheral   Result Value Ref Range     Blood Culture Result No Growth 5 Days N/A         Imaging: Reviewed in Epic.    Medications:    insulin aspart  1-10 Units Subcutaneous TID AC and HS    insulin degludec  10 Units Subcutaneous Daily    insulin aspart  1-20 Units Subcutaneous TID CC and HS    sennosides  8.6 mg Oral Daily    ipratropium-albuterol  3 mL Nebulization QID    pantoprazole  40 mg Oral BID AC    guaiFENesin ER  600 mg Oral BID    nystatin  5 mL Oral QID    multivitamin with minerals  1 tablet Oral Daily    QUEtiapine  25 mg Oral Nightly    budesonide  0.5 mg Nebulization 2 times daily    hydrALAZINE  25 mg Oral Q8H SHELLEY    carvedilol  12.5 mg Oral BID with meals    amLODIPine  10 mg Oral Daily    atorvastatin  40 mg Oral Nightly    levothyroxine  100 mcg Oral Daily @ 0700    [Held by provider] predniSONE  1 mg Oral Daily with breakfast    pregabalin  25 mg Oral BID    enoxaparin  40 mg Subcutaneous Daily       Assessment & Plan:      #CAP  #Acute bronchitis with bronchospasm   #Acute hypoxic respiratory failure due to above  -+ mycoplasma pneumoniae  -Weaned off vapotherm to HFNC- on 1 NC- wean as able  -steroids- on oral prednisone  -Bcx/Sputum culture negative   -Scheduled nebs - duoneb/pulmicort, add mucomyst  -Chest PT  -Pulmonary following  -azithromycin course completed    #Dysuria  -UA with suspect UTI  -start empiric Abx  -pyridium  -await Cx results    #New DM - suspect steroid induced  -Hba1c 7.5  -hyperglycemia protocol started    #Abdominal distention  -KUB without evidence of obstruction/impaction    -Continue bowel regimen    #Leukocytosis  -Likely in part steroid induced   -Monitor    #Acute metabolic encephalopathy   -Likely due to #1 and extended hospital stay  -Frequent re-orientation necessary     #CAD  -ASA, statin, BB     #HFpEF  -Compensated   -Echo 2023 with normal EF, G1DD    #Essential HTN  #HTN urgency  -BP controlled     #Hypothyroidism - Synthroid   #PMR - 1mg prednisone daily PTA    #Suspected cognitive  impairment  -OP neuropsych eval     Anticipate dc tomorrow if remains stable    Debby Cheek M.D.  Ault Hospitalist      Supplementary Documentation:     Quality:  DVT Mechanical Prophylaxis:     Early ambuation  DVT Pharmacologic Prophylaxis   Medication    enoxaparin (Lovenox) 40 MG/0.4ML SUBQ injection 40 mg                Code Status: DNAR/Selective Treatment  Buck: External urinary catheter in place  Buck Duration (in days):   Central line:    XAVI:     Discharge is dependent on: clinical improvement  At this point Ms. Montgomery is expected to be discharge to: tbd    The 21st Century Cures Act makes medical notes like these available to patients in the interest of transparency. Please be advised this is a medical document. Medical documents are intended to carry relevant information, facts as evident, and the clinical opinion of the practitioner. The medical note is intended as peer to peer communication and may appear blunt or direct. It is written in medical language and may contain abbreviations or verbiage that are unfamiliar.

## 2024-09-18 NOTE — SPIRITUAL CARE NOTE
Spiritual Care Visit Note    Patient Name: Alyssa Montgomery Date of Spiritual Care Visit: 24   : 1932 Primary Dx: Intractable vomiting       Referred By: Referral From:     Spiritual Care Taxonomy:    Intended Effects: Latosha affirmation    Methods: Assist with spiritual/Taoism practices    Interventions: Share written prayer;[place];Prayer for healing    Visit Type/Summary:     - Spiritual Care: Engaged Language Line Interpretive services.  remains available as needed for follow up. Alyssa led  in Tenriism prayers. She would like communion, but had just eaten - one hour fast is required.  will attempt to return later. Logistic services also offered. She thanked  for visit. She appeared nervious and anxious during visit.     Spiritual Care support can be requested via an Epic consult. For urgent/immediate needs, please contact the On Call  at: [place]: ext [phone]

## 2024-09-18 NOTE — CONSULTS
[facility]  Diabetes Consult Note    Alyssa Montgomery Patient Status:  Inpatient    1932 MRN [id number]   Location [facility] 5NW-A Attending Debby Cheek MD   Hosp Day # 23 PCP Mony Hall DO     Reason for Consult:   Insulin management recommendations in setting of new T2DM diagnosis    Provider Requesting Consult:  Dr. 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)    Macular cyst, hole, or pseudohole, right eye    Nonexudative age-related macular degeneration, right eye, intermediate dry stage    PMR (polymyalgia rheumatica) ([facility])    Paving stone degeneration of retina, bilateral    Asymptomatic carotid artery stenosis, bilateral    Weakness    Constipation    Thrombocytopenia (HCC)    Syncope and collapse    Concussion with loss of consciousness    Fall, initial encounter    Laceration of right little finger without foreign body without damage to nail, initial encounter    Neuropathy    Shortness of breath    Respiratory syncytial virus (RSV)    RSV (acute bronchiolitis due to respiratory syncytial virus)    Intractable vomiting    Hypokalemia    Community acquired pneumonia    Bronchospasm    Pneumonia due to Mycoplasma pneumoniae    Type 2 diabetes mellitus without complication, without long-term current use of insulin ([facility])       Medical History:  Past Medical History:    Essential hypertension    Heart attack (HCC)        Hyperlipidemia    Hypothyroidism    Macular degeneration    Bilateral    Retinopathy of both eyes    Diagnosis documented by Dr Serrano in notes from visit on 11/3/14     Past Surgical  History:   Procedure Laterality Date    Cataract surgery, complex      D & c  1/1/1968    Hysterectomy  1/1/1972    Oophorectomy  1/1/1965    Skin surgery  06/11/2019    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 Active diabetes medications include degludec every day & aspart AC/HS.      Labs:  Recent Labs   Lab 09/17/24  1228 09/17/24  1644 09/17/24  2114 09/18/24  0625 09/18/24  1131   PGLU 280* 241* 286* 128* 319*     Recent Labs     09/15/24  0756 09/16/24  0752 09/17/24  1228     --   --      --   --    CO2 28.0  --   --    BUN 30*  --   --    CREATSERUM 0.78  --   --    A1C 7.5*  --   --    PGLU  --    < > 280*   CA 9.1  --   --     < > = values in this interval not displayed.                History of Present Illness: Alyssa Montgomery is a 89 year old female with a PMH of HTN, HFpEf, hypothyroidism, CAD and HLD admitted 8/26/2024 with complaints of intractable vomiting and nausea with probable aspiration. On 8/27/2024, she went into acute hypoxic respiratory failure and subsequently found to have a CAP positive for mycoplasma pneumoniae on 8/31/2024. She was treated with 3 days of IV solumedrol and is now receiving 40mg prednisone every day. Since beginning of steroids, her glucoses have been increasingly elevated and insulin administration was started 9/16/2024.         Assessment/Recommendations:  Upon interview this morning, patient confused by DM diagnosis and doesn't understand why she needs insulin. Per patient's request, spoke with daughter, Radha. Explained the need for insulin in the setting of new-onset T2DM r/t continued steroid use. Again  explained to daughter that patient's insulin/oral anti-diabetic requirement may decrease with wean of steroid; reiterated that needs will have to be addressed at rehab and/or outpatient depending on pulmonology's timeline. Daughter endorses understanding of plan of care.       Plan:  Inpatient insulin recommendations -   Degludec - given fasted AM glucose of 128, continue 10u every day  Aspart - given pre-prandial glucoses of 280 & 286 yesterday, will increase short-acting insulin coverage to 1:25 >140 & 1:8gm CHO  Discharge plan recommendations - continue degludec every day and start metformin  Would recommended starting metformin at 500mg ER with evening meal  May titrate in increments of 500mg ER Q1 week based on glycemic control up to maximum of 2000mg ER QD      Prescription Recommendations:  [employer] Medicaid - [facility] Medicare  Insulin: Humalog, Levemir, Lantus   Supplies:  BD pen needles ([place])  Glucometer:  One Touch Ultra ([employer])      Provider F/U Recommendations:  PCP - Dr. Hall ([place]), recommend f/u within 1 week post rehab discharge      A total of 30 minutes were spent with the patient, 100% was spent counseling and coordinating care for T2 diabetes self-management including nutrition, exercise, blood glucose monitoring, insulin administration, medications, treatment options, follow-up coordination and resources.    Lexy Silveira, APRN  9/18/2024  1:16 PM

## 2024-09-18 NOTE — PLAN OF CARE
Alert and orientedX2-3,patient currently on RA,using 6L with activity.Refusing Tele.Lovenox. Incontinent,purewick in place.Up 2 assist,walker,PIV SL.safety and seizure precautions in place.  Problem: Patient/Family Goals  Goal: Patient/Family Long Term Goal  Description: Patient's Long Term Goal: Back to [facility]    Interventions:  - IV antibiotics  - O2  - Nebs  - fall precautions  - See additional Care Plan goals for specific interventions  Outcome: Progressing  Goal: Patient/Family Short Term Goal  Description: Patient's Short Term Goal: Pain relief  9/1 noc: wean O2  9/4AM: urinate  9/5 noc: wean off 02   9/6 noc: wean O2 to baseline  9/7 AM: wean o2  9/7 noc: wean O2 as duncan  9/8 NOC: \"I want to go to sleep\"  9/9 NOC: wean O2  9/10 NOC: sleep  9/13 noc: manage cough  9/14: wean O2 as duncan  9/14 noc: sleep/ manage cough  9/15 NOC: sleep  9/17 NOC:Weaned O2,Sleep  Interventions:   - Tylenol,   - reposition  -nebs, hob 30,   -antitussives prn    - See additional Care Plan goals for specific interventions  Outcome: Progressing     Problem: GASTROINTESTINAL - ADULT  Goal: Minimal or absence of nausea and vomiting  Description: INTERVENTIONS:  - Maintain adequate hydration with IV or PO as ordered and tolerated  - Nasogastric tube to low intermittent suction as ordered  - Evaluate effectiveness of ordered antiemetic medications  - Provide nonpharmacologic comfort measures as appropriate  - Advance diet as tolerated, if ordered  - Obtain nutritional consult as needed  - Evaluate fluid balance  Outcome: Progressing     Problem: METABOLIC/FLUID AND ELECTROLYTES - ADULT  Goal: Electrolytes maintained within normal limits  Description: INTERVENTIONS:  - Monitor labs and rhythm and assess patient for signs and symptoms of electrolyte imbalances  - Administer electrolyte replacement as ordered  - Monitor response to electrolyte replacements, including rhythm and repeat lab results as appropriate  - Fluid  restriction as ordered  - Instruct patient on fluid and nutrition restrictions as appropriate  Outcome: Progressing  Goal: Hemodynamic stability and optimal renal function maintained  Description: INTERVENTIONS:  - Monitor labs and assess for signs and symptoms of volume excess or deficit  - Monitor intake, output and patient weight  - Monitor urine specific gravity, serum osmolarity and serum sodium as indicated or ordered  - Monitor response to interventions for patient's volume status, including labs, urine output, blood pressure (other measures as available)  - Encourage oral intake as appropriate  - Instruct patient on fluid and nutrition restrictions as appropriate  Outcome: Progressing     Problem: RESPIRATORY - ADULT  Goal: Achieves optimal ventilation and oxygenation  Description: INTERVENTIONS:  - Assess for changes in respiratory status  - Assess for changes in mentation and behavior  - Position to facilitate oxygenation and minimize respiratory effort  - Oxygen supplementation based on oxygen saturation or ABGs  - Provide Smoking Cessation handout, if applicable  - Encourage broncho-pulmonary hygiene including cough, deep breathe, Incentive Spirometry  - Assess the need for suctioning and perform as needed  - Assess and instruct to report SOB or any respiratory difficulty  - Respiratory Therapy support as indicated  - Manage/alleviate anxiety  - Monitor for signs/symptoms of CO2 retention  Outcome: Progressing     Problem: MUSCULOSKELETAL - ADULT  Goal: Return mobility to safest level of function  Description: INTERVENTIONS:  - Assess patient stability and activity tolerance for standing, transferring and ambulating w/ or w/o assistive devices  - Assist with transfers and ambulation using safe patient handling equipment as needed  - Ensure adequate protection for wounds/incisions during mobilization  - Obtain PT/OT consults as needed  - Advance activity as appropriate  - Communicate ordered activity  level and limitations with patient/family  Outcome: Progressing     Problem: SAFETY ADULT - FALL  Goal: Free from fall injury  Description: INTERVENTIONS:  - Assess pt frequently for physical needs  - Identify cognitive and physical deficits and behaviors that affect risk of falls.  - [place] fall precautions as indicated by assessment.  - Educate pt/family on patient safety including physical limitations  - Instruct pt to call for assistance with activity based on assessment  - Modify environment to reduce risk of injury  - Provide assistive devices as appropriate  - Consider OT/PT consult to assist with strengthening/mobility  - Encourage toileting schedule  Outcome: Progressing     Problem: Diabetes/Glucose Control  Goal: Glucose maintained within prescribed range  Description: INTERVENTIONS:  - Monitor Blood Glucose as ordered  - Assess for signs and symptoms of hyperglycemia and hypoglycemia  - Administer ordered medications to maintain glucose within target range  - Assess barriers to adequate nutritional intake and initiate nutrition consult as needed  - Instruct patient on self management of diabetes  Outcome: Progressing

## 2024-09-19 PROBLEM — J96.01 ACUTE RESPIRATORY FAILURE WITH HYPOXIA (HCC): Status: ACTIVE | Noted: 2024-01-01

## 2024-09-19 PROBLEM — Z51.5 PALLIATIVE CARE BY SPECIALIST: Status: ACTIVE | Noted: 2024-01-01

## 2024-09-19 PROBLEM — Z71.89 GOALS OF CARE, COUNSELING/DISCUSSION: Status: ACTIVE | Noted: 2024-09-19

## 2024-09-19 PROBLEM — J96.01 ACUTE RESPIRATORY FAILURE WITH HYPOXIA (HCC): Status: ACTIVE | Noted: 2024-09-19

## 2024-09-19 PROBLEM — Z51.5 PALLIATIVE CARE BY SPECIALIST: Status: ACTIVE | Noted: 2024-09-19

## 2024-09-19 PROBLEM — N30.00 ACUTE CYSTITIS WITHOUT HEMATURIA: Status: ACTIVE | Noted: 2024-09-19

## 2024-09-19 PROBLEM — N30.00 ACUTE CYSTITIS WITHOUT HEMATURIA: Status: ACTIVE | Noted: 2024-01-01

## 2024-09-19 PROBLEM — Z71.89 GOALS OF CARE, COUNSELING/DISCUSSION: Status: ACTIVE | Noted: 2024-01-01

## 2024-09-19 LAB
GLUCOSE BLD-MCNC: 127 MG/DL (ref 70–99)
GLUCOSE BLD-MCNC: 136 MG/DL (ref 70–99)
GLUCOSE BLD-MCNC: 211 MG/DL (ref 70–99)
GLUCOSE BLD-MCNC: 228 MG/DL (ref 70–99)

## 2024-09-19 PROCEDURE — 99233 SBSQ HOSP IP/OBS HIGH 50: CPT | Performed by: INTERNAL MEDICINE

## 2024-09-19 PROCEDURE — 99222 1ST HOSP IP/OBS MODERATE 55: CPT | Performed by: NURSE PRACTITIONER

## 2024-09-19 PROCEDURE — 99231 SBSQ HOSP IP/OBS SF/LOW 25: CPT | Performed by: CLINICAL NURSE SPECIALIST

## 2024-09-19 NOTE — PROGRESS NOTES
Patient reported no BM , after dose of Miralax and Senokot. Patient agreed to have enema , enema dose given tolerated well. Patient noted with large mucus output rectal output, patient passed a lot of gas. Dr. Barth notified no further orders.

## 2024-09-19 NOTE — CONSULTS
[facility]  Diabetes Consult Note    Alyssa Montgomery Patient Status:  Inpatient    1932 MRN [id number]   Location [facility] 5NW-A Attending Debby Cheek MD   Hosp Day # 24 PCP Mony Hall DO       Reason for Consult:   Insulin management recommendations in setting of new T2DM diagnosis    Provider Requesting Consult:  Dr. Cheek (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)    Macular cyst, hole, or pseudohole, right eye    Nonexudative age-related macular degeneration, right eye, intermediate dry stage    PMR (polymyalgia rheumatica) ([facility])    Paving stone degeneration of retina, bilateral    Asymptomatic carotid artery stenosis, bilateral    Weakness    Constipation    Thrombocytopenia (HCC)    Syncope and collapse    Concussion with loss of consciousness    Fall, initial encounter    Laceration of right little finger without foreign body without damage to nail, initial encounter    Neuropathy    Shortness of breath    Respiratory syncytial virus (RSV)    RSV (acute bronchiolitis due to respiratory syncytial virus)    Intractable vomiting    Hypokalemia    Community acquired pneumonia    Bronchospasm    Pneumonia due to Mycoplasma pneumoniae    Type 2 diabetes mellitus without complication, without long-term current use of insulin ([facility])       Medical History:  Past Medical History:    Essential hypertension    Heart attack (HCC)        Hyperlipidemia    Hypothyroidism    Macular degeneration    Bilateral    Retinopathy of both eyes    Diagnosis documented by Dr Serrano in notes from visit on 11/3/14     Past  Surgical History:   Procedure Laterality Date    Cataract surgery, complex      D & c  1/1/1968    Hysterectomy  1/1/1972    Oophorectomy  1/1/1965    Skin surgery  06/11/2019    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 Active diabetes medications include degludec every day & aspart AC/HS.      Labs:  Recent Labs   Lab 09/18/24  1625 09/18/24  2119 09/18/24  2201 09/19/24  0518 09/19/24  1133   PGLU 118* 218* 198* 136* 228*     Recent Labs     09/15/24  0756 09/16/24  0752 09/17/24  1228     --   --      --   --    CO2 28.0  --   --    BUN 30*  --   --    CREATSERUM 0.78  --   --    A1C 7.5*  --   --    PGLU  --    < > 280*   CA 9.1  --   --     < > = values in this interval not displayed.                History of Present Illness: Alyssa Montgomery is a 89 year old female with a PMH of HTN, HFpEf, hypothyroidism, CAD and HLD admitted 8/26/2024 with complaints of intractable vomiting and nausea with probable aspiration. On 8/27/2024, she went into acute hypoxic respiratory failure and subsequently found to have a CAP positive for mycoplasma pneumoniae on 8/31/2024. She was treated with 3 days of IV solumedrol and is now receiving 40mg prednisone every day. Since beginning of steroids, her glucoses have been increasingly elevated and insulin administration was started 9/16/2024.         Assessment/Recommendations:  Upon interview this morning, patient confused by DM diagnosis and doesn't understand why she needs insulin. Per bedside nurse, patient with decreased nutritional intake; per daughter two days ago, patient with irregular eating habits at home (I.e.  eats 3 large meals one day and nothing the next). In addition, steroids were d/c'ed by pulmonology yesterday. Plan still to be d/c'ed to subacute rehab once UTI is treated.       Plan:  Inpatient insulin recommendations -   Degludec - discontinue d/t decreased nutritional intake coupled with steroid discontinuation yesterday (prednisone usually clears system w/in 22-24 hours)  Aspart - given pre-prandial glucose of 319 & HS glucose of 198 yesterday, could possibly increase correction and carb coverage doses; however, given steroid discontinuation, would continue same doses  Discharge plan recommendations - start metformin  Would recommended starting metformin at 500mg ER with evening meal  May titrate in increments of 500mg ER Q1 week based on glycemic control up to maximum of 2000mg ER QD      Prescription Recommendations:  [employer] Medicaid - [facility] Medicare  Insulin: Humalog, Levemir, Lantus   Supplies:  BD pen needles ([employer])  Glucometer:  One Touch Ultra ([employer])      Provider F/U Recommendations:  PCP - Dr. Hall ([place]), recommend f/u within 1 week post rehab discharge      A total of 30 minutes were spent with the patient, 100% was spent counseling and coordinating care for T2 diabetes self-management including nutrition, exercise, blood glucose monitoring, insulin administration, medications, treatment options, follow-up coordination and resources.    Lexy Silveira, APRN  9/19/2024  1:16 PM

## 2024-09-19 NOTE — CONSULTS
[facility]   part of [facility]  Palliative Care Initial Consult Note    Alyssa Montgomery Patient Status:  Inpatient    1932 MRN [id number]   Location [facility] 5NW-A Attending Arely Barth DO   Hosp Day # 24 PCP Mony Hall DO     Date of Consult: 2024  Patient seen at: [facility] Inpatient    Reason for Consultation: Consult ordered by:: Dr. Barth for evaluation of Palliative Care needs and Goals of care discussion.    Subjective     History of Present Illness: Alyssa Montgomrey is a 89 year old female with CAD, CHF, HTN, hypothyroidism PMR who was admitted on 2024 for vomiting, and then aspirated. She has had a prolonged hospital course complicated my PNA, UTI, abdominal discomfort & distention History was obtained from [facility] and patient and daughter.     Today is day #24 of hospitalization.     When I entered the room, the patient was awake, alert, and lying in bed. No family present at bedside, I called daughter Radha.      Review of Systems:      Bowel Movement         2024  0616             Stool Count Calculated for I/O: 1          Wt Readings from Last 6 Encounters:   24 172 lb 3.2 oz (78.1 kg)   24 176 lb 9.6 oz (80.1 kg)   24 166 lb 3.2 oz (75.4 kg)   24 166 lb 7.2 oz (75.5 kg)   23 166 lb 6.4 oz (75.5 kg)   10/19/23 165 lb (74.8 kg)        Palliative Care Social History:   Marital Status:   Children: Yes  Living Situation Prior to Admit:  [facility]  Is Patient Confused: No- has had intermittent confusion  Occupational History: Retired    Substance History:   reports that she quit smoking about 52 years ago. Her smoking use included cigarettes. She has never used smokeless tobacco.  reports that she does not currently use alcohol after a past usage of about 7.0 standard drinks of alcohol per week.  reports no history of drug use.    Spiritual Assessment:   Roman Catholic - Parish Not Listed      Past Medical History/Past  Surgical History:   This is the 1st hospitalization in the past 6 months.    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     Total abdom hysterectomy         Family History: obtained from [facility]  Family History   Problem Relation Age of Onset    Hypertension Mother     Heart Attack Father         MI    Breast Cancer Sister     Diabetes Sister        Allergies:  No Known Allergies    Medications:     Current Facility-Administered Medications:     magnesium hydroxide (Milk of Magnesia) 400 MG/5ML oral suspension 30 mL, 30 mL, Oral, Once    glucose (Dex4) 15 GM/59ML oral liquid 15 g, 15 g, Oral, Q15 Min PRN **OR** glucose (Glutose) 40% oral gel 15 g, 15 g, Oral, Q15 Min PRN **OR** glucose-vitamin C (Dex-4) chewable tab 4 tablet, 4 tablet, Oral, Q15 Min PRN **OR** dextrose 50% injection 50 mL, 50 mL, Intravenous, Q15 Min PRN **OR** glucose (Dex4) 15 GM/59ML oral liquid 30 g, 30 g, Oral, Q15 Min PRN **OR** glucose (Glutose) 40% oral gel 30 g, 30 g, Oral, Q15 Min PRN **OR** glucose-vitamin C (Dex-4) chewable tab 8 tablet, 8 tablet, Oral, Q15 Min PRN    insulin aspart (NovoLOG) 100 Units/mL FlexPen 1-10 Units, 1-10 Units, Subcutaneous, TID AC and HS    insulin aspart (NovoLOG) 100 Units/mL FlexPen 1-20 Units, 1-20 Units, Subcutaneous, TID CC and HS    sennosides (Senokot) tab 8.6 mg, 8.6 mg, Oral, Daily    ipratropium-albuterol (Duoneb) 0.5-2.5 (3) MG/3ML inhalation solution 3 mL, 3 mL, Nebulization, QID    pantoprazole (Protonix) DR tab 40 mg, 40 mg, Oral, BID AC     guaiFENesin ER (Mucinex) 12 hr tab 600 mg, 600 mg, Oral, BID    nystatin (Mycostatin) 037238 UNIT/ML oral suspension 500,000 Units, 5 mL, Oral, QID    hydrOXYzine (Atarax) tab 25 mg, 25 mg, Oral, TID PRN    multivitamin with minerals (Thera M Plus) tab 1 tablet, 1 tablet, Oral, Daily    QUEtiapine (SEROquel) tab 25 mg, 25 mg, Oral, Nightly    sodium chloride (hypertonic) 3 % nebulizer solution 3 mL, 3 mL, Nebulization, PRN    sodium chloride (Saline Mist) 0.65 % nasal solution 1 spray, 1 spray, Each Nare, Q3H PRN    budesonide (Pulmicort) 0.5 MG/2ML nebulizer suspension 0.5 mg, 0.5 mg, Nebulization, 2 times daily    hydrALAZINE (Apresoline) tab 25 mg, 25 mg, Oral, Q8H SHELLEY    carvedilol (Coreg) tab 12.5 mg, 12.5 mg, Oral, BID with meals    amLODIPine (Norvasc) tab 10 mg, 10 mg, Oral, Daily    guaiFENesin (Robitussin) 100 MG/5 ML oral liquid 100 mg, 100 mg, Oral, Q4H PRN    benzonatate (Tessalon) cap 100 mg, 100 mg, Oral, TID PRN    albuterol (Ventolin HFA) 108 (90 Base) MCG/ACT inhaler 2 puff, 2 puff, Inhalation, Q4H PRN    atorvastatin (Lipitor) tab 40 mg, 40 mg, Oral, Nightly    levothyroxine (Synthroid) tab 100 mcg, 100 mcg, Oral, Daily @ 0700    [Held by provider] predniSONE (Deltasone) tab 1 mg, 1 mg, Oral, Daily with breakfast    pregabalin (Lyrica) cap 25 mg, 25 mg, Oral, BID    acetaminophen (Tylenol Extra Strength) tab 500 mg, 500 mg, Oral, Q4H PRN    melatonin tab 3 mg, 3 mg, Oral, Nightly PRN    glycerin-hypromellose- (Artificial Tears) 0.2-0.2-1 % ophthalmic solution 1 drop, 1 drop, Both Eyes, QID PRN    enoxaparin (Lovenox) 40 MG/0.4ML SUBQ injection 40 mg, 40 mg, Subcutaneous, Daily    polyethylene glycol (PEG 3350) (Miralax) 17 g oral packet 17 g, 17 g, Oral, Daily PRN    sennosides (Senokot) tab 17.2 mg, 17.2 mg, Oral, Nightly PRN    bisacodyl (Dulcolax) 10 MG rectal suppository 10 mg, 10 mg, Rectal, Daily PRN    ondansetron (Zofran) 4 MG/2ML injection 4 mg, 4 mg, Intravenous, Q6H  PRN    Functional Status History:  ADLs: bathing or showering, dressing, getting in and out of bed or a chair, walking, using the toilet, and eating  - MODERATE  3 - 5 performance deficits currently, independent prior to arrival  IADLs: use the phone, shop for groceries, meal preparation, manage medicines, clean living area, use transportation by self, manage money  - MODERATE  3 - 5 performance deficits  Recurrent falls: Yes    Palliative Performance Scale:   Prior to admission Palliative performance scale PPSv2 (%): 60 (pt/family reported)   Current Palliative performance scale PPSv2 (%): 40   % Ambulation Activity Level Self-Care Intake Consciousness   100 Full  Normal  No Disease Full Normal Full   90 Full  Normal  Some Disease Full Normal Full   80 Full  Normal w/effort  Some Disease Full Normal or reduced Full   70 Reduced  Can't Perform Job  Some Disease Full Normal or reduced Full   60 Reduced  Can't Perform Hobby   Significant Disease Occ Assist Normal or reduced Full or confused   50 Mainly sit/lie Can't do any work  Extensive Disease Partial Assist Normal or reduced Full or confused   40 Mainly in bed Can't do any work  Extensive Disease Mainly Assist Normal or reduced Full or confused   30 Bed Bound Can't do any work  Extensive Disease Max Assist  Total Care Reduced  Drowsy/confused   20 Bed Bound Can't do any work  Extensive Disease Max Assist  Total Care Minimal  Drowsy/confused   10 Bed Bound Can't do any work  Extensive Disease Max Assist  Total Care Mouth Care  Drowsy/confused   0 Death        Objective      Vital Signs:  Blood pressure 127/50, pulse 86, temperature 97.8 °F (36.6 °C), temperature source Oral, resp. rate 18, height 5' 7\" (1.702 m), weight 172 lb 3.2 oz (78.1 kg), SpO2 98%, not currently breastfeeding.  Body mass index is 26.97 kg/m².  Physical Exam:  General: Alert & Awake. In no apparent respiratory distress. Body habitus Obese   HEENT: AT/NC. No gross focal deficits. MMM   Cardiac:  RRR  Lungs: Normal effort on NC  Abdomen: firm, distended, hyperactive bowel sounds X 4 quadrants   Extremities: Trace LE edema present  Neurologic: Alert and oriented to person, place, time, and situation   Psychiatric: Mood anxious   Skin: Warm and dry.    Hematology:  Lab Results   Component Value Date    WBC 10.1 09/15/2024    HGB 11.5 (L) 09/15/2024    HCT 35.3 09/15/2024    .0 (L) 09/15/2024       Coags:No results found for: \"PT\", \"INR\", \"PTT\"    Chemistry:  Lab Results   Component Value Date    CREATSERUM 0.73 09/18/2024    BUN 35 (H) 09/18/2024     09/18/2024    K 4.2 09/18/2024     09/18/2024    CO2 29.0 09/18/2024     (H) 09/18/2024    CA 9.1 09/18/2024    ALB 3.4 09/14/2024    ALKPHO 66 09/14/2024    BILT 0.7 09/14/2024    TP 6.1 09/14/2024    AST 9 09/14/2024    ALT 23 09/14/2024    MG 2.4 09/15/2024    PHOS 3.3 09/15/2024       Imaging:  No results found.    Summary of Discussion      I discussed reason for palliative care consultation with daughter.  I spoke with daughter Radha via phone during today's visit for 25 min. Radha requested I do not discuss Palliative with her mother, but to just discuss that we are extra support as she is recovering.  We discussed Faith's prolonged hospital course, co-morbid conditions, and advanced age.  Faith has been telling Radha she \"wants to die.\" However, Radha feels that her mother is frustrated with her weakened state and if she can gain some function at [facility] her outlook could improve. In talking with Faith, she did state she is motivated to go to [facility] & \"try to get stronger.\"    I differentiated the palliative treatment-focus model versus the hospice comfort-focused philosophy of care. I informed the patient/family that having palliative care support does not limit medical treatment options or decisions to those who wish to continue curative or restorative medical therapies. I discussed the benefits of palliative care to include assistance with  arising symptom management needs, an extra layer of support, to ensure GOC are respected throughout healthcare continuum, and assist with transition to hospice care when appropriate.      Outpatient/Community Palliative Care Services:  Usually visit once per 4 weeks  Focus on GOC and symptom management   Palliative Care criteria:  Not altered by prognosis   Does not limit curative or restorative therapies      Outpatient Hospice services:  24/7 phone triage services   RN visit one or more times per week depending on need  Home health aide to assist in ADLs/hygiene   Hospice criteria:  Less than six-month prognosis   Must forego most life-prolonging  measures/treatments   Focus solely on comfort   Must sign onto hospice benefit with agency     We briefly discussed hospice as an option of her mother does not make progress in [facility].    Prognostic awareness/understanding: Good  Radha is aware her mother's condition could decline at any time.  She is aware the prolonged hospital stay has been a major functional setback for her mother, however she is hopeful her mother can make some improvement in [place].  We discussed the disease trajectory of cardiac disease, & infection in advanced age with associated symptoms and decline over time.   We discussed current clinical condition and overall Fair prognosis per clinical team.     Hopes/goals:   To go to [facility], and to have some functional improvements.   To be able to return to independence [facility].     Fears/concerns:   Radha is concerned that Faith will \"give up.\"  Provided emotional support to Daughter.    Advance Care Planning counseling and discussion:   HC POA Documentation Completed--Document in [employer]. Healthcare Agent's Name: Radha   We voluntarily discussed the risks vs benefits of life sustaining treatments in the setting of comorbid medical conditions with patient and daughter.  POLST FORM Completed--Document in Epic  DNAR/Selective Treatment    Assessment and Recommendations         Principal Problem:    Intractable vomiting  Active Problems:    Hypokalemia    Community acquired pneumonia    Bronchospasm    Pneumonia due to Mycoplasma pneumoniae    Type 2 diabetes mellitus without complication, without long-term current use of insulin (HCC)    Acute cystitis without hematuria    Acute respiratory failure with hypoxia (HCC)       Palliative Care encounter    Goals of care: established and treatment focused   Plans to tx to [facility] soon, once constipation/ abd distention is resolved.  Radha is aware she can request community palliative in the future, however after our discussion she has a better understanding of what to look for as her mother goes to [facility]. She will d/w SW at the facility to determine if further palliative will be needed.   Code Status: DNAR/Selective Treatment  Healthcare Agent's Name: Radha    Discussed today's visit with Family and RN.    Palliative Care Follow Up: Palliative care team will sign off at this time. Feel free to contact our team with any questions or concerns.  Palliative care follow up outpatient: is Not indicated , as goals are established. However, Radha (daughter) is aware to request at [facility] should the need arise.     Thank you for allowing Palliative Care services to participate in the care of Alyssa Montgomery.    A total of  60  minutes were spent on this consult, which included all of the following: chart review, direct face to face contact, history taking, physical examination, counseling and coordinating care, and documentation.     Tila Small, ANN-MARIE  9/19/2024  5:24 PM  Palliative Care Services    The 21st Century Cures Act makes medical notes like these available to patients in the interest of transparency. Please be advised this is a medical document. Medical documents are intended to carry relevant information, facts as evident, and the clinical opinion of the practitioner. The medical note is intended as peer to peer communication and may appear blunt  or direct. It is written in medical language and may contain abbreviations or verbiage that are unfamiliar.

## 2024-09-19 NOTE — CM/SW NOTE
Spoke with Rosalia from [facility] who states they will not have a bed available until Saturday. Informed her we are still awaiting patient to have BM. Palliative Care consult today.     SW/CM to remain available for support and/or discharge planning.    Alexia Vivas BHANU  Discharge Planner  [phone]

## 2024-09-19 NOTE — PLAN OF CARE
A&OX2-3. Maintaining O2 on 1L.Tele refusing.C/o back pain.UpX2 with walker. PIV SL.Skin scattered bruising.[id number] diet,QID accucheck. No further needs at this time.Continue POC.Safety and seizure precaution in place.

## 2024-09-19 NOTE — PLAN OF CARE
Patient A&O x2-3 , short term memory loss. On droplet Isolation.Maintained O2 on 1L, RA at baseline. Seizure precautions in place. Refusing Tele Up x2 max assist with walker. QID accuchecks. Patient reported constipation. Plan to give stool softeners this morning.     Problem: Patient/Family Goals  Goal: Patient/Family Long Term Goal  Description: Patient's Long Term Goal: Back to [facility]    Interventions:  - IV antibiotics  - O2  - Nebs  - fall precautions  - See additional Care Plan goals for specific interventions  Outcome: Progressing  Goal: Patient/Family Short Term Goal  Description: Patient's Short Term Goal: Pain relief  9/1 noc: wean O2  9/4AM: urinate  9/5 noc: wean off 02   9/6 noc: wean O2 to baseline  9/7 AM: wean o2  9/7 noc: wean O2 as duncan  9/8 NOC: \"I want to go to sleep\"  9/9 NOC: wean O2  9/10 NOC: sleep  9/13 noc: manage cough  9/14: wean O2 as duncan  9/14 noc: sleep/ manage cough  9/15 NOC: sleep  9/17 NOC:Weaned O2,Sleep  9/18 NOC:Sleep,manage cough  Interventions:   - Tylenol,   - reposition  -nebs, hob 30,   -antitussives prn    - See additional Care Plan goals for specific interventions  Outcome: Progressing     Problem: GASTROINTESTINAL - ADULT  Goal: Minimal or absence of nausea and vomiting  Description: INTERVENTIONS:  - Maintain adequate hydration with IV or PO as ordered and tolerated  - Nasogastric tube to low intermittent suction as ordered  - Evaluate effectiveness of ordered antiemetic medications  - Provide nonpharmacologic comfort measures as appropriate  - Advance diet as tolerated, if ordered  - Obtain nutritional consult as needed  - Evaluate fluid balance  Outcome: Progressing     Problem: METABOLIC/FLUID AND ELECTROLYTES - ADULT  Goal: Electrolytes maintained within normal limits  Description: INTERVENTIONS:  - Monitor labs and rhythm and assess patient for signs and symptoms of electrolyte imbalances  - Administer electrolyte replacement as ordered  - Monitor  response to electrolyte replacements, including rhythm and repeat lab results as appropriate  - Fluid restriction as ordered  - Instruct patient on fluid and nutrition restrictions as appropriate  Outcome: Progressing  Goal: Hemodynamic stability and optimal renal function maintained  Description: INTERVENTIONS:  - Monitor labs and assess for signs and symptoms of volume excess or deficit  - Monitor intake, output and patient weight  - Monitor urine specific gravity, serum osmolarity and serum sodium as indicated or ordered  - Monitor response to interventions for patient's volume status, including labs, urine output, blood pressure (other measures as available)  - Encourage oral intake as appropriate  - Instruct patient on fluid and nutrition restrictions as appropriate  Outcome: Progressing     Problem: RESPIRATORY - ADULT  Goal: Achieves optimal ventilation and oxygenation  Description: INTERVENTIONS:  - Assess for changes in respiratory status  - Assess for changes in mentation and behavior  - Position to facilitate oxygenation and minimize respiratory effort  - Oxygen supplementation based on oxygen saturation or ABGs  - Provide Smoking Cessation handout, if applicable  - Encourage broncho-pulmonary hygiene including cough, deep breathe, Incentive Spirometry  - Assess the need for suctioning and perform as needed  - Assess and instruct to report SOB or any respiratory difficulty  - Respiratory Therapy support as indicated  - Manage/alleviate anxiety  - Monitor for signs/symptoms of CO2 retention  Outcome: Progressing     Problem: MUSCULOSKELETAL - ADULT  Goal: Return mobility to safest level of function  Description: INTERVENTIONS:  - Assess patient stability and activity tolerance for standing, transferring and ambulating w/ or w/o assistive devices  - Assist with transfers and ambulation using safe patient handling equipment as needed  - Ensure adequate protection for wounds/incisions during mobilization  -  Obtain PT/OT consults as needed  - Advance activity as appropriate  - Communicate ordered activity level and limitations with patient/family  Outcome: Progressing     Problem: SAFETY ADULT - FALL  Goal: Free from fall injury  Description: INTERVENTIONS:  - Assess pt frequently for physical needs  - Identify cognitive and physical deficits and behaviors that affect risk of falls.  - Sidney fall precautions as indicated by assessment.  - Educate pt/family on patient safety including physical limitations  - Instruct pt to call for assistance with activity based on assessment  - Modify environment to reduce risk of injury  - Provide assistive devices as appropriate  - Consider OT/PT consult to assist with strengthening/mobility  - Encourage toileting schedule  Outcome: Progressing     Problem: Diabetes/Glucose Control  Goal: Glucose maintained within prescribed range  Description: INTERVENTIONS:  - Monitor Blood Glucose as ordered  - Assess for signs and symptoms of hyperglycemia and hypoglycemia  - Administer ordered medications to maintain glucose within target range  - Assess barriers to adequate nutritional intake and initiate nutrition consult as needed  - Instruct patient on self management of diabetes  Outcome: Progressing

## 2024-09-19 NOTE — PROGRESS NOTES
[facility]   part of [facility]     Hospitalist Progress Note     Alyssa Montgomery Patient Status:  Inpatient    1932 MRN [id number]   Location [facility] 0-A Attending Medhat Dover MD   Hosp Day # 24 PCP Mony Hall DO     Chief Complaint: n/v    Subjective:     Patient resting in bed and reports being worried about her blood sugars.  She reports her appetite    Objective:    Review of Systems:   A comprehensive review of systems was completed; pertinent positive and negatives stated in subjective.    Vital signs:  Temp:  [97 °F (36.1 °C)-99.1 °F (37.3 °C)] 98 °F (36.7 °C)  Pulse:  [75-86] 76  Resp:  [16-20] 18  BP: (109-150)/(43-62) 112/54  SpO2:  [91 %-96 %] 95 %    Physical Exam:    General: No acute distress. Anxious.  Respiratory: B/L wheezing.   Cardiovascular: S1, S2, regular rate and rhythm  Abdomen: Soft, Non-tender, moderate distention, positive bowel sounds  Neuro: No new focal deficits.   Extremities: No edema      Diagnostic Data:    Labs:  Recent Labs   Lab 24  0724 24  0924 09/15/24  0756   WBC 11.7* 11.4* 10.1   HGB 12.3 11.3* 11.5*   MCV 91.7 92.0 95.1   .0 161.0 149.0*       Recent Labs   Lab 24  0724 24  0924 09/15/24  0756 24  0707   * 252* 366* 119*   BUN 17 27* 30* 35*   CREATSERUM 0.64 0.65 0.78 0.73   CA 8.6* 8.7 9.1 9.1   ALB 3.3 3.4  --   --     141 139 142   K 4.5 4.0 4.5 4.2    105 104 108   CO2 28.0 29.0 28.0 29.0   ALKPHO 71 66  --   --    AST 13 9  --   --    ALT 24 23  --   --    BILT 0.8 0.7  --   --    TP 6.2 6.1  --   --        Estimated Creatinine Clearance: 47.8 mL/min (based on SCr of 0.73 mg/dL).    No results for input(s): \"TROP\", \"TROPHS\", \"CK\" in the last 168 hours.    No results for input(s): \"PTP\", \"INR\" in the last 168 hours.                 Microbiology    Hospital Encounter on 24   1. Urine Culture, Routine     Status: Abnormal (Preliminary result)    Collection Time: 24   6:42 AM    Specimen: Urine, clean catch   Result Value Ref Range    Urine Culture >100,000 CFU/ML Escherichia coli (A) N/A   2. Blood Culture     Status: None    Collection Time: 08/28/24 10:48 AM    Specimen: Blood,peripheral   Result Value Ref Range    Blood Culture Result No Growth 5 Days N/A         Imaging: Reviewed in Epic.    Medications:    cefTRIAXone  1 g Intravenous Q24H    phenazopyridine  100 mg Oral TID CC    insulin aspart  1-10 Units Subcutaneous TID AC and HS    insulin aspart  1-20 Units Subcutaneous TID CC and HS    sennosides  8.6 mg Oral Daily    ipratropium-albuterol  3 mL Nebulization QID    pantoprazole  40 mg Oral BID AC    guaiFENesin ER  600 mg Oral BID    nystatin  5 mL Oral QID    multivitamin with minerals  1 tablet Oral Daily    QUEtiapine  25 mg Oral Nightly    budesonide  0.5 mg Nebulization 2 times daily    hydrALAZINE  25 mg Oral Q8H SHELLEY    carvedilol  12.5 mg Oral BID with meals    amLODIPine  10 mg Oral Daily    atorvastatin  40 mg Oral Nightly    levothyroxine  100 mcg Oral Daily @ 0700    [Held by provider] predniSONE  1 mg Oral Daily with breakfast    pregabalin  25 mg Oral BID    enoxaparin  40 mg Subcutaneous Daily       Assessment & Plan:      #Acute hypoxic respiratory failure  #Mycoplasma pneumonia  #Acute bronchitis with bronchospasm   -Weaned off of Vapotherm to 1 L NC; wean O2 as tolerated  -Blood culture NGTD  -Sputum culture NGTD  -Completed azithromycin  -Completed prednisone  -DuoNebs, Pulmicort  -Chest PT  -Pulm following    #E. coli UTI  -IV antibiotics    #Steroid-induced hyperglycemia, improved  -Hyperglycemia protocol  -Diabetes APN following    #Abdominal distention  -KUB without evidence of obstruction/impaction    -Continue bowel regimen    #Acute metabolic encephalopathy   -Likely due to above and extended hospital stay  -Frequent re-orientation necessary     #CAD  -Aspirin, statin, carvedilol     #HFpEF  -Compensated   -Echo 2023 with normal EF, G1DD    #  Hypertension  -Carvedilol, hydralazine, amlodipine    #Hypothyroidism  -Levothyroxine    #PMR  -Prednisone    Arely Barth DO      Supplementary Documentation:     Quality:  DVT Mechanical Prophylaxis:     Early ambuation  DVT Pharmacologic Prophylaxis   Medication    enoxaparin (Lovenox) 40 MG/0.4ML SUBQ injection 40 mg                Code Status: DNAR/Selective Treatment  Buck: External urinary catheter in place  Buck Duration (in days):   Central line:    XAVI:     Discharge is dependent on: clinical improvement  At this point Ms. Montgomery is expected to be discharge to: tbd    The 21st Century Cures Act makes medical notes like these available to patients in the interest of transparency. Please be advised this is a medical document. Medical documents are intended to carry relevant information, facts as evident, and the clinical opinion of the practitioner. The medical note is intended as peer to peer communication and may appear blunt or direct. It is written in medical language and may contain abbreviations or verbiage that are unfamiliar.

## 2024-09-19 NOTE — SPIRITUAL CARE NOTE
Spiritual Care Visit Note    Patient Name: Alyssa Montgomery Date of Spiritual Care Visit: 24   : 1932 Primary Dx: Intractable vomiting       Referred By: Referral From:     Spiritual Care Taxonomy:    Intended Effects: Latosha affirmation    Methods: Offer emotional support    Interventions: Share written prayer;[place];Prayer for healing    Visit Type/Summary:     - Spiritual Care: Provided support for Patient's spiritual/Mormonism requests. Provided Communion and verified NPO status. Alyssa was grateful  rounded back and provided communion.  As this sacrament is important to her,  plans on returning Saturday to administer.   remains available for follow up.    Spiritual Care support can be requested via an Epic consult. For urgent/immediate needs, please contact the On Call  at: Edward: ext [phone]

## 2024-09-19 NOTE — PROGRESS NOTES
[facility]  Pulmonary/Critical Care/Sleep Medicine Progress note    Alyssa Montgomery Patient Status:  Inpatient    1932 MRN [id number]   Location [facility] 5NW-A Attending Debby Cheek MD   Hosp Day # 24 PCP Mony Hall DO     Chief Complaint   Patient presents with    Nausea/Vomiting/Diarrhea        History of Present Illness:     Breathing okay.q1    History:  Past Medical History:    Essential hypertension    Heart attack (HCC)        Hyperlipidemia    Hypothyroidism    Macular degeneration    Bilateral    Retinopathy of both eyes    Diagnosis documented by Dr Serrano in notes from visit on 11/3/14     Past Surgical History:   Procedure Laterality Date    Cataract surgery, complex      D & c  1968    Hysterectomy  1972    Oophorectomy  1965    Skin surgery  2019    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 Her smoking use included cigarettes. She has never used smokeless tobacco. She reports that she does not currently use alcohol after a past usage of about 7.0 standard drinks of alcohol per week. She reports that she does not use drugs.    Allergies:  No Known Allergies    Medications:    Current Facility-Administered Medications:     cefTRIAXone (Rocephin) 1 g in sodium chloride 0.9% 100 mL IVPB-ADDV, 1 g, Intravenous, Q24H    phenazopyridine (Pyridium) tab 100 mg, 100 mg, Oral, TID CC    insulin degludec (Tresiba) 100 units/mL flextouch 8 Units, 8 Units, Subcutaneous, Daily    glucose (Dex4) 15 GM/59ML oral liquid 15 g, 15 g, Oral, Q15 Min PRN **OR** glucose (Glutose) 40% oral gel 15 g, 15 g, Oral, Q15 Min PRN **OR** glucose-vitamin C (Dex-4) chewable tab 4 tablet, 4 tablet, Oral, Q15 Min PRN **OR** dextrose 50% injection 50 mL, 50  mL, Intravenous, Q15 Min PRN **OR** glucose (Dex4) 15 GM/59ML oral liquid 30 g, 30 g, Oral, Q15 Min PRN **OR** glucose (Glutose) 40% oral gel 30 g, 30 g, Oral, Q15 Min PRN **OR** glucose-vitamin C (Dex-4) chewable tab 8 tablet, 8 tablet, Oral, Q15 Min PRN    insulin aspart (NovoLOG) 100 Units/mL FlexPen 1-10 Units, 1-10 Units, Subcutaneous, TID AC and HS    insulin aspart (NovoLOG) 100 Units/mL FlexPen 1-20 Units, 1-20 Units, Subcutaneous, TID CC and HS    sennosides (Senokot) tab 8.6 mg, 8.6 mg, Oral, Daily    ipratropium-albuterol (Duoneb) 0.5-2.5 (3) MG/3ML inhalation solution 3 mL, 3 mL, Nebulization, QID    pantoprazole (Protonix) DR tab 40 mg, 40 mg, Oral, BID AC    guaiFENesin ER (Mucinex) 12 hr tab 600 mg, 600 mg, Oral, BID    nystatin (Mycostatin) 010522 UNIT/ML oral suspension 500,000 Units, 5 mL, Oral, QID    hydrOXYzine (Atarax) tab 25 mg, 25 mg, Oral, TID PRN    multivitamin with minerals (Thera M Plus) tab 1 tablet, 1 tablet, Oral, Daily    QUEtiapine (SEROquel) tab 25 mg, 25 mg, Oral, Nightly    sodium chloride (hypertonic) 3 % nebulizer solution 3 mL, 3 mL, Nebulization, PRN    sodium chloride (Saline Mist) 0.65 % nasal solution 1 spray, 1 spray, Each Nare, Q3H PRN    budesonide (Pulmicort) 0.5 MG/2ML nebulizer suspension 0.5 mg, 0.5 mg, Nebulization, 2 times daily    hydrALAZINE (Apresoline) tab 25 mg, 25 mg, Oral, Q8H SHELLEY    carvedilol (Coreg) tab 12.5 mg, 12.5 mg, Oral, BID with meals    amLODIPine (Norvasc) tab 10 mg, 10 mg, Oral, Daily    guaiFENesin (Robitussin) 100 MG/5 ML oral liquid 100 mg, 100 mg, Oral, Q4H PRN    benzonatate (Tessalon) cap 100 mg, 100 mg, Oral, TID PRN    albuterol (Ventolin HFA) 108 (90 Base) MCG/ACT inhaler 2 puff, 2 puff, Inhalation, Q4H PRN    atorvastatin (Lipitor) tab 40 mg, 40 mg, Oral, Nightly    levothyroxine (Synthroid) tab 100 mcg, 100 mcg, Oral, Daily @ 0700    [Held by provider] predniSONE (Deltasone) tab 1 mg, 1 mg, Oral, Daily with breakfast    pregabalin  (Lyrica) cap 25 mg, 25 mg, Oral, BID    acetaminophen (Tylenol Extra Strength) tab 500 mg, 500 mg, Oral, Q4H PRN    melatonin tab 3 mg, 3 mg, Oral, Nightly PRN    glycerin-hypromellose- (Artificial Tears) 0.2-0.2-1 % ophthalmic solution 1 drop, 1 drop, Both Eyes, QID PRN    enoxaparin (Lovenox) 40 MG/0.4ML SUBQ injection 40 mg, 40 mg, Subcutaneous, Daily    polyethylene glycol (PEG 3350) (Miralax) 17 g oral packet 17 g, 17 g, Oral, Daily PRN    sennosides (Senokot) tab 17.2 mg, 17.2 mg, Oral, Nightly PRN    bisacodyl (Dulcolax) 10 MG rectal suppository 10 mg, 10 mg, Rectal, Daily PRN    fleet enema (Fleet) rectal enema 133 mL, 1 enema, Rectal, Once PRN    ondansetron (Zofran) 4 MG/2ML injection 4 mg, 4 mg, Intravenous, Q6H PRN    Intake/Output:  No intake or output data in the 24 hours ending 09/19/24 1016         Review of Systems    Review of Systems: A comprehensive 10 point review of systems was completed.  Pertinent positives and negatives noted in the the HPI.         Patient Vitals for the past 24 hrs:   BP Temp Temp src Pulse Resp SpO2   09/16/24 1000 (!) 172/71 -- -- 85 24 97 %   09/16/24 0427 151/76 98.4 °F (36.9 °C) Oral 69 16 96 %   09/16/24 0200 -- -- -- 75 -- 97 %   09/15/24 1930 (!) 162/56 97.7 °F (36.5 °C) Oral 95 16 95 %   09/15/24 1315 125/76 97.8 °F (36.6 °C) Oral 72 16 95 %     Vitals:    09/19/24 0100 09/19/24 0522 09/19/24 0630 09/19/24 0718   BP: 109/43 150/54 138/62    BP Location: Right arm Right arm Left arm    Pulse: 75 86 80    Resp: 18 18 16    Temp: 97 °F (36.1 °C) 98.1 °F (36.7 °C) 98 °F (36.7 °C)    TempSrc: Oral Oral Oral    SpO2: 95% 95% 95% 96%   Weight:       Height:          Body mass index is 26.97 kg/m².     Physical Exam    Physical Exam:   General: alert, cooperative, oriented.  No respiratory distress.  Pleasantly confused   Head: Normocephalic, without obvious abnormality, atraumatic.  Hard of hearing   Eyes: Conjunctivae/corneas clear.  No scleral icterus.  No  conjunctival     hemorrhage.   Nose: Nares normal.   Throat: Lips, mucosa, and tongue normal.  No thrush noted.   Neck: Soft, supple neck; trachea midline, no adenopathy, no thyromegaly.   Lungs: Diminished Breath sounds bilaterally, bilateral lower lobe rales posteriorly   Chest wall: No tenderness or deformity.   Heart: Regular rate and rhythm, normal S1S2, no murmur.   Abdomen: soft, non-tender, non-distended, no masses, no guarding, no     rebound, positive BS.   Extremity: no edema, no cyanosis   Skin: No rashes or lesions.   Neurological: Alert, interactive, no focal deficits    Lab Data Review:    Recent Labs   Lab 09/13/24  0724 09/14/24  0924 09/15/24  0756   WBC 11.7* 11.4* 10.1   HGB 12.3 11.3* 11.5*   HCT 35.4 33.4* 35.3   .0 161.0 149.0*       Recent Labs   Lab 09/13/24  0724 09/14/24  0924 09/15/24  0756 09/18/24  0707    141 139 142   K 4.5 4.0 4.5 4.2    105 104 108   CO2 28.0 29.0 28.0 29.0   BUN 17 27* 30* 35*   CREATSERUM 0.64 0.65 0.78 0.73   CA 8.6* 8.7 9.1 9.1   ALB 3.3 3.4  --   --    ALKPHO 71 66  --   --    ALT 24 23  --   --    AST 13 9  --   --    * 252* 366* 119*       Recent Labs   Lab 09/15/24  0756   MG 2.4       Lab Results   Component Value Date    PHOS 3.3 09/15/2024        No results for input(s): \"PT\", \"INR\", \"PTT\" in the last 168 hours.    No results for input(s): \"ABGPHT\", \"LEHQOL4P\", \"TTBXB8Y\", \"ABGHCO3\", \"ABGBE\", \"TEMP\", \"CARRIE\", \"SITE\", \"DEV\", \"THGB\" in the last 168 hours.    No results for input(s): \"TROP\", \"CKMB\" in the last 168 hours.      Cultures:   Hospital Encounter on 08/26/24   1. Blood Culture     Status: None    Collection Time: 08/28/24 10:48 AM    Specimen: Blood,peripheral   Result Value Ref Range    Blood Culture Result No Growth 5 Days N/A            Radiology personally reviewed:  No results found.     Patient Active Problem List   Diagnosis    Hypothyroidism    Unspecified cataract    Atherosclerosis of coronary artery    Herpes  zoster without mention of complication    Dyslipidemia    Retinopathy of both eyes    Macular degeneration    Benign essential HTN    Hypercholesteremia    Exudative age-related macular degeneration of left eye with active choroidal neovascularization ([facility])    Macular cyst, hole, or pseudohole, right eye    Nonexudative age-related macular degeneration, right eye, intermediate dry stage    PMR (polymyalgia rheumatica) ([facility])    Paving stone degeneration of retina, bilateral    Asymptomatic carotid artery stenosis, bilateral    Weakness    Constipation    Thrombocytopenia ([facility])    Syncope and collapse    Concussion with loss of consciousness    Fall, initial encounter    Laceration of right little finger without foreign body without damage to nail, initial encounter    Neuropathy    Shortness of breath    Respiratory syncytial virus (RSV)    RSV (acute bronchiolitis due to respiratory syncytial virus)    Intractable vomiting    Hypokalemia    Community acquired pneumonia    Bronchospasm    Pneumonia due to Mycoplasma pneumoniae    Type 2 diabetes mellitus without complication, without long-term current use of insulin ([facility])     Assessment:  Acute hypoxic respiratory failure:: Again placed on supplemental oxygen 1 L/min overnight but oxygen saturation 98%   Left lower lobe pneumonia/lung infiltrate on CT chest 9/3/2024 Mycoplasma pneumonia positive completed antibiotic course  Shortness of breath: Improving   Acute bronchitis: Improving  Type 2 diabetes mellitus:/Hyperglycemia further steroid-induced  GERD  Hypertension  Hyperlipidemia  History of coronary artery disease  History of HFpEF  Polymyalgia rheumatica  Hypothyroidism  Leukocytosis: Improved  Acute metabolic encephalopathy: Improving  Steroid induced hyperglycemia      Plan:  Wean off FiO2 to keep oxygen saturation between 90% to 92%  May need oxygen 1 L/min at bedtime and as needed if desats during daytime  Continue DuoNebs every 6 hours  Budesonide 0.5 mg  nebulizers every 12 hours  DVT prophylaxis:  Lovenox 40 mg subcutaneous every 24 hours  GI prophylaxis: Protonix 40 mg daily  Continue diabetic medications with sliding scale insulin  Follow-up labs and chest x-ray in a.m.  Discharge planning    Thank You for allowing me to participate in this patient's care     Luis Murphy MD      Note to the patient: The 21st Century Cures Act makes medical notes like these available to patients in the interest of transparency. However, be advised that this is a medical document. It is intended as peer to peer communication. It is written in medical language and may contain abbreviations or verbiage that are unfamiliar. It may appear blunt or direct. Medical documents are intended to carry relevant information, facts as evident, and clinical opinion of the practitioner.      Disclaimer: Components of this note were documented using voice recognition system and are subject to errors not corrected at proofreading. Contact the author of this note for any clarifications

## 2024-09-20 ENCOUNTER — APPOINTMENT (OUTPATIENT)
Dept: GENERAL RADIOLOGY | Facility: HOSPITAL | Age: 89
End: 2024-09-20
Attending: INTERNAL MEDICINE
Payer: MEDICARE

## 2024-09-20 VITALS
OXYGEN SATURATION: 93 % | SYSTOLIC BLOOD PRESSURE: 134 MMHG | WEIGHT: 172.19 LBS | HEIGHT: 67 IN | BODY MASS INDEX: 27.02 KG/M2 | DIASTOLIC BLOOD PRESSURE: 48 MMHG | TEMPERATURE: 100 F | RESPIRATION RATE: 16 BRPM | HEART RATE: 79 BPM

## 2024-09-20 LAB
ANION GAP SERPL CALC-SCNC: 5 MMOL/L (ref 0–18)
BASOPHILS # BLD AUTO: 0.02 X10(3) UL (ref 0–0.2)
BASOPHILS NFR BLD AUTO: 0.2 %
BUN BLD-MCNC: 25 MG/DL (ref 9–23)
CALCIUM BLD-MCNC: 8.4 MG/DL (ref 8.7–10.4)
CHLORIDE SERPL-SCNC: 107 MMOL/L (ref 98–112)
CO2 SERPL-SCNC: 29 MMOL/L (ref 21–32)
CREAT BLD-MCNC: 0.65 MG/DL
EGFRCR SERPLBLD CKD-EPI 2021: 83 ML/MIN/1.73M2 (ref 60–?)
EOSINOPHIL # BLD AUTO: 0.18 X10(3) UL (ref 0–0.7)
EOSINOPHIL NFR BLD AUTO: 1.7 %
ERYTHROCYTE [DISTWIDTH] IN BLOOD BY AUTOMATED COUNT: 14 %
GLUCOSE BLD-MCNC: 104 MG/DL (ref 70–99)
GLUCOSE BLD-MCNC: 91 MG/DL (ref 70–99)
GLUCOSE BLD-MCNC: 97 MG/DL (ref 70–99)
HCT VFR BLD AUTO: 33.6 %
HGB BLD-MCNC: 11.3 G/DL
IMM GRANULOCYTES # BLD AUTO: 0.06 X10(3) UL (ref 0–1)
IMM GRANULOCYTES NFR BLD: 0.6 %
LYMPHOCYTES # BLD AUTO: 0.85 X10(3) UL (ref 1–4)
LYMPHOCYTES NFR BLD AUTO: 8.2 %
MCH RBC QN AUTO: 31 PG (ref 26–34)
MCHC RBC AUTO-ENTMCNC: 33.6 G/DL (ref 31–37)
MCV RBC AUTO: 92.3 FL
MONOCYTES # BLD AUTO: 0.56 X10(3) UL (ref 0.1–1)
MONOCYTES NFR BLD AUTO: 5.4 %
NEUTROPHILS # BLD AUTO: 8.69 X10 (3) UL (ref 1.5–7.7)
NEUTROPHILS # BLD AUTO: 8.69 X10(3) UL (ref 1.5–7.7)
NEUTROPHILS NFR BLD AUTO: 83.9 %
OSMOLALITY SERPL CALC.SUM OF ELEC: 296 MOSM/KG (ref 275–295)
PLATELET # BLD AUTO: 93 10(3)UL (ref 150–450)
PLATELETS.RETICULATED NFR BLD AUTO: 4.9 % (ref 0–7)
POTASSIUM SERPL-SCNC: 4 MMOL/L (ref 3.5–5.1)
RBC # BLD AUTO: 3.64 X10(6)UL
SODIUM SERPL-SCNC: 141 MMOL/L (ref 136–145)
WBC # BLD AUTO: 10.4 X10(3) UL (ref 4–11)

## 2024-09-20 PROCEDURE — 71045 X-RAY EXAM CHEST 1 VIEW: CPT | Performed by: INTERNAL MEDICINE

## 2024-09-20 PROCEDURE — 99233 SBSQ HOSP IP/OBS HIGH 50: CPT | Performed by: INTERNAL MEDICINE

## 2024-09-20 PROCEDURE — 99231 SBSQ HOSP IP/OBS SF/LOW 25: CPT | Performed by: CLINICAL NURSE SPECIALIST

## 2024-09-20 RX ORDER — HYDRALAZINE HYDROCHLORIDE 25 MG/1
25 TABLET, FILM COATED ORAL EVERY 8 HOURS SCHEDULED
Qty: 90 TABLET | Refills: 0 | Status: SHIPPED | OUTPATIENT
Start: 2024-09-20 | End: 2024-10-20

## 2024-09-20 RX ORDER — AMLODIPINE BESYLATE 10 MG/1
10 TABLET ORAL DAILY
Qty: 30 TABLET | Refills: 0 | Status: SHIPPED | OUTPATIENT
Start: 2024-09-21 | End: 2024-10-21

## 2024-09-20 RX ORDER — SENNOSIDES A AND B 8.6 MG/1
8.6 TABLET, FILM COATED ORAL DAILY
Qty: 30 TABLET | Refills: 0 | Status: SHIPPED | OUTPATIENT
Start: 2024-09-21 | End: 2024-10-21

## 2024-09-20 RX ORDER — POLYETHYLENE GLYCOL 3350 17 G/17G
17 POWDER, FOR SOLUTION ORAL DAILY PRN
Qty: 30 EACH | Refills: 0 | Status: SHIPPED | OUTPATIENT
Start: 2024-09-20 | End: 2024-10-20

## 2024-09-20 RX ORDER — PANTOPRAZOLE SODIUM 40 MG/1
40 TABLET, DELAYED RELEASE ORAL
Qty: 60 TABLET | Refills: 0 | Status: SHIPPED | OUTPATIENT
Start: 2024-09-20 | End: 2024-10-20

## 2024-09-20 RX ORDER — GUAIFENESIN 600 MG/1
600 TABLET, EXTENDED RELEASE ORAL 2 TIMES DAILY
Qty: 14 TABLET | Refills: 0 | Status: SHIPPED | OUTPATIENT
Start: 2024-09-20 | End: 2024-09-27

## 2024-09-20 RX ORDER — BUDESONIDE 0.5 MG/2ML
0.5 INHALANT ORAL 2 TIMES DAILY
Qty: 120 ML | Refills: 0 | Status: SHIPPED | OUTPATIENT
Start: 2024-09-20 | End: 2024-10-20

## 2024-09-20 RX ORDER — CARVEDILOL 12.5 MG/1
12.5 TABLET ORAL 2 TIMES DAILY WITH MEALS
Qty: 60 TABLET | Refills: 0 | Status: SHIPPED | OUTPATIENT
Start: 2024-09-20 | End: 2024-10-20

## 2024-09-20 NOTE — OCCUPATIONAL THERAPY NOTE
OCCUPATIONAL THERAPY TREATMENT NOTE - INPATIENT     Room Number: 531/531-A  Session: 4       Presenting Problem: Nausea, vomiting, acute bronchities, respiratory failure, pneumonia  HOME SITUATION  Type of Home: Independent living facility  Home Layout: One level  Lives With: Alone     Toilet and Equipment: Comfort height toilet  Shower/Tub and Equipment: Walk-in shower  Other Equipment:  (rw)     Drives: No  Patient Regularly Uses: Glasses     Prior Level of Function: Modified independent with all ADL. Uses RW.    ASSESSMENT   Patient demonstrates fair progress this session, goals remain in progress.    Patient continues to function below baseline with  ADLs and functional transfers .   Contributing factors to remaining limitations include decreased functional strength, decreased functional reach, decreased endurance, impaired standing balance, impaired coordination, impaired motor planning, and decreased muscular endurance.  Next session anticipate patient to progress toileting, lower body dressing, bed mobility, transfers, static standing balance, dynamic standing balance, functional standing tolerance, and energy conservation strategies.  Occupational Therapy will continue to follow patient for duration of hospitalization.    Patient continues to benefit from continued skilled OT services: to promote return to prior level of function and safety with continuous assistance and gradual rehabilitative therapy.               History: Patient is a 89 year old female admitted on 8/26/2024 with Presenting Problem: Nausea, vomiting, acute bronchities, respiratory failure, pneumonia. Co-Morbidities : CAD, HTN, DM, macular degeneration       WEIGHT BEARING RESTRICTION  Weight Bearing Restriction: None                Recommendations for nursing staff:   Transfers: alannah stedy/total lift  Toileting location: bedside commode    TREATMENT SESSION:  Patient Start of Session: seated EOB with RN/PCT  FUNCTIONAL TRANSFER  ASSESSMENT  Sit to Stand: Edge of Bed  Edge of Bed: Dependent    BED MOBILITY  Supine to Sit : Not tested  Sit to Supine (OT): Not Tested  Scooting: NT    BALANCE ASSESSMENT     FUNCTIONAL ADL ASSESSMENT       ACTIVITY TOLERANCE: poor                         O2 SATURATIONS       EDUCATION PROVIDED  Patient: Role of Occupational Therapy; Plan of Care; Discharge Recommendations; Functional Transfer Techniques; Fall Prevention; Posture/Positioning; Energy Conservation; Proper Body Mechanics  Patient's Response to Education: Requires Further Education; Demonstrates Poor Carry Over to Information      Equipment used: RW  Demonstrates functional use, Would benefit from additional trial      Therapist comments: Pt received seated EOB with nursing staff, in preparation to transfer to bedside chair. Multiple attempts to stand from EOB to RW with pt resisting against this writer and PCT. Pt highly fearful of falling. Pt requiring extensive assist to stand compared to previous sessions. Pt requiring max A x 2-3 to pull to stand to alannah stedy and transfers to bedside chair with all needs.  Patient End of Session: Up in chair;Needs met;Call light within reach;RN aware of session/findings;All patient questions and concerns addressed;Alarm set;Family present    SUBJECTIVE  \"Dear God.\" (Repeated 3x)  \"I'm going to fall.) (Repeated 3x)    PAIN ASSESSMENT  Ratin  Location: denies        OBJECTIVE  Precautions: Bed/chair alarm;Hard of hearing;Low vision    AM-PAC ‘6-Clicks’ Inpatient Daily Activity Short Form  -   Putting on and taking off regular lower body clothing?: A Lot  -   Bathing (including washing, rinsing, drying)?: A Lot  -   Toileting, which includes using toilet, bedpan or urinal? : Total  -   Putting on and taking off regular upper body clothing?: A Lot  -   Taking care of personal grooming such as brushing teeth?: A Little  -   Eating meals?: A Little    AM-PAC Score:  Score: 13  Approx Degree of Impairment:  63.03%  Standardized Score (AM-PAC Scale): 32.03    PLAN  OT Treatment Plan: Balance activities;ADL training;Functional transfer training;UE strengthening/ROM;Patient/Family education;Cognitive reorientation;Equipment eval/education;Compensatory technique education  Rehab Potential : Fair  Frequency: 3x/week    OT Goals:     All goals ongoing 09/20    ADL Goals   Patient will perform upper body dressing:  with setup  Patient will perform lower body dressing:  with min assist  Patient will perform toileting: with mod assist     Functional Transfer Goals  Patient will transfer from supine to sit:  with min assist- goal met 09/06; UPGRADE to CGA  Patient will transfer to bedside commode:  with mod assist     UE Exercise Program Goal  Patient will be supervision with bilateral AROM HEP (home exercise program).    OT Session Time: 15 minutes  Therapeutic Activity: 15 minutes

## 2024-09-20 NOTE — PROGRESS NOTES
[facility]   part of [facility]     Hospitalist Progress Note     lAyssa Montgomery Patient Status:  Inpatient    1932 MRN [id number]   Location [facility] 0-A Attending Medhat Dover MD   Hosp Day # 25 PCP Mony Hall DO     Chief Complaint: n/v    Subjective:     Patient resting in bed and feels well     Objective:    Review of Systems:   A comprehensive review of systems was completed; pertinent positive and negatives stated in subjective.    Vital signs:  Temp:  [97.6 °F (36.4 °C)-99.9 °F (37.7 °C)] 99.9 °F (37.7 °C)  Pulse:  [79-88] 79  Resp:  [16-18] 16  BP: (125-143)/(44-60) 134/48  SpO2:  [93 %-98 %] 93 %    Physical Exam:    General: No acute distress. Anxious.  Respiratory: B/L wheezing.   Cardiovascular: S1, S2, regular rate and rhythm  Abdomen: Soft, Non-tender, moderate distention, positive bowel sounds  Neuro: No new focal deficits.   Extremities: No edema      Diagnostic Data:    Labs:  Recent Labs   Lab 24  0924 09/15/24  0756 24  0706   WBC 11.4* 10.1 10.4   HGB 11.3* 11.5* 11.3*   MCV 92.0 95.1 92.3   .0 149.0* 93.0*       Recent Labs   Lab 24  0924 09/15/24  0756 24  0707 24  0706   * 366* 119* 97   BUN 27* 30* 35* 25*   CREATSERUM 0.65 0.78 0.73 0.65   CA 8.7 9.1 9.1 8.4*   ALB 3.4  --   --   --     139 142 141   K 4.0 4.5 4.2 4.0    104 108 107   CO2 29.0 28.0 29.0 29.0   ALKPHO 66  --   --   --    AST 9  --   --   --    ALT 23  --   --   --    BILT 0.7  --   --   --    TP 6.1  --   --   --        Estimated Creatinine Clearance: 53.7 mL/min (based on SCr of 0.65 mg/dL).    No results for input(s): \"TROP\", \"TROPHS\", \"CK\" in the last 168 hours.    No results for input(s): \"PTP\", \"INR\" in the last 168 hours.                 Microbiology    Hospital Encounter on 24   1. Urine Culture, Routine     Status: Abnormal (Preliminary result)    Collection Time: 24  6:42 AM    Specimen: Urine, clean catch   Result  Value Ref Range    Urine Culture >100,000 CFU/ML Escherichia coli (A) N/A    Urine Culture (A) N/A     10,000 - 50,000 CFU/ML Staphylococcus species, not aureus   2. Blood Culture     Status: None    Collection Time: 08/28/24 10:48 AM    Specimen: Blood,peripheral   Result Value Ref Range    Blood Culture Result No Growth 5 Days N/A         Imaging: Reviewed in Epic.    Medications:    insulin aspart  1-10 Units Subcutaneous TID AC and HS    insulin aspart  1-20 Units Subcutaneous TID CC and HS    sennosides  8.6 mg Oral Daily    ipratropium-albuterol  3 mL Nebulization QID    pantoprazole  40 mg Oral BID AC    guaiFENesin ER  600 mg Oral BID    nystatin  5 mL Oral QID    multivitamin with minerals  1 tablet Oral Daily    QUEtiapine  25 mg Oral Nightly    budesonide  0.5 mg Nebulization 2 times daily    hydrALAZINE  25 mg Oral Q8H SHELLEY    carvedilol  12.5 mg Oral BID with meals    amLODIPine  10 mg Oral Daily    atorvastatin  40 mg Oral Nightly    levothyroxine  100 mcg Oral Daily @ 0700    [Held by provider] predniSONE  1 mg Oral Daily with breakfast    pregabalin  25 mg Oral BID    enoxaparin  40 mg Subcutaneous Daily       Assessment & Plan:      #Acute hypoxic respiratory failure  #Mycoplasma pneumonia  #Acute bronchitis with bronchospasm   -Weaned off of Vapotherm to 1 L NC; wean O2 as tolerated  -Blood culture NGTD  -Sputum culture NGTD  -Completed azithromycin  -Completed prednisone  -DuoNebs, Pulmicort  -Chest PT  -Pulm following    #Steroid-induced hyperglycemia, improved  -Hyperglycemia protocol  -Diabetes APN following    #Abdominal distention  -KUB without evidence of obstruction/impaction    -Continue bowel regimen    #Acute metabolic encephalopathy   -Likely due to above and extended hospital stay  -Frequent re-orientation necessary     #CAD  -Aspirin, statin, carvedilol     #HFpEF  -Compensated   -Echo 2023 with normal EF, G1DD    # Hypertension  -Carvedilol, hydralazine,  amlodipine    #Hypothyroidism  -Levothyroxine    #PMR  -Prednisone    DC Planning; awaiting JACOB placement     Arely Barth DO      Supplementary Documentation:     Quality:  DVT Mechanical Prophylaxis:     Early ambuation  DVT Pharmacologic Prophylaxis   Medication    enoxaparin (Lovenox) 40 MG/0.4ML SUBQ injection 40 mg                Code Status: DNAR/Selective Treatment  Buck: No urinary catheter in place  Buck Duration (in days):   Central line:    XAVI: 9/20/2024    Discharge is dependent on: clinical improvement  At this point Ms. Montgomery is expected to be discharge to: tbd    The 21st Century Cures Act makes medical notes like these available to patients in the interest of transparency. Please be advised this is a medical document. Medical documents are intended to carry relevant information, facts as evident, and the clinical opinion of the practitioner. The medical note is intended as peer to peer communication and may appear blunt or direct. It is written in medical language and may contain abbreviations or verbiage that are unfamiliar.

## 2024-09-20 NOTE — PLAN OF CARE
Alert x2-3, forgetful at times. Room air. 6L w/ activity. Accuchecks QID. Purewick/ briefed. Up max assist. Denies any c/o pain. No n/v/d. Updated on POC, all questions and concerned addressed to pt understanding. Call light within reach    Problem: Patient/Family Goals  Goal: Patient/Family Long Term Goal  Description: Patient's Long Term Goal: Back to [facility]    Interventions:  - IV antibiotics  - O2  - Nebs  - fall precautions  - See additional Care Plan goals for specific interventions  Outcome: Progressing  Goal: Patient/Family Short Term Goal  Description: Patient's Short Term Goal: Pain relief  9/1 noc: wean O2  9/4AM: urinate  9/5 noc: wean off 02   9/6 noc: wean O2 to baseline  9/7 AM: wean o2  9/7 noc: wean O2 as duncan  9/8 NOC: \"I want to go to sleep\"  9/9 NOC: wean O2  9/10 NOC: sleep  9/13 noc: manage cough  9/14: wean O2 as duncan  9/14 noc: sleep/ manage cough  9/15 NOC: sleep  9/17 NOC:Weaned O2,Sleep  9/18 NOC:Sleep,manage cough  Interventions:   - Tylenol,   - reposition  -nebs, hob 30,   -antitussives prn    - See additional Care Plan goals for specific interventions  Outcome: Progressing     Problem: GASTROINTESTINAL - ADULT  Goal: Minimal or absence of nausea and vomiting  Description: INTERVENTIONS:  - Maintain adequate hydration with IV or PO as ordered and tolerated  - Nasogastric tube to low intermittent suction as ordered  - Evaluate effectiveness of ordered antiemetic medications  - Provide nonpharmacologic comfort measures as appropriate  - Advance diet as tolerated, if ordered  - Obtain nutritional consult as needed  - Evaluate fluid balance  Outcome: Progressing     Problem: METABOLIC/FLUID AND ELECTROLYTES - ADULT  Goal: Electrolytes maintained within normal limits  Description: INTERVENTIONS:  - Monitor labs and rhythm and assess patient for signs and symptoms of electrolyte imbalances  - Administer electrolyte replacement as ordered  - Monitor response to electrolyte  replacements, including rhythm and repeat lab results as appropriate  - Fluid restriction as ordered  - Instruct patient on fluid and nutrition restrictions as appropriate  Outcome: Progressing  Goal: Hemodynamic stability and optimal renal function maintained  Description: INTERVENTIONS:  - Monitor labs and assess for signs and symptoms of volume excess or deficit  - Monitor intake, output and patient weight  - Monitor urine specific gravity, serum osmolarity and serum sodium as indicated or ordered  - Monitor response to interventions for patient's volume status, including labs, urine output, blood pressure (other measures as available)  - Encourage oral intake as appropriate  - Instruct patient on fluid and nutrition restrictions as appropriate  Outcome: Progressing     Problem: RESPIRATORY - ADULT  Goal: Achieves optimal ventilation and oxygenation  Description: INTERVENTIONS:  - Assess for changes in respiratory status  - Assess for changes in mentation and behavior  - Position to facilitate oxygenation and minimize respiratory effort  - Oxygen supplementation based on oxygen saturation or ABGs  - Provide Smoking Cessation handout, if applicable  - Encourage broncho-pulmonary hygiene including cough, deep breathe, Incentive Spirometry  - Assess the need for suctioning and perform as needed  - Assess and instruct to report SOB or any respiratory difficulty  - Respiratory Therapy support as indicated  - Manage/alleviate anxiety  - Monitor for signs/symptoms of CO2 retention  Outcome: Progressing     Problem: MUSCULOSKELETAL - ADULT  Goal: Return mobility to safest level of function  Description: INTERVENTIONS:  - Assess patient stability and activity tolerance for standing, transferring and ambulating w/ or w/o assistive devices  - Assist with transfers and ambulation using safe patient handling equipment as needed  - Ensure adequate protection for wounds/incisions during mobilization  - Obtain PT/OT consults as  needed  - Advance activity as appropriate  - Communicate ordered activity level and limitations with patient/family  Outcome: Progressing     Problem: SAFETY ADULT - FALL  Goal: Free from fall injury  Description: INTERVENTIONS:  - Assess pt frequently for physical needs  - Identify cognitive and physical deficits and behaviors that affect risk of falls.  - Burney fall precautions as indicated by assessment.  - Educate pt/family on patient safety including physical limitations  - Instruct pt to call for assistance with activity based on assessment  - Modify environment to reduce risk of injury  - Provide assistive devices as appropriate  - Consider OT/PT consult to assist with strengthening/mobility  - Encourage toileting schedule  Outcome: Progressing     Problem: Diabetes/Glucose Control  Goal: Glucose maintained within prescribed range  Description: INTERVENTIONS:  - Monitor Blood Glucose as ordered  - Assess for signs and symptoms of hyperglycemia and hypoglycemia  - Administer ordered medications to maintain glucose within target range  - Assess barriers to adequate nutritional intake and initiate nutrition consult as needed  - Instruct patient on self management of diabetes  Outcome: Progressing

## 2024-09-20 NOTE — PROGRESS NOTES
[facility]  Pulmonary/Critical Care/Sleep Medicine Progress note    Alyssa Montgomery Patient Status:  Inpatient    1932 MRN [id number]   Location [facility] 5NW-A Attending Debby Cheek MD   Hosp Day # 25 PCP Mony Hall DO     Chief Complaint   Patient presents with    Nausea/Vomiting/Diarrhea        History of Present Illness:     Breathing okay.  Stable overnight.    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 Her smoking use included cigarettes. She has never used smokeless tobacco. She reports that she does not currently use alcohol after a past usage of about 7.0 standard drinks of alcohol per week. She reports that she does not use drugs.    Allergies:  No Known Allergies    Medications:    Current Facility-Administered Medications:     glucose (Dex4) 15 GM/59ML oral liquid 15 g, 15 g, Oral, Q15 Min PRN **OR** glucose (Glutose) 40% oral gel 15 g, 15 g, Oral, Q15 Min PRN **OR** glucose-vitamin C (Dex-4) chewable tab 4 tablet, 4 tablet, Oral, Q15 Min PRN **OR** dextrose 50% injection 50 mL, 50 mL, Intravenous, Q15 Min PRN **OR** glucose (Dex4) 15 GM/59ML oral liquid 30 g, 30 g, Oral, Q15 Min PRN **OR** glucose (Glutose) 40% oral gel 30 g, 30 g, Oral, Q15 Min PRN **OR** glucose-vitamin C (Dex-4) chewable tab 8 tablet, 8  tablet, Oral, Q15 Min PRN    insulin aspart (NovoLOG) 100 Units/mL FlexPen 1-10 Units, 1-10 Units, Subcutaneous, TID AC and HS    insulin aspart (NovoLOG) 100 Units/mL FlexPen 1-20 Units, 1-20 Units, Subcutaneous, TID CC and HS    sennosides (Senokot) tab 8.6 mg, 8.6 mg, Oral, Daily    ipratropium-albuterol (Duoneb) 0.5-2.5 (3) MG/3ML inhalation solution 3 mL, 3 mL, Nebulization, QID    pantoprazole (Protonix) DR tab 40 mg, 40 mg, Oral, BID AC    guaiFENesin ER (Mucinex) 12 hr tab 600 mg, 600 mg, Oral, BID    nystatin (Mycostatin) 628727 UNIT/ML oral suspension 500,000 Units, 5 mL, Oral, QID    hydrOXYzine (Atarax) tab 25 mg, 25 mg, Oral, TID PRN    multivitamin with minerals (Thera M Plus) tab 1 tablet, 1 tablet, Oral, Daily    QUEtiapine (SEROquel) tab 25 mg, 25 mg, Oral, Nightly    sodium chloride (hypertonic) 3 % nebulizer solution 3 mL, 3 mL, Nebulization, PRN    sodium chloride (Saline Mist) 0.65 % nasal solution 1 spray, 1 spray, Each Nare, Q3H PRN    budesonide (Pulmicort) 0.5 MG/2ML nebulizer suspension 0.5 mg, 0.5 mg, Nebulization, 2 times daily    hydrALAZINE (Apresoline) tab 25 mg, 25 mg, Oral, Q8H SHELLEY    carvedilol (Coreg) tab 12.5 mg, 12.5 mg, Oral, BID with meals    amLODIPine (Norvasc) tab 10 mg, 10 mg, Oral, Daily    guaiFENesin (Robitussin) 100 MG/5 ML oral liquid 100 mg, 100 mg, Oral, Q4H PRN    benzonatate (Tessalon) cap 100 mg, 100 mg, Oral, TID PRN    albuterol (Ventolin HFA) 108 (90 Base) MCG/ACT inhaler 2 puff, 2 puff, Inhalation, Q4H PRN    atorvastatin (Lipitor) tab 40 mg, 40 mg, Oral, Nightly    levothyroxine (Synthroid) tab 100 mcg, 100 mcg, Oral, Daily @ 0700    [Held by provider] predniSONE (Deltasone) tab 1 mg, 1 mg, Oral, Daily with breakfast    pregabalin (Lyrica) cap 25 mg, 25 mg, Oral, BID    acetaminophen (Tylenol Extra Strength) tab 500 mg, 500 mg, Oral, Q4H PRN    melatonin tab 3 mg, 3 mg, Oral, Nightly PRN    glycerin-hypromellose- (Artificial Tears) 0.2-0.2-1 %  ophthalmic solution 1 drop, 1 drop, Both Eyes, QID PRN    enoxaparin (Lovenox) 40 MG/0.4ML SUBQ injection 40 mg, 40 mg, Subcutaneous, Daily    polyethylene glycol (PEG 3350) (Miralax) 17 g oral packet 17 g, 17 g, Oral, Daily PRN    sennosides (Senokot) tab 17.2 mg, 17.2 mg, Oral, Nightly PRN    bisacodyl (Dulcolax) 10 MG rectal suppository 10 mg, 10 mg, Rectal, Daily PRN    ondansetron (Zofran) 4 MG/2ML injection 4 mg, 4 mg, Intravenous, Q6H PRN    Intake/Output:    Intake/Output Summary (Last 24 hours) at 9/20/2024 1117  Last data filed at 9/19/2024 2300  Gross per 24 hour   Intake --   Output 425 ml   Net -425 ml            Review of Systems    Review of Systems: A comprehensive 10 point review of systems was completed.  Pertinent positives and negatives noted in the the HPI.         Patient Vitals for the past 24 hrs:   BP Temp Temp src Pulse Resp SpO2   09/16/24 1000 (!) 172/71 -- -- 85 24 97 %   09/16/24 0427 151/76 98.4 °F (36.9 °C) Oral 69 16 96 %   09/16/24 0200 -- -- -- 75 -- 97 %   09/15/24 1930 (!) 162/56 97.7 °F (36.5 °C) Oral 95 16 95 %   09/15/24 1315 125/76 97.8 °F (36.6 °C) Oral 72 16 95 %     Vitals:    09/19/24 2300 09/20/24 0511 09/20/24 0748 09/20/24 0824   BP: 143/53 132/50  134/48   BP Location: Left arm Left arm  Left arm   Pulse: 87 81  79   Resp: 18 18  16   Temp: 99 °F (37.2 °C) 97.6 °F (36.4 °C)  99.9 °F (37.7 °C)   TempSrc: Oral Oral  Oral   SpO2: 96% 95% 96% 93%   Weight:       Height:          Body mass index is 26.97 kg/m².     Physical Exam    Physical Exam:   General: alert, cooperative, oriented.  No respiratory distress.  Pleasantly confused   Head: Normocephalic, without obvious abnormality, atraumatic.  Hard of hearing   Eyes: Conjunctivae/corneas clear.  No scleral icterus.  No conjunctival     hemorrhage.   Nose: Nares normal.   Throat: Lips, mucosa, and tongue normal.  No thrush noted.   Neck: Soft, supple neck; trachea midline, no adenopathy, no thyromegaly.   Lungs:  Diminished Breath sounds bilaterally, bilateral lower lobe rales posteriorly   Chest wall: No tenderness or deformity.   Heart: Regular rate and rhythm, normal S1S2, no murmur.   Abdomen: soft, non-tender, non-distended, no masses, no guarding, no     rebound, positive BS.   Extremity: no edema, no cyanosis   Skin: No rashes or lesions.   Neurological: Alert, interactive, no focal deficits    Lab Data Review:    Recent Labs   Lab 09/14/24  0924 09/15/24  0756 09/20/24  0706   WBC 11.4* 10.1 10.4   HGB 11.3* 11.5* 11.3*   HCT 33.4* 35.3 33.6*   .0 149.0* 93.0*       Recent Labs   Lab 09/14/24  0924 09/15/24  0756 09/18/24  0707 09/20/24  0706    139 142 141   K 4.0 4.5 4.2 4.0    104 108 107   CO2 29.0 28.0 29.0 29.0   BUN 27* 30* 35* 25*   CREATSERUM 0.65 0.78 0.73 0.65   CA 8.7 9.1 9.1 8.4*   ALB 3.4  --   --   --    ALKPHO 66  --   --   --    ALT 23  --   --   --    AST 9  --   --   --    * 366* 119* 97       Recent Labs   Lab 09/15/24  0756   MG 2.4       Lab Results   Component Value Date    PHOS 3.3 09/15/2024        No results for input(s): \"PT\", \"INR\", \"PTT\" in the last 168 hours.    No results for input(s): \"ABGPHT\", \"CDVRVQ6J\", \"UDAEO8Z\", \"ABGHCO3\", \"ABGBE\", \"TEMP\", \"CARRIE\", \"SITE\", \"DEV\", \"THGB\" in the last 168 hours.    No results for input(s): \"TROP\", \"CKMB\" in the last 168 hours.      Cultures:   Hospital Encounter on 08/26/24   1. Urine Culture, Routine     Status: Abnormal (Preliminary result)    Collection Time: 09/18/24  6:42 AM    Specimen: Urine, clean catch   Result Value Ref Range    Urine Culture >100,000 CFU/ML Escherichia coli (A) N/A    Urine Culture (A) N/A     10,000 - 50,000 CFU/ML Staphylococcus species, not aureus   2. Blood Culture     Status: None    Collection Time: 08/28/24 10:48 AM    Specimen: Blood,peripheral   Result Value Ref Range    Blood Culture Result No Growth 5 Days N/A            Radiology personally reviewed:  XR CHEST AP PORTABLE   (CPT=71045)    Result Date: 9/20/2024  CONCLUSION:   1. Pulmonary vascular congestion is mildly decreased.  2. Small bilateral pleural effusions along with peripheral left mid lung field consolidation are stable.    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)    Macular cyst, hole, or pseudohole, right eye    Nonexudative age-related macular degeneration, right eye, intermediate dry stage    PMR (polymyalgia rheumatica) ([facility])    Paving stone degeneration of retina, bilateral    Asymptomatic carotid artery stenosis, bilateral    Weakness    Constipation    Thrombocytopenia ([facility])    Syncope and collapse    Concussion with loss of consciousness    Fall, initial encounter    Laceration of right little finger without foreign body without damage to nail, initial encounter    Neuropathy    Shortness of breath    Respiratory syncytial virus (RSV)    RSV (acute bronchiolitis due to respiratory syncytial virus)    Intractable vomiting    Hypokalemia    Community acquired pneumonia    Bronchospasm    Pneumonia due to Mycoplasma pneumoniae    Type 2 diabetes mellitus without complication, without long-term current use of insulin ([facility])    Acute cystitis without hematuria    Acute respiratory failure with hypoxia ([facility])    Palliative care by specialist    Goals of care, counseling/discussion     Assessment:  Acute hypoxic respiratory failure:: Again placed on supplemental oxygen 1 L/min overnight but oxygen saturation 98%   Left  lower lobe pneumonia/lung infiltrate on CT chest 9/3/2024 Mycoplasma pneumonia positive completed antibiotic course  Shortness of breath: Improving   Acute bronchitis: Improving  Type 2 diabetes mellitus:/Hyperglycemia further steroid-induced  GERD  Hypertension  Hyperlipidemia  History of coronary artery disease  History of HFpEF  Polymyalgia rheumatica  Hypothyroidism  Leukocytosis: Improved  Acute metabolic encephalopathy: Improving  Steroid induced hyperglycemia      Plan:  May need oxygen 1 L/min at bedtime and as needed if desats during daytime  Continue DuoNebs every 6 hours  Budesonide 0.5 mg nebulizers every 12 hours  Discharge per May discharge today      I  will now sign off. Please call as needed.     Thank you for consulting pulmonary/ Critical Care/ Sleep Medicine service    Thank You for allowing me to participate in this patient's care     Luis Murphy MD      Note to the patient: The 21st Century Cures Act makes medical notes like these available to patients in the interest of transparency. However, be advised that this is a medical document. It is intended as peer to peer communication. It is written in medical language and may contain abbreviations or verbiage that are unfamiliar. It may appear blunt or direct. Medical documents are intended to carry relevant information, facts as evident, and clinical opinion of the practitioner.      Disclaimer: Components of this note were documented using voice recognition system and are subject to errors not corrected at proofreading. Contact the author of this note for any clarifications

## 2024-09-20 NOTE — PLAN OF CARE
Problem: Patient/Family Goals  Goal: Patient/Family Long Term Goal  Description: Patient's Long Term Goal: Back to [facility]    Interventions:  - IV antibiotics  - O2  - Nebs  - fall precautions  - See additional Care Plan goals for specific interventions  Outcome: Adequate for Discharge  Goal: Patient/Family Short Term Goal  Description: Patient's Short Term Goal: Pain relief  9/1 noc: wean O2  9/4AM: urinate  9/5 noc: wean off 02   9/6 noc: wean O2 to baseline  9/7 AM: wean o2  9/7 noc: wean O2 as duncan  9/8 NOC: \"I want to go to sleep\"  9/9 NOC: wean O2  9/10 NOC: sleep  9/13 noc: manage cough  9/14: wean O2 as duncan  9/14 noc: sleep/ manage cough  9/15 NOC: sleep  9/17 NOC:Weaned O2,Sleep  9/18 NOC:Sleep,manage cough  Interventions:   - Tylenol,   - reposition  -nebs, hob 30,   -antitussives prn    - See additional Care Plan goals for specific interventions  Outcome: Adequate for Discharge     Problem: GASTROINTESTINAL - ADULT  Goal: Minimal or absence of nausea and vomiting  Description: INTERVENTIONS:  - Maintain adequate hydration with IV or PO as ordered and tolerated  - Nasogastric tube to low intermittent suction as ordered  - Evaluate effectiveness of ordered antiemetic medications  - Provide nonpharmacologic comfort measures as appropriate  - Advance diet as tolerated, if ordered  - Obtain nutritional consult as needed  - Evaluate fluid balance  Outcome: Adequate for Discharge     Problem: METABOLIC/FLUID AND ELECTROLYTES - ADULT  Goal: Electrolytes maintained within normal limits  Description: INTERVENTIONS:  - Monitor labs and rhythm and assess patient for signs and symptoms of electrolyte imbalances  - Administer electrolyte replacement as ordered  - Monitor response to electrolyte replacements, including rhythm and repeat lab results as appropriate  - Fluid restriction as ordered  - Instruct patient on fluid and nutrition restrictions as appropriate  Outcome: Adequate for Discharge  Goal:  Hemodynamic stability and optimal renal function maintained  Description: INTERVENTIONS:  - Monitor labs and assess for signs and symptoms of volume excess or deficit  - Monitor intake, output and patient weight  - Monitor urine specific gravity, serum osmolarity and serum sodium as indicated or ordered  - Monitor response to interventions for patient's volume status, including labs, urine output, blood pressure (other measures as available)  - Encourage oral intake as appropriate  - Instruct patient on fluid and nutrition restrictions as appropriate  Outcome: Adequate for Discharge     Problem: RESPIRATORY - ADULT  Goal: Achieves optimal ventilation and oxygenation  Description: INTERVENTIONS:  - Assess for changes in respiratory status  - Assess for changes in mentation and behavior  - Position to facilitate oxygenation and minimize respiratory effort  - Oxygen supplementation based on oxygen saturation or ABGs  - Provide Smoking Cessation handout, if applicable  - Encourage broncho-pulmonary hygiene including cough, deep breathe, Incentive Spirometry  - Assess the need for suctioning and perform as needed  - Assess and instruct to report SOB or any respiratory difficulty  - Respiratory Therapy support as indicated  - Manage/alleviate anxiety  - Monitor for signs/symptoms of CO2 retention  Outcome: Adequate for Discharge     Problem: MUSCULOSKELETAL - ADULT  Goal: Return mobility to safest level of function  Description: INTERVENTIONS:  - Assess patient stability and activity tolerance for standing, transferring and ambulating w/ or w/o assistive devices  - Assist with transfers and ambulation using safe patient handling equipment as needed  - Ensure adequate protection for wounds/incisions during mobilization  - Obtain PT/OT consults as needed  - Advance activity as appropriate  - Communicate ordered activity level and limitations with patient/family  Outcome: Adequate for Discharge     Problem: SAFETY ADULT -  FALL  Goal: Free from fall injury  Description: INTERVENTIONS:  - Assess pt frequently for physical needs  - Identify cognitive and physical deficits and behaviors that affect risk of falls.  - [place] fall precautions as indicated by assessment.  - Educate pt/family on patient safety including physical limitations  - Instruct pt to call for assistance with activity based on assessment  - Modify environment to reduce risk of injury  - Provide assistive devices as appropriate  - Consider OT/PT consult to assist with strengthening/mobility  - Encourage toileting schedule  Outcome: Adequate for Discharge     Problem: Diabetes/Glucose Control  Goal: Glucose maintained within prescribed range  Description: INTERVENTIONS:  - Monitor Blood Glucose as ordered  - Assess for signs and symptoms of hyperglycemia and hypoglycemia  - Administer ordered medications to maintain glucose within target range  - Assess barriers to adequate nutritional intake and initiate nutrition consult as needed  - Instruct patient on self management of diabetes  Outcome: Adequate for Discharge

## 2024-09-20 NOTE — PROGRESS NOTES
NURSING DISCHARGE NOTE    Discharged Rehab facility via Ambulance.  Accompanied by Support staff  Belongings Taken by patient/family.    Pt's IV removed. Tele and pulse ox returned. All paperwork and prescriptions are given to pt. All questions and concerns were answered. Discharge navigator completed.

## 2024-09-20 NOTE — CM/SW NOTE
09/20/24 1000   Discharge disposition   Expected discharge disposition subacute   Post Acute Care Provider [facility]   Discharge transportation [facility]     Informed by RN that patient is medically cleared for discharge. Spoke with Rosalia from [facility] who confirmed bed available today. Spoke with [facility] and scheduled  for 12:30pm today. PCS form completed and available for RN to print. Spoke with daughters who are both agreeable with discharge. SW will remain available.     [facility]  Phone: [phone]    [facility]  [phone] or [phone]      ELICIA Angulo  Discharge Planner  [phone]

## 2024-09-20 NOTE — DISCHARGE SUMMARY
[facility]     Alyssa Montgomery Patient Status:  Inpatient    1932 MRN [id number]   Location [facility] 5NW-A Attending No att. providers found   Hosp Day # 25 PCP Mony Hall DO     Date of Admission: 2024  Date of Discharge:  2024     Discharge Disposition: SNF Subacute Rehab    Discharge Diagnosis:  #Acute hypoxic respiratory failure  #Mycoplasma pneumonia  #Acute bronchitis with bronchospasm  #Steroid-induced hyperglycemia  #Abdominal distention  #Acute metabolic encephalopathy  #CAD  #HFpEF  #Hypertension  #Hypothyroidism  #PMR    History of Present Illness: Alyssa Montgomery is a 89 year old female with a past med history of coronary disease, hypertension, hyperlipidemia, hypothyroidism who presented to the emergency department with nausea and vomiting.  Patient was not feeling well yesterday evening, she began vomiting overnight, multiple episodes, too numerous to count.  Some greenish/bile coloring to her vomiting.  She was unable to eat or drink anything so family brought her to the hospital.  She has not vomited since coming in.  She denies any food exposures.  No change to her medications.  Denies any diarrhea, no abdominal pain, she had a normal bowel movement yesterday.  She has no other acute complaints at this time.      Brief Synopsis: Patient had intractable nausea vomiting.  CT abdomen pelvis showed no issues.  Patient was started on IV fluids and antiemetics.  Patient was found to have pneumonia and started on IV antibiotics.  She was started on steroids per pulmonology.  She was weaned off steroids.  Blood sugar improved.  She completed IV antibiotic course.  Diabetes APN help to manage patient's insulin regimen.  Acute encephalopathy improved.  Patient was discharged to [facility] with outpatient follow-up.    Lace+ Score: 80  59-90 High Risk  29-58 Medium Risk  0-28   Low Risk       TCM Follow-Up Recommendation:  LACE > 58: High Risk of readmission  after discharge from the hospital.      Procedures during hospitalization:   None    Incidental or significant findings and recommendations (brief descriptions):  See brief synopsis above    Lab/Test results pending at Discharge:   None    Consultants:  Pulm  Palliative care  Diabetes APN    Discharge Medication List:     Discharge Medications        START taking these medications        Instructions Prescription details   budesonide 0.5 MG/2ML Susp  Commonly known as: Pulmicort      Take 2 mL (0.5 mg total) by nebulization 2 (two) times daily.   Stop taking on: October 20, 2024  Quantity: 120 mL  Refills: 0     carvedilol 12.5 MG Tabs  Commonly known as: Coreg      Take 1 tablet (12.5 mg total) by mouth 2 (two) times daily with meals.   Stop taking on: October 20, 2024  Quantity: 60 tablet  Refills: 0     guaiFENesin  MG Tb12  Commonly known as: Mucinex      Take 1 tablet (600 mg total) by mouth 2 (two) times daily for 7 days.   Stop taking on: September 27, 2024  Quantity: 14 tablet  Refills: 0     hydrALAZINE 25 MG Tabs  Commonly known as: Apresoline      Take 1 tablet (25 mg total) by mouth every 8 (eight) hours.   Stop taking on: October 20, 2024  Quantity: 90 tablet  Refills: 0     insulin aspart 100 Units/mL Sopn  Commonly known as: NovoLOG      Inject 1-10 Units into the skin 4 (four) times daily before meals and nightly. Give 1 unit of Novolog (aspart) insulin for every 50 points blood glucose is greater than 140 mg/dL Give Novolog/aspart insulin subcutaneously within 30 minutes of scheduled finger-stick time   Stop taking on: October 20, 2024  Quantity: 12 mL  Refills: 0     insulin aspart 100 Units/mL Sopn  Commonly known as: NovoLOG      Inject 1-20 Units into the skin TID CC and HS. 1 unit of Novolog (aspart) insulin for every 20 grams of carbohydrates eaten Give within 10 minutes before or after a meal   Stop taking on: October 20, 2024  Quantity: 24 mL  Refills: 0     Insulin Pen Needle 32G X 4  MM Misc      May substitute if not on insurance formulary   Quantity: 100 each  Refills: 5     pantoprazole 40 MG Tbec  Commonly known as: Protonix      Take 1 tablet (40 mg total) by mouth 2 (two) times daily before meals.   Stop taking on: October 20, 2024  Quantity: 60 tablet  Refills: 0     polyethylene glycol (PEG 3350) 17 g Pack  Commonly known as: Miralax      Take 17 g by mouth daily as needed.   Stop taking on: October 20, 2024  Quantity: 30 each  Refills: 0     sennosides 8.6 MG Tabs  Commonly known as: Senokot      Take 1 tablet (8.6 mg total) by mouth daily.   Stop taking on: October 21, 2024  Quantity: 30 tablet  Refills: 0            CHANGE how you take these medications        Instructions Prescription details   amLODIPine 10 MG Tabs  Commonly known as: Norvasc  What changed:   medication strength  how much to take  when to take this      Take 1 tablet (10 mg total) by mouth daily.   Stop taking on: October 21, 2024  Quantity: 30 tablet  Refills: 0            CONTINUE taking these medications        Instructions Prescription details   acetaminophen 500 MG Tabs  Commonly known as: Tylenol Extra Strength      Take 2 tablets (1,000 mg total) by mouth every 12 (twelve) hours as needed for Pain.   Refills: 0     albuterol 108 (90 Base) MCG/ACT Aers  Commonly known as: Ventolin HFA      Inhale 1 puff into the lungs every 6 (six) hours as needed for Wheezing or Shortness of Breath.   Quantity: 1 each  Refills: 0     albuterol 108 (90 Base) MCG/ACT Aers  Commonly known as: ProAir HFA      Inhale 2 puffs into the lungs every 4 (four) hours as needed.   Quantity: 8 g  Refills: 0     atorvastatin 40 MG Tabs  Commonly known as: Lipitor      TAKE 1 TABLET(40 MG) BY MOUTH EVERY NIGHT   Quantity: 90 tablet  Refills: 0     docusate sodium 100 MG Caps  Commonly known as: Colace      Take 1 capsule (100 mg total) by mouth 2 (two) times daily as needed for constipation.   Refills: 0     ergocalciferol 1.25 MG (17074 UT)  Caps  Commonly known as: ERGOCALCIFEROL      Take 1 capsule (50,000 Units total) by mouth once a week. saturday   Refills: 0     EYE DROPS RELIEF OP      Apply 1 drop to eye daily as needed.   Refills: 0     levothyroxine 100 MCG Tabs  Commonly known as: Synthroid      TAKE 1 TABLET(100 MCG) BY MOUTH DAILY   Quantity: 90 tablet  Refills: 0     predniSONE 1 MG Tabs  Commonly known as: Deltasone      Take 1 tablet (1 mg total) by mouth daily with breakfast.   Quantity: 90 tablet  Refills: 2     pregabalin 25 MG Caps  Commonly known as: Lyrica      Take 1 capsule (25 mg total) by mouth 2 (two) times daily.   Quantity: 180 capsule  Refills: 0     Spacer/Aero-Holding Chambers Dee Dee      For use with albuterol inhaler   Quantity: 1 each  Refills: 0            STOP taking these medications      metoprolol tartrate 25 MG Tabs  Commonly known as: Lopressor                  Where to Get Your Medications        These medications were sent to [id number] - [place], [place] - [address], [phone], [phone]  [address] [place] [id number]      Phone: [phone]   amLODIPine 10 MG Tabs  budesonide 0.5 MG/2ML Susp  carvedilol 12.5 MG Tabs  guaiFENesin  MG Tb12  hydrALAZINE 25 MG Tabs  insulin aspart 100 Units/mL Sopn  insulin aspart 100 Units/mL Sopn  Insulin Pen Needle 32G X 4 MM Misc  pantoprazole 40 MG Tbec  polyethylene glycol (PEG 3350) 17 g Pack  sennosides 8.6 MG Tabs         [place]    Follow-up appointment:   Veto Velazquez PsyD [address]   [address]  [place] [address] [phone]    Schedule an appointment as soon as possible for a visit  cognitive Mony Alexandra DO [address]  [address]  [facility] [address] [phone]    Follow up in 1 week(s)      Appointments for Next 30 Days 9/23/2024 - 10/23/2024      None            Vital signs:       Physical Exam:    General: No acute distress   Lungs:  clear to auscultation  Cardiovascular: S1, S2  Abdomen: Soft      -----------------------------------------------------------------------------------------------  PATIENT DISCHARGE INSTRUCTIONS: See electronic chart    Arely Barth DO    Total time spent on discharge plannin minutes     The  [employer] Cures Act makes medical notes like these available to patients in the interest of transparency. Please be advised this is a medical document. Medical documents are intended to carry relevant information, facts as evident, and the clinical opinion of the practitioner. The medical note is intended as peer to peer communication and may appear blunt or direct. It is written in medical language and may contain abbreviations or verbiage that are unfamiliar.

## 2024-09-20 NOTE — CONSULTS
[facility]  Diabetes Consult Note    Alyssa Montgomery Patient Status:  Inpatient    1932 MRN [id number]   Location [facility] 5NW-A Attending Debby Cheek MD   Hosp Day # 25 PCP Mony Hall DO       Reason for Consult:   Insulin management recommendations in setting of new T2DM diagnosis    Provider Requesting Consult:  Dr. 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)    Paving stone degeneration of retina, bilateral    Asymptomatic carotid artery stenosis, bilateral    Weakness    Constipation    Thrombocytopenia (HCC)    Syncope and collapse    Concussion with loss of consciousness    Fall, initial encounter    Laceration of right little finger without foreign body without damage to nail, initial encounter    Neuropathy    Shortness of breath    Respiratory syncytial virus (RSV)    RSV (acute bronchiolitis due to respiratory syncytial virus)    Intractable vomiting    Hypokalemia    Community acquired pneumonia    Bronchospasm    Pneumonia due to Mycoplasma pneumoniae    Type 2 diabetes mellitus without complication, without long-term current use of insulin (HCC)    Acute cystitis without hematuria    Acute respiratory failure with hypoxia (HCC)    Palliative care by specialist    Goals of care, counseling/discussion       Medical History:  Past Medical History:    Essential hypertension    Heart attack (HCC)        Hyperlipidemia     Hypothyroidism    Macular degeneration    Bilateral    Retinopathy of both eyes    Diagnosis documented by Dr Serrano in notes from visit on 11/3/14     Past Surgical History:   Procedure Laterality Date    Cataract surgery, complex      D & c  1/1/1968    Hysterectomy  1/1/1972    Oophorectomy  1/1/1965    Skin surgery  06/11/2019    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 Active diabetes medications include aspart AC/HS.      Labs:  Recent Labs   Lab 09/19/24  1133 09/19/24  1629 09/19/24  2105 09/20/24  0509 09/20/24  0830   PGLU 228* 127* 211* 91 104*     Recent Labs     09/15/24  0756 09/16/24  0752 09/17/24  1228     --   --      --   --    CO2 28.0  --   --    BUN 30*  --   --    CREATSERUM 0.78  --   --    A1C 7.5*  --   --    PGLU  --    < > 280*   CA 9.1  --   --     < > = values in this interval not displayed.                History of Present Illness: Alyssa Montgomery is a 89 year old female with a PMH of HTN, HFpEf, hypothyroidism, CAD and HLD admitted 8/26/2024 with complaints of intractable vomiting and nausea with probable aspiration. On 8/27/2024, she went into acute hypoxic respiratory failure and subsequently found to have a CAP positive for mycoplasma pneumoniae on 8/31/2024. She was treated with 3 days of IV solumedrol and is now receiving 40mg prednisone every day. Since beginning of steroids, her glucoses have been increasingly elevated and insulin administration was started 9/16/2024.       Assessment/Recommendations:  Upon interview this morning, patient ready to go to rehab facility; states she is ready to get stronger again. Patient  seems to be in better spirits this morning d/t potential for discharge to rehab. Upon chart review, glucose values over last 24 hours have remained stable s/p steroid discontinuation despite continued liberalization of insulin doses, including discontinuation of degludec.       Plan:  Inpatient insulin recommendations -   Aspart - given pre-prandial glucose of 211 & HS glucose of 104 yesterday, will continue to liberalize doses to 1:20gm CHO & 1:50>140  Discharge plan recommendations - continue aspart  May need further lowering of aspart dose as steroid continues to clear system  Will need close follow-up to determine future need of anti-diabetic management      Prescription Recommendations:  Commercial Medicaid - [facility] Medicare  Insulin: Humalog, Levemir, Lantus   Supplies:  BD pen needles ([employer])  Glucometer:  One Touch Ultra ([employer])      Provider F/U Recommendations:  PCP - Dr. Hall ([place]), recommend f/u within 1 week post rehab discharge      A total of 30 minutes were spent with the patient, 100% was spent counseling and coordinating care for T2 diabetes self-management including nutrition, exercise, blood glucose monitoring, insulin administration, medications, treatment options, follow-up coordination and resources.    Lexy Silveira, APRN  9/20/2024  12:16 PM

## 2024-11-13 ENCOUNTER — ANESTHESIA EVENT (OUTPATIENT)
Dept: SURGERY | Facility: HOSPITAL | Age: 89
End: 2024-11-13
Payer: MEDICARE

## 2024-11-13 ENCOUNTER — ANESTHESIA (OUTPATIENT)
Dept: SURGERY | Facility: HOSPITAL | Age: 89
End: 2024-11-13
Payer: MEDICARE

## 2024-11-13 PROBLEM — K56.2 SIGMOID VOLVULUS (HCC): Status: ACTIVE | Noted: 2024-01-01

## 2024-11-13 PROBLEM — K56.2 SIGMOID VOLVULUS (HCC): Status: ACTIVE | Noted: 2024-11-13

## 2024-11-14 ENCOUNTER — ANESTHESIA (OUTPATIENT)
Dept: SURGERY | Facility: HOSPITAL | Age: 89
End: 2024-11-14
Payer: MEDICARE

## 2024-11-14 ENCOUNTER — ANESTHESIA EVENT (OUTPATIENT)
Dept: SURGERY | Facility: HOSPITAL | Age: 89
End: 2024-11-14
Payer: MEDICARE

## 2024-11-14 PROBLEM — F01.50 VASCULAR DEMENTIA WITHOUT BEHAVIORAL DISTURBANCE, PSYCHOTIC DISTURBANCE, MOOD DISTURBANCE, OR ANXIETY (HCC): Status: ACTIVE | Noted: 2024-11-14

## 2024-11-14 PROBLEM — F01.50 VASCULAR DEMENTIA WITHOUT BEHAVIORAL DISTURBANCE, PSYCHOTIC DISTURBANCE, MOOD DISTURBANCE, OR ANXIETY (HCC): Status: ACTIVE | Noted: 2024-01-01

## 2024-11-14 PROCEDURE — 76942 ECHO GUIDE FOR BIOPSY: CPT | Performed by: STUDENT IN AN ORGANIZED HEALTH CARE EDUCATION/TRAINING PROGRAM

## 2024-11-14 RX ORDER — ALBUTEROL SULFATE 90 UG/1
INHALANT RESPIRATORY (INHALATION) AS NEEDED
Status: DISCONTINUED | OUTPATIENT
Start: 2024-11-14 | End: 2024-11-14 | Stop reason: SURG

## 2024-11-14 RX ORDER — ONDANSETRON 2 MG/ML
INJECTION INTRAMUSCULAR; INTRAVENOUS AS NEEDED
Status: DISCONTINUED | OUTPATIENT
Start: 2024-11-14 | End: 2024-11-14

## 2024-11-14 RX ORDER — ROCURONIUM BROMIDE 10 MG/ML
INJECTION, SOLUTION INTRAVENOUS AS NEEDED
Status: DISCONTINUED | OUTPATIENT
Start: 2024-11-14 | End: 2024-11-14 | Stop reason: SURG

## 2024-11-14 RX ORDER — PHENYLEPHRINE HCL 10 MG/ML
VIAL (ML) INJECTION AS NEEDED
Status: DISCONTINUED | OUTPATIENT
Start: 2024-11-14 | End: 2024-11-14 | Stop reason: SURG

## 2024-11-14 RX ORDER — SODIUM CHLORIDE, SODIUM LACTATE, POTASSIUM CHLORIDE, CALCIUM CHLORIDE 600; 310; 30; 20 MG/100ML; MG/100ML; MG/100ML; MG/100ML
INJECTION, SOLUTION INTRAVENOUS CONTINUOUS PRN
Status: DISCONTINUED | OUTPATIENT
Start: 2024-11-14 | End: 2024-11-14 | Stop reason: SURG

## 2024-11-14 RX ORDER — LIDOCAINE HYDROCHLORIDE 10 MG/ML
INJECTION, SOLUTION EPIDURAL; INFILTRATION; INTRACAUDAL; PERINEURAL AS NEEDED
Status: DISCONTINUED | OUTPATIENT
Start: 2024-11-14 | End: 2024-11-14 | Stop reason: SURG

## 2024-11-14 RX ORDER — METRONIDAZOLE 500 MG/100ML
INJECTION, SOLUTION INTRAVENOUS AS NEEDED
Status: DISCONTINUED | OUTPATIENT
Start: 2024-11-14 | End: 2024-11-14 | Stop reason: SURG

## 2024-11-14 RX ORDER — BUPIVACAINE HYDROCHLORIDE AND EPINEPHRINE 2.5; 5 MG/ML; UG/ML
INJECTION, SOLUTION EPIDURAL; INFILTRATION; INTRACAUDAL; PERINEURAL AS NEEDED
Status: DISCONTINUED | OUTPATIENT
Start: 2024-11-14 | End: 2024-11-14 | Stop reason: SURG

## 2024-11-14 RX ORDER — SODIUM CHLORIDE 9 MG/ML
INJECTION, SOLUTION INTRAVENOUS CONTINUOUS PRN
Status: DISCONTINUED | OUTPATIENT
Start: 2024-11-14 | End: 2024-11-14 | Stop reason: SURG

## 2024-11-14 RX ADMIN — PHENYLEPHRINE HCL 100 MCG: 10 MG/ML VIAL (ML) INJECTION at 16:04:00

## 2024-11-14 RX ADMIN — SODIUM CHLORIDE: 9 INJECTION, SOLUTION INTRAVENOUS at 00:49:00

## 2024-11-14 RX ADMIN — ROCURONIUM BROMIDE 20 MG: 10 INJECTION, SOLUTION INTRAVENOUS at 14:58:00

## 2024-11-14 RX ADMIN — LIDOCAINE HYDROCHLORIDE 50 MG: 10 INJECTION, SOLUTION EPIDURAL; INFILTRATION; INTRACAUDAL; PERINEURAL at 00:26:00

## 2024-11-14 RX ADMIN — BUPIVACAINE HYDROCHLORIDE AND EPINEPHRINE 30 ML: 2.5; 5 INJECTION, SOLUTION EPIDURAL; INFILTRATION; INTRACAUDAL; PERINEURAL at 16:41:00

## 2024-11-14 RX ADMIN — BUPIVACAINE HYDROCHLORIDE AND EPINEPHRINE 30 ML: 2.5; 5 INJECTION, SOLUTION EPIDURAL; INFILTRATION; INTRACAUDAL; PERINEURAL at 16:45:00

## 2024-11-14 RX ADMIN — ROCURONIUM BROMIDE 10 MG: 10 INJECTION, SOLUTION INTRAVENOUS at 15:59:00

## 2024-11-14 RX ADMIN — PHENYLEPHRINE HCL 50 MCG: 10 MG/ML VIAL (ML) INJECTION at 00:39:00

## 2024-11-14 RX ADMIN — SODIUM CHLORIDE: 9 INJECTION, SOLUTION INTRAVENOUS at 15:00:00

## 2024-11-14 RX ADMIN — LIDOCAINE HYDROCHLORIDE 50 MG: 10 INJECTION, SOLUTION EPIDURAL; INFILTRATION; INTRACAUDAL; PERINEURAL at 14:48:00

## 2024-11-14 RX ADMIN — PHENYLEPHRINE HCL 100 MCG: 10 MG/ML VIAL (ML) INJECTION at 14:53:00

## 2024-11-14 RX ADMIN — ALBUTEROL SULFATE 4 PUFF: 90 INHALANT RESPIRATORY (INHALATION) at 17:04:00

## 2024-11-14 RX ADMIN — METRONIDAZOLE 500 MG: 500 INJECTION, SOLUTION INTRAVENOUS at 15:30:00

## 2024-11-14 RX ADMIN — ROCURONIUM BROMIDE 5 MG: 10 INJECTION, SOLUTION INTRAVENOUS at 14:48:00

## 2024-11-14 RX ADMIN — ONDANSETRON 4 MG: 2 INJECTION INTRAMUSCULAR; INTRAVENOUS at 16:26:00

## 2024-11-14 RX ADMIN — PHENYLEPHRINE HCL 100 MCG: 10 MG/ML VIAL (ML) INJECTION at 15:59:00

## 2024-11-14 RX ADMIN — SODIUM CHLORIDE, SODIUM LACTATE, POTASSIUM CHLORIDE, CALCIUM CHLORIDE: 600; 310; 30; 20 INJECTION, SOLUTION INTRAVENOUS at 14:44:00

## 2024-11-14 RX ADMIN — ROCURONIUM BROMIDE 20 MG: 10 INJECTION, SOLUTION INTRAVENOUS at 15:32:00

## 2024-11-14 RX ADMIN — SODIUM CHLORIDE: 9 INJECTION, SOLUTION INTRAVENOUS at 00:26:00

## 2024-11-14 NOTE — ANESTHESIA PROCEDURE NOTES
[facility]    Date/Time: 11/14/2024 4:38 PM    Performed by: Terri Bradley CRNA  Authorized by: Charles Erickson MD      General Information and Staff    Start Time:  11/14/2024 4:38 PM  End Time:  11/14/2024 4:45 PM  Anesthesiologist:  Charles Erickson MD  CRNA:  Terri Bradley CRNA  Performed by:  CRNA  Patient Location:  OR      Site Identification: real time ultrasound guided and image stored and retrievable    Block site/laterality marked before start: site marked  Reason for Block: at surgeon's request and post-op pain management    Preanesthetic Checklist: 2 patient identifers, IV checked, risks and benefits discussed, monitors and equipment checked, pre-op evaluation, timeout performed, anesthesia consent, sterile technique used, no prohibitive neurological deficits and no local skin infection at insertion site      Procedure Details    Patient Position:  Supine  Prep: ChloraPrep    Monitoring:  Cardiac monitor, continuous pulse ox and blood pressure cuff  Block Type:  TAP  Laterality:  Bilateral  Injection Technique:  Single-shot    Needle    Needle Type:  Short-bevel and echogenic  Needle Gauge:  21 G  Needle Length:  100 mm  Needle Localization:  Ultrasound guidance  Reason for Ultrasound Use: appropriate spread of the medication was noted in real time and no ultrasound evidence of intravascular and/or intraneural injection            Assessment    Injection Assessment:  Good spread noted, negative resistance, negative aspiration for heme, incremental injection and low pressure  Heart Rate Change: No    - Patient tolerated block procedure well without evidence of immediate block related complications.     Medications  11/14/2024 4:38 PM      Additional Comments    Medication:  Bupivacaine 0.25% 30mL  with 1:200,000 epi

## 2024-11-14 NOTE — ANESTHESIA POSTPROCEDURE EVALUATION
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    Patient Location: PACU    Anesthesia Type: general    Airway Patency: patent    Postop Pain Control: adequate    Mental Status: mildly sedated but able to meaningfully participate in the post-anesthesia evaluation    Nausea/Vomiting: none    Cardiopulmonary/Hydration status: stable euvolemic    Complications: no apparent anesthesia related complications    Postop vital signs: stable    Comments: Pt transported to ICU on full monitors and FMO2. VSS. Report called to ICU intensivist and given at bedside.     Dental Exam: Unchanged from Preop    Patient to be discharged from PACU when criteria met.

## 2024-11-14 NOTE — PROGRESS NOTES
[facility]   part of [facility]     Hospitalist Progress Note     Alyssa Montgomery Patient Status:  Observation    1932 MRN [id number]   Location [facility] 3NW-A Attending Vic Rock MD   Hosp Day # 0 PCP Mony Hall DO     Chief Complaint: abd pain    Subjective:     Patient denies any n/v.     Objective:    Review of Systems:   A comprehensive review of systems was completed; pertinent positive and negatives stated in subjective.    Vital signs:  Temp:  [96.5 °F (35.8 °C)-98.2 °F (36.8 °C)] 97.4 °F (36.3 °C)  Pulse:  [71-85] 80  Resp:  [12-25] 22  BP: (102-156)/(46-91) 139/53  SpO2:  [91 %-100 %] 96 %    Physical Exam:    General: No acute distress  Respiratory: No wheezes, no rhonchi  Cardiovascular: S1, S2, regular rate and rhythm  Abdomen: Soft, Non-tender, non-distended, positive bowel sounds  Neuro: No new focal deficits.   Extremities: No edema      Diagnostic Data:    Labs:  Recent Labs   Lab 24   WBC 13.6*   HGB 10.7*   .2*   .0       Recent Labs   Lab 24   *   BUN 23   CREATSERUM 0.78   CA 8.4*   ALB 2.9*   *   K 4.7   *   CO2 33.0*   ALKPHO 88   AST 14   ALT 13   BILT 0.5   TP 5.7       Estimated Creatinine Clearance: 44.8 mL/min (based on SCr of 0.78 mg/dL).    No results for input(s): \"TROP\", \"TROPHS\", \"CK\" in the last 168 hours.    No results for input(s): \"PTP\", \"INR\" in the last 168 hours.               Microbiology    No results found for this visit on 24.      Imaging: Reviewed in Epic.    Medications:    enoxaparin  40 mg Subcutaneous Daily    insulin aspart  2-10 Units Subcutaneous q6h    atorvastatin  40 mg Oral Nightly    ferrous sulfate  325 mg Oral Daily with breakfast    folic acid  1 mg Oral Daily    levothyroxine  125 mcg Oral Before breakfast    pantoprazole  40 mg Oral BID AC    predniSONE  1 mg Oral Daily with breakfast    pregabalin  50 mg Oral BID       Assessment & Plan:      AM labs  pending    #Sigmoid volvulus  -s/p sigmoidoscopy w/ decompression on 11/14 early AM  -post op: npo, ivf, xr abd obstructive series   -CT showing gaseous distention of sigmoid colon for which volvulus cannot be excluded, bl pleural effusions with atelectasis  -zofran prn  -gen sx to see, consider surgery?    #hypernatremia  -Na 148, repeat labs in AM  -free water deficit of 2 liters  -D5W @60 mls/h  -cont to monitor BMP     #hypoalbuminemia  -albumin 2.9     #macrocytic anemia  -hgb 10.7: baseline 10-11  -no signs active bleeding  -cont home folic acid and iron supplement     #DM  -A1c 7.5 as of sept 2024  -SSI, Q6H checks, hypoglycemia protocol     #GERD  -cont home PPI     #hypothyroidism  -cont home levothyroxine     #HLD  -cont home statin      Kayla Strickland,     Supplementary Documentation:     Quality:  DVT Mechanical Prophylaxis:   SCDs,    DVT Pharmacologic Prophylaxis   Medication    enoxaparin (Lovenox) 40 MG/0.4ML SUBQ injection 40 mg                Code Status: DNAR/Selective Treatment  Buck: External urinary catheter in place  Buck Duration (in days):   Central line:    XAVI:     Discharge is dependent on: abd pain and tolerating po  At this point Ms. Montgomery is expected to be discharge to: tbd    The 21st Century Cures Act makes medical notes like these available to patients in the interest of transparency. Please be advised this is a medical document. Medical documents are intended to carry relevant information, facts as evident, and the clinical opinion of the practitioner. The medical note is intended as peer to peer communication and may appear blunt or direct. It is written in medical language and may contain abbreviations or verbiage that are unfamiliar.

## 2024-11-14 NOTE — PROGRESS NOTES
Patient alert but oriented to 1-2. VSS on 3L nasal cannula. Abdomen distended. NPO. Purewick and brief in place, incontinent. Ambulated with sit-to-stand. Redness/rash-like scattered to groin, BUE and BLE. Daughter updated on plan of care and consent signed for surgery    1349: Patient transported off unit to surgery

## 2024-11-14 NOTE — OPERATIVE REPORT
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     Surgeons and Role:     * Ashley Lee MD - Primary    Assist: LOCO Reinoso    Note:         A physician's surgical assistant was essential in the prompt and proper completion of this case specifically during the dissection of the  sigmoid colon and formation of the anastomosis.         Anesthesia: General    History of Present Illness:        89-year-old female who is admitted through the emergency department with increased abdominal distention.  Workup in the emergency department revealed leukocytosis, hemoglobin 10.7.  CT scan of the abdomen and pelvis was performed and this revealed changes consistent with sigmoid volvulus.  There is no evidence of pneumoperitoneum or free air.  The patient did undergo endoscopic decompression by Dr. Mueller at approximately 2:00 in the morning.  Repeat  KUB was performed today and this revealed worsening colonic distention with colon diameter up to 9 cm.  General surgical consultation was requested for recurrent sigmoid volvulus.  On physical examination-abdomen is soft, markedly distended, tender to deep palpation in the left lower quadrant.  There is no evidence of percussion tenderness, guarding or rebound.  The patient is being interviewed with her daughter Radha at the bedside.  The patient does have a history of dementia but remains intermittently lucid.  We did discuss that the recommendation  for recurrent sigmoid volvulus despite endoscopic decompression was surgical sigmoid colon resection with end colostomy.  The patient is a resident at [facility].  The patient is not ambulatory.  The patient is a retired pediatric APRN.  Other medical history-hypertension, diabetes, CAD, GERD.  Past surgical history of hysterectomy.     Radha did speak with her mother and after some conversation Alyssa has decided to proceed with surgery.  However Radha will be signing surgical consent due to history of dementia.    Discussed:     The potential risks and benefits of surgery were discussed with the patient as well as her daughter at the bedside.  These are including but not limited to bleeding, infection, intra-abdominal abscess formation, possible need for drain, seroma-hematoma formation, postoperative wound complications including but not limited to wound infection, development of a hernia, intra-abdominal organ injury specifically ureter, bladder, spleen and small bowel, the need for a colostomy, possible complications related to the colostomy including necrosis, stoma retraction, parastomal hernia etc. possible need for further surgery pending clinical course, need for further surgery to take down colostomy, possible formation of intra-abdominal adhesions resulting in small bowel obstruction, intraoperative or postoperative medical complications including but not limited to pneumonia, DVT, pulmonary embolism.   These and other potential intraoperative and perioperative risks were discussed.  The patient and family verbalized understanding, all questions were addressed to their satisfaction, no further questions remain in the patient and family wished to proceed with surgery.    Operative Summary:   The patient was taken to the operating room and was placed on the OR table in a supine position.  After the induction of general anesthesia, perioperative antibiotics were given.  A nasogastric  tube and Buck catheter was then placed.  The patient was then prepped and draped in the usual sterile fashion.  A midline incision was made from just above the umbilicus, down to the pubic tubercle, the skin and subcutaneous tissues were dissected down to the midline fascia. The midline fascia was divided with electrocautery in the decussation of fibers.  The peritoneum was then identified and elevated.  The peritoneum was then opened sharply.  The peritoneum was opened along the entire length of the incision.  Initial exploration of the abdomen revealed   a markedly enlarged sigmoid colon which was volvulized in the pelvis.  The sigmoid colon was eviscerated.  The colon was noted to be markedly enlarged however viable.  The point of transition was noted to be in the descending colon.  The transverse and descending colon upstream of the volvulus was full of formed palpable stool.  The entire colon was run proximally towards the ileocecal valve.  The proximal colon was also noted to be extremely redundant.  The transverse colon extended below the level of the umbilicus.  The cecum was also noted to be slightly floppy however there was no evidence of cecal bascule.  The small bowel was run from the ileocecal valve to the ligament of Treitz.  No other abnormalities were noted.  No gross liver lesions were noted.  The small bowel was then packed away into the upper abdomen with moist lap pads.    The descending and sigmoid colon was mobilized along the avascular line of Toldt. The colon was mobilized onto its mesentery.  The ureter was identified and kept out of harm's way.  The pelvic adhesions were taken down distally.  The colon was mobilized up to but not including the splenic flexure proximally.  A point was chosen on the descending colon in an area that was grossly normal.   The colon was divided using the MENA-80 stapler.  Working distally, the mesentery was taken down with the LigaSure device.  The inferior  hemorrhoidal vessel was identified and ligated.  The colon was dissected off the sacral promontory.  The dissection was taken down to the rectosigmoid junction at the confluence of the tinea.  The proximal rectum was then transected with a MENA stapler.  The specimen was delivered off the field.  The left lower quadrant and pelvis was copiously irrigated.  Hemostasis was assured in the bed of dissection and specifically the residual mesentery.  There was no evidence of bleeding.    A point was then chosen in the left abdomen which would easily accommodate the end colostomy without any undue tension or kinking of the descending colon mesentery.  A circular buttonhole incision was made in the left abdomen.  The subcutaneous adipose tissue was excised.  A cruciate incision was made in the anterior abdominal fascia and the muscle was split bluntly.  The posterior abdominal fascia was also incised with a cruciate incision.  2 fingers were comfortably able to be admitted to the ostomy site.  Using a Candace forcep the end ostomy was then brought through the anterior abdominal wall.  A large Chase drain was placed into the pelvis and brought out through the anterior abdominal wall.  This was secured to the skin with a 2-0 nylon suture.  The small bowel was then unpacked.  The small bowel was again run from the ligament of Treitz down to the ileocecal valve.  The small bowel was placed back into the central abdomen without any undue tension or kinking of the mesentery.  The greater omentum was then brought over the small bowel.  The infraumbilical peritoneum was closed with #1 Vicryl in a running fashion.  The anterior abdominal fascia was closed with #1 looped PDS.  The subcutaneous tissue was copiously irrigated.  Electrocautery was used for hemostasis.  The incision was then closed in a layered fashion.  Subcutaneous tissue was then reapproximated with 3-0 Vicryl in an interrupted fashion the skin was closed with surgical  skin staples.  An occlusive dressing was then placed.  The colostomy was subsequently matured.  The colostomy was sutured to the anterior abdominal fascia in 4 quadrants using 2-0 Vicryl.  Subsequently 3-0 Vicryl was used to kirstin the ostomy.  The colostomy was completed with interrupted 4-0 Vicryl suture.  The colostomy appeared to be pink and viable.  Digital examination of the colostomy revealed it to be widely patent with no stenosis particularly at the level of the fascia.  The colostomy appliance was applied.  All sponge instrument and needle counts were correct at the conclusion of the procedure.  The patient was extubated in the operating room and was taken to the recovery room in stable condition.    Pathologic Specimen: Descending colon, sigmoid colon, rectosigmoid colon  Drains: Buck catheter    EBL: 20 cc    Ashley Lee MD      Please note that this report has been produced using speech recognition software and may contain errors related to that system including but not limited to errors in grammar, punctuation and spelling as well as words and phrases that possibly may have been recognized inappropriately.  If there are any questions or concerns please contact the dictating provider for clarification.  The 21st Century Cures Act makes medical notes like these available to patients in the interest of trans parency. Please be advised this is a medical document. Medical documents are intended to carry relevant information, facts as evident, and the clinical opinion of the practitioner. The medical note is intended as peer to peer communication and may appear blunt or direct. It is written in medical language and may contain abbreviations or verbiage that are unfamiliar.  If there are any questions or concerns please contact the dictating provider for clarification.

## 2024-11-14 NOTE — ANESTHESIA PROCEDURE NOTES
Peripheral IV  Date/Time: 11/14/2024 3:00 PM  Inserted by: Charles Erickson MD    Placement  Needle size: 20 G  Laterality: left  Location: leg  Local anesthetic: none  Site prep: alcohol  Technique: anatomical landmarks  Attempts: 2

## 2024-11-14 NOTE — ANESTHESIA PREPROCEDURE EVALUATION
PRE-OP EVALUATION    Patient Name: Alyssa Montgomery    Admit Diagnosis: No admission diagnoses are documented for this encounter.    Pre-op Diagnosis: Sigmoid volvulus (HCC) [K56.2]    UNPREPPED SIGMOIDOSCOPY    Anesthesia Procedure: UNPREPPED SIGMOIDOSCOPY    Surgeons and Role:     * Vic Rock MD - Primary    Pre-op vitals reviewed.  Temp: 97.7 °F (36.5 °C)  Pulse: 85  Resp: 25  BP: 143/61  SpO2: 99 %  Body mass index is 26.97 kg/m².    Current medications reviewed.  Hospital Medications:   [COMPLETED] iopamidol 76% (ISOVUE-370) injection for power injector  100 mL Intravenous ONCE PRN    sodium chloride 0.9% infusion  125 mL/hr Intravenous Continuous       Outpatient Medications:   Prescriptions Prior to Admission[1]    Allergies: Patient has no known allergies.      Anesthesia Evaluation    Patient summary reviewed.    Anesthetic Complications  (-) history of anesthetic complications         GI/Hepatic/Renal    Negative GI/hepatic/renal ROS.                             Cardiovascular  Comment: TTE 2024:  Conclusions:     1. Procedure narrative: Image quality was suboptimal. The study was      technically limited due to poor acoustic window availability, poor      patient compliance, restricted patient mobility, and body habitus.      Intravenous contrast (Definity) was administered to evaluate left      ventricular function, enhance Doppler signals, and opacify the LV.   2. Left ventricle: The cavity size was normal. Wall thickness was normal.      Systolic function was normal. The estimated ejection fraction was 60-65%,      by visual assessment. Left ventricular diastolic function parameters were      normal for the patient's age.   3. Right ventricle: The cavity size was normal. Systolic function was      normal.   4. Left atrium: The left atrial volume was normal.   5. No significant valvular regurgitation or stenosis.   Impressions:  This study is compared with previous dated 8/30/2023: No    significant change since prior study.                   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     Total abdom hysterectomy       Social History     Socioeconomic History    Marital status:    Occupational History    Occupation: Retired    Tobacco Use    Smoking status: Former     Current packs/day: 0.00     Types: Cigarettes     Quit date: 1972     Years since quittin.9    Smokeless tobacco: Never   Vaping Use    Vaping status: Never Used   Substance and Sexual Activity    Alcohol use: Not Currently     Alcohol/week: 7.0 standard drinks of alcohol     Types: 7 Glasses of wine per week     Comment: 1/2 glass    Drug use: No   Other Topics Concern    Caffeine Concern Yes     Comment: 2 cup daily    Sleep Concern No    Exercise No    Seat Belt Yes    Self-Exams Yes     History   Drug Use No     Available pre-op labs reviewed.  Lab Results   Component Value Date    WBC 13.6 (H) 2024    RBC 3.39 (L) 2024    HGB 10.7 (L) 2024    HCT 34.3 (L) 2024    .2 (H) 2024    MCH 31.6 2024    MCHC 31.2 2024    RDW 18.2 2024    .0 2024     Lab Results   Component Value Date     (H) 2024    K 4.7 2024     (H) 2024    CO2 33.0 (H) 2024    BUN 23 2024    CREATSERUM 0.78 2024     (H) 2024    CA 8.4 (L) 2024             Airway      Mallampati: II  Mouth opening: >3 FB  TM distance: 4 - 6 cm  Neck ROM: full Cardiovascular    Cardiovascular exam normal.         Dental             Pulmonary    Pulmonary exam normal.                 Other findings              ASA: 2   Plan: MAC  NPO status verified and patient meets guidelines.    Post-procedure pain management plan discussed with surgeon and patient.    Comment: I explained the intrinsic risks of MAC anesthesia to Alyssa Montgomery and her daughter (POA), including intraoperative awareness/recall, PONV, post-operative pain/discomfort, risk of aspiration, sore throat, airway management and, conversion to general anesthesia. Pt endorses understanding. Discussed increased risk of cx given generally frail state. Strong preference by daughter and pt to avoid intubation. Given ?small food (ice cream?) consumed prior to 18:00 today, I am ok to proceed with MAC. All questions answered and concerns addressed.      Plan/risks discussed with: patient, child/children and healthcare power of   Use of blood product(s) discussed with: patient, child/children and healthcare power of     Consented to blood products.          Present on Admission:  **None**             [1] (Not in a hospital admission)

## 2024-11-14 NOTE — ANESTHESIA POSTPROCEDURE EVALUATION
[facility]    Alyssa Montgomery Patient Status:  Emergency   Age/Gender 89 year old female MRN [id number]   Location [facility] SURGERY Attending Vic Rock MD   Hosp Day # 0 PCP Mony Hall DO       Anesthesia Post-op Note    UNPREPPED SIGMOIDOSCOPY    Procedure Summary       Date: 11/14/24 Room / Location:  MAIN OR 05 / [facility] MAIN OR    Anesthesia Start: 0026 Anesthesia Stop: 0049    Procedure: UNPREPPED SIGMOIDOSCOPY Diagnosis:       Sigmoid volvulus (HCC)      (sigmoid volvulus)    Surgeons: Vic Rock MD Anesthesiologist: Stone Clemens MD    Anesthesia Type: MAC ASA Status: 2            Anesthesia Type: MAC    Vitals Value Taken Time   /65 11/14/24 0049   Temp 97 °F (36.1 °C) 11/14/24 0049   Pulse 71 11/14/24 0049   Resp 15 11/14/24 0049   SpO2 95 % 11/14/24 0049   Vitals shown include unfiled device data.    Patient Location: PACU    Anesthesia Type: MAC    Airway Patency: patent    Postop Pain Control: adequate    Mental Status: mildly sedated but able to meaningfully participate in the post-anesthesia evaluation    Nausea/Vomiting: none    Cardiopulmonary/Hydration status: stable euvolemic    Complications: no apparent anesthesia related complications    Postop vital signs: stable    Dental Exam: Unchanged from Preop    Patient to be discharged from PACU when criteria met.

## 2024-11-14 NOTE — H&P
[facility]     Alyssa Montgomery Patient Status:  Emergency    1932 MRN [id number]   [facility] EMERGENCY DEPARTMENT Attending Jonh Payne MD   Hosp Day # 0 PCP Mony Hall DO     Chief Complaint: abdominal distention    Subjective:    History of Present Illness:     Alyssa Montgomery is a 89 year old female with PMHx HTN/ CAD/ HLD/ hypothyroidism/ DM/ HFpEF/ PMR who presented to the hospital for abdominal distention. History was limited as she is not sure why she is here. She denied any current medical complaints.    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 Her smoking use included cigarettes. She has never used smokeless tobacco. She reports that she does not currently use alcohol after a past usage of about 7.0 standard drinks of alcohol per week. She reports that she does not use drugs.     Allergies: Allergies[1]    Medications:  Medications Ordered Prior to Encounter[2]    Review of Systems:   A comprehensive review of systems was completed.    Pertinent positives and negatives noted in the HPI.    Objective:   Physical Exam:    /61   Pulse 85   Temp 97.7 °F (36.5 °C) (Temporal)    Resp 25   Ht 5' 7\" (1.702 m)   Wt 172 lb 2.9 oz (78.1 kg)   SpO2 99%   BMI 26.97 kg/m²   General: No acute distress, Alert  Respiratory: No rhonchi, no wheezes  Cardiovascular: S1, S2. Regular rate and rhythm  Abdomen: Soft, Non-tender, distended, decreased bowel sounds  Neuro: No new focal deficits  Extremities: No edema    Results:    Labs:      Labs Last 24 Hours:    Recent Labs   Lab 11/13/24 2029   RBC 3.39*   HGB 10.7*   HCT 34.3*   .2*   MCH 31.6   MCHC 31.2   RDW 18.2   NEPRELIM 9.88*   WBC 13.6*   .0       Recent Labs   Lab 11/13/24 2030   *   BUN 23   CREATSERUM 0.78   EGFRCR 71   CA 8.4*   ALB 2.9*   *   K 4.7   *   CO2 33.0*   ALKPHO 88   AST 14   ALT 13   BILT 0.5   TP 5.7       No results found for: \"PT\", \"INR\"    No results for input(s): \"TROP\", \"TROPHS\", \"CK\" in the last 168 hours.    No results for input(s): \"TROP\", \"PBNP\" in the last 168 hours.    No results for input(s): \"PCT\" in the last 168 hours.    Imaging: Imaging data reviewed in Epic.    Assessment & Plan:      #Abdominal distention with concern for sigmoid volvulus  #leukocytosis  -WBC 13.6  -CT showing gaseous distention of sigmoid colon for which volvulus cannot be excluded, bl pleural effusions with atelectasis  -NPO  -zofran prn  -cont to monitor CBC  -GI c/s in ED: plan for flexible sigmoidoscopy    #hypernatremia  -Na 148  -free water deficit of 2 liters  -D5W @60 mls/h  -cont to monitor BMP    #hypoalbuminemia  -albumin 2.9    #macrocytic anemia  -hgb 10.7: baseline 10-11  -no signs active bleeding  -cont home folic acid and iron supplement    #DM  -SSI, Q6H checks, hypoglycemia protocol    #GERD  -cont home PPI    #hypothyroidism  -cont home levothyroxine    #HLD  -cont home statin        Plan of care discussed with ED physician    Galilea Valdovinos DO    Supplementary Documentation:     The 21st Century Cures Act makes medical notes like these available to patients in the interest of  transparency. Please be advised this is a medical document. Medical documents are intended to carry relevant information, facts as evident, and the clinical opinion of the practitioner. The medical note is intended as peer to peer communication and may appear blunt or direct. It is written in medical language and may contain abbreviations or verbiage that are unfamiliar.                                       [1] No Known Allergies  [2]   Current Facility-Administered Medications on File Prior to Encounter   Medication Dose Route Frequency Provider Last Rate Last Admin    [COMPLETED] magnesium hydroxide (Milk of Magnesia) 400 MG/5ML oral suspension 30 mL  30 mL Oral Once Arely Barth, DO   30 mL at 09/19/24 1741    [COMPLETED] phenazopyridine (Pyridium) tab 100 mg  100 mg Oral TID CC Debby Cheek MD   100 mg at 09/19/24 1708    [COMPLETED] methylPREDNISolone sodium succinate (Solu-MEDROL) injection 40 mg  40 mg Intravenous Q12H Sue Varner MD   40 mg at 09/16/24 0144    [COMPLETED] predniSONE (Deltasone) tab 20 mg  20 mg Oral Once Sue Varner MD   20 mg at 09/09/24 1730    [COMPLETED] potassium chloride (Klor-Con M20) tab 40 mEq  40 mEq Oral Once Kayla Strickland, DO   40 mEq at 09/03/24 1146    [COMPLETED] piperacillin-tazobactam (Zosyn) 3.375 g in dextrose 5% 100 mL IVPB-ADDV  3.375 g Intravenous Q8H Haseeb Handy MD 25 mL/hr at 09/09/24 2200 3.375 g at 09/09/24 2200    [COMPLETED] furosemide (Lasix) 10 mg/mL injection 20 mg  20 mg Intravenous Once Kayla Strickland DO   20 mg at 09/02/24 0908    [COMPLETED] potassium chloride (Klor-Con) 20 MEQ oral powder 40 mEq  40 mEq Oral Once Kayla Strickland DO   40 mEq at 09/01/24 1532    [COMPLETED] piperacillin-tazobactam (Zosyn) 3.375 g in dextrose 5% 100 mL IVPB-ADDV  3.375 g Intravenous Q8H Kayla Strickland DO 25 mL/hr at 09/02/24 0236 3.375 g at 09/02/24 0236    [COMPLETED] potassium chloride (Klor-Con) 20 MEQ oral powder 40 mEq  40 mEq Oral Once Kayla Strickland DO   40 mEq  at 08/30/24 1530    [COMPLETED] potassium chloride (Klor-Con) 20 MEQ oral powder 40 mEq  40 mEq Oral Once Strickland, Niket, DO   40 mEq at 08/29/24 0909    [COMPLETED] potassium chloride (Klor-Con) 20 MEQ oral powder 40 mEq  40 mEq Oral Once Strickland, Niket, DO   40 mEq at 08/28/24 0854    [COMPLETED] magnesium oxide (Mag-Ox) tab 400 mg  400 mg Oral Once Strickland, Niket, DO   400 mg at 08/27/24 0956    [COMPLETED] potassium chloride (Klor-Con M20) tab 40 mEq  40 mEq Oral Once Strickland, Niket, DO   40 mEq at 08/27/24 0956    [COMPLETED] furosemide (Lasix) 10 mg/mL injection 40 mg  40 mg Intravenous Once Strickland Niket, DO   40 mg at 08/27/24 1201    [COMPLETED] Perflutren Lipid Microsphere (DEFINITY) 6.52 MG/ML injection 1.5 mL  1.5 mL Intravenous ONCE PRN Kayla Strickland, DO   1.5 mL at 08/27/24 1709    [COMPLETED] sodium chloride 0.9 % IV bolus 1,000 mL  1,000 mL Intravenous Once Jyotsna Figueroa MD   Stopped at 08/26/24 2025    [COMPLETED] ondansetron (Zofran) 4 MG/2ML injection 4 mg  4 mg Intravenous Once Jyotsna Figueroa MD   4 mg at 08/26/24 1803    [COMPLETED] metoclopramide (Reglan) 5 mg/mL injection 10 mg  10 mg Intravenous Once Jyotsna Figueroa MD   10 mg at 08/26/24 1825    [COMPLETED] diphenhydrAMINE (Benadryl) 50 mg/mL  injection 50 mg  50 mg Intravenous Once Jyotsna Figueroa MD   50 mg at 08/26/24 1825    [COMPLETED] ondansetron (Zofran) 4 MG/2ML injection 4 mg  4 mg Intravenous Once Jyotsna Figueroa MD   4 mg at 08/26/24 1918    [COMPLETED] haloperidol lactate (Haldol) 5 MG/ML injection 5 mg  5 mg Intravenous Once Jyotsna Figueroa MD   5 mg at 08/26/24 2023    [COMPLETED] fleet enema (Fleet) rectal enema 133 mL  1 enema Rectal Once PRN Medhat Dover MD   133 mL at 09/19/24 1407     Current Outpatient Medications on File Prior to Encounter   Medication Sig Dispense Refill    Insulin Pen Needle 32G X 4 MM Does not apply Misc May substitute if not on insurance formulary 100 each 5    levothyroxine 100 MCG Oral Tab TAKE 1 TABLET(100  MCG) BY MOUTH DAILY 90 tablet 0    ATORVASTATIN 40 MG Oral Tab TAKE 1 TABLET(40 MG) BY MOUTH EVERY NIGHT 90 tablet 0    albuterol (PROAIR HFA) 108 (90 Base) MCG/ACT Inhalation Aero Soln Inhale 2 puffs into the lungs every 4 (four) hours as needed. 8 g 0    albuterol 108 (90 Base) MCG/ACT Inhalation Aero Soln Inhale 1 puff into the lungs every 6 (six) hours as needed for Wheezing or Shortness of Breath. 1 each 0    docusate sodium 100 MG Oral Cap Take 1 capsule (100 mg total) by mouth 2 (two) times daily as needed for constipation.      ergocalciferol 1.25 MG (36040 UT) Oral Cap Take 1 capsule (50,000 Units total) by mouth once a week. saturday      predniSONE 1 MG Oral Tab Take 1 tablet (1 mg total) by mouth daily with breakfast. 90 tablet 2    EYE DROPS RELIEF OP Apply 1 drop to eye daily as needed.      acetaminophen 500 MG Oral Tab Take 2 tablets (1,000 mg total) by mouth every 12 (twelve) hours as needed for Pain.      [] amLODIPine 10 MG Oral Tab Take 1 tablet (10 mg total) by mouth daily. 30 tablet 0    [] carvedilol 12.5 MG Oral Tab Take 1 tablet (12.5 mg total) by mouth 2 (two) times daily with meals. 60 tablet 0    [] budesonide 0.5 MG/2ML Inhalation Suspension Take 2 mL (0.5 mg total) by nebulization 2 (two) times daily. 120 mL 0    [] hydrALAZINE 25 MG Oral Tab Take 1 tablet (25 mg total) by mouth every 8 (eight) hours. 90 tablet 0    [] sennosides 8.6 MG Oral Tab Take 1 tablet (8.6 mg total) by mouth daily. 30 tablet 0    [] polyethylene glycol, PEG 3350, 17 g Oral Powd Pack Take 17 g by mouth daily as needed. 30 each 0    [] guaiFENesin  MG Oral Tablet 12 Hr Take 1 tablet (600 mg total) by mouth 2 (two) times daily for 7 days. 14 tablet 0    [] pantoprazole 40 MG Oral Tab EC Take 1 tablet (40 mg total) by mouth 2 (two) times daily before meals. 60 tablet 0    [] insulin aspart 100 Units/mL Subcutaneous Solution Pen-injector Inject 1-10  Units into the skin 4 (four) times daily before meals and nightly. Give 1 unit of Novolog (aspart) insulin for every 50 points blood glucose is greater than 140 mg/dL Give Novolog/aspart insulin subcutaneously within 30 minutes of scheduled finger-stick time 12 mL 0    [] insulin aspart 100 Units/mL Subcutaneous Solution Pen-injector Inject 1-20 Units into the skin TID CC and HS. 1 unit of Novolog (aspart) insulin for every 20 grams of carbohydrates eaten Give within 10 minutes before or after a meal 24 mL 0    pregabalin 25 MG Oral Cap Take 1 capsule (25 mg total) by mouth 2 (two) times daily. (Patient not taking: Reported on 2024) 180 capsule 0    Spacer/Aero-Holding Chambers Does not apply Device For use with albuterol inhaler 1 each 0

## 2024-11-14 NOTE — PROGRESS NOTES
Gastroenterology Progress Note  Alyssa Montgomery Patient Status:  Observation    1932 MRN [id number]   Location [facility] 3NW-A Attending Vic Rock MD   Hosp Day # 0 PCP Mony Hall DO     Chief Complaint: Sigmoid volvulus   S: Pt awake, alert to self. She denies abd pain or nausea. Per bedside RN, no BMs overnight  O: /63 (BP Location: Right arm)   Pulse 79   Temp 98 °F (36.7 °C) (Axillary)   Resp 16   Ht 5' 7\" (1.702 m)   Wt 172 lb 2.9 oz (78.1 kg)   SpO2 99%   BMI 26.97 kg/m²   Gen: Awake, alert to self only   CV: RRR with normal S1 / S2  Resp: CTA bilaterally; No increase in respiratory effort   Abd: (+)BS, moderately soft, signs of tenderness (grimace with palpation), ++distended  and tympanic; no rebound or guarding  Ext: No edema or cyanosis  Skin: Warm and dry  Laboratory Data:       Recent Labs   Lab 24  0100 24  0536   PGLU 110* 117*     No results for input(s): \"INR\" in the last 168 hours.      Recent Labs   Lab 24   WBC 13.6*   HGB 10.7*   .0       Recent Labs   Lab 24   *   K 4.7   *   CO2 33.0*   BUN 23   CREATSERUM 0.78       Recent Labs   Lab 24   ALT 13   AST 14     Assessment: 89 yr-old female with CAD, DM, and HTN who was admitted overnight with abd distention found to have sigmoid volvulus s/p sigmoidoscopy with decompression.   Pt denies abd pain however abd markedly distended, tympanic, with signs of tenderness. Will obtain STAT obstructive series and consult general surgery   Plan:   STAT obstructive series   Consult general surgery--Dr Lee contacted. Appreciate her evaluation and assistance  Strict NPO  Hold blood thinners at this time   ANN-MARIE Cueva  11:11 AM  2024  [facility] Gastroenterology  [phone]    GI Attending Attestation    I have personally reviewed the above history, physical exam, but have not personally  examined the patient. I agree with the stated treatment plan.     At this time, given patient is now underwent surgery, GI service will sign off with further recommendations and management per general surgery.  Please call back with any further questions or concerns.    4:18 PM  11/14/2024  Serge Mueller, DO

## 2024-11-14 NOTE — H&P
Gastroenterology Procedural H/P  I have personally seen and examined the patient.    Patient Name: Alyssa Montgomery  CC: Procedures  HPI: Alyssa is here for abdominal bloating.  She is still passing gas and stool up until today.  XR in the NH showed distention and gas.  CT in ER consistent with sigmoid volvulus.  She has some confusion.  Her daughter is at the bedside and assisting with history.  She is uncomfortable, but no severe pain.  She has no fevers.  She has no rectal bleeding.  She has no vomiting.  Her last meal was earlier today, daughter believes before 6 pm.    She is not on any blood thinners.    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 8 g 0    albuterol 108 (90 Base) MCG/ACT Inhalation Aero Soln Inhale 1 puff into the lungs every 6 (six) hours as needed for Wheezing or Shortness of Breath. 1 each 0    docusate sodium 100 MG Oral Cap Take 1 capsule (100 mg total) by mouth 2 (two) times daily as needed for constipation.      ergocalciferol 1.25 MG  (73518 UT) Oral Cap Take 1 capsule (50,000 Units total) by mouth once a week. saturday      predniSONE 1 MG Oral Tab Take 1 tablet (1 mg total) by mouth daily with breakfast. 90 tablet 2    EYE DROPS RELIEF OP Apply 1 drop to eye daily as needed.      acetaminophen 500 MG Oral Tab Take 2 tablets (1,000 mg total) by mouth every 12 (twelve) hours as needed for Pain.      pregabalin 25 MG Oral Cap Take 1 capsule (25 mg total) by mouth 2 (two) times daily. (Patient not taking: Reported on 11/13/2024) 180 capsule 0    Spacer/Aero-Holding Chambers Does not apply Device For use with albuterol inhaler 1 each 0     Allergies: Allergies[1]  SocHx:  There is no heavy or excessive EtOH.  The patient has no IV drug use or other illicit substances.  FamHx: The patient has no family history of colon cancer.  ROS:  A comprehensive 10 point review of systems was completed.  Pertinent positives and negatives noted in the the HPI.     PE: /61   Pulse 85   Temp 97.7 °F (36.5 °C) (Temporal)   Resp 25   Ht 5' 7\" (1.702 m)   Wt 172 lb 2.9 oz (78.1 kg)   SpO2 99%   BMI 26.97 kg/m²   Gen: AAO x 3, able to speak in complete sentences  HENT: NCAT, oropharynx is clear with moist mucosal membranes  Eyes: Sclerae are anicteric  CV: Regular rate and rhythm, no murmurs  Resp: Clear to auscultation bilaterally without wheezes; rubs, rhonchi, or rales  Abdomen: Soft, non-tender, non-distended with the presence of bowel sounds; No hepatosplenomegaly; no rebound or guarding; No ascites is clinically apparent; no tympany to percussion  Ext: No peripheral edema or cyanosis  Skin: Warm and dry  Psychiatric: Appropriate mood and congruent affect without obvious depression or anxiety    Impression:   Sigmoid volvulus    Recommendations:   Flex sig urgently with MAC for decompression    Vic Rock MD         [1] No Known Allergies

## 2024-11-14 NOTE — ANESTHESIA POSTPROCEDURE EVALUATION
[facility]    Alyssa Montgomery Patient Status:  Emergency   Age/Gender 89 year old female MRN [id number]   Location [facility] SURGERY Attending Vic Rock MD   Hosp Day # 0 PCP Mony Hall DO       Anesthesia Post-op Note    UNPREPPED SIGMOIDOSCOPY    Procedure Summary       Date: 11/14/24 Room / Location:  MAIN OR 05 / [facility] MAIN OR    Anesthesia Start: 0026 Anesthesia Stop: 0049    Procedure: UNPREPPED SIGMOIDOSCOPY Diagnosis:       Sigmoid volvulus (HCC)      (sigmoid volvulus)    Surgeons: Vic Rock MD Anesthesiologist: Stone Clemens MD    Anesthesia Type: MAC ASA Status: 2            Anesthesia Type: MAC    Vitals Value Taken Time   /65 11/14/24 0049   Temp 97 °F (36.1 °C) 11/14/24 0049   Pulse 71 11/14/24 0049   Resp 15 11/14/24 0049   SpO2 95 % 11/14/24 0049   Vitals shown include unfiled device data.    Patient Location: PACU    Anesthesia Type: MAC    Airway Patency: patent    Postop Pain Control: adequate    Mental Status: mildly sedated but able to meaningfully participate in the post-anesthesia evaluation    Nausea/Vomiting: none    Cardiopulmonary/Hydration status: stable euvolemic    Complications: no apparent anesthesia related complications    Postop vital signs: stable    Dental Exam: Unchanged from Preop    Patient to be discharged from PACU when criteria met.

## 2024-11-14 NOTE — PHYSICAL THERAPY NOTE
PHYSICAL THERAPY EVALUATION - INPATIENT     Room Number: 307/307-A  Evaluation Date: 2024  Type of Evaluation: Initial  Physician Order: PT Eval and Treat    Presenting Problem: sigmoid volvulus  Co-Morbidities : DM, HTN  Reason for Therapy: Mobility Dysfunction and Discharge Planning  DM  PHYSICAL THERAPY ASSESSMENT   Patient is a 89 year old female admitted 2024 for abdominal pain. CT abd revealing for sigmoid volvulus. Pt s/p flex sigmoidoscopy with decompression 24. Patient is currently functioning at baseline with bed mobility and transfers. Prior to admission, patient's baseline is sit-stand lift for OOB mobility.     Patient will benefit from continued skilled PT Services return to long term care with restorative therapies.    PLAN  Patient has been evaluated and presents with no skilled Physical Therapy needs at this time.  Patient discharged from Physical Therapy services.  Please re-order if a new functional limitation presents during this admission.    PT Device Recommendation: Mechanical lift    GOALS  Patient was able to achieve the following goals ...    Patient was able to transfer At previous, functional level   Patient able to ambulate on level surfaces Unable before admission     HOME SITUATION  Type of Home: Skilled nursing facility                        Lives With: Staff 24 hours    Drives: No         Prior Level of [place]: Per EMR, Pt was living at [facility] up until [facility] admission 24. Pt discharged to [facility] 24. Pt has since transitioned to LTC at [facility]. Staff at [facility] confirm patient has assist for all ADL and mobility. Pt uses sit-stand lift for transfers to W/C or commode.     SUBJECTIVE  \"Please don't use the ! It pulls my arms out of the socket!\"     OBJECTIVE  Precautions: Bed/chair alarm  Fall Risk: High fall risk    WEIGHT BEARING RESTRICTION     PAIN ASSESSMENT  Ratin          COGNITION  Orientation Level:  oriented to  person, disoriented to place, disoriented to time, and disoriented to situation  Memory:  decreased long term memory and decreased short term memory  Following Commands:  follows one step commands with repetition and follows one step commands consistently    RANGE OF MOTION AND STRENGTH ASSESSMENT  Upper extremity ROM and strength are within functional limits     Lower extremity ROM is within functional limits     Lower extremity strength is within functional limits     BALANCE  Static Sitting: Fair  Dynamic Sitting: Fair -  Static Standing: Dependent  Dynamic Standing: Not tested    ADDITIONAL TESTS                                    ACTIVITY TOLERANCE                         O2 WALK  Oxygen Therapy  SPO2% on Oxygen at Rest: 100  At rest oxygen flow (liters per minute): 2    NEUROLOGICAL FINDINGS                        AM-PAC '6-Clicks' INPATIENT SHORT FORM - BASIC MOBILITY  How much difficulty does the patient currently have...  Patient Difficulty: Turning over in bed (including adjusting bedclothes, sheets and blankets)?: A Lot   Patient Difficulty: Sitting down on and standing up from a chair with arms (e.g., wheelchair, bedside commode, etc.): Unable   Patient Difficulty: Moving from lying on back to sitting on the side of the bed?: A Lot   How much help from another person does the patient currently need...   Help from Another: Moving to and from a bed to a chair (including a wheelchair)?: Total   Help from Another: Need to walk in hospital room?: Total   Help from Another: Climbing 3-5 steps with a railing?: Total       AM-PAC Score:  Raw Score: 8   Approx Degree of Impairment: 86.62%   Standardized Score (AM-PAC Scale): 28.58   CMS Modifier (G-Code): CM    FUNCTIONAL ABILITY STATUS  Gait Assessment        Skilled Therapy Provided   Pt requires encouragement for OOB mobility.   MAX assist for trunk support with supine-sit.   VC for posture and anterior weightshift.   Pt reporting her dislike for the sit-stand  lift used at [facility] throughout the session.   Did attempt sit-stand with RW and without RW, however, Pt retropulsive and demos poor force generation. Unable to achieve hip clearance/upright posture.   Encouraged attempting standing with sit-stand lift.   Sit-stand using lift safely and comfortably.   Able to transfer to bedside chair safely. Pt denies pain.  VC for force generation through LEs and posture.   Left up in bedside chair with PCT present.     Bed Mobility:  Rolling: NT  Supine to sit: MAX   Sit to supine: NT     Transfer Mobility:  Sit to stand: total   Stand to sit: total  Gait = NT    Therapist's comments: Reviewed POC and mobility recommendation of sit-stand lift.     Exercise/Education Provided:  Bed mobility  Energy conservation  Functional activity tolerated  Transfer training    Patient End of Session: Up in chair;With  staff;Needs met;Call light within reach;RN aware of session/findings;All patient questions and concerns addressed;[facility] anti-slip socks    Patient Evaluation Complexity Level:  History Moderate - 1 or 2 personal factors and/or co-morbidities   Examination of body systems Moderate - addressing a total of 3 or more elements   Clinical Presentation  Moderate - Evolving   Clinical Decision Making Moderate Complexity       PT Session Time: 30 minutes  Gait Training:  minutes  Therapeutic Activity: 15 minutes  Neuromuscular Re-education:  minutes  Therapeutic Exercise:  minutes

## 2024-11-14 NOTE — OPERATIVE REPORT
Sigmoidoscopy operative report  Patient Name: Alyssa Montgomery  Date or Service: 11/14/2025  Procedure: sigmoidoscopy with decompression  Pre-Op Diagnosis: Sigmoid volvulus  Post-Op Diagnosis: Sigmoid volvulus  Attending: Vic Rock M.D.  Consent: The risks, benefits, and alternatives were discussed with the patient / POA.  Risks included, but were not limited to, bleeding, perforation, medication effects, cardiac arrhythmias, missed polyps, and aspiration.  After all questions were answered to their satisfaction, a signed, informed, and witnessed consent was obtained.  Sedation: Monitored Anesthesia Care  Monitoring: Pulsoximetry, pulse, respirations, and blood pressure were monitored throughout the entire procedure    Preparation Quality: adequate (Unprepped)  Procedure: After achieving adequate sedation, and placing the patient in the left lateral decubitus position, a digital rectal examination was performed.  The lubricated tip of the pediatric colonoscope was then introduced into the rectum and advanced to the descending colon.  Air was suctioned to the best of my ability, during withdrawal of the endoscope.  The patient tolerated the procedure without apparent procedural complications.  The patient left the procedure room in stable condition for recovery.  Findings:    Swirling of the sigmoid colon encountered at approx 15 cm ronald from the anus.  The sigmoid colon was decompressed in this region. The scope advanced easily and without resistance.  The proximal sigmoid colon and descending colon were dilated.  The mucosa was healthy in appearance.  Air was suctioned and removed from the colon with appropriate decompression and response.    Impression: Findings as above.    Recommendations:   Admit to floor  NPO with sips of clears  IVF for maintenance  XR abd obstructive series in AM    Vic Rock MD

## 2024-11-14 NOTE — CONSULTS
[facility]  Report of Consultation    Alyssa Montgomery Patient Status:  Inpatient    1932 MRN [id number]   Location [facility] 3NW-A Attending Kayla Strickland DO   Hosp Day # 0 PCP Mony Hall DO     Requesting Physician:  ANN-MARIE Cueva    Reason for Consultation:  Sigmoid volvulus     Chief Complaint:  Abdominal distension     History of Present Illness:  Alyssa Montgomery is a 89 year old female who presented to [facility] yesterday with worsening abdominal distention.  She currently resides at [facility] and was sent to [place] [facility] for evaluation.    Upon presentation to the hospital, the patient was hemodynamically stable.  CBC revealed leukocytosis at 13.6 with left shift of 9.88, hemoglobin 10.7, platelets 176,000.  Hypernatremic at 148, hyperchloremic 114, hypocalcemic at 8.4 with no other electrolyte abnormalities.  No acute kidney injury.    CT of the abdomen and pelvis reveals gaseous distention of the sigmoid colon with stool throughout the remainder of the colon.  The colon is tortuous and redundant.  Trace presacral fluid/thickening.  Findings suggest sigmoid volvulus.    She was evaluated by GI and underwent sigmoidoscopy with decompression early this morning.  Sigmoidoscopy revealed swirling of the sigmoid colon around 15 cm from the anus.  The colon was decompressed in this region.  The patient continued to experience abdominal distention and general surgery was consulted for further evaluation.    Currently, the patient is resting in the chair with her daughter Radha at bedside.  She continues to experience abdominal distention.  She denies abdominal pain, nausea, or vomiting.    She has a significant past medical history of hypertension, hypothyroidism, GERD, hyperlipidemia, type 2 diabetes, CAD.  She has a significant past surgical history of hysterectomy in .  She denies taking blood thinning medications.      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 Her smoking use included cigarettes. She has never used smokeless tobacco. She reports that she does not currently use alcohol after a past usage of about 7.0 standard drinks of alcohol per week. She reports that she does not use drugs.    Allergies:  Allergies[1]    Medications:  Medications Prior to Admission   Medication Sig    folic acid 1 MG Oral Tab Take 1 tablet (1 mg total) by mouth daily.    ipratropium-albuterol 0.5-2.5 (3) MG/3ML Inhalation Solution Take 3 mL by nebulization every 6 (six) hours as needed.    ferrous sulfate 325 (65 FE) MG Oral Tab EC Take 1 tablet (325 mg total) by mouth daily with breakfast.    Potassium Chloride ER 20 MEQ Oral Tab CR Take 2 tablets by mouth daily.    enoxaparin (LOVENOX) 40 MG/0.4ML Injection Solution Prefilled Syringe Inject 0.4 mL (40 mg total) into the skin daily.    traMADol HCl 25 MG Oral Tab Take 25 mg by mouth every 8 (eight) hours as needed.    [] sennosides 8.6 MG Oral Tab Take 1 tablet (8.6 mg total) by mouth daily.    [] polyethylene glycol, PEG 3350, 17 g Oral Powd Pack Take 17 g by mouth daily as needed.    [] pantoprazole 40 MG Oral Tab EC Take 1 tablet (40 mg total) by mouth 2 (two) times daily before meals.    [] insulin aspart 100 Units/mL Subcutaneous Solution  Pen-injector Inject 1-10 Units into the skin 4 (four) times daily before meals and nightly. Give 1 unit of Novolog (aspart) insulin for every 50 points blood glucose is greater than 140 mg/dL Give Novolog/aspart insulin subcutaneously within 30 minutes of scheduled finger-stick time    Insulin Pen Needle 32G X 4 MM Does not apply Misc May substitute if not on insurance formulary    levothyroxine 100 MCG Oral Tab TAKE 1 TABLET(100 MCG) BY MOUTH DAILY (Patient taking differently: Take 1.25 tablets (125 mcg total) by mouth daily.)    ATORVASTATIN 40 MG Oral Tab TAKE 1 TABLET(40 MG) BY MOUTH EVERY NIGHT    pregabalin 25 MG Oral Cap Take 1 capsule (25 mg total) by mouth 2 (two) times daily. (Patient taking differently: Take 2 capsules (50 mg total) by mouth 2 (two) times daily.)    albuterol (PROAIR HFA) 108 (90 Base) MCG/ACT Inhalation Aero Soln Inhale 2 puffs into the lungs every 4 (four) hours as needed.    Spacer/Aero-Holding Chambers Does not apply Device For use with albuterol inhaler    docusate sodium 100 MG Oral Cap Take 1 capsule (100 mg total) by mouth 2 (two) times daily as needed for constipation.    ergocalciferol 1.25 MG (09621 UT) Oral Cap Take 1 capsule (50,000 Units total) by mouth once a week. saturday    predniSONE 1 MG Oral Tab Take 1 tablet (1 mg total) by mouth daily with breakfast.    EYE DROPS RELIEF OP Apply 1 drop to eye daily as needed.    acetaminophen 500 MG Oral Tab Take 2 tablets (1,000 mg total) by mouth every 12 (twelve) hours as needed for Pain.         Current Facility-Administered Medications:     glucose (Dex4) 15 GM/59ML oral liquid 15 g, 15 g, Oral, Q15 Min PRN **OR** glucose (Glutose) 40% oral gel 15 g, 15 g, Oral, Q15 Min PRN **OR** glucose-vitamin C (Dex-4) chewable tab 4 tablet, 4 tablet, Oral, Q15 Min PRN **OR** dextrose 50% injection 50 mL, 50 mL, Intravenous, Q15 Min PRN **OR** glucose (Dex4) 15 GM/59ML oral liquid 30 g, 30 g, Oral, Q15 Min PRN **OR** glucose (Glutose) 40%  oral gel 30 g, 30 g, Oral, Q15 Min PRN **OR** glucose-vitamin C (Dex-4) chewable tab 8 tablet, 8 tablet, Oral, Q15 Min PRN    dextrose 5% infusion, , Intravenous, Continuous    enoxaparin (Lovenox) 40 MG/0.4ML SUBQ injection 40 mg, 40 mg, Subcutaneous, Daily    acetaminophen (Tylenol Extra Strength) tab 500 mg, 500 mg, Oral, Q4H PRN    melatonin tab 3 mg, 3 mg, Oral, Nightly PRN    ondansetron (Zofran) 4 MG/2ML injection 4 mg, 4 mg, Intravenous, Q6H PRN    metoclopramide (Reglan) 5 mg/mL injection 5 mg, 5 mg, Intravenous, Q8H PRN    insulin aspart (NovoLOG) 100 Units/mL FlexPen 2-10 Units, 2-10 Units, Subcutaneous, q6h    glycerin-hypromellose- (Artificial Tears) 0.2-0.2-1 % ophthalmic solution 1 drop, 1 drop, Both Eyes, QID PRN    sodium chloride (Saline Mist) 0.65 % nasal solution 1 spray, 1 spray, Each Nare, Q3H PRN    benzonatate (Tessalon) cap 200 mg, 200 mg, Oral, TID PRN    albuterol (Ventolin HFA) 108 (90 Base) MCG/ACT inhaler 2 puff, 2 puff, Inhalation, Q4H PRN    atorvastatin (Lipitor) tab 40 mg, 40 mg, Oral, Nightly    ferrous sulfate DR tab 325 mg, 325 mg, Oral, Daily with breakfast    folic acid (Folvite) tab 1 mg, 1 mg, Oral, Daily    ipratropium-albuterol (Duoneb) 0.5-2.5 (3) MG/3ML inhalation solution 3 mL, 3 mL, Nebulization, Q6H PRN    levothyroxine (Synthroid) tab 125 mcg, 125 mcg, Oral, Before breakfast    pantoprazole (Protonix) DR tab 40 mg, 40 mg, Oral, BID AC    predniSONE (Deltasone) tab 1 mg, 1 mg, Oral, Daily with breakfast    pregabalin (Lyrica) cap 50 mg, 50 mg, Oral, BID    Review of Systems:  Pertinent items are noted in HPI.  Allergic/Immuno:  Review of patient's allergies performed.  Cardiovascular:  Negative for cool extremity and irregular heartbeat/palpitations  Constitutional:  Negative for decreased activity, fever, irritability and lethargy  Endocrine:  Negative for abnormal sleep patterns, increased activity, polydipsia and polyphagia  ENMT:  Negative for ear drainage,  hearing loss and nasal drainage  Eyes:  Negative for eye discharge and vision loss  Gastrointestinal:  Positive for abdominal distention.   Genitourinary:  Negative for dysuria and hematuria  Hema/Lymph:  Negative for easy bleeding and easy bruising  Integumentary:  Negative for pruritus and rash  Musculoskeletal:  Negative for bone/joint symptoms  Neurological:  Negative for gait disturbance  Psychiatric:  Negative for inappropriate interaction and psychiatric symptoms  Respiratory:  Negative for cough, dyspnea and wheezing      Physical Exam:  Blood pressure 103/65, pulse 72, temperature 97.7 °F (36.5 °C), temperature source Oral, resp. rate 16, height 67\", weight 172 lb 2.9 oz (78.1 kg), SpO2 99%, not currently breastfeeding.    General: Alert, orientated x3.  Cooperative.  No apparent distress.    HEENT: Exam is unremarkable.  Without scleral icterus.  Mucous membranes are moist. EOM are WNL.    Neck: No tenderness to palpitation.  Full range of motion to flexion and extension, lateral rotation and lateral flexion of cervical spine.  No JVD. Supple.     Lungs: Clear to auscultation bilaterally. No wheezes, no rales, no rhonchi. Good excursion of the diaphragms. No secondary use of accessory respiratory musculature.    Cardiac: Regular rate and rhythm. No murmur.    Abdomen:   Soft, distended, non-tender to palpation, tympanic to percussion. No peritoneal signs. No guarding.     Extremities:  No lower extremity edema noted.  Without clubbing or cyanosis.      Skin: Normal texture and turgor.    Laboratory Data:  Recent Labs   Lab 11/13/24 2029   RBC 3.39*   HGB 10.7*   HCT 34.3*   .2*   MCH 31.6   MCHC 31.2   RDW 18.2   NEPRELIM 9.88*   WBC 13.6*   .0       Recent Labs   Lab 11/13/24 2030   *   BUN 23   CREATSERUM 0.78   CA 8.4*   ALB 2.9*   *   K 4.7   *   CO2 33.0*   ALKPHO 88   AST 14   ALT 13   BILT 0.5   TP 5.7         No results for input(s): \"PTP\", \"INR\", \"PTT\" in the  last 168 hours.      XR ABDOMEN OBSTRUCTIVE SERIES ROUTINE(2 VW)(CPT=74019)    Result Date: 11/14/2024  CONCLUSION:  Gaseous distention of the colon appears slightly progressed from the prior exam and may represent obstruction.  Close follow-up and/or direct visualization/surgical consultation is suggested.   LOCATION:  [facility]    Dictated by (CST): lAbert Oneil MD on 11/14/2024 at 11:55 AM     Finalized by (CST): Albert Oneil MD on 11/14/2024 at 11:58 AM       CT ABDOMEN+PELVIS(CONTRAST ONLY)(CPT=74177)    Result Date: 11/13/2024  CONCLUSION:  1. Gaseous distension of the sigmoid colon, sigmoid volvulus cannot be excluded.  Correlate clinically. 2. Bilateral pleural effusions with atelectasis/consolidation.   LOCATION:  [facility]   Dictated by (CST): Katie Nettles MD on 11/13/2024 at 10:17 PM     Finalized by (CST): Katie Nettles MD on 11/13/2024 at 10:36 PM          LOC[facility]  11/14/2024  12:46 PM    Is this a shared or split note between Advanced Practice Provider and Physician? 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)    Macular cyst, hole, or pseudohole, right eye    Nonexudative age-related macular degeneration, right eye, intermediate dry stage    PMR (polymyalgia rheumatica) (HCC)    Paving stone degeneration of retina, bilateral    Asymptomatic carotid artery stenosis, bilateral    Weakness    Constipation    Thrombocytopenia (HCC)    Syncope and collapse    Concussion with loss of consciousness    Fall, initial encounter    Laceration of right little finger without foreign body without damage to nail, initial  encounter    Neuropathy    Shortness of breath    Respiratory syncytial virus (RSV)    RSV (acute bronchiolitis due to respiratory syncytial virus)    Intractable vomiting    Hypokalemia    Community acquired pneumonia    Bronchospasm    Pneumonia due to Mycoplasma pneumoniae    Type 2 diabetes mellitus without complication, without long-term current use of insulin (HCC)    Acute cystitis without hematuria    Acute respiratory failure with hypoxia (HCC)    Palliative care by specialist    Goals of care, counseling/discussion    Sigmoid volvulus (HCC)       89-year-old female who is admitted through the emergency department with increased abdominal distention.  Workup in the emergency department revealed leukocytosis, hemoglobin 10.7.  CT scan of the abdomen and pelvis was performed and this revealed changes consistent with sigmoid volvulus.  There is no evidence of pneumoperitoneum or free air.  The patient did undergo endoscopic decompression by Dr. Mueller at approximately 2:00 in the morning.  Repeat  KUB was performed today and this revealed worsening colonic distention with colon diameter up to 9 cm.  General surgical consultation was requested for recurrent sigmoid volvulus.  On physical examination-abdomen is soft, markedly distended, tender to deep palpation in the left lower quadrant.  There is no evidence of percussion tenderness, guarding or rebound.  The patient is being interviewed with her daughter Radha at the bedside.  The patient does have a history of dementia but remains intermittently lucid.  We did discuss that the recommendation for recurrent sigmoid volvulus despite endoscopic decompression was surgical sigmoid colon resection with end colostomy.  The patient is a resident at [facility] assisted [facility].  The patient is not ambulatory.  The patient is a retired pediatric APRN.  Other medical history-hypertension, diabetes, CAD, GERD.  Past surgical history of hysterectomy.    Radha did  speak with her mother and after some conversation Alyssa has decided to proceed with surgery.  However Radha will be signing surgical consent due to history of dementia.    The risks, benefits, complications, possible outcomes and alternatives of the procedure were explained to the patient in detail.  Potential complications of this procedure and as with any surgical procedure and anesthesia were reviewed including but not limited to bleeding, infection, thromboembolic event, pneumonia were discussed.  Expected postoperative pain, recuperation and postoperative course and possible complications were also reviewed.  All questions from the patient were answered in detail.  The patient did verbalize understanding and does not have any further questions at this time.  The patient does wish to proceed with the proposed procedure.    DAVID Lee MD, FACS    Please note that this report has been produced using speech recognition software and may contain errors related to that system including but not limited to errors in grammar, punctuation and spelling as well as words and phrases that possibly may have been recognized inappropriately.  If there are any questions or concerns please contact the dictating provider for clarification.  The 21st Century Cures Act makes medical notes like these available to patients in the interest of trans parency. Please be advised this is a medical document. Medical documents are intended to carry relevant information, facts as evident, and the clinical opinion of the practitioner. The medical note is intended as peer to peer communication and may appear blunt or direct. It is written in medical language and may contain abbreviations or verbiage that are unfamiliar.  If there are any questions or concerns please contact the dictating provider for clarification.      DAVID Lee MD, FACS    Please note that this report has been produced using speech recognition software and may contain errors  related to that system including but not limited to errors in grammar, punctuation and spelling as well as words and phrases that possibly may have been recognized inappropriately.  If there are any questions or concerns please contact the dictating provider for clarification.  The 21st Century Cures Act makes medical notes like these available to patients in the interest of trans parency. Please be advised this is a medical document. Medical documents are intended to carry relevant information, facts as evident, and the clinical opinion of the practitioner. The medical note is intended as peer to peer communication and may appear blunt or direct. It is written in medical language and may contain abbreviations or verbiage that are unfamiliar.  If there are any questions or concerns please contact the dictating provider for clarification.'                                   [1] No Known Allergies

## 2024-11-14 NOTE — OCCUPATIONAL THERAPY NOTE
OT order received, chart reviewed, pt off floor for explor lap, OT will sign off and require new order after surgery as approp.

## 2024-11-14 NOTE — ED INITIAL ASSESSMENT (HPI)
Patient arrives by EMS from [facility] for \"very mild\" bowel obstruction. Patient abdomen is firm and distended.

## 2024-11-14 NOTE — ANESTHESIA PROCEDURE NOTES
Airway  Date/Time: 11/14/2024 2:51 PM  Urgency: elective    Airway not difficult    General Information and Staff    Patient location during procedure: OR  Anesthesiologist: Charles Erickson MD  Resident/CRNA: Terri Bradley CRNA  Performed: CRNA   Performed by: Terri Bradley CRNA  Authorized by: Charles Erickson MD      Indications and Patient Condition  Indications for airway management: anesthesia  Spontaneous Ventilation: absent  Sedation level: deep  Preoxygenated: yes  Patient position: sniffing  Mask difficulty assessment: 1 - vent by mask    Final Airway Details  Final airway type: endotracheal airway      Successful airway: ETT  Cuffed: yes   Successful intubation technique: direct laryngoscopy  Facilitating devices/methods: intubating stylet, cricoid pressure and rapid sequence intubation  Endotracheal tube insertion site: oral  Blade: Apple  Blade size: #3  ETT size (mm): 7.0    Cormack-Lehane Classification: grade I - full view of glottis  Placement verified by: capnometry   Measured from: lips  ETT to lips (cm): 21  Number of attempts at approach: 1

## 2024-11-14 NOTE — CM/SW NOTE
11/14/24 1000   CM/SW Referral Data   Referral Source Social Work (self-referral)   Reason for Referral Discharge planning   Informant EMR;Clinical Staff Member   Medical Hx   Does patient have an established PCP? Yes   Patient Info   Patient's Home Environment Long Term Care Facility   Post Acute Care Provider Upon Admission [facility]   Patient Status Prior to Admission   Independent with ADLs and Mobility No   Discharge Needs   Anticipated D/C needs Long term care facility;Transportation services     Case discussed with RN. RYANN completed chart review. Pt is a 89 y/o female admitted for sigmoid volvulus, she is s/p sigmoidoscopy.    Pt is LTC resident at [facility]. Confirmed with SNF, and they are able to accept back once medically cleared.    SW/CM to remain available for dc planning, and/or additional need for support.    ELICIA Varner  Discharge Planner  [id number]

## 2024-11-14 NOTE — ED PROVIDER NOTES
Patient Seen in: [facility] Emergency Department      History     Chief Complaint   Patient presents with    Abdomen/Flank Pain     Stated Complaint:     Subjective:   HPI      Patient is a 89-year-old female nursing home resident sent for evaluation of abdominal distention.  She reports she has been having fairly regular bowel movements and has a lot of gas with it.  She does feel that her abdomen is distended but states it pretty much always looks like that and she really does not have any pain.  Has not had nausea or vomiting.  However she had a KUB today that was apparently read as equivocal by radiology for small bowel obstruction so she was sent here for further evaluation.    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     Total abdom hysterectomy                  Social History     Socioeconomic History    Marital status:    Occupational History    Occupation: Retired    Tobacco Use    Smoking status: Former     Current packs/day: 0.00     Types: Cigarettes     Quit date: 1972     Years since quittin.9    Smokeless tobacco: Never   Vaping Use    Vaping status: Never Used   Substance and Sexual Activity    Alcohol use: Not Currently     Alcohol/week: 7.0 standard drinks of alcohol     Types: 7 Glasses of wine per week     Comment: 1/2 glass    Drug use: No   Other Topics Concern    Caffeine Concern Yes     Comment: 2 cup daily    Sleep Concern No    Exercise No    Seat Belt Yes    Self-Exams Yes     Social Drivers of Health     Financial  Resource Strain: Low Risk  (2/2/2024)    Financial Resource Strain     Difficulty of Paying Living Expenses: Not very hard     Med Affordability: No   Food Insecurity: No Food Insecurity (8/27/2024)    Food Insecurity     Food Insecurity: Never true   Transportation Needs: No Transportation Needs (8/27/2024)    Transportation Needs     Lack of Transportation: No   Housing Stability: Low Risk  (8/27/2024)    Housing Stability     Housing Instability: No                  Physical Exam     ED Triage Vitals   BP 11/13/24 2000 156/63   Pulse 11/13/24 2000 84   Resp 11/13/24 2000 20   Temp 11/13/24 2004 97.7 °F (36.5 °C)   Temp src 11/13/24 2004 Temporal   SpO2 11/13/24 2000 91 %   O2 Device 11/13/24 2000 Nasal cannula       Current Vitals:   Vital Signs  BP: 143/61  Pulse: 85  Resp: 25  Temp: 97.7 °F (36.5 °C)  Temp src: Temporal  MAP (mmHg): 83    Oxygen Therapy  SpO2: 99 %  O2 Device: Nasal cannula  O2 Flow Rate (L/min): 1 L/min        Physical Exam  Vitals and nursing note reviewed.   Constitutional:       Appearance: She is well-developed.   HENT:      Head: Normocephalic and atraumatic.   Eyes:      Conjunctiva/sclera: Conjunctivae normal.      Pupils: Pupils are equal, round, and reactive to light.   Cardiovascular:      Rate and Rhythm: Normal rate and regular rhythm.      Heart sounds: Normal heart sounds.   Pulmonary:      Effort: Pulmonary effort is normal.      Breath sounds: Normal breath sounds.   Abdominal:      General: Bowel sounds are normal.      Palpations: Abdomen is soft.      Comments: Mildly distended but not tense or tympanitic.  Nontender.  No rebound or guarding.   Musculoskeletal:         General: Normal range of motion.      Cervical back: Normal range of motion and neck supple.   Skin:     General: Skin is warm and dry.   Neurological:      Mental Status: She is alert and oriented to person, place, and time.             ED Course     Labs Reviewed   COMP METABOLIC PANEL (14) - Abnormal;  Notable for the following components:       Result Value    Glucose 142 (*)     Sodium 148 (*)     Chloride 114 (*)     CO2 33.0 (*)     Calcium, Total 8.4 (*)     Calculated Osmolality 312 (*)     Albumin 2.9 (*)     All other components within normal limits   CBC WITH DIFFERENTIAL WITH PLATELET - Abnormal; Notable for the following components:    WBC 13.6 (*)     RBC 3.39 (*)     HGB 10.7 (*)     HCT 34.3 (*)     .2 (*)     Neutrophil Absolute Prelim 9.88 (*)     Neutrophil Absolute 9.88 (*)     All other components within normal limits   LIPASE - Normal   URINALYSIS WITH CULTURE REFLEX            CT ABDOMEN+PELVIS(CONTRAST ONLY)(CPT=74177)    Result Date: 11/13/2024  PROCEDURE:  CT ABDOMEN+PELVIS (CONTRAST ONLY) (CPT=74177)  COMPARISON:  [facility] , CT, CT ABDOMEN+PELVIS(CPT=74176), 8/26/2024, 6:58 PM.  INDICATIONS:  Abdominal distention, nausea  TECHNIQUE:  CT scanning was performed from the dome of the diaphragm to the pubic symphysis with non-ionic intravenous contrast material. Post contrast coronal MPR imaging was performed.  Dose reduction techniques were used. Dose information is transmitted to the ACR (American College of Radiology) NRDR (National Radiology Data Registry) which includes the Dose Index Registry.  IV infiltration on initial scan within the right antecubital region.  PATIENT STATED HISTORY:(As transcribed by Technologist)  Patient presents with abdominal distention and nausea.   CONTRAST USED:  80cc of Isovue 370  FINDINGS:  LIVER:  No enlargement, atrophy, abnormal density, or significant focal lesion.  BILIARY:  No visible dilatation or calcification.  PANCREAS:  No lesion, fluid collection, ductal dilatation, or atrophy.  SPLEEN:  No enlargement or focal lesion.  KIDNEYS:  No mass, obstruction, or calcification.  ADRENALS:  No mass or enlargement.  AORTA/VASCULAR:  Atherosclerosis.  No aneurysm or dissection.  RETROPERITONEUM:  No mass or adenopathy.  BOWEL/MESENTERY:  Normal caliber  small bowel.  There is gaseous distension of the sigmoid colon with stool throughout the remainder of the colon.  The colon is tortuous and redundant.  Trace presacral fluid/thickening.  ABDOMINAL WALL:  No mass or hernia.  URINARY BLADDER:  The bladder is low lying.  No visible focal wall thickening, lesion, or calculus.  PELVIC NODES:  No adenopathy.  PELVIC ORGANS:  Uterus is not visualized consistent with surgical removal.  BONES:  Diffuse degenerative change.  No fracture.  LUNG BASES:  Bilateral pleural effusions measure at least 3.8 cm in depth on the right and 4.9 cm in depth on the left with atelectasis/consolidation.  OTHER:  Small hiatal hernia.             CONCLUSION:  1. Gaseous distension of the sigmoid colon, sigmoid volvulus cannot be excluded.  Correlate clinically. 2. Bilateral pleural effusions with atelectasis/consolidation.   LOCATION:  [facility]   Dictated by (CST): Katie Nettles MD on 11/13/2024 at 10:17 PM     Finalized by (CST): Katie Nettles MD on 11/13/2024 at 10:36 PM             MDM      Pleasant 89-year-old female nursing home resident presenting for evaluation of abdominal pain, really more abdominal distention although she reports this is pretty much at her baseline.  She states she has been having bowel movements and a lot of gas with it and has not been vomiting.  No fevers or chills.  She had a KUB done at her nursing facility today and was apparently read as a possible bowel obstruction so she was sent here for further evaluation.  As above she is quite comfortable here and clinically does not seem to be obstructed.  Will try to track down this x-ray report but with her age I do think it is reasonable to send her for CT to rule out obstruction or other intra-abdominal pathology.  Differential includes gastroenteritis, obstruction, appendicitis.    Admission disposition: 11/13/2024 11:06 PM         Update at 11 PM.  CT as above demonstrates gaseous distention of the sigmoid with possible  sigmoid volvulus, at least unable to exclude it.  Discussed the case with Dr. Rock of gastroenterology who reviewed the CT findings.  He agrees well and certainly not definitively diagnostic of volvulus, concern is high enough that he will plan to come in for flexible sigmoidoscopy here for further evaluation and potential treatment.  Discussed this plan with the patient as well as her daughter at bedside who understands and agrees with the plan.        Past Medical History-hypertension, high cholesterol    Differential diagnosis before testing included obstruction, gastroenteritis, constipation    Co-morbidities that add to the complexity of management include: None    Testing ordered during this visit included labs, CT    Radiographic images  I personally reviewed the radiographs and my individual interpretation shows gaseous distention of the colon with possible sigmoid volvulus  I also reviewed the official reports that showed gaseous distention of the colon with possible sigmoid volvulus      History obtained by an independent source included from daughter at bedside    Discussion of management with GI, hospitalist            Disposition:    Admission  I have discussed with the patient the results of test, differential diagnosis, and treatment plan. They expressed clear understanding of these instructions and agrees to the plan provided.           Medical Decision Making      Disposition and Plan     Clinical Impression:  1. Sigmoid volvulus (HCC)         Disposition:  Admit  11/13/2024 11:06 pm    Follow-up:  No follow-up provider specified.        Medications Prescribed:  Current Discharge Medication List              Supplementary Documentation:         Hospital Problems       Present on Admission  Date Reviewed: 8/26/2024            ICD-10-CM Noted POA    * (Principal) Sigmoid volvulus (HCC) K56.2 11/13/2024 Unknown

## 2024-11-14 NOTE — ANESTHESIA PREPROCEDURE EVALUATION
PRE-OP EVALUATION    Patient Name: Alyssa Montgomery    Admit Diagnosis: Sigmoid volvulus (HCC) [K56.2]    Pre-op Diagnosis: Sigmoid volvulus (HCC) [K56.2]    EXPLORATORY LAPAROTOMY, SIGMOID RESECTION WITH COLOSTOMY    Anesthesia Procedure: EXPLORATORY LAPAROTOMY, SIGMOID RESECTION WITH COLOSTOMY    Surgeons and Role:     * Ashley Lee MD - Primary    Pre-op vitals reviewed.  Temp: 97.7 °F (36.5 °C)  Pulse: 72  Resp: 16  BP: 103/65  SpO2: 99 %  Body mass index is 26.97 kg/m².    Current medications reviewed.  Hospital Medications:   [] sodium chloride 0.9% infusion   Intravenous Continuous    [Transfer Hold] glucose (Dex4) 15 GM/59ML oral liquid 15 g  15 g Oral Q15 Min PRN    Or    [Transfer Hold] glucose (Glutose) 40% oral gel 15 g  15 g Oral Q15 Min PRN    Or    [Transfer Hold] glucose-vitamin C (Dex-4) chewable tab 4 tablet  4 tablet Oral Q15 Min PRN    Or    [Transfer Hold] dextrose 50% injection 50 mL  50 mL Intravenous Q15 Min PRN    Or    [Transfer Hold] glucose (Dex4) 15 GM/59ML oral liquid 30 g  30 g Oral Q15 Min PRN    Or    [Transfer Hold] glucose (Glutose) 40% oral gel 30 g  30 g Oral Q15 Min PRN    Or    [Transfer Hold] glucose-vitamin C (Dex-4) chewable tab 8 tablet  8 tablet Oral Q15 Min PRN    dextrose 5% infusion   Intravenous Continuous    [Transfer Hold] enoxaparin (Lovenox) 40 MG/0.4ML SUBQ injection 40 mg  40 mg Subcutaneous Daily    [Transfer Hold] acetaminophen (Tylenol Extra Strength) tab 500 mg  500 mg Oral Q4H PRN    [Transfer Hold] melatonin tab 3 mg  3 mg Oral Nightly PRN    [Transfer Hold] ondansetron (Zofran) 4 MG/2ML injection 4 mg  4 mg Intravenous Q6H PRN    [Transfer Hold] metoclopramide (Reglan) 5 mg/mL injection 5 mg  5 mg Intravenous Q8H PRN    [Transfer Hold] insulin aspart (NovoLOG) 100 Units/mL FlexPen 2-10 Units  2-10 Units Subcutaneous q6h    [Transfer Hold] glycerin-hypromellose- (Artificial Tears) 0.2-0.2-1 % ophthalmic solution 1 drop  1 drop Both Eyes  QID PRN    [Transfer Hold] sodium chloride (Saline Mist) 0.65 % nasal solution 1 spray  1 spray Each Nare Q3H PRN    [Transfer Hold] benzonatate (Tessalon) cap 200 mg  200 mg Oral TID PRN    [Transfer Hold] albuterol (Ventolin HFA) 108 (90 Base) MCG/ACT inhaler 2 puff  2 puff Inhalation Q4H PRN    [Transfer Hold] atorvastatin (Lipitor) tab 40 mg  40 mg Oral Nightly    [Transfer Hold] ferrous sulfate DR tab 325 mg  325 mg Oral Daily with breakfast    [Transfer Hold] folic acid (Folvite) tab 1 mg  1 mg Oral Daily    [Transfer Hold] ipratropium-albuterol (Duoneb) 0.5-2.5 (3) MG/3ML inhalation solution 3 mL  3 mL Nebulization Q6H PRN    [Transfer Hold] levothyroxine (Synthroid) tab 125 mcg  125 mcg Oral Before breakfast    [Transfer Hold] pantoprazole (Protonix) DR tab 40 mg  40 mg Oral BID AC    [Transfer Hold] predniSONE (Deltasone) tab 1 mg  1 mg Oral Daily with breakfast    [Transfer Hold] pregabalin (Lyrica) cap 50 mg  50 mg Oral BID    [COMPLETED] iopamidol 76% (ISOVUE-370) injection for power injector  100 mL Intravenous ONCE PRN       Outpatient Medications:   Prescriptions Prior to Admission[1]    Allergies: Patient has no known allergies.      Anesthesia Evaluation    Patient summary reviewed.    Anesthetic Complications  (-) history of anesthetic complications         GI/Hepatic/Renal      (-) GERD                           Cardiovascular        Exercise tolerance: good     MET: >4    (-) obesity  (+) hypertension     (+) CAD                  (-) angina     (-) COONEY         Endo/Other      (+) diabetes  type 2,                          Pulmonary      (-) asthma  (-) COPD       (+) shortness of breath     (-) sleep apnea       Neuro/Psych                              Patient Active Problem List:     Hypothyroidism     Unspecified cataract     Atherosclerosis of coronary artery     Herpes zoster without mention of complication     Dyslipidemia     Retinopathy of both eyes     Macular degeneration     Benign  essential HTN     Hypercholesteremia     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)     Macular cyst, hole, or pseudohole, right eye     Nonexudative age-related macular degeneration, right eye, intermediate dry stage     PMR (polymyalgia rheumatica) ([facility])     Paving stone degeneration of retina, bilateral     Asymptomatic carotid artery stenosis, bilateral     Weakness     Constipation     Thrombocytopenia (HCC)     Syncope and collapse     Concussion with loss of consciousness     Fall, initial encounter     Laceration of right little finger without foreign body without damage to nail, initial encounter     Neuropathy     Shortness of breath     Respiratory syncytial virus (RSV)     RSV (acute bronchiolitis due to respiratory syncytial virus)     Intractable vomiting     Hypokalemia     Community acquired pneumonia     Bronchospasm     Pneumonia due to Mycoplasma pneumoniae     Type 2 diabetes mellitus without complication, without long-term current use of insulin ([facility])     Acute cystitis without hematuria     Acute respiratory failure with hypoxia ([facility])     Palliative care by specialist     Goals of care, counseling/discussion     Sigmoid volvulus ([facility])            Past Surgical History:   Procedure Laterality Date    Cataract surgery, complex      D & c  1968    Hysterectomy  1972    Oophorectomy  1965    Skin surgery  2019    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     Total abdom hysterectomy       Social History     Socioeconomic History    Marital status:    Occupational History    Occupation: Retired    Tobacco Use    Smoking status: Former     Current packs/day: 0.00     Types: Cigarettes     Quit date: 1972     Years since quittin.9    Smokeless tobacco: Never   Vaping Use    Vaping status: Never Used   Substance and Sexual Activity    Alcohol use: Not Currently     Alcohol/week: 7.0 standard drinks of alcohol      Types: 7 Glasses of wine per week     Comment: 1/2 glass    Drug use: No   Other Topics Concern    Caffeine Concern Yes     Comment: 2 cup daily    Sleep Concern No    Exercise No    Seat Belt Yes    Self-Exams Yes     History   Drug Use No     Available pre-op labs reviewed.  Lab Results   Component Value Date    WBC 13.6 (H) 11/13/2024    RBC 3.39 (L) 11/13/2024    HGB 10.7 (L) 11/13/2024    HCT 34.3 (L) 11/13/2024    .2 (H) 11/13/2024    MCH 31.6 11/13/2024    MCHC 31.2 11/13/2024    RDW 18.2 11/13/2024    .0 11/13/2024     Lab Results   Component Value Date     (H) 11/13/2024    K 4.7 11/13/2024     (H) 11/13/2024    CO2 33.0 (H) 11/13/2024    BUN 23 11/13/2024    CREATSERUM 0.78 11/13/2024     (H) 11/13/2024    CA 8.4 (L) 11/13/2024            Airway      Mallampati: III  Mouth opening: 3 FB  TM distance: 4 - 6 cm  Neck ROM: full Cardiovascular    Cardiovascular exam normal.  Rhythm: regular  Rate: normal     Dental  Comment: Patient denies any loose/missing/cracked teeth. No gross abnormalities or loose teeth noted on exam.      Dentition appears grossly intact         Pulmonary    Pulmonary exam normal.                 Other findings              ASA: 3 and emergent  Plan: general  NPO status verified and patient meets guidelines.    Post-procedure pain management plan discussed with surgeon and patient.  Surgeon requests: regional block  Comment:       A detailed discussion about the anesthetic plan was held with Alyssa Montgomery in the preoperative area. Discussed benefits and risks of general anesthesia including, reasonable expectations of post-operative pain, nausea, vomiting, injury to lips, gums, tongue, teeth, eyes, awareness under anesthesia, cardiac, pulmonary, aspiration, stroke, and death. All questions were answered appropriately and patient demonstrated understanding of realistic expectations and risks of undergoing anesthesia. Alyssa Montgomery consents to  receiving anesthesia and wishes to proceed.    I also discussed with Alyssa Montgomery benefits of TAP nerve blocks including improved pain control following the procedure however there will likely be need for additional pain medicines. We discussed risks as well, including failed block,, prolonged abdominal numbness. Other very rare complications such as local anesthetic systemic toxicity (LAST), infection, hematoma formation, and permanent nerve damage was also discussed. All questions were answered and Alyssa Montgomery agreed to proceed.               Plan/risks discussed with: patient                Present on Admission:  **None**             [1]   Medications Prior to Admission   Medication Sig Dispense Refill Last Dose/Taking    folic acid 1 MG Oral Tab Take 1 tablet (1 mg total) by mouth daily.   Unknown    ipratropium-albuterol 0.5-2.5 (3) MG/3ML Inhalation Solution Take 3 mL by nebulization every 6 (six) hours as needed.   Unknown    ferrous sulfate 325 (65 FE) MG Oral Tab EC Take 1 tablet (325 mg total) by mouth daily with breakfast.   Unknown    Potassium Chloride ER 20 MEQ Oral Tab CR Take 2 tablets by mouth daily.   Unknown    enoxaparin (LOVENOX) 40 MG/0.4ML Injection Solution Prefilled Syringe Inject 0.4 mL (40 mg total) into the skin daily.   Unknown    traMADol HCl 25 MG Oral Tab Take 25 mg by mouth every 8 (eight) hours as needed.   Unknown    [] sennosides 8.6 MG Oral Tab Take 1 tablet (8.6 mg total) by mouth daily. 30 tablet 0     [] polyethylene glycol, PEG 3350, 17 g Oral Powd Pack Take 17 g by mouth daily as needed. 30 each 0     [] pantoprazole 40 MG Oral Tab EC Take 1 tablet (40 mg total) by mouth 2 (two) times daily before meals. 60 tablet 0     [] insulin aspart 100 Units/mL Subcutaneous Solution Pen-injector Inject 1-10 Units into the skin 4 (four) times daily before meals and nightly. Give 1 unit of Novolog (aspart) insulin for every 50 points blood glucose is  greater than 140 mg/dL Give Novolog/aspart insulin subcutaneously within 30 minutes of scheduled finger-stick time 12 mL 0 Unknown    Insulin Pen Needle 32G X 4 MM Does not apply Misc May substitute if not on insurance formulary 100 each 5 Unknown    levothyroxine 100 MCG Oral Tab TAKE 1 TABLET(100 MCG) BY MOUTH DAILY (Patient taking differently: Take 1.25 tablets (125 mcg total) by mouth daily.) 90 tablet 0 Unknown    ATORVASTATIN 40 MG Oral Tab TAKE 1 TABLET(40 MG) BY MOUTH EVERY NIGHT 90 tablet 0 Unknown    pregabalin 25 MG Oral Cap Take 1 capsule (25 mg total) by mouth 2 (two) times daily. (Patient taking differently: Take 2 capsules (50 mg total) by mouth 2 (two) times daily.) 180 capsule 0 Unknown    albuterol (PROAIR HFA) 108 (90 Base) MCG/ACT Inhalation Aero Soln Inhale 2 puffs into the lungs every 4 (four) hours as needed. 8 g 0 Unknown    Spacer/Aero-Holding Chambers Does not apply Device For use with albuterol inhaler 1 each 0 Unknown    docusate sodium 100 MG Oral Cap Take 1 capsule (100 mg total) by mouth 2 (two) times daily as needed for constipation.   Unknown    ergocalciferol 1.25 MG (98517 UT) Oral Cap Take 1 capsule (50,000 Units total) by mouth once a week. saturday   Unknown    predniSONE 1 MG Oral Tab Take 1 tablet (1 mg total) by mouth daily with breakfast. 90 tablet 2 Unknown    EYE DROPS RELIEF OP Apply 1 drop to eye daily as needed.   Unknown    acetaminophen 500 MG Oral Tab Take 2 tablets (1,000 mg total) by mouth every 12 (twelve) hours as needed for Pain.   Unknown

## 2024-11-14 NOTE — PLAN OF CARE
A&Ox2. VSS. 10 L with rebreather mask- post surgery . Denies chest pain and SOB.   No orders for tele  GI: Abdomen soft, distended, rounded, denies Passing gas.   Denies nausea.   : Incontinent- purewick in place  Denies pain during shift   Assume wheelchair bound   Diet: NPO sips with meds  IVF running per order.   All appropriate safety measures in place. All questions and concerns addressed.    2 person skin check done with ISMAEL Hall. Redness noticed to BLE and vaginal area with breakdown. Scattered bruising to abdomen upon admission. No skinbreak down to sacral area or heels

## 2024-11-15 PROBLEM — E87.0 HYPERNATREMIA: Status: ACTIVE | Noted: 2024-11-15

## 2024-11-15 PROBLEM — G93.40 ACUTE ENCEPHALOPATHY: Status: ACTIVE | Noted: 2024-11-15

## 2024-11-15 PROBLEM — R09.02 HYPOXIA: Status: ACTIVE | Noted: 2024-01-01

## 2024-11-15 PROBLEM — E87.0 HYPERNATREMIA: Status: ACTIVE | Noted: 2024-01-01

## 2024-11-15 PROBLEM — R21 RASH: Status: ACTIVE | Noted: 2024-11-15

## 2024-11-15 PROBLEM — G93.40 ACUTE ENCEPHALOPATHY: Status: ACTIVE | Noted: 2024-01-01

## 2024-11-15 PROBLEM — R21 RASH: Status: ACTIVE | Noted: 2024-01-01

## 2024-11-15 PROBLEM — R09.02 HYPOXIA: Status: ACTIVE | Noted: 2024-11-15

## 2024-11-15 NOTE — OCCUPATIONAL THERAPY NOTE
OT order received, chart reviewed. Per EMR, patient was living at [facility] up until recent admission 8/26/24. She discharged to [facility] on 9/20/24 and has since transitioned to LTC. Patient has assist for all ADL and mobility at LTC and uses sit-stand lift for transfers to W/C or commode.   No skilled therapy needs are identified as patient is at or near her functional baseline.  Will DC order.

## 2024-11-15 NOTE — PROGRESS NOTES
[facility]   part of [facility]     Hospitalist Progress Note     Alyssa Montgomery Patient Status:  Inpatient    1932 MRN [id number]   Location [facility] 4SW-A Attending Kayla Strickland DO   Hosp Day # 1 PCP Mony Hall DO     Chief Complaint: confused     Subjective:     Patient seems delirium. Unable to provide any history.     Noted to have generalized rash.     Objective:    Review of Systems:   A comprehensive review of systems was completed; pertinent positive and negatives stated in subjective.    Vital signs:  Temp:  [96.3 °F (35.7 °C)-98.5 °F (36.9 °C)] 98.1 °F (36.7 °C)  Pulse:  [73-94] 92  Resp:  [10-28] 23  BP: (103-173)/(40-84) 117/44  SpO2:  [91 %-100 %] 97 %    Physical Exam:    General: No acute distress  Respiratory: diminished BS BL  Cardiovascular: S1, S2, regular rate and rhythm  Abdomen: Soft, Non-tender, non-distended, positive bowel sounds  Neuro: No new focal deficits.   Extremities: edema on all extremities   + Rash diffuse     Diagnostic Data:    Labs:  Recent Labs   Lab 11/13/24  2029 11/14/24  1757 11/15/24  0015   WBC 13.6* 17.8* 20.0*   HGB 10.7* 12.5 10.1*   .2* 102.9* 104.1*   .0 186.0 138.0*   INR  --  1.20  --        Recent Labs   Lab 11/13/24  2030 11/14/24  1757 11/15/24  0015   * 126*  126* 104*   BUN 23 16  16 17   CREATSERUM 0.78 0.63  0.63 0.59   CA 8.4* 8.5*  8.5* 7.7*   ALB 2.9* 3.0*  --    * 147*  147* 146*   K 4.7 4.5  4.5 4.3   * 115*  115* 114*   CO2 33.0* 24.0  24.0 30.0   ALKPHO 88 92  --    AST 14 16  --    ALT 13 13  --    BILT 0.5 0.6  --    TP 5.7 6.3  --        Estimated Creatinine Clearance: 59.2 mL/min (based on SCr of 0.59 mg/dL).    No results for input(s): \"TROP\", \"TROPHS\", \"CK\" in the last 168 hours.    Recent Labs   Lab 24  1757   PTP 15.3*   INR 1.20                  Microbiology    No results found for this visit on 24.      Imaging: Reviewed in Epic.    Medications:    magnesium  sulfate  2 g Intravenous Once    famotidine  20 mg Intravenous BID    lansoprazole  30 mg Oral BID AC    insulin aspart  2-10 Units Subcutaneous q6h    atorvastatin  40 mg Oral Nightly    ferrous sulfate  325 mg Oral Daily with breakfast    folic acid  1 mg Oral Daily    levothyroxine  125 mcg Oral Before breakfast    predniSONE  1 mg Oral Daily with breakfast    pregabalin  50 mg Oral BID    magnesium oxide  400 mg Oral Daily    enoxaparin  40 mg Subcutaneous Daily    acetaminophen  1,000 mg Intravenous Q6H    piperacillin-tazobactam  3.375 g Intravenous Q8H    sodium chloride  3 mL Nebulization QID       Assessment & Plan:      # Acute metabolic , toxic encephalopathy   - monitor   - check ABG, ammonia   - consider CT head per course     #Abdominal distention with sigmoid volvulus sp endoscoping decompression opn 11/13-> ex-lap with left colectomy and end ostomy 11/14  - abx -> defer to surgery team   - pain control   - per gen sx  - CLD     #Acute hypoxic respiratory failure   - lasix IV x1  - Echo   - wean O2  - neb     # diffuse rash   - ?? Due to abx  - benadryl PRN-> however may cause more drowsiness   - consider stopping abx if ok per surgery    #leukocytosis  -WBC increasing likely due to reactive process post op   - monitor     #hypernatremia  -monitor   - per course may need to change IVF      #hypoalbuminemia     #macrocytic anemia  -hgb 10.7: baseline 10-11  -no signs active bleeding  -cont home folic acid and iron supplement     #DM2  -hyperglycemia protocol   - ISS     #GERD  -cont home PPI     #hypothyroidism  -cont home levothyroxine     #HLD  -cont home statin    # PMR  - on low dose steorid  - rheum notes reviewed    #dementia    Addendum;  Daughter Radha updated.     Shae Flynn MD    Supplementary Documentation:     Quality:  DVT Mechanical Prophylaxis:   SCDs, Early ambuation  DVT Pharmacologic Prophylaxis   Medication    enoxaparin (Lovenox) 40 MG/0.4ML SUBQ injection 40 mg                 Code Status: DNAR/Selective Treatment  Buck: Buck catheter in place  Buck Duration (in days): 2  Central line:    XAVI:     Discharge is dependent on: course  At this point Ms. Montgomery is expected to be discharge to: TBD    The 21st Century Cures Act makes medical notes like these available to patients in the interest of transparency. Please be advised this is a medical document. Medical documents are intended to carry relevant information, facts as evident, and the clinical opinion of the practitioner. The medical note is intended as peer to peer communication and may appear blunt or direct. It is written in medical language and may contain abbreviations or verbiage that are unfamiliar.              **Certification      PHYSICIAN Certification of Need for Inpatient Hospitalization - Initial Certification    Patient will require inpatient services that will reasonably be expected to span two midnight's based on the clinical documentation in H+P.   Based on patients current state of illness, I anticipate that, after discharge, patient will require TBD.

## 2024-11-15 NOTE — PROGRESS NOTES
[facility]  Progress Note    Alyssa Montgomery Patient Status:  Inpatient    1932 MRN [id number]   Location [facility] 4SW-A Attending Kayla Strickland DO   Hosp Day # 1 PCP Mony Hall DO     Subjective:  The patient is resting in bed. Per the patient's nurse, she became confused overnight and pulled out her IV.     She reports feeling well today. She reports abdominal pain. She denies nausea or vomiting.     Objective/Physical Exam:  General: Alert.  Cooperative.  No apparent distress.  Vital Signs:  Blood pressure 107/46, pulse 83, temperature 98.3 °F (36.8 °C), temperature source Temporal, resp. rate 18, height 67\", weight 160 lb 15 oz (73 kg), SpO2 99%, not currently breastfeeding.  HEENT: Normocephalic, atraumatic. No scleral icterus.  Abdomen:  Soft, distended, appropriately tender, with no rebound or guarding.  No peritoneal signs. Ostomy in left abdomen is pink and patent with stool and flatus in appliance.   Incision: Midline incision with Aquacel dressing in place. There are small areas of strike through on the Aquacel.    Labs:  CBC:    Lab Results   Component Value Date    WBC 20.0 (H) 11/15/2024    WBC 17.8 (H) 2024    WBC 13.6 (H) 2024     Lab Results   Component Value Date    HGB 10.1 (L) 11/15/2024    HGB 12.5 2024    HGB 10.7 (L) 2024      Lab Results   Component Value Date    .0 (L) 11/15/2024    .0 2024    .0 2024     Lab Results   Component Value Date    WBC 20.0 11/15/2024    HGB 10.1 11/15/2024    HCT 33.1 11/15/2024    .0 11/15/2024    CREATSERUM 0.59 11/15/2024    BUN 17 11/15/2024     11/15/2024    K 4.3 11/15/2024     11/15/2024    CO2 30.0 11/15/2024     11/15/2024    CA 7.7 11/15/2024    ALB 3.0 2024    ALKPHO 92 2024    BILT 0.6 2024    TP 6.3 2024    AST 16 2024    ALT 13 2024    INR 1.20 2024    MG 1.6 11/15/2024    PHOS 3.5 11/15/2024          Assessment/Plan:  Patient Active Problem List   Diagnosis    Hypothyroidism    Unspecified cataract    Atherosclerosis of coronary artery    Herpes zoster without mention of complication    Dyslipidemia    Retinopathy of both eyes    Macular degeneration    Benign essential HTN    Hypercholesteremia    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)    Macular cyst, hole, or pseudohole, right eye    Nonexudative age-related macular degeneration, right eye, intermediate dry stage    PMR (polymyalgia rheumatica) ([facility])    Paving stone degeneration of retina, bilateral    Asymptomatic carotid artery stenosis, bilateral    Weakness    Constipation    Thrombocytopenia ([facility])    Syncope and collapse    Concussion with loss of consciousness    Fall, initial encounter    Laceration of right little finger without foreign body without damage to nail, initial encounter    Neuropathy    Shortness of breath    Respiratory syncytial virus (RSV)    RSV (acute bronchiolitis due to respiratory syncytial virus)    Intractable vomiting    Hypokalemia    Community acquired pneumonia    Bronchospasm    Pneumonia due to Mycoplasma pneumoniae    Type 2 diabetes mellitus without complication, without long-term current use of insulin ([facility])    Acute cystitis without hematuria    Acute respiratory failure with hypoxia ([facility])    Palliative care by specialist    Goals of care, counseling/discussion    Sigmoid volvulus ([facility])    Vascular dementia without behavioral disturbance, psychotic disturbance, mood disturbance, or anxiety ([facility])     POD 1 exploratory laparotomy, left colectomy with end colostomy for sigmoid volvulus    Advance to clear liquid diet.   Okay to remove meyer later today once she has increased urine output.  Continue wound and ostomy care. Aquacel dressing to be removed tomorrow.   Will consult ostomy care for ostomy teaching.  Continue IV antibiotics. Likely discontinue  antibiotics tomorrow if WBC improves.  Okay to transfer out of the ICU.  Continue pain control and antiemetics as needed.  Encourage ambulation and up to chair. PT and OT on consult.  DVT prophylaxis with Lovenox  GI prophylaxis with Protonix    LOCO Reinoso  11/15/2024  9:22 AM

## 2024-11-15 NOTE — PLAN OF CARE
Received pt from OR around 1740 alert but confused on simple mask. Follows commands. CXR obtained. Transitioned to nasal cannula, tolerating. Midline placed by TASHA Randle. Buck intact, monitoring output. Colostomy intact. Afebrile. VSS. NSR on monitor. Family at bedside and updated on plan of care. See flowsheets. See MAR.

## 2024-11-15 NOTE — WOUND PROGRESS NOTE
[facility]  Inpatient Wound Care Contact Note    Alyssa Montgomery Patient Status:  Inpatient    1932 MRN [id number]   Location [facility] 4SW-A Attending Kayla Strickland DO   Hosp Day # 1 PCP Mony Hall DO     Attempted to see patient for follow up ostomy care education.  Patient is currently confused. Family at bedside states that current plan is for patient to return to her facility so they're unsure of education needs. Informed that we will follow-up once patient is no longer requiring ICU. Family expresses understanding and agreement with this plan.     We will continue to follow this patient while in-house and assist with wound care discharge planning.    Thank you,    Marco Paez RN  [facility]  [address] [address]

## 2024-11-15 NOTE — PHYSICAL THERAPY NOTE
Physical Therapy    Received duplicate order for PT following procedure 11/14..  See PT eval from 11/14.  Pt uses sit<>stand lift to w/c at baseline.  Requires assist for all ADL's/mobility at this time.  Will sign off of case.  Use lifts for mobility.

## 2024-11-15 NOTE — H&P
Patient is a 89-year-old female that resides baseline at [facility] nursing [facility] she is a retired APC.  She was sent initially to the emergency department for evaluation of abdominal distention which was seen at the nursing home.  There was concern of possible sigmoid volvulus so she was admitted to the floor yesterday November 13.  The patient underwent emergent colonoscopy by gastroenterology.  Emergent colonoscopy resulted in reduction of the sigmoid volvulus and the patient appeared to be improving.    Initially the patient had done well on the 13th although today the patient had abdominal distention so I obstructive series was ordered.  The obstructive series demonstrated worsening colonic distention and a colonic diameter up to 9 cm and therefore general surgery was consulted.  Apparently at that time the patient's abdomen was quite distended and tender to deep palpation.  There is no evidence of percussion tenderness guarding or rebound.  There was a discussion about recommendation for recurrent sigmoid volvulus despite endoscopic decompression and that a surgical sigmoid colon resection with end colostomy was being offered.  The patient is not ambulatory she is a retired pediatric APRN as I mentioned.  The risks and the benefits of the procedure were explained to the patient Altmann the patient went to the operating room for a sigmoidectomy and end ostomy.  The procedure was relatively uncomplicated and the patient was extubated at the end of the procedure.    In the operating room they demonstrated a markedly enlarged sigmoid colon which was volvulized in the pelvis.  The sigmoid colon was eviscerated and the colon was noted to be markedly enlarged however viable.  The point of transition was noted to be in the descending colon please see the rest of the operative note from the surgeon at that time.    According to the anesthesiologist the patient was given fluid resuscitation and did well was able to be  extubated at the end of the case.  She was given a regional block around 4:30 PM please see the details from the anesthesiologist at that time.    When the patient arrived in the intensive care unit she was awake stating that she was cold and she appeared somewhat confused.  Apparently before the surgery the patient had been awake alert oriented x 4.  She had a surgical incision her abdomen was soft.  She had good pulses in bilateral upper and lower extremities.  She did have upper airway secretions and her air sounds although she did have a good cough for clearance.  Stat labs were ordered including a CBC CMP mag [place] PT/INR and a chest x-ray.    Fluids were ordered with D5 LR at 125 cc an hour    Her labs from yesterday she has not had labs today demonstrated a sodium of 148 potassium of 4.7 a bicarb of 33.  This is markedly elevated from her last admission in October.  Her LFTs were normal.  Her white blood cell count was 13.6 her hemoglobin was 11 and her platelet count was 176,000.    In summary this is a 89-year-old female that presents to us with sigmoid volvulus status post low anterior resection of the rectosigmoid colon with Hamilton's pouch and end colostomy.    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     Currently she is somewhat confused we will avoid benzodiazepines and other sedative agents including anticholinergic agents    Cardiovascular  Her blood pressure is robust  We have held her home antihypertensive agents for now  May need to consider reinstituting some of these in the postoperative  state    Respiratory  She has been extubated  She has upper airway secretions  I like to order CPT and CoughAssist as well as hypertonic saline nebs for her if CPT will be scheduled 4 times a day and the saline nebs will also be scheduled 4 times daily until the patient has improvement in her cough and her oxygenation.  Follow-up chest x-ray    Echocardiogram was last performed in August that time it demonstrated a ejection fraction of 60 to 65%.  The right ventricle was normal size.  The left ventricle was normal size.  There was no significant valvular regurgitation or stenosis seen.    GI  She status post sigmoid volvulus  She has a Hamilton's and end colostomy  Pain control strict n.p.o. will follow-up with general surgery when they would like to start clear liquid diet per their recommendations.    She will have follow-up CMP CBC    Hematology oncology  Follow-up CBC  Follow PT/INR      Infectious disease  She will be on Zosyn  There was not any spillage of contents although the bowel was dilated per the report.  I will keep her on Zosyn and discussed with general surgery about how long they would like to continue this in the postoperative state.  Blood cultures have been sent previously    Endocrine  Has a history of diabetes  Insulin sliding scale    Will follow-up with TSH    Prophylaxis  When okay with general surgery we will start her on Lovenox daily  She does not need GI prophylaxis    Tubes lines and drains she has peripheral IVs  She has an ostomy  And a Buck catheter which we will keep for now.    Critical care attending    46 minutes of critical care time were spent at the bedside performing history, physical, complex data interpretation, radiographic interpretation, discussion with consultants, and creating a diagnostic and therapeutic treatment plan for this critically ill patient. Time spent was excluding time on procedures and resident teaching.    Date of Service    November 14, 2024

## 2024-11-15 NOTE — PROGRESS NOTES
ICU  Critical Care APRN Progress Note    NAME: Alyssa Montgomery - [facility] - MRN: [id number] - Age: 89 year old - :1932    SUBJECTIVE: Drowsy, oriented to self only. Diffuse rash to upper/lower extremities and anterior chest wall. HD stable, not requiring pressors.       Current Facility-Administered Medications   Medication Dose Route Frequency    glucose (Dex4) 15 GM/59ML oral liquid 15 g  15 g Oral Q15 Min PRN    Or    glucose (Glutose) 40% oral gel 15 g  15 g Oral Q15 Min PRN    Or    glucose-vitamin C (Dex-4) chewable tab 4 tablet  4 tablet Oral Q15 Min PRN    Or    dextrose 50% injection 50 mL  50 mL Intravenous Q15 Min PRN    Or    glucose (Dex4) 15 GM/59ML oral liquid 30 g  30 g Oral Q15 Min PRN    Or    glucose (Glutose) 40% oral gel 30 g  30 g Oral Q15 Min PRN    Or    glucose-vitamin C (Dex-4) chewable tab 8 tablet  8 tablet Oral Q15 Min PRN    acetaminophen (Tylenol Extra Strength) tab 500 mg  500 mg Oral Q4H PRN    melatonin tab 3 mg  3 mg Oral Nightly PRN    insulin aspart (NovoLOG) 100 Units/mL FlexPen 2-10 Units  2-10 Units Subcutaneous q6h    glycerin-hypromellose- (Artificial Tears) 0.2-0.2-1 % ophthalmic solution 1 drop  1 drop Both Eyes QID PRN    sodium chloride (Saline Mist) 0.65 % nasal solution 1 spray  1 spray Each Nare Q3H PRN    benzonatate (Tessalon) cap 200 mg  200 mg Oral TID PRN    albuterol (Ventolin HFA) 108 (90 Base) MCG/ACT inhaler 2 puff  2 puff Inhalation Q4H PRN    atorvastatin (Lipitor) tab 40 mg  40 mg Oral Nightly    ferrous sulfate DR tab 325 mg  325 mg Oral Daily with breakfast    folic acid (Folvite) tab 1 mg  1 mg Oral Daily    ipratropium-albuterol (Duoneb) 0.5-2.5 (3) MG/3ML inhalation solution 3 mL  3 mL Nebulization Q6H PRN    levothyroxine (Synthroid) tab 125 mcg  125 mcg Oral Before breakfast    pantoprazole (Protonix) DR tab 40 mg  40 mg Oral BID AC    predniSONE (Deltasone) tab 1 mg  1 mg Oral Daily with breakfast    pregabalin (Lyrica) cap 50  mg  50 mg Oral BID    polyethylene glycol (PEG 3350) (Miralax) 17 g oral packet 17 g  17 g Oral Daily PRN    sennosides (Senokot) tab 17.2 mg  17.2 mg Oral Nightly PRN    magnesium oxide (Mag-Ox) tab 400 mg  400 mg Oral Daily    enoxaparin (Lovenox) 40 MG/0.4ML SUBQ injection 40 mg  40 mg Subcutaneous Daily    oxyCODONE immediate release tab 2.5 mg  2.5 mg Oral Q4H PRN    Or    oxyCODONE immediate release tab 5 mg  5 mg Oral Q4H PRN    HYDROmorphone (Dilaudid) 1 MG/ML injection 0.2 mg  0.2 mg Intravenous Q2H PRN    Or    HYDROmorphone (Dilaudid) 1 MG/ML injection 0.4 mg  0.4 mg Intravenous Q2H PRN    acetaminophen (Ofirmev) 10 mg/mL infusion premix 1,000 mg  1,000 mg Intravenous Q6H    ondansetron (Zofran) 4 MG/2ML injection 4 mg  4 mg Intravenous Q6H PRN    metoclopramide (Reglan) 5 mg/mL injection 5 mg  5 mg Intravenous Q8H PRN    piperacillin-tazobactam (Zosyn) 3.375 g in dextrose 5% 100 mL IVPB-ADDV  3.375 g Intravenous Q8H    sodium chloride 3 % nebulizer solution 3 mL  3 mL Nebulization QID    lactated ringers infusion   Intravenous Continuous     OBJECTIVE  Vitals:  /46   Pulse 93   Temp 98.3 °F (36.8 °C) (Temporal)   Resp 20   Ht 170.2 cm (5' 7\")   Wt 160 lb 15 oz (73 kg)   SpO2 96%   BMI 25.21 kg/m²             O2: 2 L NC    Physical Exam:    General Appearance: drowsy, cooperative, oriented to self only,   Neck: No JVD  Lungs: scant crackles auscultation bilaterally, respirations unlabored  Heart: Regular rate and rhythm, S1 and S2 normal, no murmur, rub or gallop  Abdomen: Soft, midline incision with dressing intact, ostomy with stoma beefy red--scant serosanguinous drng and brown stool, bowel sounds active  Extremities: Atraumatic, no cyanosis or edema, capillary refill <3 sec.    Pulses: 2+ and symmetric all extremities  Skin: Skin color, texture, turgor normal for ethnicity, no rashes or lesions, warm and dry  Neurologic: CNII-XII intact, generalized weakness    Data this admission:  XR  CHEST AP PORTABLE  (CPT=71045)    Result Date: 11/14/2024  CONCLUSION:  1. Cardiomegaly with interstitial opacities likely representing edema. 2. Bilateral pleural effusions with bilateral basilar atelectasis/infiltrates.    LOCATION:  [id number]      Dictated by (CST): Yulissa Posada MD on 11/14/2024 at 6:44 PM     Finalized by (CST): Yulissa Posada MD on 11/14/2024 at 6:44 PM       XR ABDOMEN OBSTRUCTIVE SERIES ROUTINE(2 VW)(CPT=74019)    Result Date: 11/14/2024  CONCLUSION:  Gaseous distention of the colon appears slightly progressed from the prior exam and may represent obstruction.  Close follow-up and/or direct visualization/surgical consultation is suggested.   LOCATION:  [facility]    Dictated by (CST): Albert Oneil MD on 11/14/2024 at 11:55 AM     Finalized by (CST): Albert Oneil MD on 11/14/2024 at 11:58 AM       CT ABDOMEN+PELVIS(CONTRAST ONLY)(CPT=74177)    Result Date: 11/13/2024  CONCLUSION:  1. Gaseous distension of the sigmoid colon, sigmoid volvulus cannot be excluded.  Correlate clinically. 2. Bilateral pleural effusions with atelectasis/consolidation.   LOCATION:  [facility]   Dictated by (CST): Katie Nettles MD on 11/13/2024 at 10:17 PM     Finalized by (CST): Katie Nettles MD on 11/13/2024 at 10:36 PM       Labs:  Lab Results   Component Value Date    WBC 20.0 11/15/2024    HGB 10.1 11/15/2024    HCT 33.1 11/15/2024    .0 11/15/2024    CREATSERUM 0.59 11/15/2024    BUN 17 11/15/2024     11/15/2024    K 4.3 11/15/2024     11/15/2024    CO2 30.0 11/15/2024     11/15/2024    CA 7.7 11/15/2024    ALB 3.0 11/14/2024    ALKPHO 92 11/14/2024    BILT 0.6 11/14/2024    TP 6.3 11/14/2024    AST 16 11/14/2024    ALT 13 11/14/2024    INR 1.20 11/14/2024    MG 1.6 11/15/2024    PHOS 3.5 11/15/2024       Assessment/Plan:      Sigmoid Volvulus: S/p endoscopic decompression on 11/13. Abdominal pain and obstructive series with worsening colonic distention 11/14, now s/p low anterior resection of  rectosigmoid colon with Jethro's pouch and end colostomy  -Pain mgt PRN  -CLD per surgery  -Wound care consult for ostomy  -Continue Zosyn (11/14-)--likely dc tomorrow if leukocytosis improves  -Gen surgery following    Acute hypoxic respiratory failure: May be due to bilateral pleural effusions, +/- atelectasis and upper airway secretions contributing   -Extubated post-op 11/14  -Wean O2 as able-On 4 L NC  -CXR with increased bilateral pleural effusiosn, R>L,   -CPT, 3% nebs  -Lasix 40mg IVP x1    Acute on chronic encephalopathy: Likely to toxic metabolic.  Underlying dementia with baseline oriented x2 and per family has been hallucinating past couple of months with decline)  -Avoid sedating meds and benzos  -Frequent and PRN re-orientation    Leukocytosis: Likely 2/2 above  -WBCs trending up  -Afebrile, monitor  -continue antbx as above  -Daily CBC    Hypernatremia  -Sodium 148 today  -Encourage fluid intake   -Daily BMP    Chronic HFpEF:   -Given fluid overnight for low U/O. Now appears overloaded with generalized edema  -Will give lasix 40mg IVP  -TTE with EF 60-65% (Unchanged from prior)  -Not on BB PTA  -Strict I/Os    Diffuse Rash: Possible drug related?  -Will confirm with family if present PTA, no documentation of it, therefore unclear if onset after IV zosyn  -Pepcid and benadryl PRN for itching--although try to avoid given may cause increased drowsiness    CAD, HPL  -Statin    DM  -A1c 7.5  -ISS  -Accu-checks Q 6hrs    Macrocytic anemia  -Hgb down-trending--may be dilutional post-op  -Repeat CBC this afternoon  -No signs of active bleeding  -Transfuse for goal hgb > 7  -PTA folic acid and iron  -Daily CBC    Polymalgia Rheumatica  -On prednisone 1mg PTA    FEN  -CLD per surgery  -Replete lyes PRN    Proph  -Lovenox subcutaneous  -SCDs  -PT/OT when able    Dispo:     Code Status: DNAR/Selective Treatment  -ICU for close monitoring, possible transfer later today if remains HD stable    Plan of care  discussed with intensivist, Dr. Emmy Abbott, [facility]  ICU  Phone  [phone]   Pager [phone]    ICU addenda    Patient is stable today, somewhat confused in the ICU per daughter closer to baseline but still A and O x2.    Our plan overall is pain control, she has been cleared for a CLD at this time.    She appeared somewhat fluid OL on exam and had edema was given IVP lasix, EF was 60-65%    She did have a rash that was maculopapular and around her whole body after getting zosyn, has not markedly improved. She got pepcid, we were considering steroids and benadryl both of which might worsen her delirium. Can consider later if not improving.    There is marco antonio output in her ostomy a this time.    Dispo is ICU for now hopefully to floor tomorrow doing well, after her surgery for volvulus yesterday.    DOS 11/15/24    36 minutes of critical care time managing post operative sigmoid volvulus, ostomy, shock, fluid overload, delirium, and acute post operative complications.

## 2024-11-15 NOTE — PROGRESS NOTES
Pt oriented to self. Restless and agitated upon AM assessment. Talking to someone not in the room. Re-orientation. Pain medication given - assume pain present. Fatigued/sleeping/drowsy most of shift. Some intermittent restlessness/impulsvity.  Maintaining O2 sats on 2L via NC.  VSS.  Ostomy w/green/brown solid/soft output. Midline incision C/D/I w/ surgical dressing in place.  Buck cath removed. Purewick in place.  Diffuse pruritic rash. Benadryl prn w/good relief.  Daughter at bedside and updated on POC.

## 2024-11-15 NOTE — PLAN OF CARE
Report received from previous RN. Vitals stable.  confusion baseline . Oriented to name. Lungs DM with some rhonchi on 4 l NC. Cardiac monitor SR stable BP. ABd soft and slight tender to touch.  Pt denies any pain  ostomy pink and beefy. Buck with ow u/o  updated CCAPN.Bed alarm in placed  Pt confused and pulled her midline and IV.  Joey wrist Restrains placed per protocol

## 2024-11-16 PROBLEM — I48.0 PAF (PAROXYSMAL ATRIAL FIBRILLATION) (HCC): Status: ACTIVE | Noted: 2024-11-16

## 2024-11-16 PROBLEM — I48.0 PAF (PAROXYSMAL ATRIAL FIBRILLATION) (HCC): Status: ACTIVE | Noted: 2024-01-01

## 2024-11-16 NOTE — PLAN OF CARE
Assumed care of pt at change of shift. Vitals stable. Wrist restraints in place. Straight cath x1. Transfer orders in place, report given to Lis GUEVARA pt moving to room 303. See MAR and flowsheets for further details.

## 2024-11-16 NOTE — PROGRESS NOTES
[facility]  Progress Note    Alyssa Montgomery Patient Status:  Inpatient    1932 MRN [id number]   Location [facility] 3NW-A Attending Shae Flynn MD   Hosp Day # 2 PCP Mony Hall DO     Subjective:  She is seen and examined resting in bed. She reports feeling tired today. Patients daughter is at bedside and reports patient only had sips of water today. She denies nausea or vomiting.     Leukocytosis improving 23.8 --> 16.8. Hemoglobin 11.2 --> 10.7  Objective/Physical Exam:  /55 (BP Location: Right arm)   Pulse 90   Temp 98.1 °F (36.7 °C) (Oral)   Resp 18   Ht 67\"   Wt 162 lb 11.2 oz (73.8 kg)   SpO2 99%   BMI 25.48 kg/m²     Intake/Output Summary (Last 24 hours) at 2024 1406  Last data filed at 2024 0624  Gross per 24 hour   Intake 1055 ml   Output 655 ml   Net 400 ml         General: Awake, Cooperative.  No apparent distress.  Pulm: no respiratory distress, no increased work of breathing  Abdomen:  Soft, non-distended,appropriate incisional tenderness, with no rebound or guarding.  No peritoneal signs. Stoma in right upper quadrant with stool in the appliance  Incision:  Clean, dry, intact without erythema.        Labs:  Lab Results   Component Value Date    WBC 16.8 2024    HGB 10.7 2024    HCT 35.4 2024    .0 2024      Lab Results   Component Value Date    INR 1.20 2024     Lab Results   Component Value Date     2024    K 4.4 2024     2024    CO2 26.0 2024    BUN 16 2024    CREATSERUM 0.80 2024    GLU 79 2024    CA 7.8 2024    ALKPHO 69 2024    ALT 10 2024    AST 14 2024    BILT 0.4 2024    ALB 2.3 2024    TP 5.2 2024            Assessment:  Patient Active Problem List   Diagnosis    Hypothyroidism    Unspecified cataract    Atherosclerosis of coronary artery    Herpes zoster without mention of complication    Dyslipidemia     Retinopathy of both eyes    Macular degeneration    Benign essential HTN    Hypercholesteremia    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)    Macular cyst, hole, or pseudohole, right eye    Nonexudative age-related macular degeneration, right eye, intermediate dry stage    PMR (polymyalgia rheumatica) (HCC)    Paving stone degeneration of retina, bilateral    Asymptomatic carotid artery stenosis, bilateral    Weakness    Constipation    Thrombocytopenia (HCC)    Syncope and collapse    Concussion with loss of consciousness    Fall, initial encounter    Laceration of right little finger without foreign body without damage to nail, initial encounter    Neuropathy    Shortness of breath    Respiratory syncytial virus (RSV)    RSV (acute bronchiolitis due to respiratory syncytial virus)    Intractable vomiting    Hypokalemia    Community acquired pneumonia    Bronchospasm    Pneumonia due to Mycoplasma pneumoniae    Type 2 diabetes mellitus without complication, without long-term current use of insulin (HCC)    Acute cystitis without hematuria    Acute respiratory failure with hypoxia (HCC)    Palliative care by specialist    Goals of care, counseling/discussion    Sigmoid volvulus (HCC)    Vascular dementia without behavioral disturbance, psychotic disturbance, mood disturbance, or anxiety (HCC)    Acute encephalopathy    Hypernatremia    Hypoxia    Rash       POD 2 exploratory laparotomy, left colectomy with end colostomy for sigmoid volvulus     Plan:  Advance to full liquid diet  Continue ostomy care; ostomy team consulted  Analgesics and antiemetics as needed  Medical management per primary   DVT prophylaxis with lovenox  GI prophylaxis with pepcid     Lilia Mckeon PA-C  11/16/2024  2:06 PM    This note was initiated by Lilia Mckeon.  The PA saw the patient in conjunction with me. The PA performed a history, exam, and developed the assessment and  plan in conjunction with me. I agree with the above note and have made changes which reflect my own history and physical, if necessary.    Ostomy functioning  Patient tolerating some liquids  Advance to full's  Encourage ambulation and mobilization as tolerated    Bisi Birmingham MD  [facility]  11/16/2024  6:41 PM

## 2024-11-16 NOTE — PROGRESS NOTES
[facility]   part of [facility]     Hospitalist Progress Note     Alyssa Montgomery Patient Status:  Inpatient    1932 MRN [id number]   Location [facility] 4SW-A Attending Kayla Strickland DO   Hosp Day # 2 PCP Mony Hall DO     Chief Complaint: new onset afib     Subjective:     Patient more cooperative and less confused. Transferred out of ICU.     Noted to be tachycardic in 130s and ECG confirmed Afib RVR, got BB IV 5 mg x1 and her HR is not in 70s NSR.       Objective:    Review of Systems:   A comprehensive review of systems was completed; pertinent positive and negatives stated in subjective.    Vital signs:  Temp:  [97.5 °F (36.4 °C)-99.1 °F (37.3 °C)] 98.1 °F (36.7 °C)  Pulse:  [83-94] 90  Resp:  [17-21] 18  BP: ()/(43-74) 107/55  SpO2:  [94 %-99 %] 99 %    Physical Exam:    General: No acute distress  Respiratory: diminished BS BL  Cardiovascular: S1, S2, regular rate and rhythm  Abdomen: Soft, Non-tender, non-distended, positive bowel sounds  Neuro: No new focal deficits.   Extremities: edema on all extremities   + maculopapular Rash diffuse     Diagnostic Data:    Labs:  Recent Labs   Lab 11/13/24  2029 11/14/24  1757 11/15/24  0015 11/15/24  1308 24  0336   WBC 13.6* 17.8* 20.0* 23.8* 16.8*   HGB 10.7* 12.5 10.1* 11.2* 10.7*   .2* 102.9* 104.1* 99.7 102.3*   .0 186.0 138.0* 118.0* 113.0*   INR  --  1.20  --   --   --        Recent Labs   Lab 11/14/24  1757 11/15/24  0015 11/15/24  1308 24  0336   *  126* 104* 101* 79   BUN 16  16 17 18 16   CREATSERUM 0.63  0.63 0.59 0.74 0.80   CA 8.5*  8.5* 7.7* 7.9* 7.8*   ALB 3.0*  --  2.5* 2.3*   *  147* 146* 145 144   K 4.5  4.5 4.3 5.1 4.4   *  115* 114* 115* 116*   CO2 24.0  24.0 30.0 27.0 26.0   ALKPHO 92  --  72 69   AST 16  --  19 14   ALT 13  --  10 10   BILT 0.6  --  0.4 0.4   TP 6.3  --  5.5* 5.2*       Estimated Creatinine Clearance: 43.6 mL/min (based on SCr of 0.8  mg/dL).    No results for input(s): \"TROP\", \"TROPHS\", \"CK\" in the last 168 hours.    Recent Labs   Lab 11/14/24  1757   PTP 15.3*   INR 1.20                  Microbiology    No results found for this visit on 11/13/24.      Imaging: Reviewed in Epic.    Medications:    famotidine  20 mg Intravenous BID    lansoprazole  30 mg Oral BID AC    metRONIDAZOLE  500 mg Intravenous Q12H    cefepime  1 g Intravenous Q12H    insulin aspart  2-10 Units Subcutaneous q6h    atorvastatin  40 mg Oral Nightly    ferrous sulfate  325 mg Oral Daily with breakfast    folic acid  1 mg Oral Daily    levothyroxine  125 mcg Oral Before breakfast    predniSONE  1 mg Oral Daily with breakfast    pregabalin  50 mg Oral BID    magnesium oxide  400 mg Oral Daily    enoxaparin  40 mg Subcutaneous Daily    acetaminophen  1,000 mg Intravenous Q6H    sodium chloride  3 mL Nebulization QID       Assessment & Plan:        # PAF with RVR earlier.   - in NSR  - tele   - hold off on transfer for now unless recurrent Afib RVR  - TSH  - cardiology consult     # Acute metabolic , toxic encephalopathy   - monitor   - ABG noted. Ammonia 43  - consider CT head per course     #Abdominal distention with sigmoid volvulus sp endoscoping decompression opn 11/13-> ex-lap with left colectomy and end ostomy 11/14  - abx -> defer to surgery team   - pain control   - per gen sx  - CLD     #Acute hypoxic respiratory failure   - lasix IV  - Echo with normal EF  - wean O2  - neb     # diffuse rash   - ?? Due to abx  - benadryl PRN-> however may cause more drowsiness   - consider stopping abx if ok per surgery  - can consider increasing steroid dose- however higher risk of delirium , d/w daughter   - if not better by tomorrow can consult rheumatology?    #leukocytosis  -improved   - monitor     #hypernatremia  -improved      #hypoalbuminemia     #macrocytic anemia  -hgb 10.7: baseline 10-11  -no signs active bleeding  -cont home folic acid and iron supplement      #DM2  -hyperglycemia protocol   - ISS     #GERD  -cont home PPI     #hypothyroidism  -cont home levothyroxine     #HLD  -cont home statin    # PMR  - on low dose steorid  - rheum notes reviewed    #dementia    Shae Flynn MD    Supplementary Documentation:     Quality:  DVT Mechanical Prophylaxis:   SCDs, Early ambuation  DVT Pharmacologic Prophylaxis   Medication    enoxaparin (Lovenox) 40 MG/0.4ML SUBQ injection 40 mg                Code Status: DNAR/Selective Treatment  Buck: External urinary catheter in place  Buck Duration (in days): 2  Central line:    XAVI:     Discharge is dependent on: course  At this point Ms. Montgomery is expected to be discharge to: TBD    The 21st Century Cures Act makes medical notes like these available to patients in the interest of transparency. Please be advised this is a medical document. Medical documents are intended to carry relevant information, facts as evident, and the clinical opinion of the practitioner. The medical note is intended as peer to peer communication and may appear blunt or direct. It is written in medical language and may contain abbreviations or verbiage that are unfamiliar.

## 2024-11-16 NOTE — PROGRESS NOTES
Alert to self and place. Per daughter patient is more alert than yesterday. Transferred from ICU at 0745am. Daughter at bedside.   Patient is on 2L NC.   Lungs: Crackles auscultated. Breathing treatments given by respiratory.   Tele: NSR since 10 am. Episode of Afib. Medication given per hospitalist. EKG done. Cards consulted.   Lovenox   Colostomy CDI. Wound care to see patient.   : purewick in place.   Full liquid diet.   PT/OT to see patient. Bedbound sit to stand vs total lift.   Safety precautions in place. Call light in place.

## 2024-11-16 NOTE — CONSULTS
[facility]  Report of Consultation  MCI    Alyssa Montgomery Patient Status:  Inpatient    1932 MRN [id number]   Location [facility] 3NW-A Attending Shae Flynn MD   Hosp Day # 2 PCP Mony Hall DO     Reason for Consultation:  afib    History of Present Illness:  Alyssa Montgomery is a pleasant 89 year old lady with a longstanding history of dementia absolutely worsened over the last few months per her daughters.  I talked with them both and they said that they are very worried about their mother and her mental status is absolutely deteriorated.    We were asked to see her because of a spell of atrial fibrillation post surgery for a sigmoid volvulus with decompression.  She currently is in normal sinus rhythm.  I would not start anticoagulation at this point in time because of increased risk of bleeding    She is completely disoriented and has acute toxic metabolic encephalopathy.  She is in restraints and does not even answer any questions    She has a history of diabetes, hypothyroidism, hypertension, dyslipidemia and PMR on low-dose steroids in the past    She also has a rash and they are not sure if it is from an antibiotic    She is anemic without signs of active bleeding    She has a DNR status.        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 Her smoking use included cigarettes. She has never used smokeless tobacco. She reports that she does not currently use alcohol after a past usage of about 7.0 standard drinks of alcohol per week. She reports that she does not use drugs.    Allergies:  Allergies[1]    Medications:    Current Facility-Administered Medications:     furosemide (Lasix) 10 mg/mL injection 40 mg, 40 mg, Intravenous, Daily    predniSONE (Deltasone) tab 20 mg, 20 mg, Oral, Daily with breakfast    insulin degludec (Tresiba) 100 units/mL flextouch 10 Units, 10 Units, Subcutaneous, Nightly    famotidine (Pepcid) 20 mg/2mL injection 20 mg, 20 mg, Intravenous, BID    lansoprazole (Prevacid Solutab) disintegrating tab 30 mg, 30 mg, Oral, BID AC    diphenhydrAMINE (Benadryl) 50 mg/mL  injection 12.5 mg, 12.5 mg, Intravenous, Q8H PRN    metRONIDAZOLE in sodium chloride 0.79% (Flagyl) 5 mg/mL IVPB premix 500 mg, 500 mg, Intravenous, Q12H    hydrocortisone 1 % cream, , Topical, TID PRN    ceFEPIme (Maxipime) 1 g in sodium chloride 0.9% 100 mL IVPB-MBP, 1 g, Intravenous, Q12H    glucose (Dex4) 15 GM/59ML oral liquid 15 g, 15 g, Oral, Q15 Min PRN **OR** glucose (Glutose) 40% oral gel 15 g, 15 g, Oral, Q15 Min PRN **OR** glucose-vitamin C (Dex-4) chewable tab 4 tablet, 4 tablet, Oral, Q15 Min PRN **OR** dextrose 50% injection 50 mL, 50 mL, Intravenous, Q15 Min PRN **OR** glucose (Dex4) 15 GM/59ML oral liquid 30 g, 30 g, Oral, Q15 Min PRN **OR** glucose (Glutose) 40% oral gel 30 g, 30 g, Oral, Q15 Min PRN **OR** glucose-vitamin C (Dex-4) chewable tab 8 tablet, 8 tablet, Oral, Q15 Min PRN    acetaminophen (Tylenol Extra Strength) tab 500 mg, 500 mg, Oral, Q4H PRN    melatonin tab 3 mg, 3 mg, Oral, Nightly PRN    insulin aspart (NovoLOG) 100 Units/mL FlexPen 2-10 Units, 2-10 Units, Subcutaneous, q6h    glycerin-hypromellose- (Artificial Tears) 0.2-0.2-1 % ophthalmic solution 1 drop, 1 drop, Both Eyes, QID PRN    sodium  chloride (Saline Mist) 0.65 % nasal solution 1 spray, 1 spray, Each Nare, Q3H PRN    benzonatate (Tessalon) cap 200 mg, 200 mg, Oral, TID PRN    albuterol (Ventolin HFA) 108 (90 Base) MCG/ACT inhaler 2 puff, 2 puff, Inhalation, Q4H PRN    atorvastatin (Lipitor) tab 40 mg, 40 mg, Oral, Nightly    ferrous sulfate DR tab 325 mg, 325 mg, Oral, Daily with breakfast    folic acid (Folvite) tab 1 mg, 1 mg, Oral, Daily    ipratropium-albuterol (Duoneb) 0.5-2.5 (3) MG/3ML inhalation solution 3 mL, 3 mL, Nebulization, Q6H PRN    levothyroxine (Synthroid) tab 125 mcg, 125 mcg, Oral, Before breakfast    pregabalin (Lyrica) cap 50 mg, 50 mg, Oral, BID    polyethylene glycol (PEG 3350) (Miralax) 17 g oral packet 17 g, 17 g, Oral, Daily PRN    sennosides (Senokot) tab 17.2 mg, 17.2 mg, Oral, Nightly PRN    magnesium oxide (Mag-Ox) tab 400 mg, 400 mg, Oral, Daily    enoxaparin (Lovenox) 40 MG/0.4ML SUBQ injection 40 mg, 40 mg, Subcutaneous, Daily    oxyCODONE immediate release partial tab 2.5 mg, 2.5 mg, Oral, Q4H PRN **OR** oxyCODONE immediate release tab 5 mg, 5 mg, Oral, Q4H PRN    HYDROmorphone (Dilaudid) 1 MG/ML injection 0.2 mg, 0.2 mg, Intravenous, Q2H PRN **OR** HYDROmorphone (Dilaudid) 1 MG/ML injection 0.4 mg, 0.4 mg, Intravenous, Q2H PRN    acetaminophen (Ofirmev) 10 mg/mL infusion premix 1,000 mg, 1,000 mg, Intravenous, Q6H    ondansetron (Zofran) 4 MG/2ML injection 4 mg, 4 mg, Intravenous, Q6H PRN    metoclopramide (Reglan) 5 mg/mL injection 5 mg, 5 mg, Intravenous, Q8H PRN    sodium chloride 3 % nebulizer solution 3 mL, 3 mL, Nebulization, QID    Review of Systems:  All systems were reviewed and are negative except as described above in HPI.    Physical Exam:  Blood pressure 107/55, pulse 90, temperature 98.1 °F (36.7 °C), temperature source Oral, resp. rate 18, height 5' 7\" (1.702 m), weight 162 lb 11.2 oz (73.8 kg), SpO2 99%, not currently breastfeeding.  Temp (24hrs), Av.4 °F (36.9 °C), Min:97.5 °F (36.4 °C),  Max:99.1 °F (37.3 °C)    Wt Readings from Last 3 Encounters:   11/16/24 162 lb 11.2 oz (73.8 kg)   08/31/24 172 lb 3.2 oz (78.1 kg)   04/23/24 176 lb 9.6 oz (80.1 kg)       Telemetry: sr  General: in restraints, delerium  HEENT: No focal deficits.  Neck: No JVD, carotids 2+ no bruits.  Cardiac: Regular rate and rhythm, S1, S2 normal  Lungs: Clear anteriorly  Abdomen: Soft, non-tender.   Extremities: edema, rash   Neurologic: toxic metabolic on top of dementia  Skin: Warm and dry.     Laboratories and Data:    Labs:   Lab Results   Component Value Date    WBC 16.8 11/16/2024    RBC 3.46 11/16/2024    HGB 10.7 11/16/2024    HCT 35.4 11/16/2024    .3 11/16/2024    MCH 30.9 11/16/2024    MCHC 30.2 11/16/2024    RDW 18.2 11/16/2024    .0 11/16/2024     Lab Results   Component Value Date    INR 1.20 11/14/2024       Assessment/Plan:  Patient Active Problem List   Diagnosis    Hypothyroidism    Unspecified cataract    Atherosclerosis of coronary artery    Herpes zoster without mention of complication    Dyslipidemia    Retinopathy of both eyes    Macular degeneration    Benign essential HTN    Hypercholesteremia    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)    Macular cyst, hole, or pseudohole, right eye    Nonexudative age-related macular degeneration, right eye, intermediate dry stage    PMR (polymyalgia rheumatica) ([facility])    Paving stone degeneration of retina, bilateral    Asymptomatic carotid artery stenosis, bilateral    Weakness    Constipation    Thrombocytopenia (HCC)    Syncope and collapse    Concussion with loss of consciousness    Fall, initial encounter    Laceration of right little finger without foreign body without damage to nail, initial encounter    Neuropathy    Shortness of breath    Respiratory syncytial virus (RSV)    RSV (acute bronchiolitis due to respiratory syncytial virus)    Intractable vomiting    Hypokalemia    Community  acquired pneumonia    Bronchospasm    Pneumonia due to Mycoplasma pneumoniae    Type 2 diabetes mellitus without complication, without long-term current use of insulin (HCC)    Acute cystitis without hematuria    Acute respiratory failure with hypoxia (HCC)    Palliative care by specialist    Goals of care, counseling/discussion    Sigmoid volvulus (HCC)    Vascular dementia without behavioral disturbance, psychotic disturbance, mood disturbance, or anxiety (HCC)    Acute encephalopathy    Hypernatremia    Hypoxia    Rash    PAF (paroxysmal atrial fibrillation) (HCC)       1.  Postop volvulus decompression and surgery  2.  Paroxysmal atrial fibrillation, currently in sinus rhythm  3.  Probable heart failure with preserved ejection fraction and pleural effusions  4.  Diabetes  5.  Anemia  6.  Dyslipidemia  7.  Hypothyroidism  8.  Acute on chronic mental status changes with baseline of severe dementia  9.  DNR status    As she is in sinus rhythm and postop, I would hold off on starting anticoagulation  Hopefully her mental status can improve but I think this is the real issue here.   Will continue a course of conservative medical management and comfort care only  Her daughters understand and are in agreement with this plan.    Tara Medina MD  11/16/2024  4:39 PM    Cons 5         [1] No Known Allergies

## 2024-11-17 ENCOUNTER — ANESTHESIA EVENT (OUTPATIENT)
Dept: MEDSURG UNIT | Facility: HOSPITAL | Age: 89
End: 2024-11-17
Payer: MEDICARE

## 2024-11-17 ENCOUNTER — ANESTHESIA (OUTPATIENT)
Dept: MEDSURG UNIT | Facility: HOSPITAL | Age: 89
End: 2024-11-17
Payer: MEDICARE

## 2024-11-17 NOTE — PROGRESS NOTES
Progress Note  Alyssa Montgomery Patient Status:  Inpatient    1932 MRN [id number]   Location [facility] 3NW-A Attending Shae Flynn MD   Hosp Day # 3 PCP Mony Hall DO     Subjective:  No acute events overnight.  Resting in bed this morning on O2 at 0.5L O2 100%, denies any chest pain or SOB. Appears confused, talking to herself, complains of having abdominal pain.  Sustaining SR per tele review.     Objective:  /62 (BP Location: Left arm)   Pulse 94   Temp 98.3 °F (36.8 °C) (Oral)   Resp 18   Ht 5' 7\" (1.702 m)   Wt 162 lb 11.2 oz (73.8 kg)   SpO2 96%   BMI 25.48 kg/m²     Telemetry: SR, HR 80-90s      Intake/Output:    Intake/Output Summary (Last 24 hours) at 2024 1003  Last data filed at 2024 0600  Gross per 24 hour   Intake 0 ml   Output 650 ml   Net -650 ml       Last 3 Weights   24 0400 162 lb 11.2 oz (73.8 kg)   11/15/24 0400 160 lb 15 oz (73 kg)   24 2000 172 lb 2.9 oz (78.1 kg)   24 1937 172 lb 3.2 oz (78.1 kg)   24 0040 172 lb 12.8 oz (78.4 kg)   24 2130 172 lb 12.8 oz (78.4 kg)   24 1534 176 lb 9.6 oz (80.1 kg)       Labs:  Recent Labs   Lab 11/15/24  1308 24  0336 24  0537   * 79 84   BUN 18 16 20   CREATSERUM 0.74 0.80 0.77   EGFRCR 76 69 72   CA 7.9* 7.8* 7.4*    144 146*   K 5.1 4.4 4.6   * 116* 116*   CO2 27.0 26.0 26.0     Recent Labs   Lab 11/15/24  1308 24  0336 24  0537   RBC 3.57* 3.46* 3.13*   HGB 11.2* 10.7* 9.8*   HCT 35.6 35.4 33.9*   MCV 99.7 102.3* 108.3*   MCH 31.4 30.9 31.3   MCHC 31.5 30.2* 28.9*   RDW 18.1 18.2 18.5   NEPRELIM 21.72* 15.30* 11.07*   WBC 23.8* 16.8* 12.3*   .0* 113.0* 80.0*         No results for input(s): \"TROP\", \"TROPHS\", \"CK\" in the last 168 hours.    Review of Systems   Cardiovascular: Negative.    Respiratory: Negative.     Gastrointestinal:  Positive for abdominal pain.   Psychiatric/Behavioral:  Positive for altered mental status and  memory loss.        Physical Exam:    Gen: alert, oriented x 1, NAD, appears confused   Heent: pupils equal, reactive. Mucous membranes moist.   Neck: no jvd  Cardiac: regular rate and rhythm, normal S1,S2, no murmur, gallop or rub   Lungs: CTA  Abd: soft, tender/ND +bs, colostomy in place   Ext: no edema  Skin: Warm, dry, bruising to upper arms, diffuse rash   Neuro: No focal deficits        Medications:     furosemide  40 mg Intravenous Daily    predniSONE  20 mg Oral Daily with breakfast    insulin degludec  10 Units Subcutaneous Nightly    insulin aspart  2-10 Units Subcutaneous TID CC and HS    famotidine  20 mg Intravenous BID    lansoprazole  30 mg Oral BID AC    metRONIDAZOLE  500 mg Intravenous Q12H    cefepime  1 g Intravenous Q12H    atorvastatin  40 mg Oral Nightly    ferrous sulfate  325 mg Oral Daily with breakfast    folic acid  1 mg Oral Daily    levothyroxine  125 mcg Oral Before breakfast    pregabalin  50 mg Oral BID    magnesium oxide  400 mg Oral Daily    enoxaparin  40 mg Subcutaneous Daily    acetaminophen  1,000 mg Intravenous Q6H    sodium chloride  3 mL Nebulization QID       Assessment:  PAF, went into A-fib with RVR post op for volvulus with endoscopic decompression -now back in SR  Holding off ac given increased risk for bleeding with recent surgery  Echo 8/27/24 LVEF 60-65%  Post op volvulus s/p endoscopic decompression 11/13, ex lap 11/14 with left colectomy and end ostomy  -surgery following   AMS, suspect metabolic encephalopathy with underlying dementia -management per primary   Hypernatremia, Na trending up at 146 today-would hold off diuresis   HLP  DM2  Anemia/Thrombocytopenia- hgb/plts trending down at 9.8 and 80 -monitor    Hypothyroidism   Diffuse rash all over the body-management per primary.    Plan:  Denies cardiac symptoms.  Sustaining normal sinus rhythm per tele review, no plans for ac at this time given recent surgery, anemia/thrombocytopenia and increased bleeding  risk.   Will start low dose bb.  Would hold off further diuresis given rising sodium levels.  Hypoalbuminemia-may benefit from IV albumins-per primary.     Further management per gen surg/primary.  Cardiology will follow peripherally, call with any new questions or concerns.     Plan of care discussed with patient, RN.    Yuli De Jesus, APRN  11/17/2024  10:03 AM  [phone]

## 2024-11-17 NOTE — ANESTHESIA PROCEDURE NOTES
Peripheral IV  Date/Time: 11/17/2024 3:10 PM  Inserted by: Sadiq Alonso MD    Placement  Needle gauge: 4 Fr.  Laterality: right  Location: arm  Local anesthetic: none  Site prep: chlorhexidine  Technique: ultrasound guided  Attempts: 1

## 2024-11-17 NOTE — PROGRESS NOTES
[facility]   part of [facility]     Hospitalist Progress Note     Alyssa Montgomery Patient Status:  Inpatient    1932 MRN [id number]   Location [facility] 4SW-A Attending Kayla Strickland DO   Hosp Day # 3 PCP Mony Hall DO     Chief Complaint: rash     Subjective:     Patient in NSR   No overnight issues. More awake. Seems at baseline per family. Daughters at bedside.     Objective:    Review of Systems:   A comprehensive review of systems was completed; pertinent positive and negatives stated in subjective.    Vital signs:  Temp:  [97.5 °F (36.4 °C)-98.4 °F (36.9 °C)] 98.3 °F (36.8 °C)  Pulse:  [70-94] 94  Resp:  [16-20] 18  BP: (118-175)/(37-91) 142/62  SpO2:  [97 %-100 %] 97 %    Physical Exam:    General: No acute distress  Respiratory: diminished BS BL  Cardiovascular: S1, S2, regular rate and rhythm  Abdomen: Soft, Non-tender, non-distended, positive bowel sounds  Neuro: No new focal deficits.   Extremities: edema improved on all extremities.   + maculopapular Rash diffuse - improved     Diagnostic Data:    Labs:  Recent Labs   Lab 24  1757 11/15/24  0015 11/15/24  1308 24  0336 24  0537   WBC 17.8* 20.0* 23.8* 16.8* 12.3*   HGB 12.5 10.1* 11.2* 10.7* 9.8*   .9* 104.1* 99.7 102.3* 108.3*   .0 138.0* 118.0* 113.0* 80.0*   INR 1.20  --   --   --   --        Recent Labs   Lab 11/15/24  1308 24  0336 24  0537   * 79 84   BUN 18 16 20   CREATSERUM 0.74 0.80 0.77   CA 7.9* 7.8* 7.4*   ALB 2.5* 2.3* 2.2*    144 146*   K 5.1 4.4 4.6   * 116* 116*   CO2 27.0 26.0 26.0   ALKPHO 72 69 70   AST 19 14 15   ALT 10 10 11   BILT 0.4 0.4 0.4   TP 5.5* 5.2* 4.7*       Estimated Creatinine Clearance: 45.3 mL/min (based on SCr of 0.77 mg/dL).    No results for input(s): \"TROP\", \"TROPHS\", \"CK\" in the last 168 hours.    Recent Labs   Lab 24  1757   PTP 15.3*   INR 1.20                  Microbiology    No results found for this visit on  11/13/24.      Imaging: Reviewed in Epic.    Medications:    furosemide  40 mg Intravenous Daily    predniSONE  20 mg Oral Daily with breakfast    insulin degludec  10 Units Subcutaneous Nightly    insulin aspart  2-10 Units Subcutaneous TID CC and HS    famotidine  20 mg Intravenous BID    lansoprazole  30 mg Oral BID AC    metRONIDAZOLE  500 mg Intravenous Q12H    cefepime  1 g Intravenous Q12H    atorvastatin  40 mg Oral Nightly    ferrous sulfate  325 mg Oral Daily with breakfast    folic acid  1 mg Oral Daily    levothyroxine  125 mcg Oral Before breakfast    pregabalin  50 mg Oral BID    magnesium oxide  400 mg Oral Daily    enoxaparin  40 mg Subcutaneous Daily    acetaminophen  1,000 mg Intravenous Q6H    sodium chloride  3 mL Nebulization QID       Assessment & Plan:        # PAF with RVR earlier.   - in NSR  - tele   - hold off on transfer for now unless recurrent Afib RVR  - TSH 1.8  - cardiology consult appreciated. Cont to monitor and cont conservative Rx. No plan for AC at this time.     # Acute metabolic , toxic encephalopathy , improved   - monitor   - ABG noted. Ammonia 43    #Abdominal distention with sigmoid volvulus sp endoscoping decompression opn 11/13-> ex-lap with left colectomy and end ostomy 11/14  - defer abx Rx to surgery team.   - pain control   - per gen sx  - CLD -> ADAT    #Acute hypoxic respiratory failure   - lasix IV-> monitor Na  - Echo with normal EF  - wean O2  - neb   - CXR in am   - IS    # diffuse rash   - ?? Due to abx  - benadryl PRN-> however may cause more drowsiness   - consider stopping abx if no indication , awaiting surgery rec   - can consider increasing steroid dose- however higher risk of delirium , d/w daughter   - if not better by tomorrow can consult rheumatology?    # thrombocytopenia   - HIPA    # fluid overload   - iatrogenic     #leukocytosis  -improved   - monitor     #hypernatremia  -monitor      #hypoalbuminemia     #macrocytic anemia  -hgb 10.7: baseline  10-11  -no signs active bleeding  -cont home folic acid and iron supplement     #DM2  -hyperglycemia protocol   - ISS     #GERD  -cont home PPI     #hypothyroidism  -cont home levothyroxine     #HLD  -cont home statin    # PMR  - on low dose steorid  - rheum notes reviewed    #dementia    Shae Flynn MD    Supplementary Documentation:     Quality:  DVT Mechanical Prophylaxis:   SCDs, Early ambuation  DVT Pharmacologic Prophylaxis   Medication    enoxaparin (Lovenox) 40 MG/0.4ML SUBQ injection 40 mg                Code Status: DNAR/Selective Treatment  Buck: External urinary catheter in place  Buck Duration (in days): 2  Central line:    XAVI:     Discharge is dependent on: course  At this point Ms. Montgomery is expected to be discharge to: TBD    The 21st Century Cures Act makes medical notes like these available to patients in the interest of transparency. Please be advised this is a medical document. Medical documents are intended to carry relevant information, facts as evident, and the clinical opinion of the practitioner. The medical note is intended as peer to peer communication and may appear blunt or direct. It is written in medical language and may contain abbreviations or verbiage that are unfamiliar.

## 2024-11-17 NOTE — PROGRESS NOTES
11/17/24 1509   Respiratory Score   $ RT Standby Charge (per 15 min) 1   Consciousness 0   Level of activity 2   Respiratory pattern 0   Breath sounds 0   Cough 0   Respiratory history documented in chart 0   Surgery history this admission 1   Chest xray 2   ABG / Pulse ox 0   Asthma patients peak flow 0   Heart Rate 0   Trauma 0   Hemoglobin 2   Adult Respiratory Score Calculation 7     Per respiratory protocol tx's changed to BID 3% and BID CPT, pt has been on 3% with CPT QID for 3 days. Pt is bilaterally clr/dim with slight scattered rhonchi and a strong dry non productive cough. Pt resting comfortably on 3L NC. Chest x rays show bilateral pleura effusions Right greater than Left.

## 2024-11-17 NOTE — PROGRESS NOTES
Upon assessment, A&Ox0. Delayed response, mild slurred speech. 3L via NC. Congested. Lung sounds diminished.  WDL. See flowsheets/MAR for management. NSR on Tele. HR in the 70s. SCDs. Lovenox for VTE proph. Tolerating diet. Abdomen soft and tender. Midline w/ staples, C/D/I. Colostomy intact, formed, solid stool noted. Incontinent, purewick in place. Pain managed per MAR. Scattered redness and bruising noted. Denies SOB, CP, lightheadedness, and N/V. POC discussed w/ pt's daughters, all questions answered and concerns addressed. Safety precautions in place. Frequent rounds performed.

## 2024-11-17 NOTE — PROGRESS NOTES
[facility]  Progress Note    Alyssa Montgomery Patient Status:  Inpatient    1932 MRN [id number]   Location [facility] 3NW-A Attending Shae Flynn MD   Hosp Day # 3 PCP Mony Hall DO     Subjective:  She is seen and examined resting in bed with daughter at bedside. She reports patient tolerated ice cream without difficultly. She continues to have stool output from her ostomy.     Leukocytosis improving 16.8 -->12.3 hemoglobin 10.7 --> 9.8.   Objective/Physical Exam:  /67 (BP Location: Left arm)   Pulse 92   Temp 98 °F (36.7 °C) (Oral)   Resp 18   Ht 67\"   Wt 162 lb 11.2 oz (73.8 kg)   SpO2 98%   BMI 25.48 kg/m²     Intake/Output Summary (Last 24 hours) at 2024 1240  Last data filed at 2024 0600  Gross per 24 hour   Intake 0 ml   Output 650 ml   Net -650 ml         General: resting in bed. No apparent distress.  Pulm: no respiratory distress, no increased work of breathing  Abdomen:  Soft, non-distended,non-tender to palpation, with no rebound or guarding.  No peritoneal signs. Ostomy pink, productive of stool.   Incision:  Clean, dry, intact without erythema.  Staples in place.       Labs:  Lab Results   Component Value Date    WBC 12.3 2024    HGB 9.8 2024    HCT 33.9 2024    PLT 80.0 2024      Lab Results   Component Value Date    INR 1.20 2024     Lab Results   Component Value Date     2024    K 4.6 2024     2024    CO2 26.0 2024    BUN 20 2024    CREATSERUM 0.77 2024    GLU 84 2024    CA 7.4 2024    ALKPHO 70 2024    ALT 11 2024    AST 15 2024    BILT 0.4 2024    ALB 2.2 2024    TP 4.7 2024            Assessment:  Patient Active Problem List   Diagnosis    Hypothyroidism    Unspecified cataract    Atherosclerosis of coronary artery    Herpes zoster without mention of complication    Dyslipidemia    Retinopathy of both eyes    Macular  degeneration    Benign essential HTN    Hypercholesteremia    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)    Macular cyst, hole, or pseudohole, right eye    Nonexudative age-related macular degeneration, right eye, intermediate dry stage    PMR (polymyalgia rheumatica) ([facility])    Paving stone degeneration of retina, bilateral    Asymptomatic carotid artery stenosis, bilateral    Weakness    Constipation    Thrombocytopenia ([facility])    Syncope and collapse    Concussion with loss of consciousness    Fall, initial encounter    Laceration of right little finger without foreign body without damage to nail, initial encounter    Neuropathy    Shortness of breath    Respiratory syncytial virus (RSV)    RSV (acute bronchiolitis due to respiratory syncytial virus)    Intractable vomiting    Hypokalemia    Community acquired pneumonia    Bronchospasm    Pneumonia due to Mycoplasma pneumoniae    Type 2 diabetes mellitus without complication, without long-term current use of insulin ([facility])    Acute cystitis without hematuria    Acute respiratory failure with hypoxia ([facility])    Palliative care by specialist    Goals of care, counseling/discussion    Sigmoid volvulus ([facility])    Vascular dementia without behavioral disturbance, psychotic disturbance, mood disturbance, or anxiety ([facility])    Acute encephalopathy    Hypernatremia    Hypoxia    Rash    PAF (paroxysmal atrial fibrillation) ([facility])       POD 3 exploratory laparotomy, left colectomy with end colostomy for sigmoid volvulus     Plan:  Full liquid diet, if tolerates without worsening nausea or vomiting, she may advance to soft diet  Analgesics and antiemetics as needed  Encourage ambulation and up to chair  Continue ostomy care  Medical management per primary   DVT prophylaxis with lovenox   GI prophylaxis with pepcid    Lilia Mckeon PA-C  11/17/2024  12:40 PM    This note was initiated by Lilia Mckeon.  The PA saw the  patient in conjunction with me. The PA performed a history, exam, and developed the assessment and plan in conjunction with me. I agree with the above note and have made changes which reflect my own history and physical, if necessary.    Continues to do well  Advance diet as tolerated to soft  Encourage ambulation and mobilization as tolerated  Once patient is tolerating soft diet, she is cleared for discharge when all teams agree  Surgery will continue to follow  Rest of care per primary    Bisi Birmingham MD  [facility]  11/17/2024  2:39 PM

## 2024-11-17 NOTE — PROGRESS NOTES
A&Ox1. VSS. 3L nc. .  Telemetry: NSR  GI: Abdomen soft, nondistended. Passing gas through colostomy.  Denies nausea.  : Voids with the purewick.  Pain controlled per MAR.  Bedbound rolls side to side.   Incisions: midline with staples FAITH  Diet: full liquid diet.     All appropriate safety measures in place. All questions and concerns addressed.

## 2024-11-18 NOTE — PROGRESS NOTES
[facility]   part of [facility]     Hospitalist Progress Note     Alyssa Montgomery Patient Status:  Inpatient    1932 MRN [id number]   Location [facility] 4SW-A Attending Kayla Strickland DO   Hosp Day # 4 PCP Mony Hall DO     Chief Complaint: drowsy     Subjective:     Patient was restless and agitated per daughter and now sleeping after getting norco.   Sounds like she was more mumbling and confused earlier this am.     Objective:    Review of Systems:   A comprehensive review of systems was completed; pertinent positive and negatives stated in subjective.    Vital signs:  Temp:  [97.7 °F (36.5 °C)-99 °F (37.2 °C)] 97.8 °F (36.6 °C)  Pulse:  [] 111  Resp:  [18-20] 18  BP: (128-162)/(42-67) 162/62  SpO2:  [90 %-100 %] 90 %    Physical Exam:    General: No acute distress  Respiratory: diminished BS BL  Cardiovascular: S1, S2, regular rate and rhythm  Abdomen: Soft, Non-tender, non-distended, positive bowel sounds  Neuro: No new focal deficits.   Extremities: edema improved on all extremities.   + maculopapular Rash diffuse - improved     Diagnostic Data:    Labs:  Recent Labs   Lab 24  1757 11/15/24  0015 11/15/24  1308 24  0336 24  0537 24  0516   WBC 17.8* 20.0* 23.8* 16.8* 12.3* 15.9*   HGB 12.5 10.1* 11.2* 10.7* 9.8* 10.4*   .9* 104.1* 99.7 102.3* 108.3* 102.4*   .0 138.0* 118.0* 113.0* 80.0* 109.0*   INR 1.20  --   --   --   --   --        Recent Labs   Lab 24  0336 24  0537 24  0516   GLU 79 84 83   BUN 16 20 22   CREATSERUM 0.80 0.77 0.66   CA 7.8* 7.4* 8.0*   ALB 2.3* 2.2* 2.7*    146* 147*   K 4.4 4.6 4.3   * 116* 115*   CO2 26.0 26.0 30.0   ALKPHO 69 70 77   AST 14 15 12   ALT 10 11 12   BILT 0.4 0.4 0.4   TP 5.2* 4.7* 5.4*       Estimated Creatinine Clearance: 52.9 mL/min (based on SCr of 0.66 mg/dL).    No results for input(s): \"TROP\", \"TROPHS\", \"CK\" in the last 168 hours.    Recent Labs   Lab 24  9404    PTP 15.3*   INR 1.20                  Microbiology    No results found for this visit on 11/13/24.      Imaging: Reviewed in Epic.    Medications:    metoprolol  5 mg Intravenous Once    metoprolol tartrate  12.5 mg Oral 2x Daily(Beta Blocker)    sodium chloride  3 mL Nebulization BID    predniSONE  20 mg Oral Daily with breakfast    insulin degludec  10 Units Subcutaneous Nightly    insulin aspart  2-10 Units Subcutaneous TID CC and HS    famotidine  20 mg Intravenous BID    lansoprazole  30 mg Oral BID AC    metRONIDAZOLE  500 mg Intravenous Q12H    cefepime  1 g Intravenous Q12H    atorvastatin  40 mg Oral Nightly    ferrous sulfate  325 mg Oral Daily with breakfast    folic acid  1 mg Oral Daily    levothyroxine  125 mcg Oral Before breakfast    pregabalin  50 mg Oral BID    magnesium oxide  400 mg Oral Daily    enoxaparin  40 mg Subcutaneous Daily    acetaminophen  1,000 mg Intravenous Q6H       Assessment & Plan:        # Acute metabolic , toxic encephalopathy/ delirium   - monitor   - ABG noted. Ammonia 43-> repeat today   - CT head today   - taper steroid ( inc due to rash) to home dose over the next couple of days   - SLP eval     # PAF with RVR - resolved   - in NSR  - tele   - TSH 1.8  - cardiology consult appreciated. Cont to monitor and cont conservative Rx. No plan for AC at this time.     #Abdominal distention with sigmoid volvulus sp endoscoping decompression opn 11/13-> ex-lap with left colectomy and end ostomy 11/14  - d/w Dr Lee today, can stop abx from their perspective   - pain control   - diet per surgery     #Acute hypoxic respiratory failure   - lasix IV on hold today due to elevated Na, reassess in am and resume lasix   - Echo with normal EF  - wean O2  - neb   - CXR noted. Persistent effusion -> consider CT chest -> d/w daughter   - IS    # diffuse rash   - ?? Due to abx  - benadryl PRN-> however may cause more drowsiness   - consider stopping abx   -taper of steroid to home dose     #  thrombocytopenia , improved   - HIPA neg    # fluid overload   - iatrogenic   - reassess in am for lasix IV    #leukocytosis  -inc today likely from inc in steroid dose     #hypernatremia  -monitor      #hypoalbuminemia     #macrocytic anemia  -hgb 10.7: baseline 10-11  -no signs active bleeding  -cont home folic acid and iron supplement     #DM2  -hyperglycemia protocol   - ISS     #GERD  -cont home PPI     #hypothyroidism  -cont home levothyroxine     #HLD  -cont home statin    # PMR  - on low dose steorid  - rheum notes reviewed    #dementia  # ACP:  - DNAR select  - after discussing GOC, daughter interested to meet with hospice. She states \" I think she is there and does not want to fight\".   - > 25 minutes with patient and family, staff coordinating care    Shea Flynn MD    Supplementary Documentation:     Quality:  DVT Mechanical Prophylaxis:   SCDs, Early ambuation  DVT Pharmacologic Prophylaxis   Medication    enoxaparin (Lovenox) 40 MG/0.4ML SUBQ injection 40 mg                Code Status: DNAR/Selective Treatment  Buck: External urinary catheter in place  Buck Duration (in days): 2  Central line:    XAVI:     Discharge is dependent on: course  At this point Ms. Montgomery is expected to be discharge to: TBD    The 21st Century Cures Act makes medical notes like these available to patients in the interest of transparency. Please be advised this is a medical document. Medical documents are intended to carry relevant information, facts as evident, and the clinical opinion of the practitioner. The medical note is intended as peer to peer communication and may appear blunt or direct. It is written in medical language and may contain abbreviations or verbiage that are unfamiliar.

## 2024-11-18 NOTE — PROGRESS NOTES
Alert & oriented x0-1. VSS on 3L n/c. Voids with purewick. Tolerating diet, little appetite. Pt able to roll side to side, bilateral wrist restraints in place and side rails x4. Midline with staples FAITH. LLQ colostomy putting out gas and stool. Bilateral weeping of arms, chucks underneath. Denies nausea/chest pain/SOB. Pain controlled. Patient updated on plan of care. Questions and concerns addressed. Safety precautions in place. Frequent rounds performed.

## 2024-11-18 NOTE — PLAN OF CARE
During shift change, pt found to have pulled  IV out, pulled Telel leads off, and bleeding. Pt also repeating \"find the boy\" to RN's. Blood and patient cleaned up, Gown changed, MD paged, restraints ordered.

## 2024-11-18 NOTE — PROGRESS NOTES
Paged cardio @1608-  She was sustaining a hr of 140s. They added on metoprolol but I gave her scheduled metoprolol instead and her hr came down to the 70s,80s.

## 2024-11-18 NOTE — CM/SW NOTE
CM acknowledges order for hospice consult. [facility] notified. Referral sent to agency via [facility] w/ hospice order.     CM/SW will remain available for DC planning and/or support.     REKHA Florian, CMSRN    [id number]

## 2024-11-18 NOTE — HOSPICE RN NOTE
[facility] consult order received. Hospice will follow up with this patient and family today.    Pura Raphael RN, [facility]  [facility] Liaison  [phone] after hours     Addendum:  Daughter Radha was called. No answer, left VM message to call [facility] to schedule hospice meeting.

## 2024-11-18 NOTE — PROGRESS NOTES
A&Ox1/0. 3L nc. VSS.   Restraints are on and assessed per protocol.   Tele- ST/NSR  Passing gas through colostomy.  Voids using the purewick.   Pain controlled per MAR.   Midline incision with staples FAITH.   Full liquid diet.

## 2024-11-18 NOTE — WOUND PROGRESS NOTE
[facility]  Inpatient Ostomy Care Contact Note    Alyssa Montgomery Patient Status:  Inpatient    1932 MRN [id number]   Location [facility] 3NW-A Attending Shae Flynn MD   Hosp Day # 4 PCP Mony Hall DO     Spoke with the nurse regarding the ostomy consult.Unable to do any teachings or education at this time, patient remains confused per RN.Possible discharge to [facility].    Ostomy folder left in the patient's room.We will continue to follow this patient while in-house and assist with ostomy  care discharge planning.    Please call me at [phone]  if you have any questions about this consultation and plan of care.        Thank you,  Cris Woodall RN BSN [facility]  [address] [address]

## 2024-11-18 NOTE — PROGRESS NOTES
Assumed care of patient at 1530. Patient A&Ox0. Moaning/expressing word salad. Bedbound Bilateral wrist restraints in place. Colostomy intact w/ gas noted. Midlne incision with staples intact and ivelisse. Generalized redness and bruising noted to BUE and chest. Plan of care discussed w/ daughter at bedside.     1653: Patient transported to CT.     1730: Patient in afib rvr, HR in 140s upon return from CT. Scheduled metoprolol given. MD notified. Once time dose of metoprolol IV ordered.

## 2024-11-18 NOTE — PROGRESS NOTES
[facility]  Progress Note    Alyssa Montgomery Patient Status:  Inpatient    1932 MRN [id number]   Location [facility] 3NW-A Attending Shae Flynn MD   Hosp Day # 4 PCP Mony Hall DO     Subjective:  The patient is resting in bed. She is confused     Objective/Physical Exam:  General: Alert, orientated x 1.  Cooperative.  No apparent distress.  Vital Signs:  Blood pressure 134/40, pulse 74, temperature 97.3 °F (36.3 °C), temperature source Oral, resp. rate 17, height 67\", weight 162 lb 11.2 oz (73.8 kg), SpO2 99%, not currently breastfeeding.  HEENT: Normocephalic, atraumatic. No scleral icterus.  Abdomen:  Soft, non-distended, non-tender, with no rebound or guarding.  No peritoneal signs. Ostomy in left abdomen is edemetous with formed stool.  Incision: Dry, clean, and intact with staples in place. No surrounding erythema or drainage. No signs of infection.  The ostomy is pink, slightly edematous but functioning with formed stool in the ostomy bag      Labs:  CBC:    Lab Results   Component Value Date    WBC 15.9 (H) 2024    WBC 12.3 (H) 2024    WBC 16.8 (H) 2024     Lab Results   Component Value Date    HGB 10.4 (L) 2024    HGB 9.8 (L) 2024    HGB 10.7 (L) 2024      Lab Results   Component Value Date    .0 (L) 2024    PLT 80.0 (L) 2024    .0 (L) 2024     Lab Results   Component Value Date    WBC 15.9 2024    HGB 10.4 2024    HCT 33.6 2024    .0 2024    CREATSERUM 0.66 2024    BUN 22 2024     2024    K 4.3 2024     2024    CO2 30.0 2024    GLU 83 2024    CA 8.0 2024    ALB 2.7 2024    ALKPHO 77 2024    BILT 0.4 2024    TP 5.4 2024    AST 12 2024    ALT 12 2024         Assessment/Plan:  Patient Active Problem List   Diagnosis    Hypothyroidism    Unspecified cataract    Atherosclerosis of coronary  artery    Herpes zoster without mention of complication    Dyslipidemia    Retinopathy of both eyes    Macular degeneration    Benign essential HTN    Hypercholesteremia    Exudative age-related macular degeneration of left eye with active choroidal neovascularization ([facility])    Macular cyst, hole, or pseudohole, right eye    Nonexudative age-related macular degeneration, right eye, intermediate dry stage    PMR (polymyalgia rheumatica) ([facility])    Paving stone degeneration of retina, bilateral    Asymptomatic carotid artery stenosis, bilateral    Weakness    Constipation    Thrombocytopenia ([facility])    Syncope and collapse    Concussion with loss of consciousness    Fall, initial encounter    Laceration of right little finger without foreign body without damage to nail, initial encounter    Neuropathy    Shortness of breath    Respiratory syncytial virus (RSV)    RSV (acute bronchiolitis due to respiratory syncytial virus)    Intractable vomiting    Hypokalemia    Community acquired pneumonia    Bronchospasm    Pneumonia due to Mycoplasma pneumoniae    Type 2 diabetes mellitus without complication, without long-term current use of insulin ([facility])    Acute cystitis without hematuria    Acute respiratory failure with hypoxia ([facility])    Palliative care by specialist    Goals of care, counseling/discussion    Sigmoid volvulus ([facility])    Vascular dementia without behavioral disturbance, psychotic disturbance, mood disturbance, or anxiety ([facility])    Acute encephalopathy    Hypernatremia    Hypoxia    Rash    PAF (paroxysmal atrial fibrillation) ([facility])     POD 4 status post exploratory laparotomy, left colectomy with end colostomy for sigmoid volvulus    Continue pain control and antiemetics as needed.  Encourage  up to chair as tolerated-patient is full assist and may need Wei chair lift.  DVT prophylaxis with Lovenox  GI prophylaxis with Pepcid  Patient appears to be tolerating full liquid diet.  May advance to soft diet as  tolerated  Case discussed with LOCO Sawyer  11/18/2024  10:03 AM     Patient is alert but quite confused today.  Not answering questions appropriately or following commands.  Abdomen is soft, minimally distended, surgical incision is clean and dry.  Ostomy in the left lower quadrant is pink, slightly edematous but there is formed stool in the bag.  Serology 8 today reveals mildly elevated sodium 147 and chloride 115.  Albumin diminished 2.7.  Leukocytosis 15.9 however patient did receive increased steroid dose today for pulmonary status.  Hemoglobin 10.4 and stable-yesterday 9.8.  Thrombocytopenia improved from 80 yesterday to 109 today  Patient is tolerating full liquid diet.  She does require assist with feeding.  Will advance to soft diet today as tolerated.  Continue PT OT as tolerated    I agree with the note above and attest to its accuracy with the following changes below after my interview and examination of the patient    The patient was seen and examined.  Available labs and radiology is noted.    Ashley Lee MD FACS    Please note that this report has been produced using speech recognition software and may contain errors related to that system including but not limited to errors in grammar, punctuation and spelling as well as words and phrases that possibly may have been recognized inappropriately.  If there are any questions or concerns please contact the dictating provider for clarification.    The 21st Century Cures Act makes medical notes like these available to patients in the interest of trans parency. Please be advised this is a medical document. Medical documents are intended to carry relevant information, facts as evident, and the clinical opinion of the practitioner. The medical note is intended as peer to peer communication and may appear blunt or direct. It is written in medical language and may contain abbreviations or verbiage that are unfamiliar.   Again if there  are any questions or concerns please contact the dictating provider for clarification.

## 2024-11-19 NOTE — PROGRESS NOTES
A&Ox0. VSS. 3L O2 via NC. . Audible expiratory wheezing. BUE edema and weeping.  Telemetry: Flips from NSR to A. Fib   GI: Abdomen soft, nondistended. Passing gas and stool through colostomy. Bag changed d/t large amount of pasty stool.   Denies nausea.  : Incontinent- purewick in place  Pain controlled with PRN pain medications  Rolls side to side w/ 2 person assistance   Incisions: Midline w/ staples- C/D/I   Diet: Soft diet- very poor appetite and low intake   All appropriate safety measures in place. All questions and concerns addressed.

## 2024-11-19 NOTE — SLP NOTE
SLP order received to evaluate and treat. SLP visit attempted, but patient refused to participate with all PO trials offered and would not interact with clinician. Chart review indicated plans to meet with Hospice today. SLP will re-attempt evaluation 11/20 if aligned with goals of care.

## 2024-11-19 NOTE — PROGRESS NOTES
[facility] [place]   part of [facility]     Hospitalist Progress Note     Alyssa Montgomery Patient Status:  Inpatient    1932 MRN [id number]   Location [facility] 4SW-A Attending Kayla Strickland DO   Hosp Day # 5 PCP Mony Hall DO     Chief Complaint: drowsy     Subjective:     Patient was restless again through the day, now sleeping but woke up and mumbling. She cannot recognize her daughter who is at bedside.   Afib RVR yesterday afternoon.     Objective:    Review of Systems:   A comprehensive review of systems was completed; pertinent positive and negatives stated in subjective.    Vital signs:  Temp:  [96.7 °F (35.9 °C)-98.5 °F (36.9 °C)] 97 °F (36.1 °C)  Pulse:  [] 72  Resp:  [16-19] 19  BP: (107-162)/(38-85) 157/43  SpO2:  [98 %-100 %] 98 %    Physical Exam:    General: No acute distress  Respiratory: diminished BS BL  Cardiovascular: S1, S2, regular rate and rhythm  Abdomen: Soft, Non-tender, non-distended, positive bowel sounds  Neuro: No new focal deficits.   Extremities: edema improved on all extremities.   + maculopapular Rash diffuse - improved     Diagnostic Data:    Labs:  Recent Labs   Lab 24  1757 11/15/24  0015 11/15/24  1308 24  0336 24  0537 24  0516   WBC 17.8* 20.0* 23.8* 16.8* 12.3* 15.9*   HGB 12.5 10.1* 11.2* 10.7* 9.8* 10.4*   .9* 104.1* 99.7 102.3* 108.3* 102.4*   .0 138.0* 118.0* 113.0* 80.0* 109.0*   INR 1.20  --   --   --   --   --        Recent Labs   Lab 24  0336 24  0537 24  0516 24  0611   GLU 79 84 83 152*   BUN 16 20 22 14   CREATSERUM 0.80 0.77 0.66 0.52*   CA 7.8* 7.4* 8.0* 7.9*   ALB 2.3* 2.2* 2.7*  --     146* 147* 142   K 4.4 4.6 4.3 4.3   * 116* 115* 119*   CO2 26.0 26.0 30.0 27.0   ALKPHO 69 70 77  --    AST 14 15 12  --    ALT 10 11 12  --    BILT 0.4 0.4 0.4  --    TP 5.2* 4.7* 5.4*  --        Estimated Creatinine Clearance: 67.1 mL/min (A) (based on SCr of 0.52 mg/dL (L)).    No  results for input(s): \"TROP\", \"TROPHS\", \"CK\" in the last 168 hours.    Recent Labs   Lab 11/14/24  1757   PTP 15.3*   INR 1.20           Microbiology    No results found for this visit on 11/13/24.      Imaging: Reviewed in Epic.    Medications:    furosemide  40 mg Intravenous BID (Diuretic)    predniSONE  10 mg Oral Daily with breakfast    QUEtiapine  25 mg Oral Nightly    sodium chloride  3 mL Nebulization BID    insulin degludec  10 Units Subcutaneous Nightly    insulin aspart  2-10 Units Subcutaneous TID CC and HS    famotidine  20 mg Intravenous BID    lansoprazole  30 mg Oral BID AC    atorvastatin  40 mg Oral Nightly    ferrous sulfate  325 mg Oral Daily with breakfast    folic acid  1 mg Oral Daily    levothyroxine  125 mcg Oral Before breakfast    pregabalin  50 mg Oral BID    magnesium oxide  400 mg Oral Daily    enoxaparin  40 mg Subcutaneous Daily    acetaminophen  1,000 mg Intravenous Q6H       Assessment & Plan:        # Acute metabolic , toxic encephalopathy/ delirium   - monitor   - ABG noted.  - CT head neg for acute pathology   - taper steroid ( inc due to rash) to home dose over the next couple of days   - SLP eval     # PAF with RVR -intermittent tachycardic episodes   - in NSR now   - tele   - TSH 1.8  - cardiology consult appreciated. Cont to monitor and cont conservative Rx. No plan for AC at this time.   - add BB PO , if unable to do PO meds, IV BB     #Abdominal distention with sigmoid volvulus sp endoscoping decompression opn 11/13-> ex-lap with left colectomy and end ostomy 11/14  - d/w Dr Lee, can stop abx from their perspective   - pain control   - diet per surgery     #Acute hypoxic respiratory failure   - lasix IV again today  - Echo with normal EF  - wean O2  - neb   - CXR noted. Persistent effusion -> consider CT chest -> d/w daughter   - IS    # diffuse rash , improved   - ?? Due to abx  - benadryl PRN-> however may cause more drowsiness   - off abx now   -taper of steroid to  home dose     # thrombocytopenia , improved   - HIPA neg    # fluid overload   - iatrogenic   - reassess in am for lasix IV    #leukocytosis  -inc today likely from inc in steroid dose     #hypernatremia  -monitor      #hypoalbuminemia     #macrocytic anemia  -hgb 10.7: baseline 10-11  -no signs active bleeding  -cont home folic acid and iron supplement     #DM2  -hyperglycemia protocol   - ISS     #GERD  -cont home PPI     #hypothyroidism  -cont home levothyroxine     #HLD  -cont home statin    # PMR  - on low dose steorid  - rheum notes reviewed    #dementia    # nutrition   - NO PLAN FOR NGT ot TPN, d/w daughter     # ACP:  - DNAR select  - hospice to meet with daughter today     Shae Flynn MD    Supplementary Documentation:     Quality:  DVT Mechanical Prophylaxis:   SCDs, Early ambuation  DVT Pharmacologic Prophylaxis   Medication    enoxaparin (Lovenox) 40 MG/0.4ML SUBQ injection 40 mg                Code Status: DNAR/Selective Treatment  Buck: External urinary catheter in place  Buck Duration (in days): 2  Central line:    XAVI:     Discharge is dependent on: course  At this point Ms. Montgomery is expected to be discharge to: TBD    The 21st Century Cures Act makes medical notes like these available to patients in the interest of transparency. Please be advised this is a medical document. Medical documents are intended to carry relevant information, facts as evident, and the clinical opinion of the practitioner. The medical note is intended as peer to peer communication and may appear blunt or direct. It is written in medical language and may contain abbreviations or verbiage that are unfamiliar.

## 2024-11-19 NOTE — HOSPICE RN NOTE
Received a call from daughter Radha. She would like to meet with hospice tomorrow at 3 pm. She would also like to find a place to have a conference call with her siblings. Meeting set for 3 pm, RH will meet at the patient's room. RH will remain available for questions or concerns if needed.    Yael Lopez RN BSN  [facility] RN Liaison  [phone] (After-hours)

## 2024-11-19 NOTE — PROGRESS NOTES
[facility]  Progress Note    Alyssa Montgomery Patient Status:  Inpatient    1932 MRN [id number]   Location [facility] 3NW-A Attending Shae Flynn MD   Hosp Day # 5 PCP Mony Hall DO     Subjective:  She is resting in bed receiving nebulizer treatment. She is confused. No family at bedside. Nursing staff reports continued stool output from ostomy. She reports patient has minimal oral intake. She reports there are plans for hospice meeting today.     WBC 15.9 --> 12.3.   Objective/Physical Exam:  /43 (BP Location: Left arm)   Pulse 72   Temp 97 °F (36.1 °C) (Axillary)   Resp 19   Ht 67\"   Wt 162 lb 11.2 oz (73.8 kg)   SpO2 100%   BMI 25.48 kg/m²     Intake/Output Summary (Last 24 hours) at 2024 1027  Last data filed at 2024 0515  Gross per 24 hour   Intake 0 ml   Output 1500 ml   Net -1500 ml         General: Confused  Pulm: no respiratory distress, no increased work of breathing  Abdomen:  Soft, non-distended,non-tender ostomy pink , with no rebound or guarding.  No peritoneal signs.  Incision:  Clean, dry, intact without erythema. Staples in place       Labs:  Lab Results   Component Value Date    WBC 12.3 2024    HGB 10.3 2024    HCT 33.4 2024      Lab Results   Component Value Date    INR 1.20 2024     Lab Results   Component Value Date     2024    K 4.3 2024     2024    CO2 27.0 2024    BUN 14 2024    CREATSERUM 0.52 2024     2024    CA 7.9 2024            Assessment:  Patient Active Problem List   Diagnosis    Hypothyroidism    Unspecified cataract    Atherosclerosis of coronary artery    Herpes zoster without mention of complication    Dyslipidemia    Retinopathy of both eyes    Macular degeneration    Benign essential HTN    Hypercholesteremia    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)    Macular cyst, hole, or  pseudohole, right eye    Nonexudative age-related macular degeneration, right eye, intermediate dry stage    PMR (polymyalgia rheumatica) ([facility])    Paving stone degeneration of retina, bilateral    Asymptomatic carotid artery stenosis, bilateral    Weakness    Constipation    Thrombocytopenia ([facility])    Syncope and collapse    Concussion with loss of consciousness    Fall, initial encounter    Laceration of right little finger without foreign body without damage to nail, initial encounter    Neuropathy    Shortness of breath    Respiratory syncytial virus (RSV)    RSV (acute bronchiolitis due to respiratory syncytial virus)    Intractable vomiting    Hypokalemia    Community acquired pneumonia    Bronchospasm    Pneumonia due to Mycoplasma pneumoniae    Type 2 diabetes mellitus without complication, without long-term current use of insulin ([facility])    Acute cystitis without hematuria    Acute respiratory failure with hypoxia ([facility])    Palliative care by specialist    Goals of care, counseling/discussion    Sigmoid volvulus ([facility])    Vascular dementia without behavioral disturbance, psychotic disturbance, mood disturbance, or anxiety ([facility])    Acute encephalopathy    Hypernatremia    Hypoxia    Rash    PAF (paroxysmal atrial fibrillation) ([facility])       POD 5 status post exploratory laparotomy, left colectomy with end colostomy for sigmoid volvulus     Plan:  Noted plans for patient's family to meet with hospice today  Continue soft diet as tolerated  Analgesics and antiemetics as needed  Encourage ambulation and up to chair- patient is full assist and may require chele chair life  Medical management per primary  DVT prophylaxis with lovenox  GI prophylaxis with pepcid       Lilia Mckeon PA-C  11/19/2024  10:27 AM    POD 5-the patient is doing well from general surgical standpoint.  Surgical incision is clean and dry.  Ostomy is pink with less edema and formed stool in the bag.  The patient's daughter Radha is at the bedside.   She reports that the patient is now refusing any type of oral intake.  She will be meeting with hospice today to discuss goals of care.  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    No further indication for general surgical follow-up at this time.  General surgery service will sign off at this time.    I agree with the note above and attest to its accuracy with the following changes below after my interview and examination of the patient    The patient was seen and examined.  Available labs and radiology is noted.    Ashley Lee MD FACS    Please note that this report has been produced using speech recognition software and may contain errors related to that system including but not limited to errors in grammar, punctuation and spelling as well as words and phrases that possibly may have been recognized inappropriately.  If there are any questions or concerns please contact the dictating provider for clarification.    The 21st Century Cures Act makes medical notes like these available to patients in the interest of trans parency. Please be advised this is a medical document. Medical documents are intended to carry relevant information, facts as evident, and the clinical opinion of the practitioner. The medical note is intended as peer to peer communication and may appear blunt or direct. It is written in medical language and may contain abbreviations or verbiage that are unfamiliar.   Again if there are any questions or concerns please contact the dictating provider for clarification.

## 2024-11-19 NOTE — PLAN OF CARE
Pt vitals stable, A&O x0. Scheduled meds given. Tolerating diet although having decreased appetite and not wanting to eat. Midline with staples and colostomy noted to abdomen. Scattered redness and bruising noted across skin. Weeping edema to arms. Telemetry in place. Bed locked in low position, call light in reach, rounding provided.

## 2024-11-19 NOTE — HOSPICE RN NOTE
[facility] nursing visit for follow up on hospice consult order. Met with daughter Radha STARR with siblings Jaqueline and Hilary on speaker phone. Discussed hospice benefit, hospice philosophy, palliative care with questions and concerns addressed. Hospice informational book was provided. Patient appears appropriate for hospice with dx of acute hypoxic respiratory failure 2/2 CHFand appropriate for [facility] inpatient level of hospice care for management of respiratory distress, pain, anxiety and EOL symptoms. Family in agreement. Hospice consents and POLST signed by Radha. Contacted Dr. Shae Flynn and [facility] medical director Dr. Stone Hilliard for initial hospice orders. Orders received and entered. Patient will be transitioned to [facility] inpatient hospice bed. Patient needs continued frequent nursing assessments for administration and titration of IV comfort medications and ongoing pain assessments. Hospice will continue to follow this patient closely for EOL pain and symptom management and to support family. Please call [facility] with any questions or concerns.    Pura Raphael RN, [facility]  [facility] Liaison  [phone] after hours    Detail Level: Zone

## 2024-11-19 NOTE — CONSULTS
[facility]  Inpatient Ostomy Care Contact Note    Alyssa Montgomery Patient Status:  Inpatient    1932 MRN [id number]   Location [facility] 3NW-A Attending Shae Flynn MD   Hosp Day # 5 PCP Mony Hall DO     Attempted to see patient for follow up ostomy  care session.Possible admission  to hospice care per RN    Please call me at [phone] if you have any questions about this consultation.      Thank you,    Cris Woodall RN BSN [facility]  [address]

## 2024-11-19 NOTE — SPIRITUAL CARE NOTE
Spiritual Care Visit Note    Patient Name: Alyssa oMntgomery Date of Spiritual Care Visit: 24   : 1932 Primary Dx: <principal problem not specified>       Referred By: Referral From: Nurse (Hospice RN)    Visit Type/Summary: Notified [facility] for Anointing as per the call received from Hospice.      - Spiritual Care: Responded to a request via the on call phone Provided support for Patient's spiritual/Confucianism requests. Coordinated  visit for Sacrament of the Sick.     Fr Martinez called back and notified that he will be here between 9pm and 10 pm to anoint the patient tonight. Notified the [facility] RN.    Spiritual Care support can be requested via an Epic consult.   For urgent/immediate needs, please contact the On Call  at: [employer]: ext [phone].    Rev. Akbar Lara M.Div., M.T.S.,   Certified Grief Counseling Specialist  Advanced Practice Board Certified

## 2024-11-20 NOTE — PLAN OF CARE
Pt resting comfortably. 2L oxygen via NC for comfort. Buck intact and draining. Colostomy noted to abdomen. Bed locked in low position, call light in reach, rounding provided.

## 2024-11-20 NOTE — CM/SW NOTE
RYANN and Hospice RN met with the patient and family at bedside to offer support.  [facility] will continue to follow as the patient will remain GIP.    Osiris Strange, DOUGLAS  [facility]  [phone]

## 2024-11-20 NOTE — H&P
[facility]     Alyssa Montgomery Patient Status:  Inpatient    1932 MRN [id number]   Location [facility] 3NW-A Attending Kayla Strickland DO   Hosp Day # 1 PCP Mony Hall DO     Chief Complaint: My mom needs hospice    Subjective:    History of Present Illness:     Alyssa Montgomery is a 89 year old female with multiple medical problems presented to the hospital  for abd pain and found to have sigmoid volvulus s/p decompression w/ flex sig. Pt then went for colectomy on . Post op course complicated by AMS and fluid overload. Pt has underlying dementia. Given worsening confusion and inability to properly eat pt's condition further declined. Decision was eventually made to adjust goals of care to comfort. Hospice involved. Pt switched to comfort care w/ support from hospice services on . Pt seen today resting comfortably.     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 Her smoking use included cigarettes. She has never used smokeless tobacco. She reports that she does not currently use alcohol after a past usage of about 7.0 standard drinks of alcohol per week. She reports that she does not use drugs.      Allergies: Allergies[1]    Medications:  Medications Ordered Prior to Encounter[2]    Review of Systems:   A comprehensive review of systems was completed.    Pertinent positives and negatives noted in the HPI.    Objective:   Physical Exam:    /50 (BP Location: Left arm)   Pulse 77   Temp 98.2 °F (36.8 °C) (Oral)   Resp 19   SpO2 98%   General: No acute distress, resting comfortably in bed  Respiratory: No rhonchi, no wheezes  Cardiovascular: S1, S2. Regular rate and rhythm  Abdomen: Soft, Non-tender, non-distended, positive bowel sounds  Neuro: global weakness  Extremities: No edema    Results:    Labs:      Labs Last 24 Hours:    Recent Labs   Lab 11/16/24  0336 11/17/24  0537 11/18/24  0516 11/19/24  0910   RBC 3.46* 3.13* 3.28* 3.25*   HGB 10.7* 9.8* 10.4* 10.3*   HCT 35.4 33.9* 33.6* 33.4*   .3* 108.3* 102.4* 102.8*   MCH 30.9 31.3 31.7 31.7   MCHC 30.2* 28.9* 31.0 30.8*   RDW 18.2 18.5 18.4 18.2   NEPRELIM 15.30* 11.07* 13.01*  --    WBC 16.8* 12.3* 15.9* 12.3*   .0* 80.0* 109.0* 93.0*       Recent Labs   Lab 11/16/24  0336 11/17/24  0537 11/18/24  0516 11/19/24  0611   GLU 79 84 83 152*   BUN 16 20 22 14   CREATSERUM 0.80 0.77 0.66 0.52*   EGFRCR 69 72 82 87   CA 7.8* 7.4* 8.0* 7.9*   ALB 2.3* 2.2* 2.7*  --     146* 147* 142   K 4.4 4.6 4.3 4.3   * 116* 115* 119*   CO2 26.0 26.0 30.0 27.0   ALKPHO 69 70 77  --    AST 14 15 12  --    ALT 10 11 12  --    BILT 0.4 0.4 0.4  --    TP 5.2* 4.7* 5.4*  --        Lab Results   Component Value Date    INR 1.20 11/14/2024       No results for input(s): \"TROP\", \"TROPHS\", \"CK\" in the last 168 hours.    Recent Labs   Lab 11/15/24  0015   PBNP 2,409*       No results for input(s): \"PCT\" in the last 168 hours.    Imaging: Imaging data reviewed in Epic.    Assessment & Plan:      #Acute on chronic metabolic encephalopathy  #Delirium   #Dementia  #PAF w/ RVR  #Sigmoid volvulus s/p left colectomy and end ostomy on 11/14  #Acute hypoxemic  resp failure  #Rash  #Thrombocytopenia  #Leukocytosis  #Hypernatremia  #Hypoalbuminemia  #Macrocytic anemia  #DM2  #GERD  #Hypothyroidism  #HLD  #PMR    Plan  -comfort care   -hospice following  -discussed w/ patient's daughter at the bedside        Plan of care discussed with staff    Kayla Strickland DO    Supplementary Documentation:     The 21st Century Cures Act makes medical notes like these available to patients in the interest of transparency. Please be advised this is a medical document. Medical documents are intended to carry relevant information, facts as evident, and the clinical opinion of the practitioner. The medical note is intended as peer to peer communication and may appear blunt or direct. It is written in medical language and may contain abbreviations or verbiage that are unfamiliar.               **Certification      PHYSICIAN Certification of Need for Inpatient Hospitalization - Initial Certification    Patient will require inpatient services that will reasonably be expected to span two midnight's based on the clinical documentation in H+P.   Based on patients current state of illness, I anticipate that, after discharge, patient will require TBD.                        [1] No Known Allergies  [2]   Current Facility-Administered Medications on File Prior to Encounter   Medication Dose Route Frequency Provider Last Rate Last Admin    [COMPLETED] metoprolol (Lopressor) 5 mg/5mL injection 5 mg  5 mg Intravenous Once Shae Flynn MD   5 mg at 11/18/24 1902    [COMPLETED] metoprolol (Lopressor) 5 mg/5mL injection 5 mg  5 mg Intravenous Once Shae Flynn MD   5 mg at 11/16/24 0950    [COMPLETED] furosemide (Lasix) 10 mg/mL injection 40 mg  40 mg Intravenous Once Shae Flynn MD   40 mg at 11/15/24 0907    [COMPLETED] magnesium sulfate in sterile water for injection 2 g/50mL IVPB premix 2 g  2 g Intravenous Once Domingo Booth MD 50 mL/hr at 11/15/24 1202 2 g at 11/15/24 1202    [COMPLETED]  diphenhydrAMINE (Benadryl) 50 mg/mL  injection 12.5 mg  12.5 mg Intravenous Once Lorena Abbott APRN   12.5 mg at 11/15/24 1210    [] sodium chloride 0.9% infusion   Intravenous Continuous Jonh Payne MD   Stopped at 24 0145    [] lactated ringers IV bolus 500 mL  500 mL Intravenous Once PRN Terri Bradley CRNA        [] atropine 0.1 MG/ML injection 0.5 mg  0.5 mg Intravenous PRN Terri Bradley CRNA        [] naloxone (Narcan) 0.4 MG/ML injection 0.08 mg  0.08 mg Intravenous PRN Terri Bradley CRNA        [] fentaNYL (Sublimaze) 50 mcg/mL injection 25 mcg  25 mcg Intravenous Q5 Min PRN Terri Bradley CRNA        Or    [] fentaNYL (Sublimaze) 50 mcg/mL injection 50 mcg  50 mcg Intravenous Q5 Min PRN Terri Bradley CRNA        [] HYDROmorphone (Dilaudid) 1 MG/ML injection 0.2 mg  0.2 mg Intravenous Q5 Min PRN Terri Bradley CRNA        Or    [] HYDROmorphone (Dilaudid) 1 MG/ML injection 0.4 mg  0.4 mg Intravenous Q5 Min PRN Terri Bradley CRNA        Or    [] HYDROmorphone (Dilaudid) 1 MG/ML injection 0.6 mg  0.6 mg Intravenous Q5 Min PRN Terri Bradley CRNA        [] diphenhydrAMINE (Benadryl) 50 mg/mL  injection 12.5 mg  12.5 mg Intravenous PRN Terri Bradley CRNA        [COMPLETED] iopamidol 76% (ISOVUE-370) injection for power injector  100 mL Intravenous ONCE PRN Jonh Payne MD   80 mL at 24 2151    [COMPLETED] magnesium hydroxide (Milk of Magnesia) 400 MG/5ML oral suspension 30 mL  30 mL Oral Once Arely Barth DO   30 mL at 24 1741    [COMPLETED] phenazopyridine (Pyridium) tab 100 mg  100 mg Oral TID CC Debby Cheek MD   100 mg at 24 1708    [COMPLETED] methylPREDNISolone sodium succinate (Solu-MEDROL) injection 40 mg  40 mg Intravenous Q12H Sue Varner MD   40 mg at 24 0144    [COMPLETED] predniSONE (Deltasone) tab 20 mg  20 mg Oral Once Sue Varner MD   20 mg at 24  1730    [COMPLETED] potassium chloride (Klor-Con M20) tab 40 mEq  40 mEq Oral Once Strickland, Darnellet, DO   40 mEq at 09/03/24 1146    [COMPLETED] piperacillin-tazobactam (Zosyn) 3.375 g in dextrose 5% 100 mL IVPB-ADDV  3.375 g Intravenous Q8H Haseeb Handy MD 25 mL/hr at 09/09/24 2200 3.375 g at 09/09/24 2200    [COMPLETED] furosemide (Lasix) 10 mg/mL injection 20 mg  20 mg Intravenous Once Strickland, Darnellet, DO   20 mg at 09/02/24 0908    [COMPLETED] potassium chloride (Klor-Con) 20 MEQ oral powder 40 mEq  40 mEq Oral Once Strickland, Darnellet, DO   40 mEq at 09/01/24 1532    [COMPLETED] piperacillin-tazobactam (Zosyn) 3.375 g in dextrose 5% 100 mL IVPB-ADDV  3.375 g Intravenous Q8H Darnell Stricklandet, DO 25 mL/hr at 09/02/24 0236 3.375 g at 09/02/24 0236    [COMPLETED] potassium chloride (Klor-Con) 20 MEQ oral powder 40 mEq  40 mEq Oral Once Strickland, Niket, DO   40 mEq at 08/30/24 1530    [COMPLETED] potassium chloride (Klor-Con) 20 MEQ oral powder 40 mEq  40 mEq Oral Once Strickland, Darnellet, DO   40 mEq at 08/29/24 0909    [COMPLETED] potassium chloride (Klor-Con) 20 MEQ oral powder 40 mEq  40 mEq Oral Once StricklandDarnellet, DO   40 mEq at 08/28/24 0854    [COMPLETED] magnesium oxide (Mag-Ox) tab 400 mg  400 mg Oral Once Strickland, Niket, DO   400 mg at 08/27/24 0956    [COMPLETED] potassium chloride (Klor-Con M20) tab 40 mEq  40 mEq Oral Once StricklandDarnellet, DO   40 mEq at 08/27/24 0956    [COMPLETED] furosemide (Lasix) 10 mg/mL injection 40 mg  40 mg Intravenous Once Strickland, Darnellet, DO   40 mg at 08/27/24 1201    [COMPLETED] Perflutren Lipid Microsphere (DEFINITY) 6.52 MG/ML injection 1.5 mL  1.5 mL Intravenous ONCE PRN Kayla Strickland, DO   1.5 mL at 08/27/24 1709    [COMPLETED] sodium chloride 0.9 % IV bolus 1,000 mL  1,000 mL Intravenous Once Jyotsna Figueroa MD   Stopped at 08/26/24 2025    [COMPLETED] ondansetron (Zofran) 4 MG/2ML injection 4 mg  4 mg Intravenous Once Jyotsna Figueroa MD   4 mg at 08/26/24 1803    [COMPLETED] metoclopramide (Reglan) 5 mg/mL  injection 10 mg  10 mg Intravenous Once Jyotsna Figueroa MD   10 mg at 24    [COMPLETED] diphenhydrAMINE (Benadryl) 50 mg/mL  injection 50 mg  50 mg Intravenous Once Jyotsna Figueroa MD   50 mg at 24    [COMPLETED] ondansetron (Zofran) 4 MG/2ML injection 4 mg  4 mg Intravenous Once Jyotsna Figueroa MD   4 mg at 24    [COMPLETED] haloperidol lactate (Haldol) 5 MG/ML injection 5 mg  5 mg Intravenous Once Jyotsna Figueroa MD   5 mg at 24    [COMPLETED] fleet enema (Fleet) rectal enema 133 mL  1 enema Rectal Once PRN Medhat Dover MD   133 mL at 24 1407     Current Outpatient Medications on File Prior to Encounter   Medication Sig Dispense Refill    folic acid 1 MG Oral Tab Take 1 tablet (1 mg total) by mouth daily.      ipratropium-albuterol 0.5-2.5 (3) MG/3ML Inhalation Solution Take 3 mL by nebulization every 6 (six) hours as needed.      ferrous sulfate 325 (65 FE) MG Oral Tab EC Take 1 tablet (325 mg total) by mouth daily with breakfast.      Potassium Chloride ER 20 MEQ Oral Tab CR Take 2 tablets by mouth daily.      enoxaparin (LOVENOX) 40 MG/0.4ML Injection Solution Prefilled Syringe Inject 0.4 mL (40 mg total) into the skin daily.      traMADol HCl 25 MG Oral Tab Take 25 mg by mouth every 8 (eight) hours as needed.      [] sennosides 8.6 MG Oral Tab Take 1 tablet (8.6 mg total) by mouth daily. 30 tablet 0    [] polyethylene glycol, PEG 3350, 17 g Oral Powd Pack Take 17 g by mouth daily as needed. 30 each 0    [] pantoprazole 40 MG Oral Tab EC Take 1 tablet (40 mg total) by mouth 2 (two) times daily before meals. 60 tablet 0    [] insulin aspart 100 Units/mL Subcutaneous Solution Pen-injector Inject 1-10 Units into the skin 4 (four) times daily before meals and nightly. Give 1 unit of Novolog (aspart) insulin for every 50 points blood glucose is greater than 140 mg/dL Give Novolog/aspart insulin subcutaneously within 30 minutes of  scheduled finger-stick time 12 mL 0    Insulin Pen Needle 32G X 4 MM Does not apply Misc May substitute if not on insurance formulary 100 each 5    levothyroxine 100 MCG Oral Tab TAKE 1 TABLET(100 MCG) BY MOUTH DAILY (Patient taking differently: Take 1.25 tablets (125 mcg total) by mouth daily.) 90 tablet 0    ATORVASTATIN 40 MG Oral Tab TAKE 1 TABLET(40 MG) BY MOUTH EVERY NIGHT 90 tablet 0    pregabalin 25 MG Oral Cap Take 1 capsule (25 mg total) by mouth 2 (two) times daily. (Patient taking differently: Take 2 capsules (50 mg total) by mouth 2 (two) times daily.) 180 capsule 0    albuterol (PROAIR HFA) 108 (90 Base) MCG/ACT Inhalation Aero Soln Inhale 2 puffs into the lungs every 4 (four) hours as needed. 8 g 0    Spacer/Aero-Holding Chambers Does not apply Device For use with albuterol inhaler 1 each 0    docusate sodium 100 MG Oral Cap Take 1 capsule (100 mg total) by mouth 2 (two) times daily as needed for constipation.      ergocalciferol 1.25 MG (94436 UT) Oral Cap Take 1 capsule (50,000 Units total) by mouth once a week. saturday      predniSONE 1 MG Oral Tab Take 1 tablet (1 mg total) by mouth daily with breakfast. 90 tablet 2    EYE DROPS RELIEF OP Apply 1 drop to eye daily as needed.      acetaminophen 500 MG Oral Tab Take 2 tablets (1,000 mg total) by mouth every 12 (twelve) hours as needed for Pain.

## 2024-11-20 NOTE — HOSPICE RN NOTE
[facility] Inpatient Nursing Rounds:     Patient is lying in bed, unresponsive to voice or touch, and does not open her eyes. Lungs very diminished on 2L NC with an expiratory wheeze. She is using abdominal accessory muscles to breathe and grunts as she exhales.   Morphine drip infusing at 1 mg/hr. Scopolamine patch present for secretions. Discussed increasing Morphine drip to 2 mg/hr with Jade GUEVARA. She also gave Ativan 1 mg IV to help with work of breathing. Buck draining dark yellow urine.      PPS: 10%    Patient requiring frequent nursing assessments and interventions including titration of IV medications. POC discussed with Jade GUEVARA, daughter Radha and Dr. Strickland. All in agreement. [facility] will continue to support the patient and family.     Yael Lopez RN BSN  [facility] RN Liaison  [phone] (After-hours)

## 2024-11-20 NOTE — SPIRITUAL CARE NOTE
Spiritual Care Visit Note    Patient Name: Alyssa Montgomery Date of Spiritual Care Visit: 24   : 1932 Primary Dx: <principal problem not specified>       Referred By: Referral From: Nurse (Hospice RN)    Spiritual Care Taxonomy:                   Visit Type/Summary:     - Spiritual Care: Provided support for Patient's spiritual/Catholic requests.  remains available for follow up.    Spiritual Care support can be requested via an Epic consult. For urgent/immediate needs, please contact the On Call  at: [facility]: ext [phone]         Anointed by Fr. Perera

## 2024-11-20 NOTE — PROGRESS NOTES
Pt resting comfortably in bed. 2L O2 via NC for comfort. Morphine drip increased for increasing discomfort. Pt does not respond to verbal or physical stimuli. PRN ativan for agitation. Oral hygiene and franko care performed. Family at bedside       1230- Pt move to room 312

## 2024-11-20 NOTE — PROGRESS NOTES
Pt on hospice- meyer placed per family request. Bilateral wrist restraints discontinued. 2L O2 via NC for comfort. PRN Ativan and Morphine given for discomfort/ agitation. Scopolamine patch placed behind L ear. Pt resting comfortably in bed.

## 2024-11-20 NOTE — SPIRITUAL CARE NOTE
[facility] Chaplain visited pt for initial spiritual assessment and supportive care. Pt is nonresponsive with marli Garcia at bedside. Pr is Islam and was anointed last night by a . Chp provided compassionate/reflective listening and supportive presence. No issues or concerns.    Kita Gonzalez,   [facility]  [phone]

## 2024-11-20 NOTE — SPIRITUAL CARE NOTE
Spiritual Care Visit Note    Patient Name: Alyssa Montgomery Date of Spiritual Care Visit: 24   : 1932 Primary Dx: <principal problem not specified>       Referred By: Referral From: Nurse (Hospice RN)    Visit Type/Summary:     attempted to visit; however patient appeared sleeping and family was not present. Listened to the patient's story/situation/condition through her RN. Quietly offered prayers for the patient, family and our team members. Also, placed a Spiritual care  contact information card.     Spiritual Care support can be requested via an Epic consult.   For urgent/immediate needs, please contact the On Call  at: [facility]: ext [phone]    Rev. Akbar Lara M.Div., M.T.S.,   Certified Grief Counseling Specialist  Advanced Practice Board Certified

## 2024-11-21 NOTE — DISCHARGE SUMMARY
[place] [facility]  DISCHARGE SUMMARY     Alyssa Montgomery Patient Status:  Inpatient    1932 MRN [id number]   Location [facility] 3NW-A Attending No att. providers found   Hosp Day # 2 PCP Mony Hall DO     Date of Admission: 2024  Date of Discharge:  2024     Discharge Disposition:  Hospice    Discharge Diagnosis:  #Acute on chronic metabolic encephalopathy  #Delirium   #Dementia  #PAF w/ RVR  #Sigmoid volvulus s/p left colectomy and end ostomy on   #Acute hypoxemic resp failure  #Rash  #Thrombocytopenia  #Leukocytosis  #Hypernatremia  #Hypoalbuminemia  #Macrocytic anemia  #DM2  #GERD  #Hypothyroidism  #HLD  #PMR    History of Present Illness: Alyssa Montgomery is a 89 year old female with multiple medical problems presented to the hospital  for abd pain and found to have sigmoid volvulus s/p decompression w/ flex sig. Pt then went for colectomy on . Post op course complicated by AMS and fluid overload. Pt has underlying dementia. Given worsening confusion and inability to properly eat pt's condition further declined. Decision was eventually made to adjust goals of care to comfort. Hospice involved. Pt switched to comfort care w/ support from hospice services on . Pt seen today resting comfortably.     Brief Synopsis: pt admitted under hospice support. Pt did well on iv morphine gtt. Symptoms controlled. Pt passed peacefully on 24.            TCM Follow-Up Recommendation:          Discharge Medication List:  -----------------------------------------------------------------------------------------------  PATIENT DISCHARGE INSTRUCTIONS: See electronic chart    Kayla Strickland DO    Total time spent on discharge plannin minutes     The  Cures Act makes medical notes like these available to patients in the interest of transparency. Please be advised this is a medical document. Medical documents are intended to carry relevant information, facts as  evident, and the clinical opinion of the practitioner. The medical note is intended as peer to peer communication and may appear blunt or direct. It is written in medical language and may contain abbreviations or verbiage that are unfamiliar.

## 2024-11-21 NOTE — PLAN OF CARE
Pt resting comfortably in bed, on 2.5L O2 NC for comfort. Pt repositioned for comfort, Buck draining minimal amount of bobby output. Small amount of brown stool in colostomy. Morphine drip infusing per order for comfort, oral and pericare performed.

## 2024-11-21 NOTE — SPIRITUAL CARE NOTE
Spiritual Care Visit Note    Patient Name: Alyssa Montgomery Date of Spiritual Care Visit: 24   : 1932 Primary Dx: <principal problem not specified>       Referred By: Referral From: Nurse (Hospice RN)    Spiritual Care Taxonomy:    Intended Effects: Journeying with someone in the grief process    Methods: Offer emotional support    Interventions: Share words of hope and inspiration;Provide grief resources;Provide Grief Processing Session;Provide compassionate touch;Invite someone to reminisce;Perform a Pentecostal rite or ritual    Visit Type/Summary:     - Spiritual Care: Responded to a request via the on call phone Consulted with RN prior to visit. Patient and family expressed appreciation for  visit. Both daughters present were doing okay and said they had expected this and were accepting.  answered their questions.  remains available as needed for follow up.    Spiritual Care support can be requested via an Epic consult. For urgent/immediate needs, please contact the On Call  at: [facility]: ext [phone]

## 2024-11-25 NOTE — HOSPICE RN NOTE
[facility] bereavement call to daughter Radha. Emotional support provided. Family was grateful for hospice services. Hospice will continue to follow family for bereavement needs.    Pura Raphael RN, [facility]  [facility] Liaison  [phone] after hours

## 2024-12-19 ENCOUNTER — HOME HEALTH CHARGES (OUTPATIENT)
Dept: FAMILY MEDICINE CLINIC | Facility: CLINIC | Age: 89
End: 2024-12-19

## 2024-12-19 DIAGNOSIS — Z71.89 COUNSELING AND COORDINATION OF CARE: ICD-10-CM

## 2024-12-19 DIAGNOSIS — Z51.5 HOSPICE CARE: Primary | ICD-10-CM

## 2025-07-23 NOTE — PLAN OF CARE
Left a voicemail for pt to call back for an appointment.   Pt A&Ox2-3 forgetful. Glasses. [place]. Pt IV abx. tolerating diet- QID accuchecks. RA. Seizure precautions in place-refusing tele. No further needs at this time. Utilizing purewick-incontinent. Up x2 max assist with walker. Pt updated on POC. No further needs at this time.     Problem: Patient/Family Goals  Goal: Patient/Family Long Term Goal  Description: Patient's Long Term Goal: Back to [facility]    Interventions:  - IV antibiotics  - O2  - Nebs  - fall precautions  - See additional Care Plan goals for specific interventions  Outcome: Progressing  Goal: Patient/Family Short Term Goal  Description: Patient's Short Term Goal: Pain relief  9/1 noc: wean O2  9/4AM: urinate  9/5 noc: wean off 02   9/6 noc: wean O2 to baseline  9/7 AM: wean o2  9/7 noc: wean O2 as dun[place]  9/8 NOC: \"I want to go to sleep\"  9/9 NOC: wean O2  9/10 NOC: sleep  9/13 noc: manage cough  9/14: wean O2 as duncan  9/14 noc: sleep/ manage cough  9/15 NOC: sleep  9/17 NOC:Weaned O2,Sleep  Interventions:   - Tylenol,   - reposition  -nebs, hob 30,   -antitussives prn    - See additional Care Plan goals for specific interventions  Outcome: Progressing     Problem: GASTROINTESTINAL - ADULT  Goal: Minimal or absence of nausea and vomiting  Description: INTERVENTIONS:  - Maintain adequate hydration with IV or PO as ordered and tolerated  - Nasogastric tube to low intermittent suction as ordered  - Evaluate effectiveness of ordered antiemetic medications  - Provide nonpharmacologic comfort measures as appropriate  - Advance diet as tolerated, if ordered  - Obtain nutritional consult as needed  - Evaluate fluid balance  Outcome: Progressing     Problem: METABOLIC/FLUID AND ELECTROLYTES - ADULT  Goal: Electrolytes maintained within normal limits  Description: INTERVENTIONS:  - Monitor labs and rhythm and assess patient for signs and symptoms of electrolyte imbalances  - Administer electrolyte replacement as ordered  - Monitor response to electrolyte  replacements, including rhythm and repeat lab results as appropriate  - Fluid restriction as ordered  - Instruct patient on fluid and nutrition restrictions as appropriate  Outcome: Progressing  Goal: Hemodynamic stability and optimal renal function maintained  Description: INTERVENTIONS:  - Monitor labs and assess for signs and symptoms of volume excess or deficit  - Monitor intake, output and patient weight  - Monitor urine specific gravity, serum osmolarity and serum sodium as indicated or ordered  - Monitor response to interventions for patient's volume status, including labs, urine output, blood pressure (other measures as available)  - Encourage oral intake as appropriate  - Instruct patient on fluid and nutrition restrictions as appropriate  Outcome: Progressing     Problem: RESPIRATORY - ADULT  Goal: Achieves optimal ventilation and oxygenation  Description: INTERVENTIONS:  - Assess for changes in respiratory status  - Assess for changes in mentation and behavior  - Position to facilitate oxygenation and minimize respiratory effort  - Oxygen supplementation based on oxygen saturation or ABGs  - Provide Smoking Cessation handout, if applicable  - Encourage broncho-pulmonary hygiene including cough, deep breathe, Incentive Spirometry  - Assess the need for suctioning and perform as needed  - Assess and instruct to report SOB or any respiratory difficulty  - Respiratory Therapy support as indicated  - Manage/alleviate anxiety  - Monitor for signs/symptoms of CO2 retention  Outcome: Progressing     Problem: MUSCULOSKELETAL - ADULT  Goal: Return mobility to safest level of function  Description: INTERVENTIONS:  - Assess patient stability and activity tolerance for standing, transferring and ambulating w/ or w/o assistive devices  - Assist with transfers and ambulation using safe patient handling equipment as needed  - Ensure adequate protection for wounds/incisions during mobilization  - Obtain PT/OT consults as  needed  - Advance activity as appropriate  - Communicate ordered activity level and limitations with patient/family  Outcome: Progressing     Problem: SAFETY ADULT - FALL  Goal: Free from fall injury  Description: INTERVENTIONS:  - Assess pt frequently for physical needs  - Identify cognitive and physical deficits and behaviors that affect risk of falls.  - [place] fall precautions as indicated by assessment.  - Educate pt/family on patient safety including physical limitations  - Instruct pt to call for assistance with activity based on assessment  - Modify environment to reduce risk of injury  - Provide assistive devices as appropriate  - Consider OT/PT consult to assist with strengthening/mobility  - Encourage toileting schedule  Outcome: Progressing     Problem: Diabetes/Glucose Control  Goal: Glucose maintained within prescribed range  Description: INTERVENTIONS:  - Monitor Blood Glucose as ordered  - Assess for signs and symptoms of hyperglycemia and hypoglycemia  - Administer ordered medications to maintain glucose within target range  - Assess barriers to adequate nutritional intake and initiate nutrition consult as needed  - Instruct patient on self management of diabetes  Outcome: Progressing

## (undated) DEVICE — DRAPE FLD WRM W44XL66IN C6L FOR INTRATEMP SYS

## (undated) DEVICE — LOADING UNIT WITH DST SERIES TECHNOLOGY: Brand: GIA

## (undated) DEVICE — CLOSING BUNDLE: Brand: MEDLINE INDUSTRIES, INC.

## (undated) DEVICE — VIOLET BRAIDED (POLYGLACTIN 910), SYNTHETIC ABSORBABLE SUTURE: Brand: COATED VICRYL

## (undated) DEVICE — LAPAROTOMY SPONGE - RF AND X-RAY DETECTABLE PRE-WASHED: Brand: SITUATE

## (undated) DEVICE — DRESSING ALG 3.5X10IN TAN CARBOXYMETHYL CELOS

## (undated) DEVICE — STAPLER WITH DST SERIES TECHNOLOGY: Brand: GIA

## (undated) DEVICE — NEPTUNE E-SEP SMOKE EVACUATION PENCIL, COATED, 70MM BLADE, PUSH BUTTON SWITCH: Brand: NEPTUNE E-SEP

## (undated) DEVICE — GOWN,PREVENTION PLUS,XLARGE,STERILE: Brand: MEDLINE

## (undated) DEVICE — SOLUTION IRRIG 1000ML 0.9% NACL USP BTL

## (undated) DEVICE — SYRINGE MED 10ML LL TIP W/O SFTY DISP

## (undated) DEVICE — GOWN,SIRUS,FABRIC-REINFORCED,X-LARGE: Brand: MEDLINE

## (undated) DEVICE — PAD,ABDOMINAL,8"X7.5",ST,LF,20/BX: Brand: MEDLINE INDUSTRIES, INC.

## (undated) DEVICE — SLEEVE COMPR MD KNEE LEN SGL USE KENDALL SCD

## (undated) DEVICE — SUT COAT VCRL 4-0 27IN SH-1 ABSRB VLT 22MM 1/

## (undated) DEVICE — AEGIS 1" DISK 4MM HOLE, PEEL OPEN: Brand: MEDLINE

## (undated) DEVICE — SUT PDS II 0 L60IN ABSRB VLT L48MM CTX 1/2

## (undated) DEVICE — SUT ETHLN 3-0 18IN PS-1 NABSRB BLK 24MM 3/8 C

## (undated) DEVICE — PACK PBDS LAPAROTOMY GENERAL

## (undated) DEVICE — SUT COAT VCRL + 2-0 18IN ABSRB UD ANTIBACT

## (undated) DEVICE — SUT VCRL 0 L18IN ABSRB UD TIE POLYGLACTIN

## (undated) DEVICE — PROXIMATE RH ROTATING HEAD SKIN STAPLERS (35 WIDE) CONTAINS 35 STAINLESS STEEL STAPLES: Brand: PROXIMATE

## (undated) DEVICE — PTFE COATED BLADE 2.75': Brand: MEDLINE

## (undated) DEVICE — GLOVE SUR 6.5 SENSICARE PI PIP CRM PWD F

## (undated) DEVICE — ANTIBACTERIAL UNDYED BRAIDED (POLYGLACTIN 910), SYNTHETIC ABSORBABLE SUTURE: Brand: COATED VICRYL

## (undated) NOTE — IP AVS SNAPSHOT
Patient Demographics     Address  [address]  Phone  [phone] (Home) *Preferred* E-mail Address  [email or web address]      Emergency Contact(s)     Name Maurisio Mina [phone] Apply 1 drop to eye daily as needed. Levothyroxine Sodium 100 MCG Tabs  Commonly known as:  SYNTHROID  Next dose due:   Tomorrow before breakfast  Notes to patient:  Take on an empty stomach      Take 1 tablet (100 mcg total) by mouth once latha [id number] Enoxaparin Sodium (LOVENOX) 40 MG/0.4ML injection 40 mg 01/27/20 1626 Given              Recent Vital Signs       Most Recent Value   Vitals  117/54 Filed at 01/28/2020 1200   Pulse  83 Filed at 01/28/2020 1200   Resp  18 Filed at 01/28/2020 120 evidence links potential adverse effects to high levels, particularly >60 ng/mL.                 RHEUMATOID ARTHRITIS FACTOR [[id number]] (Normal)  Resulted: 01/27/20 4094, Result status: Final result   Ordering provider:  Bernie Engel DO  01/27/20 0321 Re Author:  Maximo Almaguer MD Service:  Tiffani Peralta Author Type:  Physician    Filed:  1/26/2020  3:57 PM Date of Service:  1/26/2020  3:55 PM Status:  Signed    :   Maximo Almaguer MD (Physician)         [facility]  History and Physical     Bry Lin Diclofenac Sodium 1 % Transdermal Gel, Apply topically 4 (four) times daily. , Disp: , Rfl:   Levothyroxine Sodium 100 MCG Oral Tab, Take 1 tablet (100 mcg total) by mouth once daily. , Disp: 90 tablet, Rfl: 1  Betamethasone Dipropionate 0.05 % External Oint CA 8.8   ALB 2.7*      K 3.7      CO2 28.0   ALKPHO 88   AST 10*   ALT 14   BILT 0.4   TP 6.8       Estimated Creatinine Clearance: 74.1 mL/min (A) (based on SCr of 0.52 mg/dL (L)). No results for input(s): PTP, INR in the last 168 hours. based on high sedimentation rate C-reactive protein and sudden polyarthralgias mostly affecting the hip girdle arms , shoulder apins and sudden  neck at the time she had elevated sedimentation rate and responded very nicely to steroids at that time.  At the this admission she improved very quickly again in the last 24 hours after being given steroids. She was given higher doses of IV steroids yesterday given diagnosis of PMR flare and now on 40 mg a day of prednisone today and improved symptoms.   She still w Drug use: No     Profession: retired     [facility]:   Family History   Problem Relation Age of Onset   • Hypertension Mother    • Heart Attack Father         MI   • Breast Cancer Sister    • Diabetes Sister      EXAM:   /56 (BP Location: Left arm)   Puls 11.0 - 15.0 % 14.2 14.4   RDW-SD      35.1 - 46.3 fL 51.5 (H) 51.8 (H)   Prelim Neutrophil Abs      1.50 - 7.70 x10 (3) uL 6.81 6.78   Neutrophils Absolute      1.50 - 7.70 x10(3) uL 6.81 6.78   Lymphocytes Absolute      1.00 - 4.00 x10(3) uL 0.66 (L) Urobilinogen Urine      0.2 - 2.0 mg/dL  2.0   Nitrite Urine      Negative  Negative   Leukocyte Esterase Urine      Negative  Negative   Microscopic        Microscopic not indicated   CK      26 - 192 U/L 29 29   SED RATE      0 - 25 mm/Hr  67 (H)   RHEUM for the next 4 weeks w/ a  gradual reduction of steroid oveer every 3-4 weeks. . In future again if there are flareups we may need to x-ray joints for CPPD this is less likely vs  rheumatoid arthritis only if future ongoing flareup  Also in  future may need • CATARACT SURGERY, COMPLEX     • D & C  1/1/1968   • HYSTERECTOMY  1/1/1972   • OOPHORECTOMY  1/1/1965   • SKIN SURGERY  06/11/2019    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    • TOTAL ABDOM HYSTERECTOMY[AR.2]         SUBJECTIVE  \"I am good once I get t Pattern: Within Functional Limits  Stoop/Curb Assistance: Not tested  Comment : Above score is based on FIM definations[AR.2]    Skilled Therapy Provided:     Pt presented in supine upon PT arrival. Pt willing to participate in session, working towards inc re-educate;Strengthening;Transfer training;Balance training  Rehab Potential : Good  Frequency (Obs): Daily[AR.2]    CURRENT GOALS     Goal #1 Patient is able to demonstrate supine - sit EOB @ level: modified independent      Goal #2 Patient is able to [facility] Diagnosis documented by Dr Bettye Espinoza in notes from visit on 11/3/14       Past Surgical History  Past Surgical History:   Procedure Laterality Date   • CATARACT SURGERY, COMPLEX     • D & C  1/1/1968   • HYSTERECTOMY  1/1/1972   • OOPHORECTOMY  1/1/1965   • S BALANCE  Static Sitting: Good  Dynamic Sitting: Fair +  Static Standing: Fair(with Rw)  Dynamic Standing: Fair(with RW)    ADDITIONAL TESTS  Additional Tests: None                                 NEUROLOGICAL FINDINGS  Neurological Findings: None Body mechanics  Energy conservation  Functional activity tolerated  Gait training  Transfer training    Patient End of Session: Up in chair;Needs met;Call light within reach;RN aware of session/findings; All patient questions and concerns addressed;SCDs in Goal #1 Patient is able to demonstrate supine - sit EOB @ level: modified independent     Goal #2 Patient is able to demonstrate transfers EOB to/from Chair/Wheelchair at assistance level: modified independent     Goal #3 Patient is able to ambulate 150 fe Past Surgical History  Past Surgical History:   Procedure Laterality Date   • CATARACT SURGERY, COMPLEX     • D & C  1/1/1968   • HYSTERECTOMY  1/1/1972   • OOPHORECTOMY  1/1/1965   • SKIN SURGERY  06/11/2019    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    • standing; education on toileting and toilet transfer techniques, performed transfer with SBA assist to raised height toilet with light use of grab bar required (reports has safety frame in same location at home), toileting via SBA.  Patient also educated on Occupational Therapy Note signed by Cinthya Durand OT at 1/27/2020  3:58 PM  Version 1 of 1    Author:  Cinthya Durand OT Service:  Rehab Author Type:  Occupational Therapist    Filed:  1/27/2020  3:58 PM Date of Service:  1/27/2020 10:05 AM Stat Toilet and Equipment: Comfort height toilet;Grab bar  Shower/Tub and Equipment: Walk-in shower;Grab bar  Other Equipment: Reacher    Occupation/Status: Retired     Drives: No  Patient Regularly Uses: Glasses    Prior Level of Function:[BW.1] Patient report -   Toileting, which includes using toilet, bedpan or urinal? : A Little  -   Putting on and taking off regular upper body clothing?: None  -   Taking care of personal grooming such as brushing teeth?: None  -   Eating meals?: None    AM-PAC Score:  Score: maximizing patient’s ability to return safely to her prior level of function.     Patient Complexity  Occupational Profile/Medical History[BW.1] LOW - Brief history including review of medical or therapy records[BW.2]    Specific performance deficits impact Pneumococcal (Prevnar 13) defer-09/01/17     Deferral:  +Patient Refuses Z28.21       Future Appointments        Provider Jae Nietoi    3/26/2020 1:30 PM Tresea Goltz, DO [address]

## (undated) NOTE — Clinical Note
[address]  [address]  Authorization for Surgical Operation and Procedure     Date:___________                                                                                                         Time:__________  1. I hereby authorize Surgeon(s):  Vic Rock MD, my physician and his/her assistants (if applicable), which may include medical students, residents, and/or fellows, to perform the following surgical operation/ procedure and administer such anesthesia as may be determined necessary by my physician:  Operation/Procedure name (s) Procedure(s):  UNPREPPED SIGMOIDOSCOPY on Alyssa Montgomery   2.   I recognize that during the surgical operation/procedure, unforeseen conditions may necessitate additional or different procedures than those listed above.  I, therefore, further authorize and request that the above-named surgeon, assistants, or designees perform such procedures as are, in their judgment, necessary and desirable.    3.   My surgeon/physician has discussed prior to my surgery the potential benefits, risks and side effects of this procedure; the likelihood of achieving goals; and potential problems that might occur during recuperation.  They also discussed reasonable alternatives to the procedure, including risks, benefits, and side effects related to the alternatives and risks related to not receiving this procedure.  I have had all my questions answered and I acknowledge that no guarantee has been made as to the result that may be obtained.    4.   Should the need arise during my operation/procedure, which includes change of level of care prior to discharge, I also consent to the administration of blood and/or blood products.  Further, I understand that despite careful testing and screening of blood or blood products by collecting agencies, I may still be subject to ill effects as a result of receiving a blood transfusion and/or blood products.  The  following are some, but not all, of the potential risks that can occur: fever and allergic reactions, hemolytic reactions, transmission of diseases such as Hepatitis, AIDS and Cytomegalovirus (CMV) and fluid overload.  In the event that I wish to have an autologous transfusion of my own blood, or a directed donor transfusion, I will discuss this with my physician.  Check only if Refusing Blood or Blood Products  I understand refusal of blood or blood products as deemed necessary by my physician may have serious consequences to my condition to include possible death. I hereby assume responsibility for my refusal and release the hospital, its personnel, and my physicians from any responsibility for the consequences of my refusal.          o  Refuse      5.   I authorize the use of any specimen, organs, tissues, body parts or foreign objects that may be removed from my body during the operation/procedure for diagnosis, research or teaching purposes and their subsequent disposal by hospital authorities.  I also authorize the release of specimen test results and/or written reports to my treating physician on the hospital medical staff or other referring or consulting physicians involved in my care, at the discretion of the Pathologist or my treating physician.    6.   I consent to the photographing or videotaping of the operations or procedures to be performed, including appropriate portions of my body for medical, scientific, or educational purposes, provided my identity is not revealed by the pictures or by descriptive texts accompanying them.  If the procedure has been photographed/videotaped, the surgeon will obtain the original picture, image, videotape or CD.  The hospital will not be responsible for storage, release or maintenance of the picture, image, tape or CD.    7.   I consent to the presence of a  or observers in the operating room as deemed necessary by my physician or their designees.     8.   I recognize that in the event my procedure results in extended X-Ray/fluoroscopy time, I may develop a skin reaction.    9. If I have a Do Not Attempt Resuscitation (DNAR) order in place, that status will be suspended while in the operating room, procedural suite, and during the recovery period unless otherwise explicitly stated by me (or a person authorized to consent on my behalf). The surgeon or my attending physician will determine when the applicable recovery period ends for purposes of reinstating the DNAR order.  10. Patients having a sterilization procedure: I understand that if the procedure is successful the results will be permanent and it will therefore be impossible for me to inseminate, conceive, or bear children.  I also understand that the procedure is intended to result in sterility, although the result has not been guaranteed.   11. I acknowledge that my physician has explained sedation/analgesia administration to me including the risk and benefits I consent to the administration of sedation/analgesia as may be necessary or desirable in the judgment of my physician.    I CERTIFY THAT I HAVE READ AND FULLY UNDERSTAND THE ABOVE CONSENT TO OPERATION and/or OTHER PROCEDURE.    _________________________________________  __________________________________  Signature of Patient     Signature of Responsible Person         ___________________________________         Printed Name of Responsible Person           _________________________________                 Relationship to Patient  _________________________________________  ______________________________  Signature of Witness          Date  Time      Patient Name: Alyssa Montgomery     : 1932                 Printed: 2024     Medical Record #: [id number]                     Page 1 [address]  [address]    Consent for Anesthesia    1. Alyssa MONTEJO  agree to be cared for by an anesthesiologist, who is specially trained to monitor me and give me medicine to put me to sleep or keep me comfortable during my procedure    I understand that my anesthesiologist is not an employee or agent of [facility] or [employer]. He or she works for [employer].    2. As the patient asking for anesthesia services, I agree to:  a. Allow the anesthesiologist (anesthesia doctor) to give me medicine and do additional procedures as necessary. Some examples are: Starting or using an “IV” to give me medicine, fluids or blood during my procedure, and having a breathing tube placed to help me breathe when I’m asleep (intubation). In the event that my heart stops working properly, I understand that my anesthesiologist will make every effort to sustain my life, unless otherwise directed by [facility] Do Not Resuscitate documents.  b. Tell my anesthesia doctor before my procedure:  i. If I am pregnant.  ii. The last time that I ate or drank.  iii. All of the medicines I take (including prescriptions, herbal supplements, and pills I can buy without a prescription (including street drugs/illegal medications). Failure to inform my anesthesiologist about these medicines may increase my risk of anesthetic complications.  iv. If I am allergic to anything or have had a reaction to anesthesia before.  3. I understand how the anesthesia medicine will help me (benefits).  4. I understand that with any type of anesthesia medicine there are risks:  a. The most common risks are: nausea, vomiting, sore throat, muscle soreness, damage to my eyes, mouth, or teeth (from breathing tube placement).  b. Rare risks include: remembering what happened during my procedure, allergic reactions to medications, injury to my airway, heart, lungs, vision, nerves, or muscles and in extremely rare instances death.  5. My doctor has explained to me other choices available to me for my  care (alternatives).  6. Pregnant Patients (“epidural”):  I understand that the risks of having an epidural (medicine given into my back to help control pain during labor), include itching, low blood pressure, difficulty urinating, headache or slowing of the baby’s heart. Very rare risks include infection, bleeding, seizure, irregular heart rhythms and nerve injury.  7. Regional Anesthesia (“spinal”, “epidural”, & “nerve blocks”):  I understand that rare but potential complications include headache, bleeding, infection, seizure, irregular heart rhythms, and nerve injury.    I can change my mind about having anesthesia services at any time before I get the medicine.    _____________________________________________________________________________  Patient (or Representative) Signature/Relationship to Patient  Date   Time    _____________________________________________________________________________   Name (if used)    Language/Organization   Time    _____________________________________________________________________________  Anesthesiologist Signature     Date   Time  I have discussed the procedure and information above with the patient (or patient’s representative) and answered their questions. The patient or their representative has agreed to have anesthesia services.    _____________________________________________________________________________  Witness        Date   Time  I have verified that the signature is that of the patient or patient’s representative, and that it was signed before the procedure  Patient Name: Alyssa Montgomery     : 1932                 Printed: 2024     Medical Record #: [id number]                     Page 2 of 2

## (undated) NOTE — Clinical Note
Hello! Contacted pt for TCM. She is feeling better but anxious about her dx. We discussed RSV in detail, she was feeling better by the end of the call. Just an FYI!

## (undated) NOTE — LETTER
[address] [place] [address] MANAGEMENT DEPARTMENT  [address] Luismyriam Castilloes, [address] [facility]  [phone]                 March 2, 2020    [address] [facility], [facility] [address]      Dear Alonso Jha:   It was a pleasure speaking with you o

## (undated) NOTE — LETTER
March 13, 2019        [place] [address]        Dear Ev Summers: It was a pleasure speaking with you over the phone recently.  To follow up, I wanted to send you some contact information to utilize when you have a

## (undated) NOTE — LETTER
December 2, 2020          [address] [place] [address]          Dear Jaki Jacinto: The following are the results of your recent tests ordered by Dr. Alyce Nielson. Please review the list of test results.   Your result is the zhang

## (undated) NOTE — MR AVS SNAPSHOT
After Visit Summary   7/14/2020    Ophelia Cancel    MRN: [id number]           Visit Information     Date & Time  7/14/2020  9:15 AM Provider  REF [facility] Department  THE [facility] Reference Lab Dept.  Phone  [phone]      Allergies as of experience and are looking for ways to make improvements. Your feedback will help us do so. For more information on [employer], please visit [email or web address]                   DO YOU KNOW WHERE TO GO?   Injury & Illness are neve For more information about hours, locations or appointment options available at [facility],   visit [employer] or call [phone]. ([phone])

## (undated) NOTE — Clinical Note
TCM call completed. Pt has an appt on 2/15/24, declined a sooner appt. Pt is feeling better today. Thank you.

## (undated) NOTE — LETTER
[address]  [address]  Authorization for Surgical Operation and Procedure     Date:___________                                                                                                         Time:__________  I hereby authorize Aslhey Lee , my physician and his/her assistants (if applicable), which may include medical students, residents, and/or fellows, to perform the following surgical operation/ procedure and administer such anesthesia as may be determined necessary by my physician:  Operation/Procedure name (s)  exploratory laparotomy, sigmoid colectomy with end colostomy on Alyssa Montgomery   2.   I recognize that during the surgical operation/procedure, unforeseen conditions may necessitate additional or different procedures than those listed above.  I, therefore, further authorize and request that the above-named surgeon, assistants, or designees perform such procedures as are, in their judgment, necessary and desirable.    3.   My surgeon/physician has discussed prior to my surgery the potential benefits, risks and side effects of this procedure; the likelihood of achieving goals; and potential problems that might occur during recuperation.  They also discussed reasonable alternatives to the procedure, including risks, benefits, and side effects related to the alternatives and risks related to not receiving this procedure.  I have had all my questions answered and I acknowledge that no guarantee has been made as to the result that may be obtained.    4.   Should the need arise during my operation/procedure, which includes change of level of care prior to discharge, I also consent to the administration of blood and/or blood products.  Further, I understand that despite careful testing and screening of blood or blood products by collecting agencies, I may still be subject to ill effects as a result of receiving a blood transfusion and/or blood products.  The  following are some, but not all, of the potential risks that can occur: fever and allergic reactions, hemolytic reactions, transmission of diseases such as Hepatitis, AIDS and Cytomegalovirus (CMV) and fluid overload.  In the event that I wish to have an autologous transfusion of my own blood, or a directed donor transfusion, I will discuss this with my physician.  Check only if Refusing Blood or Blood Products  I understand refusal of blood or blood products as deemed necessary by my physician may have serious consequences to my condition to include possible death. I hereby assume responsibility for my refusal and release the hospital, its personnel, and my physicians from any responsibility for the consequences of my refusal.          o  Refuse      5.   I authorize the use of any specimen, organs, tissues, body parts or foreign objects that may be removed from my body during the operation/procedure for diagnosis, research or teaching purposes and their subsequent disposal by hospital authorities.  I also authorize the release of specimen test results and/or written reports to my treating physician on the hospital medical staff or other referring or consulting physicians involved in my care, at the discretion of the Pathologist or my treating physician.    6.   I consent to the photographing or videotaping of the operations or procedures to be performed, including appropriate portions of my body for medical, scientific, or educational purposes, provided my identity is not revealed by the pictures or by descriptive texts accompanying them.  If the procedure has been photographed/videotaped, the surgeon will obtain the original picture, image, videotape or CD.  The hospital will not be responsible for storage, release or maintenance of the picture, image, tape or CD.    7.   I consent to the presence of a  or observers in the operating room as deemed necessary by my physician or their designees.     8.   I recognize that in the event my procedure results in extended X-Ray/fluoroscopy time, I may develop a skin reaction.    9. If I have a Do Not Attempt Resuscitation (DNAR) order in place, that status will be suspended while in the operating room, procedural suite, and during the recovery period unless otherwise explicitly stated by me (or a person authorized to consent on my behalf). The surgeon or my attending physician will determine when the applicable recovery period ends for purposes of reinstating the DNAR order.  10. Patients having a sterilization procedure: I understand that if the procedure is successful the results will be permanent and it will therefore be impossible for me to inseminate, conceive, or bear children.  I also understand that the procedure is intended to result in sterility, although the result has not been guaranteed.   11. I acknowledge that my physician has explained sedation/analgesia administration to me including the risk and benefits I consent to the administration of sedation/analgesia as may be necessary or desirable in the judgment of my physician.    I CERTIFY THAT I HAVE READ AND FULLY UNDERSTAND THE ABOVE CONSENT TO OPERATION and/or OTHER PROCEDURE.    _________________________________________  __________________________________  Signature of Patient     Signature of Responsible Person         ___________________________________         Printed Name of Responsible Person           _________________________________                 Relationship to Patient  _________________________________________  ______________________________  Signature of Witness          Date  Time      Patient Name: Alyssa Montgomery     : 1932                 Printed: 2024     Medical Record #: [id number]                     Page 1 of [address] [place] [address]  [address]    Consent for Anesthesia    I, Alyssa Montgomery  agree to be cared for by an anesthesiologist, who is specially trained to monitor me and give me medicine to put me to sleep or keep me comfortable during my procedure    I understand that my anesthesiologist is not an employee or agent of [facility] or [employer]. He or she works for [email or web address].    As the patient asking for anesthesia services, I agree to:  Allow the anesthesiologist (anesthesia doctor) to give me medicine and do additional procedures as necessary. Some examples are: Starting or using an “IV” to give me medicine, fluids or blood during my procedure, and having a breathing tube placed to help me breathe when I’m asleep (intubation). In the event that my heart stops working properly, I understand that my anesthesiologist will make every effort to sustain my life, unless otherwise directed by [facility] Do Not Resuscitate documents.  Tell my anesthesia doctor before my procedure:  If I am pregnant.  The last time that I ate or drank.  All of the medicines I take (including prescriptions, herbal supplements, and pills I can buy without a prescription (including street drugs/illegal medications). Failure to inform my anesthesiologist about these medicines may increase my risk of anesthetic complications.  If I am allergic to anything or have had a reaction to anesthesia before.  I understand how the anesthesia medicine will help me (benefits).  I understand that with any type of anesthesia medicine there are risks:  The most common risks are: nausea, vomiting, sore throat, muscle soreness, damage to my eyes, mouth, or teeth (from breathing tube placement).  Rare risks include: remembering what happened during my procedure, allergic reactions to medications, injury to my airway, heart, lungs, vision, nerves, or muscles and in extremely rare instances death.  My doctor has explained to me other choices available to me for my care (alternatives).  Pregnant Patients  (“epidural”):  I understand that the risks of having an epidural (medicine given into my back to help control pain during labor), include itching, low blood pressure, difficulty urinating, headache or slowing of the baby’s heart. Very rare risks include infection, bleeding, seizure, irregular heart rhythms and nerve injury.  Regional Anesthesia (“spinal”, “epidural”, & “nerve blocks”):  I understand that rare but potential complications include headache, bleeding, infection, seizure, irregular heart rhythms, and nerve injury.    I can change my mind about having anesthesia services at any time before I get the medicine.    _____________________________________________________________________________  Patient (or Representative) Signature/Relationship to Patient  Date   Time    _____________________________________________________________________________   Name (if used)    Language/Organization   Time    _____________________________________________________________________________  Anesthesiologist Signature     Date   Time  I have discussed the procedure and information above with the patient (or patient’s representative) and answered their questions. The patient or their representative has agreed to have anesthesia services.    _____________________________________________________________________________  Witness        Date   Time  I have verified that the signature is that of the patient or patient’s representative, and that it was signed before the procedure  Patient Name: Alyssa Montgomery     : 1932                 Printed: 2024     Medical Record #: [id number]                     Page 2 of 2

## (undated) NOTE — LETTER
[address]  [address]  Authorization for Surgical Operation and Procedure     Date:___________                                                                                                         Time:__________  I hereby authorize Surgeon(s):  Vic Rock MD, my physician and his/her assistants (if applicable), which may include medical students, residents, and/or fellows, to perform the following surgical operation/ procedure and administer such anesthesia as may be determined necessary by my physician:  Operation/Procedure name (s) Procedure(s):  UNPREPPED SIGMOIDOSCOPY on Alyssa Montgomery   2.   I recognize that during the surgical operation/procedure, unforeseen conditions may necessitate additional or different procedures than those listed above.  I, therefore, further authorize and request that the above-named surgeon, assistants, or designees perform such procedures as are, in their judgment, necessary and desirable.    3.   My surgeon/physician has discussed prior to my surgery the potential benefits, risks and side effects of this procedure; the likelihood of achieving goals; and potential problems that might occur during recuperation.  They also discussed reasonable alternatives to the procedure, including risks, benefits, and side effects related to the alternatives and risks related to not receiving this procedure.  I have had all my questions answered and I acknowledge that no guarantee has been made as to the result that may be obtained.    4.   Should the need arise during my operation/procedure, which includes change of level of care prior to discharge, I also consent to the administration of blood and/or blood products.  Further, I understand that despite careful testing and screening of blood or blood products by collecting agencies, I may still be subject to ill effects as a result of receiving a blood transfusion and/or blood products.  The  following are some, but not all, of the potential risks that can occur: fever and allergic reactions, hemolytic reactions, transmission of diseases such as Hepatitis, AIDS and Cytomegalovirus (CMV) and fluid overload.  In the event that I wish to have an autologous transfusion of my own blood, or a directed donor transfusion, I will discuss this with my physician.  Check only if Refusing Blood or Blood Products  I understand refusal of blood or blood products as deemed necessary by my physician may have serious consequences to my condition to include possible death. I hereby assume responsibility for my refusal and release the hospital, its personnel, and my physicians from any responsibility for the consequences of my refusal.          o  Refuse      5.   I authorize the use of any specimen, organs, tissues, body parts or foreign objects that may be removed from my body during the operation/procedure for diagnosis, research or teaching purposes and their subsequent disposal by hospital authorities.  I also authorize the release of specimen test results and/or written reports to my treating physician on the hospital medical staff or other referring or consulting physicians involved in my care, at the discretion of the Pathologist or my treating physician.    6.   I consent to the photographing or videotaping of the operations or procedures to be performed, including appropriate portions of my body for medical, scientific, or educational purposes, provided my identity is not revealed by the pictures or by descriptive texts accompanying them.  If the procedure has been photographed/videotaped, the surgeon will obtain the original picture, image, videotape or CD.  The hospital will not be responsible for storage, release or maintenance of the picture, image, tape or CD.    7.   I consent to the presence of a  or observers in the operating room as deemed necessary by my physician or their designees.     8.   I recognize that in the event my procedure results in extended X-Ray/fluoroscopy time, I may develop a skin reaction.    9. If I have a Do Not Attempt Resuscitation (DNAR) order in place, that status will be suspended while in the operating room, procedural suite, and during the recovery period unless otherwise explicitly stated by me (or a person authorized to consent on my behalf). The surgeon or my attending physician will determine when the applicable recovery period ends for purposes of reinstating the DNAR order.  10. Patients having a sterilization procedure: I understand that if the procedure is successful the results will be permanent and it will therefore be impossible for me to inseminate, conceive, or bear children.  I also understand that the procedure is intended to result in sterility, although the result has not been guaranteed.   11. I acknowledge that my physician has explained sedation/analgesia administration to me including the risk and benefits I consent to the administration of sedation/analgesia as may be necessary or desirable in the judgment of my physician.    I CERTIFY THAT I HAVE READ AND FULLY UNDERSTAND THE ABOVE CONSENT TO OPERATION and/or OTHER PROCEDURE.    _________________________________________  __________________________________  Signature of Patient     Signature of Responsible Person         ___________________________________         Printed Name of Responsible Person           _________________________________                 Relationship to Patient  _________________________________________  ______________________________  Signature of Witness          Date  Time      Patient Name: Alyssa Montgomery     : 1932                 Printed: 2024     Medical Record #: [id number]                     Page 1 of [address] [place] [address]  [address]    Consent for Anesthesia    I, Alyssa Montgomery  agree to be cared for by an anesthesiologist, who is specially trained to monitor me and give me medicine to put me to sleep or keep me comfortable during my procedure    I understand that my anesthesiologist is not an employee or agent of [facility] or [employer]. He or she works for [employer]    As the patient asking for anesthesia services, I agree to:  Allow the anesthesiologist (anesthesia doctor) to give me medicine and do additional procedures as necessary. Some examples are: Starting or using an “IV” to give me medicine, fluids or blood during my procedure, and having a breathing tube placed to help me breathe when I’m asleep (intubation). In the event that my heart stops working properly, I understand that my anesthesiologist will make every effort to sustain my life, unless otherwise directed by [facility] Do Not Resuscitate documents.  Tell my anesthesia doctor before my procedure:  If I am pregnant.  The last time that I ate or drank.  All of the medicines I take (including prescriptions, herbal supplements, and pills I can buy without a prescription (including street drugs/illegal medications). Failure to inform my anesthesiologist about these medicines may increase my risk of anesthetic complications.  If I am allergic to anything or have had a reaction to anesthesia before.  I understand how the anesthesia medicine will help me (benefits).  I understand that with any type of anesthesia medicine there are risks:  The most common risks are: nausea, vomiting, sore throat, muscle soreness, damage to my eyes, mouth, or teeth (from breathing tube placement).  Rare risks include: remembering what happened during my procedure, allergic reactions to medications, injury to my airway, heart, lungs, vision, nerves, or muscles and in extremely rare instances death.  My doctor has explained to me other choices available to me for my care (alternatives).  Pregnant Patients  (“epidural”):  I understand that the risks of having an epidural (medicine given into my back to help control pain during labor), include itching, low blood pressure, difficulty urinating, headache or slowing of the baby’s heart. Very rare risks include infection, bleeding, seizure, irregular heart rhythms and nerve injury.  Regional Anesthesia (“spinal”, “epidural”, & “nerve blocks”):  I understand that rare but potential complications include headache, bleeding, infection, seizure, irregular heart rhythms, and nerve injury.    I can change my mind about having anesthesia services at any time before I get the medicine.    _____________________________________________________________________________  Patient (or Representative) Signature/Relationship to Patient  Date   Time    _____________________________________________________________________________   Name (if used)    Language/Organization   Time    _____________________________________________________________________________  Anesthesiologist Signature     Date   Time  I have discussed the procedure and information above with the patient (or patient’s representative) and answered their questions. The patient or their representative has agreed to have anesthesia services.    _____________________________________________________________________________  Witness        Date   Time  I have verified that the signature is that of the patient or patient’s representative, and that it was signed before the procedure  Patient Name: Alyssa Montgomery     : 1932                 Printed: 2024     Medical Record #: [id number]                     Page 2 of 2

## (undated) NOTE — IP AVS SNAPSHOT
[address]            (For Outpatient Use Only) Initial Admit Date: 1/26/2020   Inpt/Obs Admit Date: Inpt: N/A / Obs: 01/26/20   Discharge Date:    Fernando Chand:  [de-identified]   MRN: [de-identified]   CSN: [id number]   CEID: [id number] Group Number:  Insurance Type:    Subscriber Name:  Subscriber :    Subscriber ID:  Pt Rel to Subscriber:    Hospital Account Financial Class: Medicare    2020

## (undated) NOTE — MR AVS SNAPSHOT
After Visit Summary   6/9/2020    Jose Luis Peoples    MRN: [id number]           Visit Information     Date & Time  6/9/2020  8:00 AM Provider  [facility]  [facility] Dept.  Phone  [phone]      Allergies as of 6/ complete it and provide feedback. We strive to deliver the best patient experience and are looking for ways to make improvements. Your feedback will help us do so. For more information on [employer], please visit [email or web address] at a hospital emergency room. Average cost  $2,300*   *Cost varies based on your insurance coverage  For more information about hours, locations or appointment options available at [facility],   visit [email or web address] or call [phone].  (